# Patient Record
Sex: FEMALE | Race: WHITE | NOT HISPANIC OR LATINO | Employment: OTHER | URBAN - METROPOLITAN AREA
[De-identification: names, ages, dates, MRNs, and addresses within clinical notes are randomized per-mention and may not be internally consistent; named-entity substitution may affect disease eponyms.]

---

## 2017-03-06 RX ORDER — NITROFURANTOIN MACROCRYSTALS 50 MG/1
CAPSULE ORAL
Qty: 30 CAPSULE | Refills: 11 | OUTPATIENT
Start: 2017-03-06

## 2017-03-06 RX ORDER — OSPEMIFENE 60 MG/1
TABLET, FILM COATED ORAL
Qty: 30 TABLET | Refills: 11 | OUTPATIENT
Start: 2017-03-06

## 2017-03-06 RX ORDER — ACYCLOVIR 400 MG/1
TABLET ORAL
Qty: 30 TABLET | Refills: 11 | OUTPATIENT
Start: 2017-03-06

## 2017-03-08 RX ORDER — OSPEMIFENE 60 MG/1
TABLET, FILM COATED ORAL
Qty: 30 TABLET | Refills: 11 | OUTPATIENT
Start: 2017-03-08

## 2017-03-08 RX ORDER — ACYCLOVIR 400 MG/1
TABLET ORAL
Qty: 30 TABLET | Refills: 11 | OUTPATIENT
Start: 2017-03-08

## 2017-03-08 RX ORDER — NITROFURANTOIN MACROCRYSTALS 50 MG/1
CAPSULE ORAL
Qty: 30 CAPSULE | Refills: 11 | OUTPATIENT
Start: 2017-03-08

## 2017-03-10 RX ORDER — OSPEMIFENE 60 MG/1
TABLET, FILM COATED ORAL
Qty: 30 TABLET | Refills: 11 | OUTPATIENT
Start: 2017-03-10

## 2017-03-10 RX ORDER — ACYCLOVIR 400 MG/1
TABLET ORAL
Qty: 30 TABLET | Refills: 11 | OUTPATIENT
Start: 2017-03-10

## 2017-03-10 RX ORDER — NITROFURANTOIN MACROCRYSTALS 50 MG/1
CAPSULE ORAL
Qty: 30 CAPSULE | Refills: 11 | OUTPATIENT
Start: 2017-03-10

## 2017-03-29 ENCOUNTER — OFFICE VISIT (OUTPATIENT)
Dept: OBSTETRICS AND GYNECOLOGY | Facility: CLINIC | Age: 62
End: 2017-03-29

## 2017-03-29 VITALS
SYSTOLIC BLOOD PRESSURE: 132 MMHG | DIASTOLIC BLOOD PRESSURE: 86 MMHG | WEIGHT: 190.6 LBS | HEIGHT: 61 IN | BODY MASS INDEX: 35.98 KG/M2

## 2017-03-29 DIAGNOSIS — N94.11 SUPERFICIAL DYSPAREUNIA: Primary | ICD-10-CM

## 2017-03-29 DIAGNOSIS — Z78.0 MENOPAUSE: ICD-10-CM

## 2017-03-29 DIAGNOSIS — A60.00 RECURRENT GENITAL HERPES: ICD-10-CM

## 2017-03-29 DIAGNOSIS — Z01.419 ENCOUNTER FOR GYNECOLOGICAL EXAMINATION WITHOUT ABNORMAL FINDING: ICD-10-CM

## 2017-03-29 PROCEDURE — 99396 PREV VISIT EST AGE 40-64: CPT | Performed by: OBSTETRICS & GYNECOLOGY

## 2017-03-29 RX ORDER — ACYCLOVIR 400 MG/1
400 TABLET ORAL DAILY
Qty: 90 TABLET | Refills: 3 | Status: SHIPPED | OUTPATIENT
Start: 2017-03-29 | End: 2018-05-29 | Stop reason: SDUPTHER

## 2017-03-29 RX ORDER — FLUTICASONE PROPIONATE 50 MCG
2 SPRAY, SUSPENSION (ML) NASAL DAILY
COMMUNITY

## 2017-03-29 RX ORDER — PSEUDOEPHEDRINE HCL 30 MG
30 TABLET ORAL EVERY 4 HOURS PRN
COMMUNITY
End: 2021-01-13

## 2017-03-29 RX ORDER — PHENTERMINE HYDROCHLORIDE 37.5 MG/1
37.5 CAPSULE ORAL EVERY MORNING
COMMUNITY
End: 2019-09-24

## 2017-03-29 NOTE — PROGRESS NOTES
"   Chief Complaint   Patient presents with   • Gynecologic Exam     intermittent lower abd pain   • Med Refill       Park Ray is a 61 y.o. year old  presenting to be seen for her annual exam.  This patient has previously had an abdominal hysterectomy left salpingo-oophorectomy by Dr. Burgos.  He subsequently had a right salpingo-oophorectomy in .  Dr. 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  She has recently had laparoscopic cholecystectomy.  Takes Osphena for superficial dyspareunia.  She takes daily acyclovir for recurrent genital HSV.  She denies menopausal symptoms.  She has complaints of urinary urgency and incontinence.  She had simple uroflowmetry in 2016 in my office with a Bonne' test revealing a 10° deviation from baseline and a PVR of 15 mL.     SCREENING TESTS    Year 2012   Age                         PAP                         HPV high risk                         Mammogram     benign                    JOSÉ MANUEL score                         Breast MRI                         Lipids                         Vitamin D                         Colonoscopy                         DEXA  Frax (hip/any)   normal                      Ovarian Screen                           Enter the month test was performed.  If month not known, enter \"X'  · Black numbers = normal results  · Red numbers = abnormal results  · Black X = patient reported normal  · Red X - patient reported abnormal      Referred by:    Profession:    Other info:         History   Sexual Activity   • Sexual activity: Yes   • Partners: Male   • Birth control/ protection: Post-menopausal, Surgical    She would not like to be screened for STD's at today's exam.     She exercises regularly: no.  She wears her seat belt: yes.  She has concerns about domestic " violence: no.  She has noticed changes in height: no    GYN screening history:  · Last mammogram: was done on approximately 10/14/2016 and the result was: Birads I (Normal).  · Last DEXA: was done on approximately 2014 and the results were: normal.    No Additional Complaints Reported    The following portions of the patient's history were reviewed and updated as appropriate:vital signs and   She  does not have any pertinent problems on file.  She  has a past surgical history that includes Cholecystectomy; Bowel resection; Rectal surgery; Pelvic laparoscopy; Total abdominal hysterectomy w/ bilateral salpingoophorectomy (Left); Salpingoophorectomy (Right); Uterine Suspension; Colectomy partial / total; MMK Procedure; Enterocele repair; and  section.  Her family history includes Brain cancer in her brother; Breast cancer in her mother; Cancer in her father; Diabetes in her brother; Hypertension in her brother and sister.  She  reports that she has never smoked. She does not have any smokeless tobacco history on file. She reports that she drinks alcohol. She reports that she does not use illicit drugs.  Current Outpatient Prescriptions   Medication Sig Dispense Refill   • Cholecalciferol (VITAMIN D-400 PO) Take 200 Units by mouth Daily.     • fluticasone (FLONASE) 50 MCG/ACT nasal spray 2 sprays into each nostril Daily.     • furosemide (LASIX) 40 MG tablet Take 40 mg by mouth as needed.     • levothyroxine (SYNTHROID, LEVOTHROID) 25 MCG tablet Take 25 mcg by mouth daily.     • Multiple Vitamins-Minerals (MULTIVITAMIN ADULT PO) Take 1 tablet by mouth daily.     • phentermine 37.5 MG capsule Take 37.5 mg by mouth Every Morning.     • pravastatin (PRAVACHOL) 40 MG tablet Take 40 mg by mouth daily.     • pseudoephedrine (SUDAFED) 30 MG tablet Take 30 mg by mouth Every 4 (Four) Hours As Needed for Congestion.     • Vitamin A 53783 UNITS tablet Take 10,000 tablets by mouth daily.     • vitamin E 400 UNIT capsule  "Take 400 Units by mouth daily.     • acyclovir (ZOVIRAX) 400 MG tablet Take 1 tablet by mouth Daily. Take no more than 5 doses a day. 90 tablet 3   • Ospemifene 60 MG tablet Take 60 mg by mouth Daily. 90 tablet 3     No current facility-administered medications for this visit.      She is allergic to tetracyclines & related..    Review of Systems  A comprehensive review of systems was negative.  Constitutional: negative for fever, chills, activity change, appetite change, fatigue and unexpected weight change.  Respiratory: negative  Cardiovascular: negative  Gastrointestinal: positive for abdominal pain  Genitourinary:positive for urinary incontinence and urgency  Musculoskeletal:negative  Behavioral/Psych: negative       /86  Ht 61\" (154.9 cm)  Wt 190 lb 9.6 oz (86.5 kg)  LMP  (LMP Unknown)  BMI 36.01 kg/m2    Physical Exam    General:  alert; cooperative; well developed; well nourished   Skin:  No suspicious lesions seen   Thyroid: normal to inspection and palpation   Lungs:  clear to auscultation bilaterally   Heart:  regular rate and rhythm, S1, S2 normal, no murmur, click, rub or gallop   Breasts:  Examined in supine position  Symmetric without masses or skin dimpling  Nipples normal without inversion, lesions or discharge  There are no palpable axillary nodes   Abdomen: soft, non-tender; no masses  no umbilical or inginual hernias are present  no hepato-splenomegaly  scars   Pelvis: Clinical staff was present for exam  External genitalia:  normal appearance of the external genitalia including Bartholin's and [place]'s glands.  Vaginal:  normal pink mucosa without prolapse or lesions.  Cervix:  absent.  Uterus:  absent.  Adnexa:  absent, bilateral.  Rectal:  anus visually normal appearing. recto-vaginal exam unremarkable and confirms findings;     Lab Review   No data reviewed    Imaging  Mammogram results- benign         ASSESSMENT  Problems Addressed this Visit        Nervous and Auditory    " Superficial dyspareunia - Primary    Relevant Medications    Ospemifene 60 MG tablet       Genitourinary    Menopause    Recurrent genital herpes    Relevant Medications    acyclovir (ZOVIRAX) 400 MG tablet      Other Visit Diagnoses     Encounter for gynecological examination without abnormal finding              PLAN    Medications prescribed this encounter:    New Medications Ordered This Visit   Medications   • phentermine 37.5 MG capsule     Sig: Take 37.5 mg by mouth Every Morning.   • fluticasone (FLONASE) 50 MCG/ACT nasal spray     Si sprays into each nostril Daily.   • pseudoephedrine (SUDAFED) 30 MG tablet     Sig: Take 30 mg by mouth Every 4 (Four) Hours As Needed for Congestion.   • Cholecalciferol (VITAMIN D-400 PO)     Sig: Take 200 Units by mouth Daily.   • acyclovir (ZOVIRAX) 400 MG tablet     Sig: Take 1 tablet by mouth Daily. Take no more than 5 doses a day.     Dispense:  90 tablet     Refill:  3   • Ospemifene 60 MG tablet     Sig: Take 60 mg by mouth Daily.     Dispense:  90 tablet     Refill:  3   ·   · Calcium, 600 mg/ Vit. D, 400 IU daily; regular weight-bearing exercise  · Follow up: 12 month(s)  *Please note that portions of this documentation may have been completed with a voice recognition program.  Efforts were made to edit this dictation, but occasional words may have been mistranscribed.       This note was electronically signed.    SHARON Bishop MD  2017  3:35 PM

## 2017-03-30 RX ORDER — NITROFURANTOIN MACROCRYSTALS 50 MG/1
50 CAPSULE ORAL DAILY
Qty: 90 CAPSULE | Refills: 3 | Status: SHIPPED | OUTPATIENT
Start: 2017-03-30 | End: 2018-07-25 | Stop reason: SDUPTHER

## 2017-03-30 NOTE — TELEPHONE ENCOUNTER
Pt asking for refill on Macrodantin 50 mg - takes daily.  She forgot when here yesterday. Wants  90 day supply sent to [facility] in [place]. Ok 'd per Dr Bishop.

## 2017-06-01 ENCOUNTER — APPOINTMENT (OUTPATIENT)
Dept: GENERAL RADIOLOGY | Facility: HOSPITAL | Age: 62
End: 2017-06-01

## 2017-06-01 ENCOUNTER — HOSPITAL ENCOUNTER (EMERGENCY)
Facility: HOSPITAL | Age: 62
Discharge: HOME | End: 2017-06-01
Attending: EMERGENCY MEDICINE | Admitting: EMERGENCY MEDICINE

## 2017-06-01 ENCOUNTER — APPOINTMENT (OUTPATIENT)
Dept: CT IMAGING | Facility: HOSPITAL | Age: 62
End: 2017-06-01

## 2017-06-01 VITALS
HEART RATE: 75 BPM | TEMPERATURE: 98 F | OXYGEN SATURATION: 96 % | RESPIRATION RATE: 16 BRPM | HEIGHT: 62 IN | DIASTOLIC BLOOD PRESSURE: 79 MMHG | WEIGHT: 160 LBS | SYSTOLIC BLOOD PRESSURE: 125 MMHG | BODY MASS INDEX: 29.44 KG/M2
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
## 2017-06-01 DIAGNOSIS — S52.125A CLOSED NONDISPLACED FRACTURE OF HEAD OF LEFT RADIUS, INITIAL ENCOUNTER: ICD-10-CM

## 2017-06-01 DIAGNOSIS — S90.32XA CONTUSION OF LEFT FOOT, INITIAL ENCOUNTER: ICD-10-CM

## 2017-06-01 DIAGNOSIS — S42.022A CLOSED DISPLACED FRACTURE OF SHAFT OF LEFT CLAVICLE, INITIAL ENCOUNTER: Primary | ICD-10-CM

## 2017-06-01 LAB
ALBUMIN SERPL-MCNC: 4.3 G/DL (ref 3.2–4.8)
ALBUMIN/GLOB SERPL: 1.7 G/DL (ref 1.5–2.5)
ALP SERPL-CCNC: 99 U/L (ref 25–100)
ALT SERPL W P-5'-P-CCNC: 29 U/L (ref 7–40)
ANION GAP SERPL CALCULATED.3IONS-SCNC: 2 MMOL/L (ref 3–11)
APTT PPP: 26.5 SECONDS (ref 24–31)
AST SERPL-CCNC: 30 U/L (ref 0–33)
BACTERIA UR QL AUTO: ABNORMAL /HPF
BASOPHILS # BLD AUTO: 0.02 10*3/MM3 (ref 0–0.2)
BASOPHILS NFR BLD AUTO: 0.2 % (ref 0–1)
BILIRUB SERPL-MCNC: 0.6 MG/DL (ref 0.3–1.2)
BILIRUB UR QL STRIP: NEGATIVE
BUN BLD-MCNC: 14 MG/DL (ref 9–23)
BUN/CREAT SERPL: 28 (ref 7–25)
CALCIUM SPEC-SCNC: 9.5 MG/DL (ref 8.7–10.4)
CHLORIDE SERPL-SCNC: 102 MMOL/L (ref 99–109)
CLARITY UR: ABNORMAL
CO2 SERPL-SCNC: 35 MMOL/L (ref 20–31)
COD CRY URNS QL: ABNORMAL /HPF
COLOR UR: ABNORMAL
CREAT BLD-MCNC: 0.5 MG/DL (ref 0.6–1.3)
DEPRECATED RDW RBC AUTO: 43.1 FL (ref 37–54)
EOSINOPHIL # BLD AUTO: 0.12 10*3/MM3 (ref 0.1–0.3)
EOSINOPHIL NFR BLD AUTO: 1.1 % (ref 0–3)
ERYTHROCYTE [DISTWIDTH] IN BLOOD BY AUTOMATED COUNT: 13.6 % (ref 11.3–14.5)
GFR SERPL CREATININE-BSD FRML MDRD: 125 ML/MIN/1.73
GLOBULIN UR ELPH-MCNC: 2.5 GM/DL
GLUCOSE BLD-MCNC: 88 MG/DL (ref 70–100)
GLUCOSE UR STRIP-MCNC: NEGATIVE MG/DL
HCT VFR BLD AUTO: 41.6 % (ref 34.5–44)
HGB BLD-MCNC: 13.6 G/DL (ref 11.5–15.5)
HGB UR QL STRIP.AUTO: NEGATIVE
HYALINE CASTS UR QL AUTO: ABNORMAL /LPF
IMM GRANULOCYTES # BLD: 0.04 10*3/MM3 (ref 0–0.03)
IMM GRANULOCYTES NFR BLD: 0.4 % (ref 0–0.6)
INR PPP: 0.92
KETONES UR QL STRIP: NEGATIVE
LEUKOCYTE ESTERASE UR QL STRIP.AUTO: ABNORMAL
LIPASE SERPL-CCNC: 21 U/L (ref 6–51)
LYMPHOCYTES # BLD AUTO: 1.85 10*3/MM3 (ref 0.6–4.8)
LYMPHOCYTES NFR BLD AUTO: 16.9 % (ref 24–44)
MCH RBC QN AUTO: 28.6 PG (ref 27–31)
MCHC RBC AUTO-ENTMCNC: 32.7 G/DL (ref 32–36)
MCV RBC AUTO: 87.6 FL (ref 80–99)
MONOCYTES # BLD AUTO: 0.74 10*3/MM3 (ref 0–1)
MONOCYTES NFR BLD AUTO: 6.8 % (ref 0–12)
NEUTROPHILS # BLD AUTO: 8.18 10*3/MM3 (ref 1.5–8.3)
NEUTROPHILS NFR BLD AUTO: 74.6 % (ref 41–71)
NITRITE UR QL STRIP: NEGATIVE
PH UR STRIP.AUTO: <=5 [PH] (ref 5–8)
PLATELET # BLD AUTO: 236 10*3/MM3 (ref 150–450)
PMV BLD AUTO: 10.1 FL (ref 6–12)
POTASSIUM BLD-SCNC: 3.6 MMOL/L (ref 3.5–5.5)
PROT SERPL-MCNC: 6.8 G/DL (ref 5.7–8.2)
PROT UR QL STRIP: NEGATIVE
PROTHROMBIN TIME: 10 SECONDS (ref 9.6–11.5)
RBC # BLD AUTO: 4.75 10*6/MM3 (ref 3.89–5.14)
RBC # UR: ABNORMAL /HPF
REF LAB TEST METHOD: ABNORMAL
SODIUM BLD-SCNC: 139 MMOL/L (ref 132–146)
SP GR UR STRIP: >=1.03 (ref 1–1.03)
SQUAMOUS #/AREA URNS HPF: ABNORMAL /HPF
UROBILINOGEN UR QL STRIP: ABNORMAL
WBC NRBC COR # BLD: 10.95 10*3/MM3 (ref 3.5–10.8)
WBC UR QL AUTO: ABNORMAL /HPF
YEAST URNS QL MICRO: ABNORMAL /HPF

## 2017-06-01 PROCEDURE — 83690 ASSAY OF LIPASE: CPT | Performed by: EMERGENCY MEDICINE

## 2017-06-01 PROCEDURE — 96374 THER/PROPH/DIAG INJ IV PUSH: CPT

## 2017-06-01 PROCEDURE — 85025 COMPLETE CBC W/AUTO DIFF WBC: CPT | Performed by: EMERGENCY MEDICINE

## 2017-06-01 PROCEDURE — 73030 X-RAY EXAM OF SHOULDER: CPT

## 2017-06-01 PROCEDURE — 73110 X-RAY EXAM OF WRIST: CPT

## 2017-06-01 PROCEDURE — 25010000002 ONDANSETRON PER 1 MG: Performed by: EMERGENCY MEDICINE

## 2017-06-01 PROCEDURE — 72125 CT NECK SPINE W/O DYE: CPT

## 2017-06-01 PROCEDURE — 85610 PROTHROMBIN TIME: CPT | Performed by: EMERGENCY MEDICINE

## 2017-06-01 PROCEDURE — 25010000002 HYDROMORPHONE PER 4 MG: Performed by: EMERGENCY MEDICINE

## 2017-06-01 PROCEDURE — 73090 X-RAY EXAM OF FOREARM: CPT

## 2017-06-01 PROCEDURE — 99284 EMERGENCY DEPT VISIT MOD MDM: CPT

## 2017-06-01 PROCEDURE — 96376 TX/PRO/DX INJ SAME DRUG ADON: CPT

## 2017-06-01 PROCEDURE — 70450 CT HEAD/BRAIN W/O DYE: CPT

## 2017-06-01 PROCEDURE — 73060 X-RAY EXAM OF HUMERUS: CPT

## 2017-06-01 PROCEDURE — 80053 COMPREHEN METABOLIC PANEL: CPT | Performed by: EMERGENCY MEDICINE

## 2017-06-01 PROCEDURE — 85730 THROMBOPLASTIN TIME PARTIAL: CPT | Performed by: EMERGENCY MEDICINE

## 2017-06-01 PROCEDURE — 81001 URINALYSIS AUTO W/SCOPE: CPT | Performed by: EMERGENCY MEDICINE

## 2017-06-01 PROCEDURE — 87086 URINE CULTURE/COLONY COUNT: CPT | Performed by: EMERGENCY MEDICINE

## 2017-06-01 PROCEDURE — 96375 TX/PRO/DX INJ NEW DRUG ADDON: CPT

## 2017-06-01 PROCEDURE — 71020 HC CHEST PA AND LATERAL: CPT

## 2017-06-01 PROCEDURE — 73630 X-RAY EXAM OF FOOT: CPT

## 2017-06-01 RX ORDER — HYDROMORPHONE HYDROCHLORIDE 1 MG/ML
0.5 INJECTION, SOLUTION INTRAMUSCULAR; INTRAVENOUS; SUBCUTANEOUS ONCE
Status: COMPLETED | OUTPATIENT
Start: 2017-06-01 | End: 2017-06-01

## 2017-06-01 RX ORDER — ONDANSETRON 2 MG/ML
4 INJECTION INTRAMUSCULAR; INTRAVENOUS ONCE
Status: COMPLETED | OUTPATIENT
Start: 2017-06-01 | End: 2017-06-01

## 2017-06-01 RX ORDER — ONDANSETRON HYDROCHLORIDE 8 MG/1
8 TABLET, FILM COATED ORAL EVERY 8 HOURS PRN
Qty: 15 TABLET | Refills: 0 | Status: SHIPPED | OUTPATIENT
Start: 2017-06-01 | End: 2018-05-29

## 2017-06-01 RX ORDER — CYCLOBENZAPRINE HCL 10 MG
10 TABLET ORAL ONCE
Status: COMPLETED | OUTPATIENT
Start: 2017-06-01 | End: 2017-06-01

## 2017-06-01 RX ORDER — OXYCODONE HYDROCHLORIDE AND ACETAMINOPHEN 5; 325 MG/1; MG/1
1-2 TABLET ORAL EVERY 6 HOURS PRN
Qty: 30 TABLET | Refills: 0 | Status: SHIPPED | OUTPATIENT
Start: 2017-06-01 | End: 2018-05-29

## 2017-06-01 RX ORDER — SODIUM CHLORIDE 0.9 % (FLUSH) 0.9 %
10 SYRINGE (ML) INJECTION AS NEEDED
Status: DISCONTINUED | OUTPATIENT
Start: 2017-06-01 | End: 2017-06-01 | Stop reason: HOSPADM

## 2017-06-01 RX ADMIN — HYDROMORPHONE HYDROCHLORIDE 0.5 MG: 1 INJECTION, SOLUTION INTRAMUSCULAR; INTRAVENOUS; SUBCUTANEOUS at 13:46

## 2017-06-01 RX ADMIN — HYDROMORPHONE HYDROCHLORIDE 1 MG: 1 INJECTION, SOLUTION INTRAMUSCULAR; INTRAVENOUS; SUBCUTANEOUS at 14:30

## 2017-06-01 RX ADMIN — HYDROMORPHONE HYDROCHLORIDE 0.5 MG: 1 INJECTION, SOLUTION INTRAMUSCULAR; INTRAVENOUS; SUBCUTANEOUS at 12:42

## 2017-06-01 RX ADMIN — ONDANSETRON 4 MG: 2 INJECTION INTRAMUSCULAR; INTRAVENOUS at 12:41

## 2017-06-01 RX ADMIN — CYCLOBENZAPRINE HYDROCHLORIDE 10 MG: 10 TABLET, FILM COATED ORAL at 12:42

## 2017-06-01 RX ADMIN — HYDROMORPHONE HYDROCHLORIDE 0.5 MG: 1 INJECTION, SOLUTION INTRAMUSCULAR; INTRAVENOUS; SUBCUTANEOUS at 16:09

## 2017-06-01 NOTE — DISCHARGE INSTRUCTIONS
Call Dr. Roger today to schedule an appointment, follow up within 1 week.     Leave in your sling until follow-up with Ace wrap on the arm    Wear a firm soled shoe until seen by Dr. Roger    No vigorous activity.  Off work for the next week with no traction or lifting with either hand.  Reevaluation of the right hand in the office next week and all fracture and traumatic areas as well as the left foot.    Immediate return if any acute symptoms or any neurologic symptoms as discussed.  Use over-the-counter ibuprofen as needed in addition to the narcotics.  Start MiraLAX if you become constipated.    Information regarding Risks and Benefits When using Opioids and Other Controlled Substances to include Storage and Disposal of Medications    When considering the use of opioids and other controlled substances for the control of pain, anxiety, or for other medical purposes, you need to know of not only the benefits of these drugs but also of potential risks in using these drugs. These drugs, as well as more drugs, have beneficial uses; which is why their use is being considered in your   care, but they have risks involved in their use, too.    Opioids:  Opioids such as hydrocodone, oxycodone, hydromorphone, and codeine are pain relieving drugs, some more potent than others. They are most useful for moderate to more severe painful conditions. Risks include sedation, loss of coordination, decreased concentration, and decreased breathing with possibility of loss of consciousness or even death, especially if used in doses higher than prescribed. Improper usage can lead to addiction, tolerance, or overdose. In addition, many of these drugs are combined with acetaminophen (Tylenol) which can damage or destroy our liver when used excessively.  Alternatives to opioids are useful for mild to moderate pain and include ibuprofen (Motrin), naproxen (Aleve), aspirin, and acetaminophen (Tylenol). As with other drugs, these medications  should be used according to directions on the label or from your doctor, as overuse can cause harm.    Benzodiazepines:  This group of drugs include: alprazolam (Xanax), diazepam (Valium), lorazepam (Ativan), and clonazepam (Klonopin). These drugs are used to control anxiety symptoms including anxiety and panic attacks. Risks using these drugs include: sedation, loss of coordination, decreased ability to concentrate, effects on memory, and decreased breathing with possibility of loss of consciousness or even death. Improper and prolonged usage can lead to addiction. An alternative without addiction potential is hydroxyzine (Vistrail).    Other Controlled Substance:  This group includes Soma, Tramadol, stimulant drugs such as Ritalin, and others. Stimulant drugs are not medications that are prescribed by ER doctors. Soma and Tramadol have sedative and addictive affects similar to opioids with the same dangers mentioned with them.    Overdose:  If you or someone else are concerned that overdose has occurred, call 911 for transportation to the nearest hospital.    Storage and Disposal:  All medications need to be kept out of the reach of children or adults who cannot manage their own medicines. In addition, controlled substances can be targeted by criminals so extra precautions need to be taken to keep them in a safe, secure place. Any unused medications should be disposed of by flushing them down the toilet in the home setting or contact your local pharmacy.

## 2017-06-01 NOTE — ED PROVIDER NOTES
Subjective   HPI Comments: Park Ray is a 62 y.o.female who presents to the ED with c/o s/p fall today at 0400. She reports that at 0400 today she got up to turn on the light on the way to the bathroom when she fell over her dog and down a flight of stairs. She states that she hit her head and left shoulder on the wall but denies any LOC. She reports it took her an hour to get down the stairs secondary to pain. Additionally she complains of blurry vision, a headache across her temples which extends down the left side of her neck, pain in the toes on her left foot, mild numbness in her left hand, and sharp, intermittent abdominal pain in the right ribcage region. She is unable to lift her left arm in the ED secondary to pain. She denies nausea, vomiting, diarrhea, changes in thought, vision, or speech, or any other acute complaints at this time.          Patient is a 62 y.o. female presenting with fall.   History provided by:  Patient  Fall   Mechanism of injury: fall    Injury location:  Head/neck, shoulder/arm and foot  Shoulder/arm injury location:  L forearm and R hand  Foot injury location:  L foot  Incident location:  Home  Arrived directly from scene: no    Fall:     Fall occurred:  Down stairs    Impact surface:  Hard floor    Point of impact:  Head    Entrapped after fall: no    Suspicion of alcohol use: no    Suspicion of drug use: no    Prior to arrival data:     Blood loss:  None    Responsiveness at scene:  Alert    Orientation at scene:  Person, place, situation and time    Loss of consciousness: no      Amnesic to event: no      Airway interventions:  None    Breathing interventions:  None    IV access status:  None    Fluids administered:  None    Cardiac interventions:  None    Immobilization:  None  Associated symptoms: abdominal pain (intermittent)    Associated symptoms: no back pain, no chest pain, no nausea, no neck pain and no vomiting        Review of Systems   Constitutional: Negative  for chills and fever.   HENT: Negative for rhinorrhea and sore throat.    Respiratory: Positive for shortness of breath.    Cardiovascular: Negative for chest pain.   Gastrointestinal: Positive for abdominal pain (intermittent). Negative for nausea and vomiting.   Musculoskeletal: Negative for back pain and neck pain.        Pain in left forearm, left toes, and right wrist   Neurological:        Blurry vision   All other systems reviewed and are negative.      Past Medical History:   Diagnosis Date   • Colon cancer    • Dyspareunia    • Family history of breast cancer in mother    • Fatty liver    • Herpes    • Hypothyroidism    • Menopause    • Mixed incontinence urge and stress    • OAB (overactive bladder)        Allergies   Allergen Reactions   • Tetracyclines & Related Hives       Past Surgical History:   Procedure Laterality Date   •  SECTION     • CHOLECYSTECTOMY     • COLECTOMY PARTIAL / TOTAL     • COLON RESECTION     • ENTEROCELE REPAIR     • DAWSON MORA ([facility]) PROCEDURE     • PELVIC LAPAROSCOPY     • RECTAL SURGERY     • SALPINGO OOPHORECTOMY Right    • TOTAL ABDOMINAL HYSTERECTOMY WITH SALPINGO OOPHORECTOMY Left    • UTERINE SUSPENSION LAPAROSCOPIC         Family History   Problem Relation Age of Onset   • Cancer Father    • Breast cancer Mother    • Brain cancer Brother    • Hypertension Brother    • Diabetes Brother    • Hypertension Sister        Social History     Social History   • Marital status:      Spouse name: N/A   • Number of children: N/A   • Years of education: N/A     Social History Main Topics   • Smoking status: Never Smoker   • Smokeless tobacco: None   • Alcohol use Yes      Comment: OCCASIONALLY   • Drug use: No   • Sexual activity: Yes     Partners: Male     Birth control/ protection: Post-menopausal, Surgical     Other Topics Concern   • None     Social History Narrative    Lives with her          Objective   Physical Exam   Constitutional: She is  oriented to person, place, and time. She appears well-developed and well-nourished.   HENT:   Head: Normocephalic.   Mouth/Throat: Oropharynx is clear and moist.   No bruising visible on head. Head and scalp non-tender.    Eyes: Conjunctivae are normal. Pupils are equal, round, and reactive to light.   Neck: Normal range of motion. Neck supple.   Cardiovascular: Normal rate, regular rhythm, normal heart sounds and intact distal pulses.  Exam reveals no gallop and no friction rub.    No murmur heard.  Pulmonary/Chest: Effort normal and breath sounds normal. No respiratory distress. She has no wheezes. She has no rales.   Clear bilaterally.    Abdominal: Soft. She exhibits no distension. There is no tenderness.   Musculoskeletal: She exhibits tenderness. She exhibits no edema.   Left shoulder ttp over the entire clavical. Bilateral trapezius tautness, left greater than right. TTP left 5th digit.    Neurological: She is alert and oriented to person, place, and time.   Visual fileds including finger counting intact. Sensation intact in the left upper and lower extremities.    Skin: Skin is warm and dry.   Psychiatric: She has a normal mood and affect. Her behavior is normal.   Nursing note and vitals reviewed.      Procedures         ED Course  ED Course   Comment By Time   Dr. Collins updated patient on imaging results and course of care. All are agreeable with the plan. She requested Dr. Roger for ortho follow up. Piyush BOBBY Rand 06/01 3076   I discussed the findings with Dr. Morgan on for Dr. Roger.  He is just an Ace wrap for the radial head fracture sling for the clavicle fracture and will have her followed up in the office for orthopedic careI discussed findings at length with patient.  I discussed primary care follow-up in view of her closed head injury though she doesn't meet clear concussive criteria she certainly had some symptoms after closed head injury and will have her doctor in [place] follow her up next  week.  I discussed immediate return to the ED with any neurologic symptoms.  I discussed orthopedic care with her and pain medication suggested she take MiraLAX if she becomes constipated.I discussed precautions at length with patient and family and indications for immediate return to the EDVery pleasant and responsible patient and family verbalized understanding with the plan of care. Andrei Collins MD 06/01 5625   Discussed findings at length with patient and spouse.  Discussed needed follow-up.  I discussed her possible left fifth digit fracture as well, and is only mildly tender on last reevaluation now.  She has no clear fracture of her right wrist and proximal hand or distal forearm, however I discussed early follow-up if she has continued discomfort, and needed follow-up within the next week with the [facility] anyway.  I discussed off work for the next week with no lifting or traction with either hand.  Firm soled shoe for the next week.  I discussed immediate return again with any neurologic symptomsVery pleasant and responsible patient and spouse verbalized understanding and agreement with the plan of care. Andrei Collins MD 06/01 8347     Recent Results (from the past 24 hour(s))   Comprehensive Metabolic Panel    Collection Time: 06/01/17 12:35 PM   Result Value Ref Range    Glucose 88 70 - 100 mg/dL    BUN 14 9 - 23 mg/dL    Creatinine 0.50 (L) 0.60 - 1.30 mg/dL    Sodium 139 132 - 146 mmol/L    Potassium 3.6 3.5 - 5.5 mmol/L    Chloride 102 99 - 109 mmol/L    CO2 35.0 (H) 20.0 - 31.0 mmol/L    Calcium 9.5 8.7 - 10.4 mg/dL    Total Protein 6.8 5.7 - 8.2 g/dL    Albumin 4.30 3.20 - 4.80 g/dL    ALT (SGPT) 29 7 - 40 U/L    AST (SGOT) 30 0 - 33 U/L    Alkaline Phosphatase 99 25 - 100 U/L    Total Bilirubin 0.6 0.3 - 1.2 mg/dL    eGFR Non African Amer 125 >60 mL/min/1.73    Globulin 2.5 gm/dL    A/G Ratio 1.7 1.5 - 2.5 g/dL    BUN/Creatinine Ratio 28.0 (H) 7.0 - 25.0    Anion Gap 2.0 (L) 3.0 - 11.0 mmol/L    Protime-INR    Collection Time: 06/01/17 12:35 PM   Result Value Ref Range    Protime 10.0 9.6 - 11.5 Seconds    INR 0.92    aPTT    Collection Time: 06/01/17 12:35 PM   Result Value Ref Range    PTT 26.5 24.0 - 31.0 seconds   Lipase    Collection Time: 06/01/17 12:35 PM   Result Value Ref Range    Lipase 21 6 - 51 U/L   CBC Auto Differential    Collection Time: 06/01/17 12:35 PM   Result Value Ref Range    WBC 10.95 (H) 3.50 - 10.80 10*3/mm3    RBC 4.75 3.89 - 5.14 10*6/mm3    Hemoglobin 13.6 11.5 - 15.5 g/dL    Hematocrit 41.6 34.5 - 44.0 %    MCV 87.6 80.0 - 99.0 fL    MCH 28.6 27.0 - 31.0 pg    MCHC 32.7 32.0 - 36.0 g/dL    RDW 13.6 11.3 - 14.5 %    RDW-SD 43.1 37.0 - 54.0 fl    MPV 10.1 6.0 - 12.0 fL    Platelets 236 150 - 450 10*3/mm3    Neutrophil % 74.6 (H) 41.0 - 71.0 %    Lymphocyte % 16.9 (L) 24.0 - 44.0 %    Monocyte % 6.8 0.0 - 12.0 %    Eosinophil % 1.1 0.0 - 3.0 %    Basophil % 0.2 0.0 - 1.0 %    Immature Grans % 0.4 0.0 - 0.6 %    Neutrophils, Absolute 8.18 1.50 - 8.30 10*3/mm3    Lymphocytes, Absolute 1.85 0.60 - 4.80 10*3/mm3    Monocytes, Absolute 0.74 0.00 - 1.00 10*3/mm3    Eosinophils, Absolute 0.12 0.10 - 0.30 10*3/mm3    Basophils, Absolute 0.02 0.00 - 0.20 10*3/mm3    Immature Grans, Absolute 0.04 (H) 0.00 - 0.03 10*3/mm3   Urinalysis With / Culture If Indicated    Collection Time: 06/01/17  1:30 PM   Result Value Ref Range    Color, UA Dark Yellow (A) Yellow, Straw    Appearance, UA Cloudy (A) Clear    pH, UA <=5.0 5.0 - 8.0    Specific Gravity, UA >=1.030 1.001 - 1.030    Glucose, UA Negative Negative    Ketones, UA Negative Negative    Bilirubin, UA Negative Negative    Blood, UA Negative Negative    Protein, UA Negative Negative    Leuk Esterase, UA Small (1+) (A) Negative    Nitrite, UA Negative Negative    Urobilinogen, UA 0.2 E.U./dL 0.2 - 1.0 E.U./dL   Urinalysis, Microscopic Only    Collection Time: 06/01/17  1:30 PM   Result Value Ref Range    RBC, UA None Seen None Seen, 0-2  /HPF    WBC, UA 3-5 (A) None Seen /HPF    Bacteria, UA None Seen None Seen, Trace /HPF    Squamous Epithelial Cells, UA 3-6 (A) None Seen, 0-2 /HPF    Yeast, UA Small/1+ Budding Yeast None Seen /HPF    Hyaline Casts, UA None Seen 0 - 6 /LPF    Calcium Oxalate Crystals, UA Small/1+ None Seen /HPF    Methodology Manual Light Microscopy      Note: In addition to lab results from this visit, the labs listed above may include labs taken at another facility or during a different encounter within the last 24 hours. Please correlate lab times with ED admission and discharge times for further clarification of the services performed during this visit.    XR Foot 3+ View Left   Final Result   1.  Negative left foot series for significant acute or reproducible   evidence of a macrofracture as described above. See above comment.   2.  Midfoot and hindfoot structures are unremarkable.       D:  06/01/2017   E:  06/01/2017       This report was finalized on 6/1/2017 4:08 PM by Dr. Luigi Orozco MD.          CT Head Without Contrast   Final Result   Negative CT data set of the brain and skull.       D:  06/01/2017   E:  06/01/2017               This report was finalized on 6/1/2017 1:17 PM by Dr. Luigi Orozco MD.          CT Cervical Spine Without Contrast   Final Result   1.  Diffuse arthropathy noted throughout the cervical vertebral bodies   and the facets. Degenerative disc disease with disc space narrowing   C5-C7.   2.  Superimposed acute cervical injury, fracture or subluxation is   otherwise not identified.       D:  06/01/2017   E:  06/01/2017               This report was finalized on 6/1/2017 1:17 PM by Dr. Luigi Orozco MD.          XR Chest 2 View   Final Result   1. Slightly displaced midshaft left clavicular fracture.   2. No active disease in the chest.       D:  06/01/2017   E:  06/01/2017       This report was finalized on 6/1/2017 1:17 PM by Dr. Luigi Orozco MD.          XR Forearm 2 View Left   Final  Result   1. Compression fracture radial head without dislocation.   2. The remainder of the radius and the ulna are intact.       D:  06/01/2017   E:  06/01/2017       This report was finalized on 6/1/2017 1:17 PM by Dr. Luigi Orozco MD.          XR Shoulder 2+ View Left   Final Result   1. Negative left shoulder series.   2. Acute fracture mid shaft left clavicle.       D:  06/01/2017   E:  06/01/2017       This report was finalized on 6/1/2017 1:17 PM by Dr. Luigi Orozco MD.          XR Humerus Left   Final Result   1. Left humerus intact, no humeral or shoulder fracture or dislocation.   2. Compression fracture radial head without dislocation.       D:  06/01/2017   E:  06/01/2017       This report was finalized on 6/1/2017 1:17 PM by Dr. Luigi Orozco MD.          XR Wrist 3+ View Left   Final Result   Negative left wrist series.       D:  06/01/2017   E:  06/01/2017       This report was finalized on 6/1/2017 1:17 PM by Dr. Luigi Orozco MD.          XR Wrist 3+ View Right   Final Result   1. Poorly profiled scaphoid navicular because of projection.   2. Negative right wrist and right distal forearm otherwise.       D:  06/01/2017   E:  06/01/2017       This report was finalized on 6/1/2017 1:16 PM by Dr. Luigi Orozco MD.            Vitals:    06/01/17 1500 06/01/17 1508 06/01/17 1611 06/01/17 1646   BP: 127/70  125/79 125/79   BP Location:   Right arm Right arm   Patient Position:   Lying Sitting   Pulse: 88 94 72 75   Resp:   16 16   Temp:    98 °F (36.7 °C)   TempSrc:    Oral   SpO2: 97% 95% 96% 96%   Weight:       Height:         Medications   HYDROmorphone (DILAUDID) injection 0.5 mg (0.5 mg Intravenous Given 6/1/17 1242)   ondansetron (ZOFRAN) injection 4 mg (4 mg Intravenous Given 6/1/17 1241)   cyclobenzaprine (FLEXERIL) tablet 10 mg (10 mg Oral Given 6/1/17 1242)   HYDROmorphone (DILAUDID) injection 0.5 mg (0.5 mg Intravenous Given 6/1/17 1346)   HYDROmorphone (DILAUDID) injection 1 mg (1 mg  Intravenous Given 6/1/17 1430)   HYDROmorphone (DILAUDID) injection 0.5 mg (0.5 mg Intravenous Given 6/1/17 1609)     ECG/EMG Results (last 24 hours)     ** No results found for the last 24 hours. **                       [facility]    Final diagnoses:   Closed displaced fracture of shaft of left clavicle, initial encounter   Closed nondisplaced fracture of head of left radius, initial encounter   Fall, initial encounter   Contusion of left foot, initial encounter possible fracture       Documentation assistance provided by francesco Gordillo.  Information recorded by the francesco was done at my direction and has been verified and validated by me.     Piyush BOBBY Gordillo  06/01/17 1205       [facility]  06/01/17 1332       [facility]  06/01/17 1338       [facility]  06/01/17 1358       [facility]  06/01/17 1515       [facility]  06/01/17 1606       Andrei Collins MD  06/01/17 194

## 2017-06-03 LAB — BACTERIA SPEC AEROBE CULT: ABNORMAL

## 2017-06-04 ENCOUNTER — TELEPHONE (OUTPATIENT)
Dept: EMERGENCY DEPT | Facility: HOSPITAL | Age: 62
End: 2017-06-04

## 2017-06-05 ENCOUNTER — TRANSCRIBE ORDERS (OUTPATIENT)
Dept: ADMINISTRATIVE | Facility: HOSPITAL | Age: 62
End: 2017-06-05

## 2017-06-05 DIAGNOSIS — M25.531 ACUTE PAIN OF RIGHT WRIST: Primary | ICD-10-CM

## 2017-06-08 ENCOUNTER — APPOINTMENT (OUTPATIENT)
Dept: CT IMAGING | Facility: HOSPITAL | Age: 62
End: 2017-06-08
Attending: ORTHOPAEDIC SURGERY

## 2017-06-15 ENCOUNTER — HOSPITAL ENCOUNTER (OUTPATIENT)
Dept: CT IMAGING | Facility: HOSPITAL | Age: 62
Discharge: HOME | End: 2017-06-15
Attending: ORTHOPAEDIC SURGERY | Admitting: ORTHOPAEDIC SURGERY

## 2017-06-15 DIAGNOSIS — M25.531 ACUTE PAIN OF RIGHT WRIST: ICD-10-CM

## 2017-06-15 PROCEDURE — 73200 CT UPPER EXTREMITY W/O DYE: CPT

## 2017-10-09 ENCOUNTER — OFFICE VISIT (OUTPATIENT)
Dept: ORTHOPEDIC SURGERY | Facility: CLINIC | Age: 62
End: 2017-10-09

## 2017-10-09 ENCOUNTER — TELEPHONE (OUTPATIENT)
Dept: ORTHOPEDIC SURGERY | Facility: CLINIC | Age: 62
End: 2017-10-09

## 2017-10-09 VITALS
WEIGHT: 183 LBS | SYSTOLIC BLOOD PRESSURE: 136 MMHG | DIASTOLIC BLOOD PRESSURE: 72 MMHG | BODY MASS INDEX: 31.24 KG/M2 | HEIGHT: 64 IN | HEART RATE: 64 BPM

## 2017-10-09 DIAGNOSIS — S42.022G CLOSED DISPLACED FRACTURE OF SHAFT OF LEFT CLAVICLE WITH DELAYED HEALING, SUBSEQUENT ENCOUNTER: Primary | ICD-10-CM

## 2017-10-09 DIAGNOSIS — S52.135D CLOSED NONDISPLACED FRACTURE OF NECK OF LEFT RADIUS WITH ROUTINE HEALING, SUBSEQUENT ENCOUNTER: ICD-10-CM

## 2017-10-09 PROCEDURE — 99214 OFFICE O/P EST MOD 30 MIN: CPT | Performed by: ORTHOPAEDIC SURGERY

## 2017-10-09 RX ORDER — BIOTIN 1000 MCG
TABLET,CHEWABLE ORAL
COMMUNITY
End: 2020-12-02

## 2017-10-09 RX ORDER — OMEPRAZOLE 40 MG/1
20 CAPSULE, DELAYED RELEASE ORAL DAILY
COMMUNITY

## 2017-10-09 RX ORDER — OLOPATADINE HYDROCHLORIDE 665 UG/1
SPRAY NASAL 2 TIMES DAILY
COMMUNITY
End: 2018-05-29

## 2017-10-09 NOTE — TELEPHONE ENCOUNTER
It might be best for her primary care physician to refer her.  Otherwise, we can refer her to the ENT doctors here at [facility].

## 2017-10-09 NOTE — PROGRESS NOTES
[facility] [place] [facility] Note    Subjective     Chief Complaint   Patient presents with   • Left Clavicle - Follow-up     2 months     • Left Forearm - Follow-up     2 months          HPI    Park Ray is a 62 y.o. female. She follows up today for her left midshaft clavicle fracture, and left radial neck fracture, which she sustained on 2017.  Other injuries included a left foot proximal phalanx fracture of the small toe, which has healed.  She also injured her right wrist, but the CT scans showed no evidence of scaphoid fracture.    With regards to her left shoulder, she has minimal symptoms, with mild pain occasionally.  She is able to move her shoulder without difficulty.  She is right-hand dominant.    With regards to her left elbow, that is also improved overall since the time of her injury.  Pain does radiate from the elbow down to the forearm.  Range of motion is good today.    Patient Active Problem List   Diagnosis   • OAB (overactive bladder)   • Mixed incontinence urge and stress   • Menopause   • Hypothyroidism   • Fatty liver   • Family history of breast cancer in mother   • Superficial dyspareunia   • Colon cancer   • Recurrent genital herpes     Past Medical History:   Diagnosis Date   • Closed displaced fracture of shaft of left clavicle    • Closed nondisplaced fracture of neck of left radius    • Colon cancer    • Dyspareunia    • Family history of breast cancer in mother    • Fatty liver    • Fracture of proximal phalanx of toe    • Herpes    • Hypothyroidism    • IBS (irritable bowel syndrome)    • Left wrist pain    • Menopause    • Mixed incontinence urge and stress    • OAB (overactive bladder)    • Right wrist pain       Past Surgical History:   Procedure Laterality Date   •  SECTION     • CHOLECYSTECTOMY     • COLECTOMY PARTIAL / TOTAL     • COLON RESECTION     • ENTEROCELE REPAIR     • DAWSON MORA (MADDISON) PROCEDURE     • PELVIC LAPAROSCOPY     •  RECTAL SURGERY     • SALPINGO OOPHORECTOMY Right    • TOTAL ABDOMINAL HYSTERECTOMY WITH SALPINGO OOPHORECTOMY Left    • UTERINE SUSPENSION LAPAROSCOPIC        Family History   Problem Relation Age of Onset   • Cancer Father    • Heart disease Father    • Hypertension Father    • Osteoarthritis Father    • Breast cancer Mother    • Cancer Mother    • Brain cancer Brother    • Hypertension Brother    • Diabetes Brother    • Hypertension Sister    • Cancer Other    • Heart disease Other      Social History     Social History   • Marital status:      Spouse name: N/A   • Number of children: N/A   • Years of education: N/A     Occupational History   • Not on file.     Social History Main Topics   • Smoking status: Never Smoker   • Smokeless tobacco: Never Used   • Alcohol use Yes      Comment: OCCASIONALLY   • Drug use: No   • Sexual activity: Yes     Partners: Male     Birth control/ protection: Post-menopausal, Surgical     Other Topics Concern   • Not on file     Social History Narrative    Lives with her       Current Outpatient Prescriptions on File Prior to Visit   Medication Sig Dispense Refill   • acyclovir (ZOVIRAX) 400 MG tablet Take 1 tablet by mouth Daily. Take no more than 5 doses a day. 90 tablet 3   • Cholecalciferol (VITAMIN D-400 PO) Take 200 Units by mouth Daily.     • fluticasone (FLONASE) 50 MCG/ACT nasal spray 2 sprays into each nostril Daily.     • furosemide (LASIX) 40 MG tablet Take 40 mg by mouth as needed.     • levothyroxine (SYNTHROID, LEVOTHROID) 25 MCG tablet Take 25 mcg by mouth daily.     • Multiple Vitamins-Minerals (MULTIVITAMIN ADULT PO) Take 1 tablet by mouth daily.     • nitrofurantoin (MACRODANTIN) 50 MG capsule Take 1 capsule by mouth Daily. 90 capsule 3   • Ospemifene 60 MG tablet Take 60 mg by mouth Daily. 90 tablet 3   • phentermine 37.5 MG capsule Take 37.5 mg by mouth Every Morning.     • pravastatin (PRAVACHOL) 40 MG tablet Take 40 mg by mouth daily.     •  pseudoephedrine (SUDAFED) 30 MG tablet Take 30 mg by mouth Every 4 (Four) Hours As Needed for Congestion.     • Vitamin A 78501 UNITS tablet Take 10,000 tablets by mouth daily.     • vitamin E 400 UNIT capsule Take 400 Units by mouth daily.     • amoxicillin (AMOXIL) 875 MG tablet Take  by mouth Take As Directed.     • ondansetron (ZOFRAN) 8 MG tablet Take 1 tablet by mouth Every 8 (Eight) Hours As Needed for Nausea. 15 tablet 0   • oxyCODONE-acetaminophen (PERCOCET) 5-325 MG per tablet Take 1-2 tablets by mouth Every 6 (Six) Hours As Needed for Moderate Pain (4-6). 30 tablet 0     No current facility-administered medications on file prior to visit.       Allergies   Allergen Reactions   • Tetracyclines & Related Hives        Review of Systems   Constitutional: Negative for activity change, appetite change, chills, diaphoresis, fatigue, fever and unexpected weight change.   HENT: Positive for congestion, ear pain, hearing loss and sinus pressure. Negative for dental problem, drooling, ear discharge, facial swelling, mouth sores, nosebleeds, postnasal drip, rhinorrhea, sneezing, sore throat, tinnitus, trouble swallowing and voice change.    Eyes: Positive for visual disturbance. Negative for photophobia, pain, discharge, redness and itching.   Respiratory: Positive for wheezing. Negative for apnea, cough, choking, chest tightness, shortness of breath and stridor.    Cardiovascular: Positive for leg swelling. Negative for chest pain and palpitations.   Gastrointestinal: Positive for abdominal distention, constipation and diarrhea. Negative for abdominal pain, anal bleeding, blood in stool, nausea, rectal pain and vomiting.   Endocrine: Negative for cold intolerance, heat intolerance, polydipsia, polyphagia and polyuria.   Genitourinary: Negative for decreased urine volume, difficulty urinating, dysuria, enuresis, flank pain, frequency, genital sores, hematuria and urgency.   Musculoskeletal: Positive for arthralgias,  "gait problem, neck pain and neck stiffness. Negative for back pain, joint swelling and myalgias.   Skin: Negative for color change, pallor, rash and wound.   Allergic/Immunologic: Negative for environmental allergies, food allergies and immunocompromised state.   Neurological: Negative for dizziness, tremors, seizures, syncope, facial asymmetry, speech difficulty, weakness, light-headedness, numbness and headaches.   Hematological: Negative for adenopathy. Does not bruise/bleed easily.   Psychiatric/Behavioral: Positive for confusion and decreased concentration. Negative for agitation, behavioral problems, dysphoric mood, hallucinations, self-injury, sleep disturbance and suicidal ideas. The patient is not nervous/anxious and is not hyperactive.         Objective      Physical Exam  /72  Pulse 64  Ht 64.17\" (163 cm)  Wt 183 lb (83 kg)  LMP  (LMP Unknown) Comment: S/P AH, LEFT S&O/RIGHT S&O  BMI 31.24 kg/m2    Body mass index is 31.24 kg/(m^2).    General:   Mental Status:  Alert   Appearance: Cooperative, in no acute distress   Build and Nutrition: Overweight female   Orientation: Alert and oriented to person, place and time   Posture: Normal   Gait: Normal    Integument:   Left shoulder: No skin lesions, no rash, no ecchymosis    Upper Extremities:   Left Shoulder:    Tenderness:  None    Swelling:  None    Range of motion:  External rotation:  80°       Forward flexion:  170°       Abduction:   170°  Deformities:  None    Integument:   Left elbow: No skin lesions, no rash, no ecchymosis    Upper Extremities:   Left Elbow:    Tenderness:  Mild tenderness    Effusion:  None    Swelling:  None    Range of motion:  Extension:  0°        Flexion:  130°       Pronation:  90°       Supination: 90°  Deformities:  None      Imaging/Studies  Imaging Results (last 24 hours)     Procedure Component Value Units Date/Time    XR Forearm 2 View Left [[id number]] Resulted:  10/09/17 1022     Updated:  10/09/17 1023    " Narrative:       Left Forearm Radiographs  Indication: pain  Views: AP and lateral views of the left forearm    Comparison: Healed left radial neck fracture compared to previous films    Findings:  Healed left radial neck fracture    XR Clavicle Left [[id number]] Resulted:  10/09/17 1023     Updated:  10/09/17 1024    Narrative:       Left Clavicle Radiographs  Indication: left clavicle pain  Views: AP and 30 degree cephalad view of the left clavicle    Comparison: Fracture site persists, with no bony union    Findings:  Midshaft clavicle fracture, with no evidence of bony union            Assessment and Plan     Park was seen today for follow-up and follow-up.    Diagnoses and all orders for this visit:    Closed displaced fracture of shaft of left clavicle with delayed healing, subsequent encounter  -     XR Clavicle Left    Closed nondisplaced fracture of neck of left radius with routine healing, subsequent encounter  -     XR Forearm 2 View Left        I reviewed my findings with patient today.  Her left elbow is improved, she does have some mild residual pain.  It does radiate down to her wrist at times.  I suspect this is from a ligamentous injury associated with her radial neck fracture.  The radial neck fracture does appear to be well-healed, and I will see her back as needed for that particular problem.    With regards to her left clavicle, she seems to have a delayed union, and it may be headed towards a fibrous union.  I will see her back in 2 months with a repeat x-ray, but sooner for any problems.  She is minimally symptomatic.  We discussed possible surgical intervention if it is bothersome in the future.  She is not keen on that idea.    Return in about 2 months (around 12/9/2017) for Recheck with X-Rays left clavicle.      Medical Decision Making  Management Options : close treatment of fracture or dislocation  Data/Risk: radiology tests and independent visualization of imaging, lab tests, or  EMG/NCV      Jose Roger MD  10/09/17  10:34 AM

## 2017-10-09 NOTE — TELEPHONE ENCOUNTER
PATIENT STATES SHE IS HAVING TROUBLE WITH BLURRY VISION AND MUFFLED HEARING IN THE RIGHT SIDE SINCE HER RECENT FALL. WOULD LIKE TO BE REFERRED TO A SPECIALIST IF POSSIBLE. PATIENTS REQUESTED ENT/EYE DOCTOR REFERRALS. PLEASE CALL TO ADVISE IF HER MD ARE OKAY WITH THESE REFERRALS. PATIENT MAY BE REACHED [phone] THANK YOU!!

## 2017-10-10 NOTE — TELEPHONE ENCOUNTER
Referral Coordinators - Can you see Dr Henriquez note below and schedule this patient with  ENT docs.  Thanks, Lizzie

## 2017-12-13 ENCOUNTER — OFFICE VISIT (OUTPATIENT)
Dept: ORTHOPEDIC SURGERY | Facility: CLINIC | Age: 62
End: 2017-12-13

## 2017-12-13 VITALS
BODY MASS INDEX: 30.11 KG/M2 | DIASTOLIC BLOOD PRESSURE: 100 MMHG | WEIGHT: 176.37 LBS | HEART RATE: 104 BPM | HEIGHT: 64 IN | SYSTOLIC BLOOD PRESSURE: 179 MMHG

## 2017-12-13 DIAGNOSIS — S42.022K CLOSED DISPLACED FRACTURE OF SHAFT OF LEFT CLAVICLE WITH NONUNION, SUBSEQUENT ENCOUNTER: Primary | ICD-10-CM

## 2017-12-13 PROCEDURE — 99213 OFFICE O/P EST LOW 20 MIN: CPT | Performed by: ORTHOPAEDIC SURGERY

## 2017-12-13 NOTE — PROGRESS NOTES
[facility] [place] [facility] Note    Subjective     Chief Complaint   Patient presents with   • Left Clavicle - Follow-up        HPI    Park Ray is a 62 y.o. female. She follows up today for her left clavicle fracture.  She is having some pain, mild to moderate in severity, throbbing in quality, which is been fairly persistent.  She is able to move her shoulder, and has some mild stiffness.  Original injury was on 2017.      Patient Active Problem List   Diagnosis   • OAB (overactive bladder)   • Mixed incontinence urge and stress   • Menopause   • Hypothyroidism   • Fatty liver   • Family history of breast cancer in mother   • Superficial dyspareunia   • Colon cancer   • Recurrent genital herpes     Past Medical History:   Diagnosis Date   • Closed displaced fracture of shaft of left clavicle    • Closed nondisplaced fracture of neck of left radius    • Colon cancer    • Dyspareunia    • Family history of breast cancer in mother    • Fatty liver    • Fracture of proximal phalanx of toe    • Herpes    • Hypothyroidism    • IBS (irritable bowel syndrome)    • Left wrist pain    • Menopause    • Mixed incontinence urge and stress    • OAB (overactive bladder)    • Right wrist pain       Past Surgical History:   Procedure Laterality Date   •  SECTION     • CHOLECYSTECTOMY     • COLECTOMY PARTIAL / TOTAL     • COLON RESECTION     • ENTEROCELE REPAIR     • DAWSON MORA (JOANNK) PROCEDURE     • PELVIC LAPAROSCOPY     • RECTAL SURGERY     • SALPINGO OOPHORECTOMY Right    • TOTAL ABDOMINAL HYSTERECTOMY WITH SALPINGO OOPHORECTOMY Left    • UTERINE SUSPENSION LAPAROSCOPIC        Family History   Problem Relation Age of Onset   • Cancer Father    • Heart disease Father    • Hypertension Father    • Osteoarthritis Father    • Breast cancer Mother    • Cancer Mother    • Brain cancer Brother    • Hypertension Brother    • Diabetes Brother    • Hypertension Sister    • Cancer Other    •  Heart disease Other      Social History     Social History   • Marital status:      Spouse name: N/A   • Number of children: N/A   • Years of education: N/A     Occupational History   • Not on file.     Social History Main Topics   • Smoking status: Never Smoker   • Smokeless tobacco: Never Used   • Alcohol use Yes      Comment: OCCASIONALLY   • Drug use: No   • Sexual activity: Yes     Partners: Male     Birth control/ protection: Post-menopausal, Surgical     Other Topics Concern   • Not on file     Social History Narrative    Lives with her       Current Outpatient Prescriptions on File Prior to Visit   Medication Sig Dispense Refill   • acyclovir (ZOVIRAX) 400 MG tablet Take 1 tablet by mouth Daily. Take no more than 5 doses a day. 90 tablet 3   • amoxicillin (AMOXIL) 875 MG tablet Take  by mouth Take As Directed.     • Biotin 1000 MCG chewable tablet Chew.     • Cholecalciferol (VITAMIN D-400 PO) Take 200 Units by mouth Daily.     • fluticasone (FLONASE) 50 MCG/ACT nasal spray 2 sprays into each nostril Daily.     • furosemide (LASIX) 40 MG tablet Take 40 mg by mouth as needed.     • levothyroxine (SYNTHROID, LEVOTHROID) 25 MCG tablet Take 25 mcg by mouth daily.     • Multiple Vitamins-Minerals (MULTIVITAMIN ADULT PO) Take 1 tablet by mouth daily.     • nitrofurantoin (MACRODANTIN) 50 MG capsule Take 1 capsule by mouth Daily. 90 capsule 3   • olopatadine (PATANASE) 0.6 % solution nasal solution by Each Nare route 2 (Two) Times a Day.     • omeprazole (priLOSEC) 40 MG capsule Take 40 mg by mouth Daily.     • ondansetron (ZOFRAN) 8 MG tablet Take 1 tablet by mouth Every 8 (Eight) Hours As Needed for Nausea. 15 tablet 0   • Ospemifene 60 MG tablet Take 60 mg by mouth Daily. 90 tablet 3   • oxyCODONE-acetaminophen (PERCOCET) 5-325 MG per tablet Take 1-2 tablets by mouth Every 6 (Six) Hours As Needed for Moderate Pain (4-6). 30 tablet 0   • phentermine 37.5 MG capsule Take 37.5 mg by mouth Every  "Morning.     • pravastatin (PRAVACHOL) 40 MG tablet Take 40 mg by mouth daily.     • pseudoephedrine (SUDAFED) 30 MG tablet Take 30 mg by mouth Every 4 (Four) Hours As Needed for Congestion.     • Vitamin A 05245 UNITS tablet Take 10,000 tablets by mouth daily.     • vitamin E 400 UNIT capsule Take 400 Units by mouth daily.       No current facility-administered medications on file prior to visit.       Allergies   Allergen Reactions   • Tetracyclines & Related Hives        Review of Systems   Constitutional: Negative.    HENT: Negative.    Eyes: Negative.    Respiratory: Negative.    Cardiovascular: Negative.    Gastrointestinal: Negative.    Endocrine: Negative.    Genitourinary: Negative.    Musculoskeletal: Positive for arthralgias.   Skin: Negative.    Allergic/Immunologic: Negative.    Neurological: Negative.    Hematological: Negative.    Psychiatric/Behavioral: Negative.         Objective      Physical Exam  /100  Pulse 104  Ht 163 cm (64.17\")  Wt 80 kg (176 lb 5.9 oz)  LMP  (LMP Unknown) Comment: S/P AH, LEFT S&O/RIGHT S&O  BMI 30.11 kg/m2    Body mass index is 30.11 kg/(m^2).    General:   Mental Status:  Alert   Appearance: Cooperative, in no acute distress   Build and Nutrition: Well-nourished and well developed female   Orientation: Alert and oriented to person, place and time   Posture: Normal   Gait: Normal    Integument:   Left shoulder: No skin lesions, no rash, no ecchymosis    Upper Extremities:   Left Shoulder:    Tenderness:  Tenderness over the midshaft of the clavicle    Swelling:  None    Range of motion:  External rotation:  70°       Forward flexion:  170°       Abduction:   170°  Deformities:  Mild deformity in the midshaft of the clavicle      Imaging/Studies  Imaging Results (last 24 hours)     Procedure Component Value Units Date/Time    XR Clavicle Left [[id number]] Resulted:  12/13/17 1022     Updated:  12/13/17 1023    Narrative:       Left Clavicle Radiographs  Indication: " left clavicle pain  Views: AP and 30 degree cephalad view of the left clavicle    Comparison: Minimal interval healing compared to the previous films from   10/9/2017    Findings:  Left clavicle fracture, with probable nonunion.            Assessment and Plan     Park was seen today for follow-up.    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  Symptoms are mild to moderate, and we discussed treatment options today.  She may be interested in surgical intervention, but previously she was not keen on that idea.  I will order a CT scan, and see her back after that completed for review.  I will see her back sooner for any problems.    Return for After Imaging Study.      Medical Decision Making  Management Options : close treatment of fracture or dislocation  Data/Risk: radiology tests and independent visualization of imaging, lab tests, or EMG/NCV      Jose Roger MD  12/13/17  12:47 PM

## 2017-12-18 ENCOUNTER — APPOINTMENT (OUTPATIENT)
Dept: CT IMAGING | Facility: HOSPITAL | Age: 62
End: 2017-12-18
Attending: ORTHOPAEDIC SURGERY

## 2018-04-04 ENCOUNTER — OFFICE VISIT (OUTPATIENT)
Dept: ORTHOPEDIC SURGERY | Facility: CLINIC | Age: 63
End: 2018-04-04

## 2018-04-04 VITALS
SYSTOLIC BLOOD PRESSURE: 186 MMHG | BODY MASS INDEX: 28.68 KG/M2 | HEIGHT: 64 IN | WEIGHT: 168 LBS | DIASTOLIC BLOOD PRESSURE: 79 MMHG | HEART RATE: 94 BPM

## 2018-04-04 DIAGNOSIS — S42.022K CLOSED DISPLACED FRACTURE OF SHAFT OF LEFT CLAVICLE WITH NONUNION, SUBSEQUENT ENCOUNTER: Primary | ICD-10-CM

## 2018-04-04 PROCEDURE — 99213 OFFICE O/P EST LOW 20 MIN: CPT | Performed by: ORTHOPAEDIC SURGERY

## 2018-04-04 NOTE — PROGRESS NOTES
[facility] Note    Subjective     Chief Complaint   Patient presents with   • Follow-up     4 months left shoulder        HPI    Park Ray is a 62 y.o. female. She follows up today for her left clavicle.  She fractured the clavicle in a fall on 2017, when she fell down the stairs at home.  She has mild to moderate pain in the topical, which is aching in quality, worse with certain activities.  It is tolerable for the most part.      Patient Active Problem List   Diagnosis   • OAB (overactive bladder)   • Mixed incontinence urge and stress   • Menopause   • Hypothyroidism   • Fatty liver   • Family history of breast cancer in mother   • Superficial dyspareunia   • Colon cancer   • Recurrent genital herpes     Past Medical History:   Diagnosis Date   • Closed displaced fracture of shaft of left clavicle    • Closed nondisplaced fracture of neck of left radius    • Colon cancer    • Dyspareunia    • Family history of breast cancer in mother    • Fatty liver    • Fracture of proximal phalanx of toe    • Herpes    • Hypothyroidism    • IBS (irritable bowel syndrome)    • Left wrist pain    • Menopause    • Mixed incontinence urge and stress    • OAB (overactive bladder)    • Right wrist pain       Past Surgical History:   Procedure Laterality Date   •  SECTION     • CHOLECYSTECTOMY     • COLECTOMY PARTIAL / TOTAL     • COLON RESECTION     • ENTEROCELE REPAIR     • DAWSON MORA ([facility]) PROCEDURE     • PELVIC LAPAROSCOPY     • RECTAL SURGERY     • SALPINGO OOPHORECTOMY Right    • TOTAL ABDOMINAL HYSTERECTOMY WITH SALPINGO OOPHORECTOMY Left    • UTERINE SUSPENSION LAPAROSCOPIC        Family History   Problem Relation Age of Onset   • Cancer Father    • Heart disease Father    • Hypertension Father    • Osteoarthritis Father    • Breast cancer Mother    • Cancer Mother    • Brain cancer Brother    • Hypertension Brother    • Diabetes Brother    • Hypertension Sister     • Cancer Other    • Heart disease Other      Social History     Social History   • Marital status:      Spouse name: N/A   • Number of children: N/A   • Years of education: N/A     Occupational History   • Not on file.     Social History Main Topics   • Smoking status: Never Smoker   • Smokeless tobacco: Never Used   • Alcohol use Yes      Comment: OCCASIONALLY   • Drug use: No   • Sexual activity: Yes     Partners: Male     Birth control/ protection: Post-menopausal, Surgical     Other Topics Concern   • Not on file     Social History Narrative    Lives with her       Current Outpatient Prescriptions on File Prior to Visit   Medication Sig Dispense Refill   • acyclovir (ZOVIRAX) 400 MG tablet Take 1 tablet by mouth Daily. Take no more than 5 doses a day. 90 tablet 3   • amoxicillin (AMOXIL) 875 MG tablet Take  by mouth Take As Directed.     • Biotin 1000 MCG chewable tablet Chew.     • Cholecalciferol (VITAMIN D-400 PO) Take 200 Units by mouth Daily.     • fluticasone (FLONASE) 50 MCG/ACT nasal spray 2 sprays into each nostril Daily.     • furosemide (LASIX) 40 MG tablet Take 40 mg by mouth as needed.     • levothyroxine (SYNTHROID, LEVOTHROID) 25 MCG tablet Take 25 mcg by mouth daily.     • Multiple Vitamins-Minerals (MULTIVITAMIN ADULT PO) Take 1 tablet by mouth daily.     • nitrofurantoin (MACRODANTIN) 50 MG capsule Take 1 capsule by mouth Daily. 90 capsule 3   • olopatadine (PATANASE) 0.6 % solution nasal solution by Each Nare route 2 (Two) Times a Day.     • omeprazole (priLOSEC) 40 MG capsule Take 40 mg by mouth Daily.     • ondansetron (ZOFRAN) 8 MG tablet Take 1 tablet by mouth Every 8 (Eight) Hours As Needed for Nausea. 15 tablet 0   • Ospemifene 60 MG tablet Take 60 mg by mouth Daily. 90 tablet 3   • oxyCODONE-acetaminophen (PERCOCET) 5-325 MG per tablet Take 1-2 tablets by mouth Every 6 (Six) Hours As Needed for Moderate Pain (4-6). 30 tablet 0   • phentermine 37.5 MG capsule Take 37.5 mg  by mouth Every Morning.     • pravastatin (PRAVACHOL) 40 MG tablet Take 40 mg by mouth daily.     • pseudoephedrine (SUDAFED) 30 MG tablet Take 30 mg by mouth Every 4 (Four) Hours As Needed for Congestion.     • Vitamin A 37718 UNITS tablet Take 10,000 tablets by mouth daily.     • vitamin E 400 UNIT capsule Take 400 Units by mouth daily.       No current facility-administered medications on file prior to visit.       Allergies   Allergen Reactions   • Tetracyclines & Related Hives        Review of Systems   Constitutional: Negative for activity change, appetite change, chills, diaphoresis, fatigue, fever and unexpected weight change.        Night sweats   HENT: Positive for dental problem, ear pain, mouth sores and sinus pressure. Negative for congestion, drooling, ear discharge, facial swelling, hearing loss, nosebleeds, postnasal drip, rhinorrhea, sneezing, sore throat, tinnitus, trouble swallowing and voice change.    Eyes: Negative for photophobia, pain, discharge, redness, itching and visual disturbance.   Respiratory: Negative for apnea, cough, choking, chest tightness, shortness of breath, wheezing and stridor.    Cardiovascular: Negative for chest pain, palpitations and leg swelling.   Gastrointestinal: Negative for abdominal distention, abdominal pain, anal bleeding, blood in stool, constipation, diarrhea, nausea, rectal pain and vomiting.   Endocrine: Negative for cold intolerance, heat intolerance, polydipsia, polyphagia and polyuria.   Genitourinary: Negative for decreased urine volume, difficulty urinating, dysuria, enuresis, flank pain, frequency, genital sores, hematuria and urgency.   Musculoskeletal: Positive for gait problem, neck pain and neck stiffness. Negative for arthralgias, back pain, joint swelling and myalgias.   Skin: Negative for color change, pallor, rash and wound.   Allergic/Immunologic: Negative for environmental allergies, food allergies and immunocompromised state.   Neurological:  "Negative for dizziness, tremors, seizures, syncope, facial asymmetry, speech difficulty, weakness, light-headedness, numbness and headaches.   Hematological: Negative for adenopathy. Does not bruise/bleed easily.   Psychiatric/Behavioral: Negative for agitation, behavioral problems, confusion, decreased concentration, dysphoric mood, hallucinations, self-injury, sleep disturbance and suicidal ideas. The patient is not nervous/anxious and is not hyperactive.         Objective      Physical Exam  BP (!) 186/79   Pulse 94   Ht 163 cm (64.17\")   Wt 76.2 kg (168 lb)   LMP  (LMP Unknown) Comment: S/P AH, LEFT S&O/RIGHT S&O  BMI 28.68 kg/m²     Body mass index is 28.68 kg/m².    General:   Mental Status:  Alert   Appearance: Cooperative, in no acute distress   Build and Nutrition: Well-nourished and well developed female   Orientation: Alert and oriented to person, place and time   Posture: Normal   Gait: Normal    Integument:   Left shoulder: No skin lesions, no rash, no ecchymosis    Upper Extremities:   Left Shoulder:    Tenderness:  Mild tenderness over the midshaft clavicle    Swelling:  None    Range of motion:  External rotation:  80°       Forward flexion:  180°       Abduction:   180°  Deformities:  None  Functional testing: Negative drop arm, negative lift-off, negative impingement      Imaging/Studies  Imaging Results (last 24 hours)     Procedure Component Value Units Date/Time    XR Clavicle Left [[id number]] Resulted:  04/04/18 1545     Updated:  04/04/18 1546    Narrative:       Left Clavicle Radiographs  Indication: left clavicle pain  Views: AP and 30 degree cephalad view of the left clavicle    Comparison: 12/13/2017    Findings:  Nonunion of the clavicle is seen, with no significant change compared to   previous films.            Assessment and Plan     Park was seen today for follow-up.    Diagnoses and all orders for this visit:    Closed displaced fracture of shaft of left clavicle with " nonunion, subsequent encounter  -     XR Clavicle Left        I reviewed my findings with patient today.  She has a left clavicle nonunion.  It is minimally symptomatic at current time.  She is also not a spot where she would consider operative intervention from a social standpoint.  At this point, I will see her back if there is any worsening or problems in the future.  If she does have problems/pain, operative intervention would be entertained, and I would refer her to a shoulder specialist for consideration.  She was pleased with this plan today, and will contact me if she needs any help in the future.    Return if symptoms worsen or fail to improve.      Medical Decision Making  Data/Risk: radiology tests and independent visualization of imaging, lab tests, or EMG/NCV      Jose Roger MD  04/04/18  5:26 PM

## 2018-05-29 ENCOUNTER — OFFICE VISIT (OUTPATIENT)
Dept: OBSTETRICS AND GYNECOLOGY | Facility: CLINIC | Age: 63
End: 2018-05-29

## 2018-05-29 VITALS
BODY MASS INDEX: 34.45 KG/M2 | WEIGHT: 187.2 LBS | DIASTOLIC BLOOD PRESSURE: 88 MMHG | SYSTOLIC BLOOD PRESSURE: 160 MMHG | HEIGHT: 62 IN

## 2018-05-29 DIAGNOSIS — N95.2 VAGINAL ATROPHY: ICD-10-CM

## 2018-05-29 DIAGNOSIS — A60.00 RECURRENT GENITAL HERPES: ICD-10-CM

## 2018-05-29 DIAGNOSIS — Z01.419 ENCOUNTER FOR GYNECOLOGICAL EXAMINATION WITHOUT ABNORMAL FINDING: ICD-10-CM

## 2018-05-29 DIAGNOSIS — N32.81 OAB (OVERACTIVE BLADDER): Primary | ICD-10-CM

## 2018-05-29 DIAGNOSIS — N94.11 SUPERFICIAL DYSPAREUNIA: ICD-10-CM

## 2018-05-29 DIAGNOSIS — Z78.0 MENOPAUSE: ICD-10-CM

## 2018-05-29 PROCEDURE — 99396 PREV VISIT EST AGE 40-64: CPT | Performed by: OBSTETRICS & GYNECOLOGY

## 2018-05-29 RX ORDER — ACYCLOVIR 400 MG/1
400 TABLET ORAL DAILY
Qty: 90 TABLET | Refills: 3 | Status: SHIPPED | OUTPATIENT
Start: 2018-05-29 | End: 2019-05-29

## 2018-05-29 RX ORDER — TOLTERODINE 4 MG/1
4 CAPSULE, EXTENDED RELEASE ORAL DAILY
Qty: 90 CAPSULE | Refills: 3 | Status: SHIPPED | OUTPATIENT
Start: 2018-05-29 | End: 2019-05-29

## 2018-05-29 NOTE — PROGRESS NOTES
Chief Complaint   Patient presents with   • Gynecologic Exam   • Urinary Incontinence       Park Ray is a 63 y.o. year old  presenting to be seen for her annual exam.This patient has a complex gynecologic history.  Dr. Mahendra Muñoz's did an AH/left S&O on this patient.  He subsequently did a right S&O.  She has had a cholecystectomy.  She does not take estrogen replacement therapy.  She developed carcinoma of the rectum and Dr. Hunter Donohue did a partial sigmoid colectomy along with an enterocele repair and a Cory“Ambreen-Salvatore procedure.  She has symptoms of urinary urgency with urge incontinence.  I did simple uroflowmetry on the patient in 2016 and her Bonne' test revealed a 10° deviation from baseline(normal).  She continues to have nocturia and urgency.  She desires medical treatment.  She has been treated for superficial dyspareunia and vaginal atrophy with ospemifene, 60 mg daily with good success.  She is treated for suppression of recurrent genital herpes infection with acyclovir, 400 mg daily.    SCREENING TESTS    2012 2018 2019   [id number]   Age                         PAP                         HPV high risk                         Mammogram     benign                    JOSÉ MANUEL score                         Breast MRI                         Lipids                         Vitamin D                         Colonoscopy                         DEXA  Frax (hip/any)                         Ovarian Screen                             She exercises regularly: no.  She wears her seat belt: yes.  She has concerns about domestic violence: no.  She has noticed changes in height: no    GYN screening history:  · Last mammogram: 10/14/2016 and the result was Birads I    No Additional Complaints Reported    The following portions of the patient's history were reviewed and updated as appropriate:vital signs and    She  has a past medical history of Closed displaced fracture of shaft of left clavicle; Closed nondisplaced fracture of neck of left radius; Colon cancer; Dyspareunia; Family history of breast cancer in mother; Fatty liver; Fracture of proximal phalanx of toe; Herpes; Hypothyroidism; IBS (irritable bowel syndrome); Left wrist pain; Menopause; Mixed incontinence urge and stress; OAB (overactive bladder); and Right wrist pain.  She  does not have any pertinent problems on file.  She  has a past surgical history that includes Cholecystectomy; Bowel resection; Rectal surgery; Pelvic laparoscopy; Total abdominal hysterectomy w/ bilateral salpingoophorectomy (Left); Salpingoophorectomy (Right); Uterine Suspension; Colectomy partial / total; [facility] Procedure; Enterocele repair; and  section.  Her family history includes Brain cancer in her brother; Breast cancer in her mother; Cancer in her father, mother, and other; Diabetes in her brother; Heart disease in her father and other; Hypertension in her brother, father, and sister; Osteoarthritis in her father.  She  reports that she has never smoked. She has never used smokeless tobacco. She reports that she drinks alcohol. She reports that she does not use drugs.  Current Outpatient Prescriptions   Medication Sig Dispense Refill   • acyclovir (ZOVIRAX) 400 MG tablet Take 1 tablet by mouth Daily. Take no more than 5 doses a day. 90 tablet 3   • Biotin 1000 MCG chewable tablet Chew.     • Cholecalciferol (VITAMIN D-400 PO) Take 200 Units by mouth Daily.     • fluticasone (FLONASE) 50 MCG/ACT nasal spray 2 sprays into each nostril Daily.     • furosemide (LASIX) 40 MG tablet Take 40 mg by mouth as needed.     • levothyroxine (SYNTHROID, LEVOTHROID) 25 MCG tablet Take 25 mcg by mouth daily.     • Multiple Vitamins-Minerals (MULTIVITAMIN ADULT PO) Take 1 tablet by mouth daily.     • nitrofurantoin (MACRODANTIN) 50 MG capsule Take 1 capsule by mouth Daily. 90 capsule 3   •  "omeprazole (priLOSEC) 40 MG capsule Take 40 mg by mouth Daily.     • Ospemifene 60 MG tablet Take 60 mg by mouth Daily. 90 tablet 3   • phentermine 37.5 MG capsule Take 37.5 mg by mouth Every Morning.     • pseudoephedrine (SUDAFED) 30 MG tablet Take 30 mg by mouth Every 4 (Four) Hours As Needed for Congestion.     • Vitamin A 20869 UNITS tablet Take 10,000 tablets by mouth daily.     • vitamin E 400 UNIT capsule Take 400 Units by mouth daily.     • tolterodine LA (DETROL LA) 4 MG 24 hr capsule Take 1 capsule by mouth Daily. 90 capsule 3     No current facility-administered medications for this visit.      She is allergic to tetracyclines & related..    Review of Systems  A comprehensive review of systems was taken.  Constitutional: negative for fever, chills, activity change, appetite change, fatigue and unexpected weight change.  Respiratory: negative  Cardiovascular: negative  Gastrointestinal: negative  Genitourinary:positive for urgency and nocturia  Musculoskeletal:negative  Behavioral/Psych: negative       /88   Ht 157.5 cm (62\")   Wt 84.9 kg (187 lb 3.2 oz)   LMP  (LMP Unknown) Comment: S/P AH, LEFT S&O/RIGHT S&O  BMI 34.24 kg/m²     Physical Exam    General:  alert; cooperative; well developed; well nourished   Skin:  No suspicious lesions seen   Thyroid: normal to inspection and palpation   Lungs:  clear to auscultation bilaterally   Heart:  regular rate and rhythm, S1, S2 normal, no murmur, click, rub or gallop   Breasts:  Examined in supine position  Symmetric without masses or skin dimpling  Nipples normal without inversion, lesions or discharge  There are no palpable axillary nodes  Fibrocystic changes are present both breasts without a discrete mass   Abdomen: soft, non-tender; no masses  no umbilical or inginual hernias are present  no hepato-splenomegaly   Pelvis: Clinical staff was present for exam  External genitalia:  normal appearance of the external genitalia including Bartholin's and " [place]'s glands.  Vaginal:  normal pink mucosa without prolapse or lesions.  Cervix:  absent.  Uterus:  absent.  Adnexa:  absent, bilateral.  Rectal:  anus visually normal appearing. recto-vaginal exam unremarkable and confirms findings;     Lab Review   No data reviewed    Imaging  Mammogram results         ASSESSMENT  Problems Addressed this Visit        Nervous and Auditory    Superficial dyspareunia    Relevant Medications    Ospemifene 60 MG tablet       Musculoskeletal and Integument    OAB (overactive bladder) - Primary    Relevant Medications    tolterodine LA (DETROL LA) 4 MG 24 hr capsule       Genitourinary    Menopause    Recurrent genital herpes    Relevant Medications    acyclovir (ZOVIRAX) 400 MG tablet    Vaginal atrophy    Relevant Medications    Ospemifene 60 MG tablet      Other Visit Diagnoses     Encounter for gynecological examination without abnormal finding              PLAN    Medications prescribed this encounter:    New Medications Ordered This Visit   Medications   • acyclovir (ZOVIRAX) 400 MG tablet     Sig: Take 1 tablet by mouth Daily. Take no more than 5 doses a day.     Dispense:  90 tablet     Refill:  3   • tolterodine LA (DETROL LA) 4 MG 24 hr capsule     Sig: Take 1 capsule by mouth Daily.     Dispense:  90 capsule     Refill:  3   • Ospemifene 60 MG tablet     Sig: Take 60 mg by mouth Daily.     Dispense:  90 tablet     Refill:  3   · Monthly self breast assessment and annual breast imaging  · GI follow-up  · Calcium, 600 mg/ Vit. D, 400 IU daily; regular weight-bearing exercise  · Follow up: 12 month(s)  *Please note that portions of this documentation may have been completed with a voice recognition program.  Efforts were made to edit this dictation, but occasional words may have been mistranscribed.       This note was electronically signed.    SHARON Bishop MD  May 29, 2018  3:44 PM

## 2018-07-25 RX ORDER — NITROFURANTOIN MACROCRYSTALS 50 MG/1
CAPSULE ORAL
Qty: 90 CAPSULE | Refills: 2 | Status: SHIPPED | OUTPATIENT
Start: 2018-07-25 | End: 2019-09-24 | Stop reason: SDUPTHER

## 2018-07-25 NOTE — TELEPHONE ENCOUNTER
Patient was seen in the office 05/29/18 for an annual exam.  She has been taking Macrodantin 50 mg daily, but this prescription was not renewed at that time.  A request has come in today from the pharmacy for this medication.

## 2019-05-15 ENCOUNTER — OFFICE VISIT (OUTPATIENT)
Dept: ORTHOPEDIC SURGERY | Facility: CLINIC | Age: 64
End: 2019-05-15

## 2019-05-15 VITALS — WEIGHT: 190.92 LBS | HEIGHT: 61 IN | BODY MASS INDEX: 36.05 KG/M2 | HEART RATE: 105 BPM | OXYGEN SATURATION: 99 %

## 2019-05-15 DIAGNOSIS — M17.12 PRIMARY OSTEOARTHRITIS OF LEFT KNEE: Primary | ICD-10-CM

## 2019-05-15 PROCEDURE — 20610 DRAIN/INJ JOINT/BURSA W/O US: CPT | Performed by: ORTHOPAEDIC SURGERY

## 2019-05-15 PROCEDURE — 99214 OFFICE O/P EST MOD 30 MIN: CPT | Performed by: ORTHOPAEDIC SURGERY

## 2019-05-15 RX ORDER — ROPIVACAINE HYDROCHLORIDE 5 MG/ML
4 INJECTION, SOLUTION EPIDURAL; INFILTRATION; PERINEURAL
Status: COMPLETED | OUTPATIENT
Start: 2019-05-15 | End: 2019-05-15

## 2019-05-15 RX ORDER — TRIAMCINOLONE ACETONIDE 40 MG/ML
40 INJECTION, SUSPENSION INTRA-ARTICULAR; INTRAMUSCULAR
Status: COMPLETED | OUTPATIENT
Start: 2019-05-15 | End: 2019-05-15

## 2019-05-15 RX ADMIN — ROPIVACAINE HYDROCHLORIDE 4 ML: 5 INJECTION, SOLUTION EPIDURAL; INFILTRATION; PERINEURAL at 09:33

## 2019-05-15 RX ADMIN — TRIAMCINOLONE ACETONIDE 40 MG: 40 INJECTION, SUSPENSION INTRA-ARTICULAR; INTRAMUSCULAR at 09:33

## 2019-05-15 NOTE — PROGRESS NOTES
Procedure   Large Joint Arthrocentesis  Date/Time: 5/15/2019 9:33 AM  Consent given by: patient  Site marked: site marked  Timeout: Immediately prior to procedure a time out was called to verify the correct patient, procedure, equipment, support staff and site/side marked as required   Supporting Documentation  Indications: pain   Procedure Details  Location: knee -   Preparation: Patient was prepped and draped in the usual sterile fashion  Needle size: 22 G  Approach: anterolateral  Medications administered: 40 mg triamcinolone acetonide 40 MG/ML; 4 mL ropivacaine 0.5 %  Patient tolerance: patient tolerated the procedure well with no immediate complications

## 2019-05-15 NOTE — PROGRESS NOTES
[facility] [place] [facility] Note    Subjective     Chief Complaint   Patient presents with   • Left Knee - Pain        HPI    Park Ray is a 64 y.o. female.  She presents today for evaluation of her left knee.  She has had left knee pain since 2019, when she was down working on the floor, and had pain in her knee afterwards.  The pain is aching, throbbing and shooting.  It is associate with popping and stiffness.  It worsens with walking, standing and climbing stairs.  No previous injections.      Patient Active Problem List   Diagnosis   • OAB (overactive bladder)   • Mixed incontinence urge and stress   • Menopause   • Hypothyroidism   • Fatty liver   • Family history of breast cancer in mother   • Superficial dyspareunia   • Colon cancer (CMS/HCC)   • Recurrent genital herpes   • Vaginal atrophy     Past Medical History:   Diagnosis Date   • Closed displaced fracture of shaft of left clavicle    • Closed nondisplaced fracture of neck of left radius    • Colon cancer (CMS/HCC)    • Dyspareunia    • Family history of breast cancer in mother    • Fatty liver    • Fracture of proximal phalanx of toe    • Herpes    • Hypothyroidism    • IBS (irritable bowel syndrome)    • Left wrist pain    • Menopause    • Mixed incontinence urge and stress    • OAB (overactive bladder)    • Right wrist pain       Past Surgical History:   Procedure Laterality Date   •  SECTION     • CHOLECYSTECTOMY     • COLECTOMY PARTIAL / TOTAL     • COLON RESECTION     • ENTEROCELE REPAIR     • DAWSON MORA ([facility]) PROCEDURE     • PELVIC LAPAROSCOPY     • RECTAL SURGERY     • SALPINGO OOPHORECTOMY Right    • TOTAL ABDOMINAL HYSTERECTOMY WITH SALPINGO OOPHORECTOMY Left    • UTERINE SUSPENSION LAPAROSCOPIC        Family History   Problem Relation Age of Onset   • Cancer Father    • Heart disease Father    • Hypertension Father    • Osteoarthritis Father    • Breast cancer Mother    • Cancer Mother    •  Brain cancer Brother    • Hypertension Brother    • Diabetes Brother    • Hypertension Sister    • Cancer Other    • Heart disease Other      Social History     Socioeconomic History   • Marital status:      Spouse name: Not on file   • Number of children: Not on file   • Years of education: Not on file   • Highest education level: Not on file   Tobacco Use   • Smoking status: Never Smoker   • Smokeless tobacco: Never Used   Substance and Sexual Activity   • Alcohol use: Yes     Comment: OCCASIONALLY   • Drug use: No   • Sexual activity: Yes     Partners: Male     Birth control/protection: Post-menopausal, Surgical   Social History Narrative    Lives with her       Current Outpatient Medications on File Prior to Visit   Medication Sig Dispense Refill   • acyclovir (ZOVIRAX) 400 MG tablet Take 1 tablet by mouth Daily. Take no more than 5 doses a day. 90 tablet 3   • Biotin 1000 MCG chewable tablet Chew.     • Cholecalciferol (VITAMIN D-400 PO) Take 200 Units by mouth Daily.     • fluticasone (FLONASE) 50 MCG/ACT nasal spray 2 sprays into each nostril Daily.     • furosemide (LASIX) 40 MG tablet Take 40 mg by mouth as needed.     • levothyroxine (SYNTHROID, LEVOTHROID) 25 MCG tablet Take 25 mcg by mouth daily.     • Multiple Vitamins-Minerals (MULTIVITAMIN ADULT PO) Take 1 tablet by mouth daily.     • nitrofurantoin (MACRODANTIN) 50 MG capsule TAKE ONE CAPSULE BY MOUTH DAILY 90 capsule 2   • omeprazole (priLOSEC) 40 MG capsule Take 40 mg by mouth Daily.     • Ospemifene 60 MG tablet Take 60 mg by mouth Daily. 90 tablet 3   • phentermine 37.5 MG capsule Take 37.5 mg by mouth Every Morning.     • pseudoephedrine (SUDAFED) 30 MG tablet Take 30 mg by mouth Every 4 (Four) Hours As Needed for Congestion.     • Vitamin A 60689 UNITS tablet Take 10,000 tablets by mouth daily.     • vitamin E 400 UNIT capsule Take 400 Units by mouth daily.     • tolterodine LA (DETROL LA) 4 MG 24 hr capsule Take 1 capsule by  mouth Daily. 90 capsule 3     No current facility-administered medications on file prior to visit.       Allergies   Allergen Reactions   • Tetracyclines & Related Hives        Review of Systems   Constitutional: Positive for activity change. Negative for appetite change, chills, diaphoresis, fatigue, fever and unexpected weight change.        Night sweats   HENT: Positive for hearing loss, postnasal drip, sinus pressure and sore throat. Negative for congestion, dental problem, drooling, ear discharge, ear pain, facial swelling, mouth sores, nosebleeds, rhinorrhea, sneezing, tinnitus, trouble swallowing and voice change.    Eyes: Negative.  Negative for photophobia, pain, discharge, redness, itching and visual disturbance.   Respiratory: Negative.  Negative for apnea, cough, choking, chest tightness, shortness of breath, wheezing and stridor.    Cardiovascular: Positive for leg swelling. Negative for chest pain and palpitations.   Gastrointestinal: Positive for abdominal distention and constipation. Negative for abdominal pain, anal bleeding, blood in stool, diarrhea, nausea, rectal pain and vomiting.   Endocrine: Negative.  Negative for cold intolerance, heat intolerance, polydipsia, polyphagia and polyuria.   Genitourinary: Positive for frequency. Negative for decreased urine volume, difficulty urinating, dysuria, enuresis, flank pain, genital sores, hematuria and urgency.   Musculoskeletal: Positive for arthralgias. Negative for back pain, gait problem, joint swelling, myalgias, neck pain and neck stiffness.   Skin: Negative.  Negative for color change, pallor, rash and wound.   Allergic/Immunologic: Negative.  Negative for environmental allergies, food allergies and immunocompromised state.   Neurological: Positive for numbness. Negative for dizziness, tremors, seizures, syncope, facial asymmetry, speech difficulty, weakness, light-headedness and headaches.   Hematological: Negative.  Negative for adenopathy.  "Does not bruise/bleed easily.   Psychiatric/Behavioral: Negative.  Negative for agitation, behavioral problems, confusion, decreased concentration, dysphoric mood, hallucinations, self-injury, sleep disturbance and suicidal ideas. The patient is not nervous/anxious and is not hyperactive.         Objective      Physical Exam  Pulse 105   Ht 155.5 cm (61.22\")   Wt 86.6 kg (190 lb 14.7 oz)   LMP  (LMP Unknown) Comment: S/P AH, LEFT S&O/RIGHT S&O  SpO2 99%   BMI 35.81 kg/m²     Body mass index is 35.81 kg/m².    General:   Mental Status:  Alert   Appearance: Cooperative, in no acute distress   Build and Nutrition: Overweight female   Orientation: Alert and oriented to person, place and time   Posture: Normal   Gait: Limping on the left    Integument:   Left knee: No skin lesions, no rash, no ecchymosis    Neurologic:   Sensation:    Left foot: Intact to light touch on the dorsal and plantar aspect   Motor:  Left lower extremity: 5/5 quadriceps, hamstrings, ankle dorsiflexors, and ankle plantar flexors  Vascular:   Left lower extremity: 2+ dorsalis pedis pulse, prompt capillary refill    Lower Extremities:   Left Knee:    Tenderness:  Medial and lateral joint line tenderness    Effusion:  None    Swelling:  None    Crepitus:  Positive    Atrophy:  None    Range of motion:  Extension: 0°       Flexion: 110°  Instability:  No varus laxity, no valgus laxity, negative anterior drawer  Deformities:  None      Imaging/Studies  Imaging Results (last 24 hours)     Procedure Component Value Units Date/Time    XR Knee 4+ View Left [[id number]] Resulted:  05/15/19 0919     Updated:  05/15/19 0920    Narrative:       Left Knee Radiographs  Indication: left knee pain  Views: Standing AP's and skiers of both knees, with lateral and sunrise   views of the left knee    Comparison: no prior studies available    Findings:   Bone-on-bone contact medial compartment, advanced patellofemoral   degeneration, varus alignment, with " tricompartment osteophytes.    Impression: Advanced left knee osteoarthritis.        MRI: MRI from [facility] of the left knee from 5/10/2019 was reviewed, which showed medial meniscal degeneration, osteoarthritic changes, with a small popliteal cyst.    Assessment and Plan     Park was seen today for pain.    Diagnoses and all orders for this visit:    Primary osteoarthritis of left knee  -     XR Knee 4+ View Left  -     Large Joint Arthrocentesis        1. Primary osteoarthritis of left knee        I reviewed my findings with the patient today.  X-rays and MRI confirm advanced degeneration in the left knee.  I offered her an intra-articular injection today, she wished to proceed.  Long-term she may be a candidate for knee replacement surgery depending her response to conservative treatment.  I will see her back in 2 months, but sooner for any problems.  She may also be interested in Visco supplementation injections in the future.    Of note, she had 30 to 40% relief just a few minutes following the injection.    Return in about 2 months (around 7/15/2019).      Medical Decision Making  Management Options : prescription/IM medicine  Data/Risk: radiology tests and independent visualization of imaging, lab tests, or EMG/NCV      Jose Roger MD  05/15/19  9:34 AM

## 2019-05-16 ENCOUNTER — TELEPHONE (OUTPATIENT)
Dept: ORTHOPEDIC SURGERY | Facility: CLINIC | Age: 64
End: 2019-05-16

## 2019-06-21 ENCOUNTER — TRANSCRIBE ORDERS (OUTPATIENT)
Dept: ADMINISTRATIVE | Facility: HOSPITAL | Age: 64
End: 2019-06-21

## 2019-06-21 DIAGNOSIS — E34.0 CARCINOID SYNDROME (HCC): Primary | ICD-10-CM

## 2019-06-25 ENCOUNTER — APPOINTMENT (OUTPATIENT)
Dept: CT IMAGING | Facility: HOSPITAL | Age: 64
End: 2019-06-25

## 2019-06-25 ENCOUNTER — HOSPITAL ENCOUNTER (OUTPATIENT)
Dept: CT IMAGING | Facility: HOSPITAL | Age: 64
Discharge: HOME | End: 2019-06-25
Admitting: COLON & RECTAL SURGERY

## 2019-06-25 DIAGNOSIS — E34.0 CARCINOID SYNDROME (HCC): ICD-10-CM

## 2019-06-25 PROCEDURE — 74177 CT ABD & PELVIS W/CONTRAST: CPT

## 2019-06-25 PROCEDURE — 0 DIATRIZOATE MEGLUMINE & SODIUM PER 1 ML: Performed by: COLON & RECTAL SURGERY

## 2019-06-25 PROCEDURE — 25010000002 IOPAMIDOL 61 % SOLUTION: Performed by: COLON & RECTAL SURGERY

## 2019-06-25 RX ADMIN — DIATRIZOATE MEGLUMINE AND DIATRIZOATE SODIUM 15 ML: 660; 100 LIQUID ORAL; RECTAL at 15:54

## 2019-06-25 RX ADMIN — IOPAMIDOL 90 ML: 612 INJECTION, SOLUTION INTRAVENOUS at 15:54

## 2019-06-26 ENCOUNTER — TRANSCRIBE ORDERS (OUTPATIENT)
Dept: ADMINISTRATIVE | Facility: HOSPITAL | Age: 64
End: 2019-06-26

## 2019-06-26 DIAGNOSIS — D3A.026 CARCINOID TUMOR OF RECTUM: Primary | ICD-10-CM

## 2019-06-26 DIAGNOSIS — D3A.026 RECTAL CARCINOID TUMOR: ICD-10-CM

## 2019-06-29 ENCOUNTER — APPOINTMENT (OUTPATIENT)
Dept: CT IMAGING | Facility: HOSPITAL | Age: 64
End: 2019-06-29

## 2019-07-08 ENCOUNTER — HOSPITAL ENCOUNTER (OUTPATIENT)
Dept: NUCLEAR MEDICINE | Facility: HOSPITAL | Age: 64
Discharge: HOME | End: 2019-07-08

## 2019-07-08 DIAGNOSIS — D3A.026 RECTAL CARCINOID TUMOR: ICD-10-CM

## 2019-07-08 PROCEDURE — 78804 RP LOCLZJ TUM WHBDY 2+D IMG: CPT

## 2019-07-08 PROCEDURE — 0 INDIUM PENTETREOTIDE KIT: Performed by: COLON & RECTAL SURGERY

## 2019-07-08 PROCEDURE — A9572 INDIUM IN-111 PENTETREOTIDE: HCPCS | Performed by: COLON & RECTAL SURGERY

## 2019-07-08 RX ADMIN — INDIUM IN -111 PENTETREOTIDE 1 DOSE: KIT at 10:30

## 2019-07-09 ENCOUNTER — HOSPITAL ENCOUNTER (OUTPATIENT)
Dept: NUCLEAR MEDICINE | Facility: HOSPITAL | Age: 64
Discharge: HOME | End: 2019-07-09

## 2019-07-10 ENCOUNTER — HOSPITAL ENCOUNTER (OUTPATIENT)
Dept: NUCLEAR MEDICINE | Facility: HOSPITAL | Age: 64
Discharge: HOME | End: 2019-07-10

## 2019-07-17 ENCOUNTER — OFFICE VISIT (OUTPATIENT)
Dept: ORTHOPEDIC SURGERY | Facility: CLINIC | Age: 64
End: 2019-07-17

## 2019-07-17 VITALS — BODY MASS INDEX: 35.59 KG/M2 | HEART RATE: 122 BPM | HEIGHT: 61 IN | WEIGHT: 188.49 LBS | OXYGEN SATURATION: 98 %

## 2019-07-17 DIAGNOSIS — M17.12 PRIMARY OSTEOARTHRITIS OF LEFT KNEE: Primary | ICD-10-CM

## 2019-07-17 PROCEDURE — 20610 DRAIN/INJ JOINT/BURSA W/O US: CPT | Performed by: ORTHOPAEDIC SURGERY

## 2019-07-17 PROCEDURE — 99213 OFFICE O/P EST LOW 20 MIN: CPT | Performed by: ORTHOPAEDIC SURGERY

## 2019-07-17 RX ORDER — ROPIVACAINE HYDROCHLORIDE 5 MG/ML
4 INJECTION, SOLUTION EPIDURAL; INFILTRATION; PERINEURAL
Status: COMPLETED | OUTPATIENT
Start: 2019-07-17 | End: 2019-07-17

## 2019-07-17 RX ORDER — TRIAMCINOLONE ACETONIDE 40 MG/ML
40 INJECTION, SUSPENSION INTRA-ARTICULAR; INTRAMUSCULAR
Status: COMPLETED | OUTPATIENT
Start: 2019-07-17 | End: 2019-07-17

## 2019-07-17 RX ORDER — DIAZEPAM 5 MG/1
5 TABLET ORAL 2 TIMES DAILY PRN
COMMUNITY

## 2019-07-17 RX ADMIN — TRIAMCINOLONE ACETONIDE 40 MG: 40 INJECTION, SUSPENSION INTRA-ARTICULAR; INTRAMUSCULAR at 10:29

## 2019-07-17 RX ADMIN — ROPIVACAINE HYDROCHLORIDE 4 ML: 5 INJECTION, SOLUTION EPIDURAL; INFILTRATION; PERINEURAL at 10:29

## 2019-07-17 NOTE — PROGRESS NOTES
[facility] [place] [facility] Note    Subjective     Chief Complaint   Patient presents with   • Follow-up     2 months- Primary osteoarthritis of left knee         HPI    Park Ray is a 64 y.o. female.  Follows up today for left knee.  She did have brief relief with the steroid injection, but the pain is returned, which is severe, dull, aching, burning and throbbing.  Worse with walking, standing and climbing stairs.  She would like to have another injection today.  She is not interested in surgery at this time, because her  is having upcoming surgery on his knee.      Patient Active Problem List   Diagnosis   • OAB (overactive bladder)   • Mixed incontinence urge and stress   • Menopause   • Hypothyroidism   • Fatty liver   • Family history of breast cancer in mother   • Superficial dyspareunia   • Colon cancer (CMS/HCC)   • Recurrent genital herpes   • Vaginal atrophy     Past Medical History:   Diagnosis Date   • Closed displaced fracture of shaft of left clavicle    • Closed nondisplaced fracture of neck of left radius    • Colon cancer (CMS/HCC)    • Dyspareunia    • Family history of breast cancer in mother    • Fatty liver    • Fracture of proximal phalanx of toe    • Herpes    • Hypothyroidism    • IBS (irritable bowel syndrome)    • Left wrist pain    • Menopause    • Mixed incontinence urge and stress    • OAB (overactive bladder)    • Right wrist pain       Past Surgical History:   Procedure Laterality Date   •  SECTION     • CHOLECYSTECTOMY     • COLECTOMY PARTIAL / TOTAL     • COLON RESECTION     • ENTEROCELE REPAIR     • DAWSON MORA ([facility]) PROCEDURE     • PELVIC LAPAROSCOPY     • RECTAL SURGERY     • SALPINGO OOPHORECTOMY Right    • TOTAL ABDOMINAL HYSTERECTOMY WITH SALPINGO OOPHORECTOMY Left    • UTERINE SUSPENSION LAPAROSCOPIC        Family History   Problem Relation Age of Onset   • Cancer Father    • Heart disease Father    • Hypertension Father    •  Osteoarthritis Father    • Breast cancer Mother    • Cancer Mother    • Brain cancer Brother    • Hypertension Brother    • Diabetes Brother    • Hypertension Sister    • Cancer Other    • Heart disease Other      Social History     Socioeconomic History   • Marital status:      Spouse name: Not on file   • Number of children: Not on file   • Years of education: Not on file   • Highest education level: Not on file   Tobacco Use   • Smoking status: Never Smoker   • Smokeless tobacco: Never Used   Substance and Sexual Activity   • Alcohol use: Yes     Comment: OCCASIONALLY   • Drug use: No   • Sexual activity: Yes     Partners: Male     Birth control/protection: Post-menopausal, Surgical   Social History Narrative    Lives with her       Current Outpatient Medications on File Prior to Visit   Medication Sig Dispense Refill   • Biotin 1000 MCG chewable tablet Chew.     • Cholecalciferol (VITAMIN D-400 PO) Take 200 Units by mouth Daily.     • diazePAM (VALIUM) 5 MG tablet Take 5 mg by mouth 2 (Two) Times a Day As Needed for Anxiety.     • fluticasone (FLONASE) 50 MCG/ACT nasal spray 2 sprays into each nostril Daily.     • furosemide (LASIX) 40 MG tablet Take 40 mg by mouth as needed.     • levothyroxine (SYNTHROID, LEVOTHROID) 25 MCG tablet Take 25 mcg by mouth daily.     • Multiple Vitamins-Minerals (MULTIVITAMIN ADULT PO) Take 1 tablet by mouth daily.     • nitrofurantoin (MACRODANTIN) 50 MG capsule TAKE ONE CAPSULE BY MOUTH DAILY 90 capsule 2   • omeprazole (priLOSEC) 40 MG capsule Take 40 mg by mouth Daily.     • phentermine 37.5 MG capsule Take 37.5 mg by mouth Every Morning.     • pseudoephedrine (SUDAFED) 30 MG tablet Take 30 mg by mouth Every 4 (Four) Hours As Needed for Congestion.     • Vitamin A 94794 UNITS tablet Take 10,000 tablets by mouth daily.     • vitamin E 400 UNIT capsule Take 400 Units by mouth daily.       No current facility-administered medications on file prior to visit.        Allergies   Allergen Reactions   • Tetracyclines & Related Hives        Review of Systems   Constitutional: Negative for activity change, appetite change, chills, diaphoresis, fatigue, fever and unexpected weight change.   HENT: Negative for congestion, dental problem, drooling, ear discharge, ear pain, facial swelling, hearing loss, mouth sores, nosebleeds, postnasal drip, rhinorrhea, sinus pressure, sneezing, sore throat, tinnitus, trouble swallowing and voice change.    Eyes: Negative for photophobia, pain, discharge, redness, itching and visual disturbance.   Respiratory: Negative for apnea, cough, choking, chest tightness, shortness of breath, wheezing and stridor.    Cardiovascular: Negative for chest pain, palpitations and leg swelling.   Gastrointestinal: Negative for abdominal distention, abdominal pain, anal bleeding, blood in stool, constipation, diarrhea, nausea, rectal pain and vomiting.   Endocrine: Negative for cold intolerance, heat intolerance, polydipsia, polyphagia and polyuria.   Genitourinary: Negative for decreased urine volume, difficulty urinating, dysuria, enuresis, flank pain, frequency, genital sores, hematuria and urgency.   Musculoskeletal: Positive for arthralgias. Negative for back pain, gait problem, joint swelling, myalgias, neck pain and neck stiffness.   Skin: Negative for color change, pallor, rash and wound.   Allergic/Immunologic: Negative for environmental allergies, food allergies and immunocompromised state.   Neurological: Negative for dizziness, tremors, seizures, syncope, facial asymmetry, speech difficulty, weakness, light-headedness, numbness and headaches.   Hematological: Negative for adenopathy. Does not bruise/bleed easily.   Psychiatric/Behavioral: Negative for agitation, behavioral problems, confusion, decreased concentration, dysphoric mood, hallucinations, self-injury, sleep disturbance and suicidal ideas. The patient is not nervous/anxious and is not  "hyperactive.         Objective      Physical Exam  Pulse (!) 122   Ht 155.5 cm (61.22\")   Wt 85.5 kg (188 lb 7.9 oz)   LMP  (LMP Unknown) Comment: S/P AH, LEFT S&O/RIGHT S&O  SpO2 98%   BMI 35.36 kg/m²     Body mass index is 35.36 kg/m².    General:   Mental Status:  Alert   Appearance: Cooperative, in no acute distress   Build and Nutrition: Obese female   Orientation: Alert and oriented to person, place and time   Posture: Normal   Gait: Limping    Integument:   Left knee: No skin lesions, no rash, no ecchymosis    Lower Extremities:   Left Knee:    Tenderness:  Medial joint line tenderness    Effusion:  None    Swelling:  None    Crepitus: Positive    Range of motion:  Extension: 0°       Flexion: 120°  Instability: No varus laxity, no valgus laxity, negative anterior drawer  Deformities:  Varus        Assessment and Plan     Park was seen today for follow-up.    Diagnoses and all orders for this visit:    Primary osteoarthritis of left knee  -     Large Joint Arthrocentesis: L knee  -     Large Joint Arthrocentesis        1. Primary osteoarthritis of left knee        I reviewed my findings with the patient today.  Left knee continues to bother her, and she is not yet interested in surgical intervention.  Her  has surgery upcoming, and she would like to wait until he has recovered.  She would like to try Visco supplementation injections.  We will do a steroid injection today, and submit for approval.  I will see her back once the Visco supplementation injection series is ready for administration.    She had 10% improvement just a few minutes following the injection today.    No Follow-up on file.      Medical Decision Making  Management Options : prescription/IM medicine      Jose Roger MD  07/17/19  11:10 AM            "

## 2019-07-17 NOTE — PROGRESS NOTES
Procedure   Large Joint Arthrocentesis: L knee  Date/Time: 7/17/2019 10:29 AM  Consent given by: patient  Site marked: site marked  Timeout: Immediately prior to procedure a time out was called to verify the correct patient, procedure, equipment, support staff and site/side marked as required   Supporting Documentation  Indications: pain   Procedure Details  Location: knee - L knee  Preparation: Patient was prepped and draped in the usual sterile fashion  Needle size: 22 G  Approach: anterolateral  Medications administered: 4 mL ropivacaine 0.5 %; 40 mg triamcinolone acetonide 40 MG/ML  Patient tolerance: patient tolerated the procedure well with no immediate complications

## 2019-08-07 ENCOUNTER — CLINICAL SUPPORT (OUTPATIENT)
Dept: ORTHOPEDIC SURGERY | Facility: CLINIC | Age: 64
End: 2019-08-07

## 2019-08-07 DIAGNOSIS — M17.12 PRIMARY OSTEOARTHRITIS OF LEFT KNEE: Primary | ICD-10-CM

## 2019-08-07 PROCEDURE — 20610 DRAIN/INJ JOINT/BURSA W/O US: CPT | Performed by: PHYSICIAN ASSISTANT

## 2019-08-07 RX ORDER — LIDOCAINE HYDROCHLORIDE 10 MG/ML
2 INJECTION, SOLUTION EPIDURAL; INFILTRATION; INTRACAUDAL; PERINEURAL
Status: COMPLETED | OUTPATIENT
Start: 2019-08-07 | End: 2019-08-07

## 2019-08-07 RX ADMIN — LIDOCAINE HYDROCHLORIDE 2 ML: 10 INJECTION, SOLUTION EPIDURAL; INFILTRATION; INTRACAUDAL; PERINEURAL at 08:50

## 2019-08-07 NOTE — PROGRESS NOTES
Procedure   Large Joint Arthrocentesis: L knee  Date/Time: 8/7/2019 8:50 AM  Consent given by: patient  Site marked: site marked  Timeout: Immediately prior to procedure a time out was called to verify the correct patient, procedure, equipment, support staff and site/side marked as required   Supporting Documentation  Indications: pain   Procedure Details  Location: knee - L knee  Preparation: Patient was prepped and draped in the usual sterile fashion  Needle size: 22 G  Approach: anterolateral  Medications administered: 30 mg Hyaluronan 30 MG/2ML; 2 mL lidocaine PF 1% 1 %  Patient tolerance: patient tolerated the procedure well with no immediate complications

## 2019-08-07 NOTE — PROGRESS NOTES
Subjective     Follow-up (#1 Left knee Orthovisc injection- Primary Osteoarthritis of left knee)      Park Ray is a 64 y.o. female.     History of Present Illness   Right is undergoing an Orthovisc series.  This would be her first series of Visco.  Allergies   Allergen Reactions   • Tetracyclines & Related Hives     Current Outpatient Medications on File Prior to Visit   Medication Sig Dispense Refill   • Biotin 1000 MCG chewable tablet Chew.     • Cholecalciferol (VITAMIN D-400 PO) Take 200 Units by mouth Daily.     • diazePAM (VALIUM) 5 MG tablet Take 5 mg by mouth 2 (Two) Times a Day As Needed for Anxiety.     • fluticasone (FLONASE) 50 MCG/ACT nasal spray 2 sprays into each nostril Daily.     • furosemide (LASIX) 40 MG tablet Take 40 mg by mouth as needed.     • levothyroxine (SYNTHROID, LEVOTHROID) 25 MCG tablet Take 25 mcg by mouth daily.     • Multiple Vitamins-Minerals (MULTIVITAMIN ADULT PO) Take 1 tablet by mouth daily.     • nitrofurantoin (MACRODANTIN) 50 MG capsule TAKE ONE CAPSULE BY MOUTH DAILY 90 capsule 2   • omeprazole (priLOSEC) 40 MG capsule Take 40 mg by mouth Daily.     • phentermine 37.5 MG capsule Take 37.5 mg by mouth Every Morning.     • pseudoephedrine (SUDAFED) 30 MG tablet Take 30 mg by mouth Every 4 (Four) Hours As Needed for Congestion.     • Vitamin A 30547 UNITS tablet Take 10,000 tablets by mouth daily.     • vitamin E 400 UNIT capsule Take 400 Units by mouth daily.       No current facility-administered medications on file prior to visit.      Social History     Socioeconomic History   • Marital status:      Spouse name: Not on file   • Number of children: Not on file   • Years of education: Not on file   • Highest education level: Not on file   Tobacco Use   • Smoking status: Never Smoker   • Smokeless tobacco: Never Used   Substance and Sexual Activity   • Alcohol use: Yes     Comment: OCCASIONALLY   • Drug use: No   • Sexual activity: Yes     Partners: Male      Birth control/protection: Post-menopausal, Surgical   Social History Narrative    Lives with her      Past Surgical History:   Procedure Laterality Date   •  SECTION     • CHOLECYSTECTOMY     • COLECTOMY PARTIAL / TOTAL     • COLON RESECTION     • ENTEROCELE REPAIR     • DAWSON MORA ([facility]) PROCEDURE     • PELVIC LAPAROSCOPY     • RECTAL SURGERY     • SALPINGO OOPHORECTOMY Right    • TOTAL ABDOMINAL HYSTERECTOMY WITH SALPINGO OOPHORECTOMY Left    • UTERINE SUSPENSION LAPAROSCOPIC       Family History   Problem Relation Age of Onset   • Cancer Father    • Heart disease Father    • Hypertension Father    • Osteoarthritis Father    • Breast cancer Mother    • Cancer Mother    • Brain cancer Brother    • Hypertension Brother    • Diabetes Brother    • Hypertension Sister    • Cancer Other    • Heart disease Other      Past Medical History:   Diagnosis Date   • Closed displaced fracture of shaft of left clavicle    • Closed nondisplaced fracture of neck of left radius    • Colon cancer (CMS/HCC)    • Dyspareunia    • Family history of breast cancer in mother    • Fatty liver    • Fracture of proximal phalanx of toe    • Herpes    • Hypothyroidism    • IBS (irritable bowel syndrome)    • Left wrist pain    • Menopause    • Mixed incontinence urge and stress    • OAB (overactive bladder)    • Right wrist pain          Review of Systems    The following portions of the patient's history were reviewed and updated as appropriate: allergies, current medications, past family history, past medical history, past social history, past surgical history and problem list.    Ortho Exam      Medical Decision Making    Assessment and Plan/ Diagnosis/Treatment options:   Left knee arthritis here to begin a series of Orthovisc.  Plan is to proceed with first injection in the left knee today.  She will return in 1 week for the second injection or sooner if needed.    Using sterile technique, the left knee was  sterilely prepped with Hibiclens.  Following time out, using a 22 gauge needle the left knee was aspirated and then injected with 2 ml Orthovisc. Approximately 0.5 mm of straw-colored fluid was obtained.  Patient tolerated the procedure well.  No complications.

## 2019-08-14 ENCOUNTER — CLINICAL SUPPORT (OUTPATIENT)
Dept: ORTHOPEDIC SURGERY | Facility: CLINIC | Age: 64
End: 2019-08-14

## 2019-08-14 DIAGNOSIS — M17.12 PRIMARY OSTEOARTHRITIS OF LEFT KNEE: Primary | ICD-10-CM

## 2019-08-14 PROCEDURE — 20610 DRAIN/INJ JOINT/BURSA W/O US: CPT | Performed by: PHYSICIAN ASSISTANT

## 2019-08-14 NOTE — PROGRESS NOTES
Subjective     Injections (Left Knee Orthovisc # 2 )      Park Ray is a 64 y.o. female.     History of Present Illness   Patient presents for the second injection of Orthovisc in the left knee.  She is tolerated previous injections well.  Allergies   Allergen Reactions   • Tetracyclines & Related Hives     Current Outpatient Medications on File Prior to Visit   Medication Sig Dispense Refill   • Biotin 1000 MCG chewable tablet Chew.     • Cholecalciferol (VITAMIN D-400 PO) Take 200 Units by mouth Daily.     • diazePAM (VALIUM) 5 MG tablet Take 5 mg by mouth 2 (Two) Times a Day As Needed for Anxiety.     • fluticasone (FLONASE) 50 MCG/ACT nasal spray 2 sprays into each nostril Daily.     • furosemide (LASIX) 40 MG tablet Take 40 mg by mouth as needed.     • levothyroxine (SYNTHROID, LEVOTHROID) 25 MCG tablet Take 25 mcg by mouth daily.     • Multiple Vitamins-Minerals (MULTIVITAMIN ADULT PO) Take 1 tablet by mouth daily.     • nitrofurantoin (MACRODANTIN) 50 MG capsule TAKE ONE CAPSULE BY MOUTH DAILY 90 capsule 2   • omeprazole (priLOSEC) 40 MG capsule Take 40 mg by mouth Daily.     • phentermine 37.5 MG capsule Take 37.5 mg by mouth Every Morning.     • pseudoephedrine (SUDAFED) 30 MG tablet Take 30 mg by mouth Every 4 (Four) Hours As Needed for Congestion.     • Vitamin A 73642 UNITS tablet Take 10,000 tablets by mouth daily.     • vitamin E 400 UNIT capsule Take 400 Units by mouth daily.       No current facility-administered medications on file prior to visit.      Social History     Socioeconomic History   • Marital status:      Spouse name: Not on file   • Number of children: Not on file   • Years of education: Not on file   • Highest education level: Not on file   Tobacco Use   • Smoking status: Never Smoker   • Smokeless tobacco: Never Used   Substance and Sexual Activity   • Alcohol use: Yes     Comment: OCCASIONALLY   • Drug use: No   • Sexual activity: Yes     Partners: Male     Birth  control/protection: Post-menopausal, Surgical   Social History Narrative    Lives with her      Past Surgical History:   Procedure Laterality Date   •  SECTION     • CHOLECYSTECTOMY     • COLECTOMY PARTIAL / TOTAL     • COLON RESECTION     • ENTEROCELE REPAIR     • DAWSON MORA ([facility]) PROCEDURE     • PELVIC LAPAROSCOPY     • RECTAL SURGERY     • SALPINGO OOPHORECTOMY Right    • TOTAL ABDOMINAL HYSTERECTOMY WITH SALPINGO OOPHORECTOMY Left    • UTERINE SUSPENSION LAPAROSCOPIC       Family History   Problem Relation Age of Onset   • Cancer Father    • Heart disease Father    • Hypertension Father    • Osteoarthritis Father    • Breast cancer Mother    • Cancer Mother    • Brain cancer Brother    • Hypertension Brother    • Diabetes Brother    • Hypertension Sister    • Cancer Other    • Heart disease Other      Past Medical History:   Diagnosis Date   • Closed displaced fracture of shaft of left clavicle    • Closed nondisplaced fracture of neck of left radius    • Colon cancer (CMS/HCC)    • Dyspareunia    • Family history of breast cancer in mother    • Fatty liver    • Fracture of proximal phalanx of toe    • Herpes    • Hypothyroidism    • IBS (irritable bowel syndrome)    • Left wrist pain    • Menopause    • Mixed incontinence urge and stress    • OAB (overactive bladder)    • Right wrist pain          Review of Systems   Constitutional: Negative.    HENT: Negative.    Eyes: Negative.    Respiratory: Negative.    Cardiovascular: Negative.    Gastrointestinal: Negative.    Endocrine: Negative.    Genitourinary: Negative.    Musculoskeletal: Positive for arthralgias.   Skin: Negative.    Allergic/Immunologic: Negative.    Neurological: Negative.    Hematological: Negative.    Psychiatric/Behavioral: Negative.        The following portions of the patient's history were reviewed and updated as appropriate: allergies, current medications, past family history, past medical history, past  social history, past surgical history and problem list.    Ortho Exam      Medical Decision Making    Assessment and Plan/ Diagnosis/Treatment options:   Left knee arthritis undergoing an Orthovisc series.  Plan is to proceed with second injection in the left knee today.  She will return in 1 week for the final injection or sooner if needed.    Using sterile technique, the left knee was sterilely prepped with Hibiclens.  Following time out, using a 22 gauge needle the left knee was aspirated and then injected with 2 ml Orthovisc. Approximately 0.5 mm of straw-colored fluid was obtained.  Patient tolerated the procedure well.  No complications.

## 2019-08-14 NOTE — PROGRESS NOTES
Procedure   Large Joint Arthrocentesis: L knee  Date/Time: 8/14/2019 9:25 AM  Consent given by: patient  Site marked: site marked  Timeout: Immediately prior to procedure a time out was called to verify the correct patient, procedure, equipment, support staff and site/side marked as required   Supporting Documentation  Indications: pain   Procedure Details  Location: knee - L knee  Needle size: 22 G  Approach: anterolateral  Medications administered: 30 mg Hyaluronan 30 MG/2ML  Patient tolerance: patient tolerated the procedure well with no immediate complications

## 2019-08-21 ENCOUNTER — CLINICAL SUPPORT (OUTPATIENT)
Dept: ORTHOPEDIC SURGERY | Facility: CLINIC | Age: 64
End: 2019-08-21

## 2019-08-21 DIAGNOSIS — M17.12 PRIMARY OSTEOARTHRITIS OF LEFT KNEE: Primary | ICD-10-CM

## 2019-08-21 PROCEDURE — 20610 DRAIN/INJ JOINT/BURSA W/O US: CPT | Performed by: PHYSICIAN ASSISTANT

## 2019-08-21 NOTE — PROGRESS NOTES
Subjective     Injections (Left Orthovisc injection #3)      Park Ray is a 64 y.o. female.     History of Present Illness   Patient presents for the third injection of Orthovisc in the left knee.  She is tolerated previous injections well.  Allergies   Allergen Reactions   • Tetracyclines & Related Hives     Current Outpatient Medications on File Prior to Visit   Medication Sig Dispense Refill   • Biotin 1000 MCG chewable tablet Chew.     • Cholecalciferol (VITAMIN D-400 PO) Take 200 Units by mouth Daily.     • diazePAM (VALIUM) 5 MG tablet Take 5 mg by mouth 2 (Two) Times a Day As Needed for Anxiety.     • fluticasone (FLONASE) 50 MCG/ACT nasal spray 2 sprays into each nostril Daily.     • furosemide (LASIX) 40 MG tablet Take 40 mg by mouth as needed.     • levothyroxine (SYNTHROID, LEVOTHROID) 25 MCG tablet Take 25 mcg by mouth daily.     • Multiple Vitamins-Minerals (MULTIVITAMIN ADULT PO) Take 1 tablet by mouth daily.     • nitrofurantoin (MACRODANTIN) 50 MG capsule TAKE ONE CAPSULE BY MOUTH DAILY 90 capsule 2   • omeprazole (priLOSEC) 40 MG capsule Take 40 mg by mouth Daily.     • phentermine 37.5 MG capsule Take 37.5 mg by mouth Every Morning.     • pseudoephedrine (SUDAFED) 30 MG tablet Take 30 mg by mouth Every 4 (Four) Hours As Needed for Congestion.     • Vitamin A 33707 UNITS tablet Take 10,000 tablets by mouth daily.     • vitamin E 400 UNIT capsule Take 400 Units by mouth daily.       No current facility-administered medications on file prior to visit.      Social History     Socioeconomic History   • Marital status:      Spouse name: Not on file   • Number of children: Not on file   • Years of education: Not on file   • Highest education level: Not on file   Tobacco Use   • Smoking status: Never Smoker   • Smokeless tobacco: Never Used   Substance and Sexual Activity   • Alcohol use: Yes     Comment: OCCASIONALLY   • Drug use: No   • Sexual activity: Yes     Partners: Male     Birth  control/protection: Post-menopausal, Surgical   Social History Narrative    Lives with her      Past Surgical History:   Procedure Laterality Date   •  SECTION     • CHOLECYSTECTOMY     • COLECTOMY PARTIAL / TOTAL     • COLON RESECTION     • ENTEROCELE REPAIR     • DAWSON MORA ([facility]) PROCEDURE     • PELVIC LAPAROSCOPY     • RECTAL SURGERY     • SALPINGO OOPHORECTOMY Right    • TOTAL ABDOMINAL HYSTERECTOMY WITH SALPINGO OOPHORECTOMY Left    • UTERINE SUSPENSION LAPAROSCOPIC       Family History   Problem Relation Age of Onset   • Cancer Father    • Heart disease Father    • Hypertension Father    • Osteoarthritis Father    • Breast cancer Mother    • Cancer Mother    • Brain cancer Brother    • Hypertension Brother    • Diabetes Brother    • Hypertension Sister    • Cancer Other    • Heart disease Other      Past Medical History:   Diagnosis Date   • Closed displaced fracture of shaft of left clavicle    • Closed nondisplaced fracture of neck of left radius    • Colon cancer (CMS/HCC)    • Dyspareunia    • Family history of breast cancer in mother    • Fatty liver    • Fracture of proximal phalanx of toe    • Herpes    • Hypothyroidism    • IBS (irritable bowel syndrome)    • Left wrist pain    • Menopause    • Mixed incontinence urge and stress    • OAB (overactive bladder)    • Right wrist pain          Review of Systems   Constitutional: Negative.    HENT: Negative.    Eyes: Negative.    Respiratory: Negative.    Cardiovascular: Negative.    Gastrointestinal: Negative.    Endocrine: Negative.    Genitourinary: Negative.    Musculoskeletal: Positive for arthralgias (knee pain).   Skin: Negative.    Allergic/Immunologic: Negative.    Neurological: Negative.    Hematological: Negative.    Psychiatric/Behavioral: Negative.        The following portions of the patient's history were reviewed and updated as appropriate: allergies, current medications, past family history, past medical  history, past social history, past surgical history and problem list.    Ortho Exam      Medical Decision Making    Assessment and Plan/ Diagnosis/Treatment options:   Left knee arthritis undergoing an Orthovisc series.  Plan is to proceed with final injection in the left knee today.  She will return in 2 months to see how she is progressed or sooner if needed    Using sterile technique, the left knee was sterilely prepped with Hibiclens.  Following time out, using a 22 gauge needle the left knee was aspirated and then injected with 2 ml Orthovisc. Approximately 0.5 mm of straw-colored fluid was obtained.  Patient tolerated the procedure well.  No complications.

## 2019-08-21 NOTE — PROGRESS NOTES
Procedure   Large Joint Arthrocentesis: L knee  Date/Time: 8/21/2019 8:40 AM  Consent given by: patient  Site marked: site marked  Timeout: Immediately prior to procedure a time out was called to verify the correct patient, procedure, equipment, support staff and site/side marked as required   Supporting Documentation  Indications: pain   Procedure Details  Location: knee - L knee  Preparation: Patient was prepped and draped in the usual sterile fashion  Needle size: 22 G  Approach: anterolateral  Medications administered: 30 mg Hyaluronan 30 MG/2ML  Patient tolerance: patient tolerated the procedure well with no immediate complications

## 2019-09-04 ENCOUNTER — TELEPHONE (OUTPATIENT)
Dept: OBSTETRICS AND GYNECOLOGY | Facility: CLINIC | Age: 64
End: 2019-09-04

## 2019-09-04 NOTE — TELEPHONE ENCOUNTER
Patient called back and let Daksha know that she no longer needs to speak with me.  She has an appointment next week.

## 2019-09-24 ENCOUNTER — OFFICE VISIT (OUTPATIENT)
Dept: OBSTETRICS AND GYNECOLOGY | Facility: CLINIC | Age: 64
End: 2019-09-24

## 2019-09-24 VITALS
DIASTOLIC BLOOD PRESSURE: 78 MMHG | WEIGHT: 188.8 LBS | HEIGHT: 61 IN | BODY MASS INDEX: 35.65 KG/M2 | SYSTOLIC BLOOD PRESSURE: 138 MMHG

## 2019-09-24 DIAGNOSIS — Z78.0 MENOPAUSE: Primary | ICD-10-CM

## 2019-09-24 DIAGNOSIS — N95.2 VAGINAL ATROPHY: ICD-10-CM

## 2019-09-24 DIAGNOSIS — Z01.411 ENCOUNTER FOR GYNECOLOGICAL EXAMINATION WITH ABNORMAL FINDING: ICD-10-CM

## 2019-09-24 DIAGNOSIS — Z80.3 FAMILY HISTORY OF BREAST CANCER IN MOTHER: ICD-10-CM

## 2019-09-24 DIAGNOSIS — N30.00 ACUTE RECURRENT CYSTITIS: ICD-10-CM

## 2019-09-24 DIAGNOSIS — A60.00 RECURRENT GENITAL HERPES: ICD-10-CM

## 2019-09-24 PROCEDURE — 99396 PREV VISIT EST AGE 40-64: CPT | Performed by: OBSTETRICS & GYNECOLOGY

## 2019-09-24 RX ORDER — SULFAMETHOXAZOLE AND TRIMETHOPRIM 800; 160 MG/1; MG/1
1 TABLET ORAL 2 TIMES DAILY
Qty: 10 TABLET | Refills: 0 | Status: SHIPPED | OUTPATIENT
Start: 2019-09-24 | End: 2019-09-29

## 2019-09-24 RX ORDER — NITROFURANTOIN MACROCRYSTALS 50 MG/1
50 CAPSULE ORAL DAILY
Qty: 90 CAPSULE | Refills: 3 | Status: SHIPPED | OUTPATIENT
Start: 2019-09-24 | End: 2020-09-23

## 2019-09-24 RX ORDER — PHENAZOPYRIDINE HYDROCHLORIDE 200 MG/1
200 TABLET, FILM COATED ORAL 3 TIMES DAILY PRN
COMMUNITY
End: 2021-01-13

## 2019-09-24 RX ORDER — ACYCLOVIR 400 MG/1
400 TABLET ORAL DAILY
COMMUNITY
End: 2019-09-24 | Stop reason: SDUPTHER

## 2019-09-24 RX ORDER — ACYCLOVIR 400 MG/1
400 TABLET ORAL DAILY
Qty: 90 TABLET | Refills: 3 | Status: SHIPPED | OUTPATIENT
Start: 2019-09-24 | End: 2020-09-23

## 2019-09-24 RX ORDER — ECHINACEA PURPUREA AERIAL 350 MG
1 CAPSULE ORAL DAILY
COMMUNITY

## 2019-09-24 RX ORDER — DIPHENOXYLATE HYDROCHLORIDE AND ATROPINE SULFATE 2.5; .025 MG/1; MG/1
1 TABLET ORAL 3 TIMES DAILY PRN
COMMUNITY
End: 2020-12-02

## 2019-09-24 NOTE — PROGRESS NOTES
Chief Complaint   Patient presents with   • Gynecologic Exam   • Urinary Frequency     urgency, dysuria.  patient is taking pyridium.   • Med Refill       Park Ray is a 64 y.o. year old  presenting to be seen for her annual exam.  This patient has previously had a ORTEGA/left S&O.  She has had a right salpingo-oophorectomy and an MMK.  She has previously had a cholecystectomy.  She has had a sigmoid colon resection.  She has had a ventral herniorrhaphy with mesh.  A recent CT scan of the abdomen and pelvis revealed no evidence of pathology other than a fatty liver.  She has a history of recurrent genital herpes and is suppressed with daily acyclovir.  She has a history of recurrent cystitis and is suppressed with nitrofurantoin, 50 mg daily.  She has symptoms of vaginal atrophy which have been relieved with daily Osphena, 60 mg.  She has no side effects on any of these medications.  She was recently taken off of the nitrofurantoin by her primary care physician and has now developed urinary frequency, urgency, and dysuria.  She desires treatment for cystitis.    SCREENING TESTS    Year 2012 2015 2016 2017 2018 2019 2020  [id number]   Age                         PAP                         HPV high risk                         Mammogram       benign benign                 JOSÉ MANUEL score                         Breast MRI                         Lipids                         Vitamin D                         Colonoscopy                         DEXA  Frax (hip/any)                         Ovarian Screen                             She exercises regularly: no.  She wears her seat belt: yes.  She has concerns about domestic violence: no.  She has noticed changes in height: no    GYN screening history:  · Last mammogram: was done on approximately 2019 and the result was: Birads II (Benign findings)..    No Additional Complaints Reported    The  following portions of the patient's history were reviewed and updated as appropriate:vital signs and   She  has a past medical history of Acute torn meniscus of knee, Closed displaced fracture of shaft of left clavicle, Closed nondisplaced fracture of neck of left radius, Colon cancer (CMS/HCC), Dyspareunia, Family history of breast cancer in mother, Fatty liver, Fracture of proximal phalanx of toe, Herpes, Hypothyroidism, IBS (irritable bowel syndrome), Left wrist pain, Menopause, Mixed incontinence urge and stress, OAB (overactive bladder), and Right wrist pain.  She does not have any pertinent problems on file.  She  has a past surgical history that includes Cholecystectomy; Bowel resection; Rectal surgery; Pelvic laparoscopy; Total abdominal hysterectomy w/ bilateral salpingoophorectomy (Left); Salpingoophorectomy (Right); Uterine Suspension; Colectomy partial / total; MMK Procedure; Enterocele repair; and  section.  Her family history includes Brain cancer in her brother; Breast cancer in her mother; Cancer in her father, mother, and other; Diabetes in her brother; Heart disease in her father and other; Hypertension in her brother, father, and sister; Osteoarthritis in her father.  She  reports that she has never smoked. She has never used smokeless tobacco. She reports that she drinks alcohol. She reports that she does not use drugs.  Current Outpatient Medications   Medication Sig Dispense Refill   • acyclovir (ZOVIRAX) 400 MG tablet Take 1 tablet by mouth Daily. Take no more than 5 doses a day. 90 tablet 3   • Cholecalciferol (VITAMIN D-3) 5000 units tablet Take 1 tablet by mouth Daily.     • diphenoxylate-atropine (LOMOTIL) 2.5-0.025 MG per tablet Take 1 tablet by mouth 3 (Three) Times a Day As Needed for Diarrhea.     • Milk Thistle 140 MG capsule Take 1 capsule by mouth Daily.     • phenazopyridine (PYRIDIUM) 200 MG tablet Take 200 mg by mouth 3 (Three) Times a Day As Needed for bladder spasms.    "  • Biotin 1000 MCG chewable tablet Chew.     • diazePAM (VALIUM) 5 MG tablet Take 5 mg by mouth 2 (Two) Times a Day As Needed for Anxiety.     • fluticasone (FLONASE) 50 MCG/ACT nasal spray 2 sprays into each nostril Daily.     • furosemide (LASIX) 40 MG tablet Take 40 mg by mouth as needed.     • levothyroxine (SYNTHROID, LEVOTHROID) 25 MCG tablet Take 25 mcg by mouth daily.     • Multiple Vitamins-Minerals (MULTIVITAMIN ADULT PO) Take 1 tablet by mouth daily.     • nitrofurantoin (MACRODANTIN) 50 MG capsule Take 1 capsule by mouth Daily. 90 capsule 3   • omeprazole (priLOSEC) 40 MG capsule Take 20 mg by mouth Daily.     • Ospemifene 60 MG tablet Take 60 mg by mouth Daily. 90 tablet 3   • pseudoephedrine (SUDAFED) 30 MG tablet Take 30 mg by mouth Every 4 (Four) Hours As Needed for Congestion.     • sulfamethoxazole-trimethoprim (BACTRIM DS) 800-160 MG per tablet Take 1 tablet by mouth 2 (Two) Times a Day for 5 days. 10 tablet 0   • Vitamin A 06523 UNITS tablet Take 10,000 tablets by mouth daily.     • vitamin E 400 UNIT capsule Take 400 Units by mouth daily.       No current facility-administered medications for this visit.      She is allergic to tetracyclines & related..    Review of Systems  A comprehensive review of systems was taken.  Constitutional: negative for fever, chills, activity change, appetite change, fatigue and unexpected weight change.  Respiratory: negative  Cardiovascular: negative  Gastrointestinal: positive for abdominal pain  Genitourinary:positive for frequency and urgency  Musculoskeletal:positive for left knee pain  Behavioral/Psych: negative       /78   Ht 155.6 cm (61.25\")   Wt 85.6 kg (188 lb 12.8 oz)   LMP  (LMP Unknown) Comment: S/P AH, LEFT S&O/RIGHT S&O  Breastfeeding? No   BMI 35.38 kg/m²     Physical Exam    General:  alert; cooperative; well developed; well nourished   Skin:  No suspicious lesions seen   Thyroid: normal to inspection and palpation   Lungs:  clear to " auscultation bilaterally   Heart:  regular rate and rhythm, S1, S2 normal, no murmur, click, rub or gallop   Breasts:  Examined in supine position  Symmetric without masses or skin dimpling  Nipples normal without inversion, lesions or discharge  There are no palpable axillary nodes   Abdomen: soft, non-tender; no masses  no umbilical or inguinal hernias are present  no hepato-splenomegaly   Pelvis: Clinical staff was present for exam  External genitalia:  normal appearance of the external genitalia including Bartholin's and Dresser's glands.  Vaginal:  normal pink mucosa without prolapse or lesions. pH = 4.0  Cervix:  absent.  Uterus:  absent.  Adnexa:  absent, bilateral.  Rectal:  anus visually normal appearing. recto-vaginal exam unremarkable and confirms findings;     Lab Review   No data reviewed    Imaging  Mammogram results         ASSESSMENT  Problems Addressed this Visit        Genitourinary    Menopause - Primary    Recurrent genital herpes    Relevant Medications    acyclovir (ZOVIRAX) 400 MG tablet    Vaginal atrophy    Relevant Medications    Ospemifene 60 MG tablet       Other    Family history of breast cancer in mother      Other Visit Diagnoses     Encounter for gynecological examination with abnormal finding        Acute recurrent cystitis        Relevant Medications    phenazopyridine (PYRIDIUM) 200 MG tablet    nitrofurantoin (MACRODANTIN) 50 MG capsule    sulfamethoxazole-trimethoprim (BACTRIM DS) 800-160 MG per tablet              Substance History:   reports that she has never smoked. She has never used smokeless tobacco.   reports that she drinks alcohol.   reports that she does not use drugs.    Substance use counseling is not indicated based on patient history. Alcohol use is social.    PLAN    Medications prescribed this encounter:    New Medications Ordered This Visit   Medications   • acyclovir (ZOVIRAX) 400 MG tablet     Sig: Take 1 tablet by mouth Daily. Take no more than 5 doses a day.      Dispense:  90 tablet     Refill:  3   • nitrofurantoin (MACRODANTIN) 50 MG capsule     Sig: Take 1 capsule by mouth Daily.     Dispense:  90 capsule     Refill:  3   • Ospemifene 60 MG tablet     Sig: Take 60 mg by mouth Daily.     Dispense:  90 tablet     Refill:  3   • sulfamethoxazole-trimethoprim (BACTRIM DS) 800-160 MG per tablet     Sig: Take 1 tablet by mouth 2 (Two) Times a Day for 5 days.     Dispense:  10 tablet     Refill:  0   · I have counseled the patient that I will treat her for acute cystitis but then she should resume nitrofurantoin for suppression and a dose of 50 mg daily rather than using daily Pyridium.  · I have counseled her to continue using acyclovir for suppression of recurrent genital herpes.  · She has no side effects on ospemifene and I have counseled her to continue this medication to treat vaginal atrophy and dyspareunia.  · Monthly self breast assessment and annual breast imaging  · Calcium, 600 mg/ Vit. D, 400 IU daily; regular weight-bearing exercise  · Follow up: 12 month(s)  *Please note that portions of this documentation may have been completed with a voice recognition program.  Efforts were made to edit this dictation, but occasional words may have been mistranscribed.       This note was electronically signed.    SHARON Bishop MD  September 24, 2019  4:13 PM

## 2019-10-02 ENCOUNTER — OFFICE VISIT (OUTPATIENT)
Dept: ORTHOPEDIC SURGERY | Facility: CLINIC | Age: 64
End: 2019-10-02

## 2019-10-02 VITALS — BODY MASS INDEX: 35.63 KG/M2 | HEIGHT: 61 IN | WEIGHT: 188.71 LBS | HEART RATE: 87 BPM | OXYGEN SATURATION: 99 %

## 2019-10-02 DIAGNOSIS — M70.52 PES ANSERINUS BURSITIS OF LEFT KNEE: ICD-10-CM

## 2019-10-02 DIAGNOSIS — M17.12 PRIMARY OSTEOARTHRITIS OF LEFT KNEE: Primary | ICD-10-CM

## 2019-10-02 PROCEDURE — 20610 DRAIN/INJ JOINT/BURSA W/O US: CPT | Performed by: PHYSICIAN ASSISTANT

## 2019-10-02 PROCEDURE — 99213 OFFICE O/P EST LOW 20 MIN: CPT | Performed by: PHYSICIAN ASSISTANT

## 2019-10-02 RX ADMIN — LIDOCAINE HYDROCHLORIDE 1 ML: 10 INJECTION, SOLUTION EPIDURAL; INFILTRATION; INTRACAUDAL; PERINEURAL at 08:46

## 2019-10-02 RX ADMIN — METHYLPREDNISOLONE ACETATE 40 MG: 40 INJECTION, SUSPENSION INTRA-ARTICULAR; INTRALESIONAL; INTRAMUSCULAR; SOFT TISSUE at 08:46

## 2019-10-02 NOTE — PROGRESS NOTES
[facility]    Subjective     Patient: Park Ray  : 1955    Primary Care Provider: Kate Garcia MD    Requesting Provider: As above    Follow-up (left knee follow up after injections on 19)      History    Chief Complaint: Left knee pain    History of Present Illness: Patient returns today for her left knee pain.  She reports that the Orthovisc that she finished in August improved her knee joint pain but she has persisting proximal medial tibia pain.  She has pain with weightbearing and walking that is worse with stairs.  She has a little bit of radiating pain to the distal fibula.    Current Outpatient Medications on File Prior to Visit   Medication Sig Dispense Refill   • acyclovir (ZOVIRAX) 400 MG tablet Take 1 tablet by mouth Daily. Take no more than 5 doses a day. 90 tablet 3   • Biotin 1000 MCG chewable tablet Chew.     • Cholecalciferol (VITAMIN D-3) 5000 units tablet Take 1 tablet by mouth Daily.     • diazePAM (VALIUM) 5 MG tablet Take 5 mg by mouth 2 (Two) Times a Day As Needed for Anxiety.     • diphenoxylate-atropine (LOMOTIL) 2.5-0.025 MG per tablet Take 1 tablet by mouth 3 (Three) Times a Day As Needed for Diarrhea.     • fluticasone (FLONASE) 50 MCG/ACT nasal spray 2 sprays into each nostril Daily.     • furosemide (LASIX) 40 MG tablet Take 40 mg by mouth as needed.     • levothyroxine (SYNTHROID, LEVOTHROID) 25 MCG tablet Take 25 mcg by mouth daily.     • Milk Thistle 140 MG capsule Take 1 capsule by mouth Daily.     • Multiple Vitamins-Minerals (MULTIVITAMIN ADULT PO) Take 1 tablet by mouth daily.     • nitrofurantoin (MACRODANTIN) 50 MG capsule Take 1 capsule by mouth Daily. 90 capsule 3   • omeprazole (priLOSEC) 40 MG capsule Take 20 mg by mouth Daily.     • Ospemifene 60 MG tablet Take 60 mg by mouth Daily. 90 tablet 3   • phenazopyridine (PYRIDIUM) 200 MG tablet Take 200 mg by mouth 3 (Three) Times a Day As Needed for bladder spasms.      • pseudoephedrine (SUDAFED) 30 MG tablet Take 30 mg by mouth Every 4 (Four) Hours As Needed for Congestion.     • Vitamin A 38497 UNITS tablet Take 10,000 tablets by mouth daily.     • vitamin E 400 UNIT capsule Take 400 Units by mouth daily.       No current facility-administered medications on file prior to visit.       Allergies   Allergen Reactions   • Tetracyclines & Related Hives      Past Medical History:   Diagnosis Date   • Acute torn meniscus of knee     left   • Closed displaced fracture of shaft of left clavicle    • Closed nondisplaced fracture of neck of left radius    • Colon cancer (CMS/HCC)    • Dyspareunia    • Family history of breast cancer in mother    • Fatty liver    • Fracture of proximal phalanx of toe    • Herpes    • Hypothyroidism    • IBS (irritable bowel syndrome)    • Left wrist pain    • Menopause    • Mixed incontinence urge and stress    • OAB (overactive bladder)    • Right wrist pain      Past Surgical History:   Procedure Laterality Date   •  SECTION     • CHOLECYSTECTOMY     • COLECTOMY PARTIAL / TOTAL     • COLON RESECTION     • ENTEROCELE REPAIR     • DAWSON MORA ([facility]) PROCEDURE     • PELVIC LAPAROSCOPY     • RECTAL SURGERY     • SALPINGO OOPHORECTOMY Right    • TOTAL ABDOMINAL HYSTERECTOMY WITH SALPINGO OOPHORECTOMY Left    • UTERINE SUSPENSION LAPAROSCOPIC       Family History   Problem Relation Age of Onset   • Cancer Father    • Heart disease Father    • Hypertension Father    • Osteoarthritis Father    • Breast cancer Mother    • Cancer Mother    • Brain cancer Brother    • Hypertension Brother    • Diabetes Brother    • Hypertension Sister    • Cancer Other    • Heart disease Other       Social History     Socioeconomic History   • Marital status:      Spouse name: Not on file   • Number of children: Not on file   • Years of education: Not on file   • Highest education level: Not on file   Tobacco Use   • Smoking status: Never Smoker   •  "Smokeless tobacco: Never Used   Substance and Sexual Activity   • Alcohol use: Yes     Comment: OCCASIONALLY   • Drug use: No   • Sexual activity: Yes     Partners: Male     Birth control/protection: Post-menopausal, Surgical   Social History Narrative    Lives with her         Review of Systems   Constitutional: Negative.    HENT: Negative.    Eyes: Negative.    Respiratory: Negative.    Cardiovascular: Negative.    Gastrointestinal: Negative.    Endocrine: Negative.    Genitourinary: Negative.    Musculoskeletal: Positive for joint swelling.   Skin: Negative.    Allergic/Immunologic: Negative.    Neurological: Negative.    Hematological: Negative.    Psychiatric/Behavioral: Negative.        The following portions of the patient's history were reviewed and updated as appropriate: allergies, current medications, past family history, past medical history, past social history, past surgical history and problem list.      Objective      Physical Exam  Pulse 87   Ht 155.6 cm (61.26\")   Wt 85.6 kg (188 lb 11.4 oz)   LMP  (LMP Unknown) Comment: S/P AH, LEFT S&O/RIGHT S&O  SpO2 99%   BMI 35.36 kg/m²     Body mass index is 35.36 kg/m².    Patient is well developed, well nourished and in no acute distress.  Alert and oriented x 3.    Ortho Exam  Left Knee Exam  ----------  Knee Exam:  ----------  ALIGNMENT:  Left: neutral  ----------  RANGE OF MOTION:  Left: Normal (0-120 degrees) with no extensor lag or flexion contracture  LIGAMENTOUS STABILITY:   Left:stable to varus and valgus stress at terminal extension and 30 degrees without any evidence of laxity   ----------  STRENGTH:  KNEE FLEXION  Left 5/5  KNEE EXTENSION Left 5/5  ----------  PAIN WITH PALPATION: Left medial joint line and pes bursa  PAIN WITH KNEE ROM:  Left no  PATELLAR CREPITUS:  Left yes  ----------  SENSATION TO LIGHT TOUCH:  DEEP PERONEAL/SUPERFICIAL PERONEAL/SURAL/SAPHENOUS/TIBIAL:   Left intact  ----------  EFFUSION:   Left:  no  ERYTHEMA:  ; " Left:  no  WOUNDS/INCISIONS: none, no overlying skin problems.      Medical Decision Making    Data Review:   ordered and reviewed x-rays today and none    Assessment:  1. Primary osteoarthritis of left knee    2. Pes anserinus bursitis of left knee        Plan:  Left knee arthritis with left Pes bursitis.  I reviewed clinical findings, past and current treatment with the patient.  On exam she has tenderness of the medial joint line and pes bursa.  She reports Orthovisc improved her joint line pain but she continues to have pain in the medial tibia.  I think her proximal tibia pain is Pes bursitis.  I explained the Pes bursitis to the patient.  Recommendation today is Pes bursal steroid injection.  She will do a round of physical therapy as well.  She will return in January 2020 or sooner if needed.    See procedure note for details      Abbie Mas PA-C  10/03/19  2:40 PM

## 2019-10-02 NOTE — PROGRESS NOTES
Procedure   Left knee Pes Bursa   Date/Time: 10/2/2019 8:46 AM  Consent given by: patient  Site marked: site marked  Timeout: Immediately prior to procedure a time out was called to verify the correct patient, procedure, equipment, support staff and site/side marked as required   Supporting Documentation  Indications: pain   Procedure Details  Location: knee - L knee  Preparation: Patient was prepped and draped in the usual sterile fashion  Needle size: 22 G  Approach: anteromedial  Medications administered: 1 mL lidocaine PF 1% 1 %; 40 mg methylPREDNISolone acetate 40 MG/ML  Patient tolerance: patient tolerated the procedure well with no immediate complications

## 2019-10-03 RX ORDER — LIDOCAINE HYDROCHLORIDE 10 MG/ML
1 INJECTION, SOLUTION EPIDURAL; INFILTRATION; INTRACAUDAL; PERINEURAL
Status: COMPLETED | OUTPATIENT
Start: 2019-10-02 | End: 2019-10-02

## 2019-10-03 RX ORDER — METHYLPREDNISOLONE ACETATE 40 MG/ML
40 INJECTION, SUSPENSION INTRA-ARTICULAR; INTRALESIONAL; INTRAMUSCULAR; SOFT TISSUE
Status: COMPLETED | OUTPATIENT
Start: 2019-10-02 | End: 2019-10-02

## 2019-12-04 ENCOUNTER — OFFICE VISIT (OUTPATIENT)
Dept: ORTHOPEDIC SURGERY | Facility: CLINIC | Age: 64
End: 2019-12-04

## 2019-12-04 VITALS — OXYGEN SATURATION: 98 % | BODY MASS INDEX: 35.67 KG/M2 | HEIGHT: 61 IN | WEIGHT: 188.93 LBS | HEART RATE: 99 BPM

## 2019-12-04 DIAGNOSIS — M17.11 ARTHRITIS OF RIGHT KNEE: Primary | ICD-10-CM

## 2019-12-04 DIAGNOSIS — M17.12 PRIMARY OSTEOARTHRITIS OF LEFT KNEE: ICD-10-CM

## 2019-12-04 PROCEDURE — 99214 OFFICE O/P EST MOD 30 MIN: CPT | Performed by: PHYSICIAN ASSISTANT

## 2019-12-04 PROCEDURE — 20610 DRAIN/INJ JOINT/BURSA W/O US: CPT | Performed by: PHYSICIAN ASSISTANT

## 2019-12-04 RX ORDER — LIDOCAINE HYDROCHLORIDE 10 MG/ML
4 INJECTION, SOLUTION EPIDURAL; INFILTRATION; INTRACAUDAL; PERINEURAL
Status: COMPLETED | OUTPATIENT
Start: 2019-12-04 | End: 2019-12-04

## 2019-12-04 RX ORDER — METHYLPREDNISOLONE ACETATE 40 MG/ML
40 INJECTION, SUSPENSION INTRA-ARTICULAR; INTRALESIONAL; INTRAMUSCULAR; SOFT TISSUE
Status: COMPLETED | OUTPATIENT
Start: 2019-12-04 | End: 2019-12-04

## 2019-12-04 RX ADMIN — LIDOCAINE HYDROCHLORIDE 4 ML: 10 INJECTION, SOLUTION EPIDURAL; INFILTRATION; INTRACAUDAL; PERINEURAL at 10:09

## 2019-12-04 RX ADMIN — METHYLPREDNISOLONE ACETATE 40 MG: 40 INJECTION, SUSPENSION INTRA-ARTICULAR; INTRALESIONAL; INTRAMUSCULAR; SOFT TISSUE at 10:09

## 2019-12-04 RX ADMIN — METHYLPREDNISOLONE ACETATE 40 MG: 40 INJECTION, SUSPENSION INTRA-ARTICULAR; INTRALESIONAL; INTRAMUSCULAR; SOFT TISSUE at 10:10

## 2019-12-04 RX ADMIN — LIDOCAINE HYDROCHLORIDE 4 ML: 10 INJECTION, SOLUTION EPIDURAL; INFILTRATION; INTRACAUDAL; PERINEURAL at 10:10

## 2019-12-04 NOTE — PROGRESS NOTES
[facility]    Subjective     Patient: Park Ray  : 1955    Primary Care Provider: Kate Garcia MD    Requesting Provider: As above    Follow-up of the Left Knee (2 month)      History    Chief Complaint: Bilateral knee pain    History of Present Illness: Patient returns today with new right knee pain and increasing left knee pain.  She has been treated in the past for left knee arthritis with steroid injection, Visco supplementation with improved pain.  She also recently had a Pez injection with helps her pain.  She is here today with increasing left knee pain and persisting right knee pain.  She has had no prior treatment on the right knee.    Current Outpatient Medications on File Prior to Visit   Medication Sig Dispense Refill   • acyclovir (ZOVIRAX) 400 MG tablet Take 1 tablet by mouth Daily. Take no more than 5 doses a day. 90 tablet 3   • Biotin 1000 MCG chewable tablet Chew.     • Cholecalciferol (VITAMIN D-3) 5000 units tablet Take 1 tablet by mouth Daily.     • diazePAM (VALIUM) 5 MG tablet Take 5 mg by mouth 2 (Two) Times a Day As Needed for Anxiety.     • diphenoxylate-atropine (LOMOTIL) 2.5-0.025 MG per tablet Take 1 tablet by mouth 3 (Three) Times a Day As Needed for Diarrhea.     • fluticasone (FLONASE) 50 MCG/ACT nasal spray 2 sprays into each nostril Daily.     • furosemide (LASIX) 40 MG tablet Take 40 mg by mouth as needed.     • levothyroxine (SYNTHROID, LEVOTHROID) 25 MCG tablet Take 25 mcg by mouth daily.     • Milk Thistle 140 MG capsule Take 1 capsule by mouth Daily.     • Multiple Vitamins-Minerals (MULTIVITAMIN ADULT PO) Take 1 tablet by mouth daily.     • nitrofurantoin (MACRODANTIN) 50 MG capsule Take 1 capsule by mouth Daily. 90 capsule 3   • omeprazole (priLOSEC) 40 MG capsule Take 20 mg by mouth Daily.     • Ospemifene 60 MG tablet Take 60 mg by mouth Daily. 90 tablet 3   • phenazopyridine (PYRIDIUM) 200 MG tablet Take 200 mg by  mouth 3 (Three) Times a Day As Needed for bladder spasms.     • pseudoephedrine (SUDAFED) 30 MG tablet Take 30 mg by mouth Every 4 (Four) Hours As Needed for Congestion.     • Vitamin A 18669 UNITS tablet Take 10,000 tablets by mouth daily.     • vitamin E 400 UNIT capsule Take 400 Units by mouth daily.       No current facility-administered medications on file prior to visit.       Allergies   Allergen Reactions   • Tetracyclines & Related Hives      Past Medical History:   Diagnosis Date   • Acute torn meniscus of knee     left   • Closed displaced fracture of shaft of left clavicle    • Closed nondisplaced fracture of neck of left radius    • Colon cancer (CMS/HCC)    • Dyspareunia    • Family history of breast cancer in mother    • Fatty liver    • Fracture of proximal phalanx of toe    • Herpes    • Hypothyroidism    • IBS (irritable bowel syndrome)    • Left wrist pain    • Menopause    • Mixed incontinence urge and stress    • OAB (overactive bladder)    • Right wrist pain      Past Surgical History:   Procedure Laterality Date   •  SECTION     • CHOLECYSTECTOMY     • COLECTOMY PARTIAL / TOTAL     • COLON RESECTION     • ENTEROCELE REPAIR     • DAWSON MORA ([facility]) PROCEDURE     • PELVIC LAPAROSCOPY     • RECTAL SURGERY     • SALPINGO OOPHORECTOMY Right    • TOTAL ABDOMINAL HYSTERECTOMY WITH SALPINGO OOPHORECTOMY Left    • UTERINE SUSPENSION LAPAROSCOPIC       Family History   Problem Relation Age of Onset   • Cancer Father    • Heart disease Father    • Hypertension Father    • Osteoarthritis Father    • Breast cancer Mother    • Cancer Mother    • Brain cancer Brother    • Hypertension Brother    • Diabetes Brother    • Hypertension Sister    • Cancer Other    • Heart disease Other       Social History     Socioeconomic History   • Marital status:      Spouse name: Not on file   • Number of children: Not on file   • Years of education: Not on file   • Highest education level: Not  "on file   Tobacco Use   • Smoking status: Never Smoker   • Smokeless tobacco: Never Used   Substance and Sexual Activity   • Alcohol use: Yes     Comment: OCCASIONALLY   • Drug use: No   • Sexual activity: Yes     Partners: Male     Birth control/protection: Post-menopausal, Surgical   Social History Narrative    Lives with her         Review of Systems   Constitutional: Negative.    HENT: Negative.    Eyes: Negative.    Respiratory: Negative.    Cardiovascular: Negative.    Gastrointestinal: Negative.    Endocrine: Negative.    Genitourinary: Negative.    Musculoskeletal: Positive for arthralgias.   Skin: Negative.    Allergic/Immunologic: Negative.    Neurological: Negative.    Hematological: Negative.    Psychiatric/Behavioral: Negative.        The following portions of the patient's history were reviewed and updated as appropriate: allergies, current medications, past family history, past medical history, past social history, past surgical history and problem list.      Objective      Physical Exam  Pulse 99   Ht 155.6 cm (61.26\")   Wt 85.7 kg (188 lb 15 oz)   LMP  (LMP Unknown) Comment: S/P AH, LEFT S&O/RIGHT S&O  SpO2 98%   BMI 35.40 kg/m²     Body mass index is 35.4 kg/m².    Patient is well developed, well nourished and in no acute distress.  Alert and oriented x 3.    Ortho Exam    Right Knee Exam  ----------  ALIGNMENT: Right: neutral----------  RANGE OF MOTION:  Right: Normal (0-120 degrees) with no extensor lag or flexion contracture  LIGAMENTOUS STABILITY:   Right:stable to varus and valgus stress at terminal extension and 30 degrees without any evidence of laxity----------  STRENGTH:  KNEE FLEXION Right 5/5  KNEE EXTENSION Right 5/5 ----------  PAIN WITH PALPATION: Right medial joint line  PAIN WITH KNEE ROM: Right no  PATELLAR CREPITUS: Right yes   ----------    Left Knee Exam  ----------  Knee Exam:  ----------  ALIGNMENT:  Left: neutral  ----------  RANGE OF MOTION:  Left: Normal (0-120 " degrees) with no extensor lag or flexion contracture  LIGAMENTOUS STABILITY:   Left:stable to varus and valgus stress at terminal extension and 30 degrees without any evidence of laxity   ----------  STRENGTH:  KNEE FLEXION  Left 5/5  KNEE EXTENSION Left 5/5  ----------  PAIN WITH PALPATION: Left medial joint line  PAIN WITH KNEE ROM:  Left no  PATELLAR CREPITUS:  Left yes  ----------        Medical Decision Making    Data Review:   ordered and reviewed x-rays today    Assessment:  1. Arthritis of right knee    2. Primary osteoarthritis of left knee        Plan:  Bilateral knee arthritis.  I reviewed today's x-rays of the right knee clinical findings past and current treatment the patient.  On exam, she has medial joint line tenderness bilaterally.  X-rays of the right knee today show Near bone-on-bone contact medial compartment, varus alignment, tricompartmental osteophytes, with patellofemoral degeneration.  Plan today is bilateral intra-articular steroid injection of the knees.  She will return to see us as scheduled in January to consider repeat Visco.  I will put in the order for that today.    Using sterile technique, the left knee was sterilely prepped with Hibiclens. Following a time out,  using a 22 gauge needle, the left knee was injected with 40mg Depo Medrol, 4 cc lidocaine.  Patient tolerated the procedure well.  No complications.  Using sterile technique, the right knee was sterilely prepped with Hibiclens.  Following a time out,  using a 22 gauge needle, the right knee was injected with 40mg Depo Medrol, 4 cc lidocaine.  Patient tolerated the procedure well.  No complications.          Abbie Mas PA-C  12/04/19  10:50 AM

## 2019-12-04 NOTE — PROGRESS NOTES
Procedure   Large Joint Arthrocentesis: R knee  Date/Time: 12/4/2019 10:09 AM  Consent given by: patient  Site marked: site marked  Timeout: Immediately prior to procedure a time out was called to verify the correct patient, procedure, equipment, support staff and site/side marked as required   Supporting Documentation  Indications: pain   Procedure Details  Location: knee - R knee  Preparation: Patient was prepped and draped in the usual sterile fashion  Needle size: 22 G  Approach: anterolateral  Medications administered: 4 mL lidocaine PF 1% 1 %; 40 mg methylPREDNISolone acetate 40 MG/ML  Patient tolerance: patient tolerated the procedure well with no immediate complications    Large Joint Arthrocentesis: L knee  Date/Time: 12/4/2019 10:10 AM  Consent given by: patient  Site marked: site marked  Timeout: Immediately prior to procedure a time out was called to verify the correct patient, procedure, equipment, support staff and site/side marked as required   Supporting Documentation  Indications: pain   Procedure Details  Location: knee - L knee  Needle size: 22 G  Approach: anterolateral  Medications administered: 40 mg methylPREDNISolone acetate 40 MG/ML; 4 mL lidocaine PF 1% 1 %  Patient tolerance: patient tolerated the procedure well with no immediate complications

## 2020-01-08 ENCOUNTER — CLINICAL SUPPORT (OUTPATIENT)
Dept: ORTHOPEDIC SURGERY | Facility: CLINIC | Age: 65
End: 2020-01-08

## 2020-01-08 DIAGNOSIS — M17.0 PRIMARY OSTEOARTHRITIS OF BOTH KNEES: Primary | ICD-10-CM

## 2020-01-08 DIAGNOSIS — M70.52 PES ANSERINUS BURSITIS OF LEFT KNEE: ICD-10-CM

## 2020-01-08 PROCEDURE — 20610 DRAIN/INJ JOINT/BURSA W/O US: CPT | Performed by: PHYSICIAN ASSISTANT

## 2020-01-08 RX ORDER — METHYLPREDNISOLONE ACETATE 40 MG/ML
40 INJECTION, SUSPENSION INTRA-ARTICULAR; INTRALESIONAL; INTRAMUSCULAR; SOFT TISSUE
Status: COMPLETED | OUTPATIENT
Start: 2020-01-08 | End: 2020-01-08

## 2020-01-08 RX ORDER — LIDOCAINE HYDROCHLORIDE 10 MG/ML
4 INJECTION, SOLUTION EPIDURAL; INFILTRATION; INTRACAUDAL; PERINEURAL
Status: COMPLETED | OUTPATIENT
Start: 2020-01-08 | End: 2020-01-08

## 2020-01-08 RX ADMIN — LIDOCAINE HYDROCHLORIDE 4 ML: 10 INJECTION, SOLUTION EPIDURAL; INFILTRATION; INTRACAUDAL; PERINEURAL at 08:49

## 2020-01-08 RX ADMIN — METHYLPREDNISOLONE ACETATE 40 MG: 40 INJECTION, SUSPENSION INTRA-ARTICULAR; INTRALESIONAL; INTRAMUSCULAR; SOFT TISSUE at 08:49

## 2020-01-08 NOTE — PROGRESS NOTES
Procedure   Large Joint Arthrocentesis: R knee  Date/Time: 1/8/2020 8:19 AM  Consent given by: patient  Site marked: site marked  Timeout: Immediately prior to procedure a time out was called to verify the correct patient, procedure, equipment, support staff and site/side marked as required   Supporting Documentation  Indications: pain   Procedure Details  Location: knee - R knee  Preparation: Patient was prepped and draped in the usual sterile fashion  Needle size: 22 G  Approach: anterolateral  Medications administered: 30 mg Hyaluronan 30 MG/2ML  Patient tolerance: patient tolerated the procedure well with no immediate complications    Large Joint Arthrocentesis: L knee  Date/Time: 1/8/2020 8:20 AM  Consent given by: patient  Site marked: site marked  Timeout: Immediately prior to procedure a time out was called to verify the correct patient, procedure, equipment, support staff and site/side marked as required   Supporting Documentation  Indications: pain   Procedure Details  Location: knee - L knee  Preparation: Patient was prepped and draped in the usual sterile fashion  Needle size: 22 G  Approach: anterolateral  Medications administered: 30 mg Hyaluronan 30 MG/2ML  Patient tolerance: patient tolerated the procedure well with no immediate complications    Large Joint Arthrocentesis: L knee  Date/Time: 1/8/2020 8:49 AM  Consent given by: patient  Site marked: site marked  Timeout: Immediately prior to procedure a time out was called to verify the correct patient, procedure, equipment, support staff and site/side marked as required   Supporting Documentation  Indications: pain   Procedure Details  Location: knee - L knee  Preparation: Patient was prepped and draped in the usual sterile fashion  Needle size: 22 G  Approach: anteromedial  Medications administered: 4 mL lidocaine PF 1% 1 %; 40 mg methylPREDNISolone acetate 40 MG/ML  Patient tolerance: patient tolerated the procedure well with no immediate  complications

## 2020-01-08 NOTE — PROGRESS NOTES
Subjective     Follow-up (orthovisc #1 bilateral)      Park Ray is a 64 y.o. female.     History of Present Illness   Patient presents today for the first injection of Orthovisc in bilateral knees as well as increasing pain at the Pez bursa.  She had steroid injection in the pes bursa 3 months ago but was unable to participate in physical therapy secondary to death in the family.  She has had series of Visco in the past with good relief.  Allergies   Allergen Reactions   • Tetracyclines & Related Hives     Current Outpatient Medications on File Prior to Visit   Medication Sig Dispense Refill   • acyclovir (ZOVIRAX) 400 MG tablet Take 1 tablet by mouth Daily. Take no more than 5 doses a day. 90 tablet 3   • Biotin 1000 MCG chewable tablet Chew.     • Cholecalciferol (VITAMIN D-3) 5000 units tablet Take 1 tablet by mouth Daily.     • diazePAM (VALIUM) 5 MG tablet Take 5 mg by mouth 2 (Two) Times a Day As Needed for Anxiety.     • diphenoxylate-atropine (LOMOTIL) 2.5-0.025 MG per tablet Take 1 tablet by mouth 3 (Three) Times a Day As Needed for Diarrhea.     • fluticasone (FLONASE) 50 MCG/ACT nasal spray 2 sprays into each nostril Daily.     • furosemide (LASIX) 40 MG tablet Take 40 mg by mouth as needed.     • levothyroxine (SYNTHROID, LEVOTHROID) 25 MCG tablet Take 25 mcg by mouth daily.     • Milk Thistle 140 MG capsule Take 1 capsule by mouth Daily.     • Multiple Vitamins-Minerals (MULTIVITAMIN ADULT PO) Take 1 tablet by mouth daily.     • nitrofurantoin (MACRODANTIN) 50 MG capsule Take 1 capsule by mouth Daily. 90 capsule 3   • omeprazole (priLOSEC) 40 MG capsule Take 20 mg by mouth Daily.     • Ospemifene 60 MG tablet Take 60 mg by mouth Daily. 90 tablet 3   • phenazopyridine (PYRIDIUM) 200 MG tablet Take 200 mg by mouth 3 (Three) Times a Day As Needed for bladder spasms.     • pseudoephedrine (SUDAFED) 30 MG tablet Take 30 mg by mouth Every 4 (Four) Hours As Needed for Congestion.     • Vitamin A  19263 UNITS tablet Take 10,000 tablets by mouth daily.     • vitamin E 400 UNIT capsule Take 400 Units by mouth daily.       No current facility-administered medications on file prior to visit.      Social History     Socioeconomic History   • Marital status:      Spouse name: Not on file   • Number of children: Not on file   • Years of education: Not on file   • Highest education level: Not on file   Tobacco Use   • Smoking status: Never Smoker   • Smokeless tobacco: Never Used   Substance and Sexual Activity   • Alcohol use: Yes     Comment: OCCASIONALLY   • Drug use: No   • Sexual activity: Yes     Partners: Male     Birth control/protection: Post-menopausal, Surgical   Social History Narrative    Lives with her      Past Surgical History:   Procedure Laterality Date   •  SECTION     • CHOLECYSTECTOMY     • COLECTOMY PARTIAL / TOTAL     • COLON RESECTION     • ENTEROCELE REPAIR     • DAWSON MORA ([facility]) PROCEDURE     • PELVIC LAPAROSCOPY     • RECTAL SURGERY     • SALPINGO OOPHORECTOMY Right    • TOTAL ABDOMINAL HYSTERECTOMY WITH SALPINGO OOPHORECTOMY Left    • UTERINE SUSPENSION LAPAROSCOPIC       Family History   Problem Relation Age of Onset   • Cancer Father    • Heart disease Father    • Hypertension Father    • Osteoarthritis Father    • Breast cancer Mother    • Cancer Mother    • Brain cancer Brother    • Hypertension Brother    • Diabetes Brother    • Hypertension Sister    • Cancer Other    • Heart disease Other      Past Medical History:   Diagnosis Date   • Acute torn meniscus of knee     left   • Closed displaced fracture of shaft of left clavicle    • Closed nondisplaced fracture of neck of left radius    • Colon cancer (CMS/HCC)    • Dyspareunia    • Family history of breast cancer in mother    • Fatty liver    • Fracture of proximal phalanx of toe    • Herpes    • Hypothyroidism    • IBS (irritable bowel syndrome)    • Left wrist pain    • Menopause    • Mixed  incontinence urge and stress    • OAB (overactive bladder)    • Right wrist pain          Review of Systems    The following portions of the patient's history were reviewed and updated as appropriate: allergies, current medications, past family history, past medical history, past social history, past surgical history and problem list.    Ortho Exam      Medical Decision Making    Assessment and Plan/ Diagnosis/Treatment options:   Bilateral knee arthritis and left Pes anserine bursitis.  Plan today is to begin with the first injection of Orthovisc in bilateral knees.  We will also proceed with a left Pez bursal injection.  She still has her physical therapy prescription that had gave her in the past and she will begin PT.  I will see her back in 1 week for the second injection of Orthovisc or sooner if needed.    Using sterile technique, the left knee was sterilely prepped with Hibiclens.  Following time out, using a 22 gauge needle the left knee was aspirated and then injected with 2 ml Orthovisc. Approximately 0.5 mm of straw-colored fluid was obtained.  Patient tolerated the procedure well.  No complications.      Using sterile technique, the right knee was sterilely prepped with Hibiclens.  Following time out, using a 22 gauge needle the right knee was aspirated and then injected with 2 ml Orthovisc. Approximately 0.5 mm of straw-colored fluid was obtained.  Patient tolerated the procedure well.  No complications.      Using sterile technique, the left knee was sterilely prepped with Hibiclens.  Following timeout, using a 22-gauge needle the right Pes bursa was injected with 1 cc of lidocaine and 1 cc of Depakote, 40 mg.  Patient tolerated the procedure well.

## 2020-01-15 ENCOUNTER — CLINICAL SUPPORT (OUTPATIENT)
Dept: ORTHOPEDIC SURGERY | Facility: CLINIC | Age: 65
End: 2020-01-15

## 2020-01-15 DIAGNOSIS — M17.0 PRIMARY OSTEOARTHRITIS OF BOTH KNEES: Primary | ICD-10-CM

## 2020-01-15 PROCEDURE — 20610 DRAIN/INJ JOINT/BURSA W/O US: CPT | Performed by: PHYSICIAN ASSISTANT

## 2020-01-15 NOTE — PROGRESS NOTES
Subjective     Injections (bilateral knee orthovisc injection #2 )      Park Ray is a 64 y.o. female.     History of Present Illness   Patient presents for second injection of Orthovisc in bilateral knees.  She has tolerated previous injections well.  Allergies   Allergen Reactions   • Tetracyclines & Related Hives     Current Outpatient Medications on File Prior to Visit   Medication Sig Dispense Refill   • acyclovir (ZOVIRAX) 400 MG tablet Take 1 tablet by mouth Daily. Take no more than 5 doses a day. 90 tablet 3   • Biotin 1000 MCG chewable tablet Chew.     • Cholecalciferol (VITAMIN D-3) 5000 units tablet Take 1 tablet by mouth Daily.     • diazePAM (VALIUM) 5 MG tablet Take 5 mg by mouth 2 (Two) Times a Day As Needed for Anxiety.     • diphenoxylate-atropine (LOMOTIL) 2.5-0.025 MG per tablet Take 1 tablet by mouth 3 (Three) Times a Day As Needed for Diarrhea.     • fluticasone (FLONASE) 50 MCG/ACT nasal spray 2 sprays into each nostril Daily.     • furosemide (LASIX) 40 MG tablet Take 40 mg by mouth as needed.     • levothyroxine (SYNTHROID, LEVOTHROID) 25 MCG tablet Take 25 mcg by mouth daily.     • Milk Thistle 140 MG capsule Take 1 capsule by mouth Daily.     • Multiple Vitamins-Minerals (MULTIVITAMIN ADULT PO) Take 1 tablet by mouth daily.     • nitrofurantoin (MACRODANTIN) 50 MG capsule Take 1 capsule by mouth Daily. 90 capsule 3   • omeprazole (priLOSEC) 40 MG capsule Take 20 mg by mouth Daily.     • Ospemifene 60 MG tablet Take 60 mg by mouth Daily. 90 tablet 3   • phenazopyridine (PYRIDIUM) 200 MG tablet Take 200 mg by mouth 3 (Three) Times a Day As Needed for bladder spasms.     • pseudoephedrine (SUDAFED) 30 MG tablet Take 30 mg by mouth Every 4 (Four) Hours As Needed for Congestion.     • Vitamin A 72079 UNITS tablet Take 10,000 tablets by mouth daily.     • vitamin E 400 UNIT capsule Take 400 Units by mouth daily.       No current facility-administered medications on file prior to visit.       Social History     Socioeconomic History   • Marital status:      Spouse name: Not on file   • Number of children: Not on file   • Years of education: Not on file   • Highest education level: Not on file   Tobacco Use   • Smoking status: Never Smoker   • Smokeless tobacco: Never Used   Substance and Sexual Activity   • Alcohol use: Yes     Comment: OCCASIONALLY   • Drug use: No   • Sexual activity: Yes     Partners: Male     Birth control/protection: Post-menopausal, Surgical   Social History Narrative    Lives with her      Past Surgical History:   Procedure Laterality Date   •  SECTION     • CHOLECYSTECTOMY     • COLECTOMY PARTIAL / TOTAL     • COLON RESECTION     • ENTEROCELE REPAIR     • DAWSON MORA ([facility]) PROCEDURE     • PELVIC LAPAROSCOPY     • RECTAL SURGERY     • SALPINGO OOPHORECTOMY Right    • TOTAL ABDOMINAL HYSTERECTOMY WITH SALPINGO OOPHORECTOMY Left    • UTERINE SUSPENSION LAPAROSCOPIC       Family History   Problem Relation Age of Onset   • Cancer Father    • Heart disease Father    • Hypertension Father    • Osteoarthritis Father    • Breast cancer Mother    • Cancer Mother    • Brain cancer Brother    • Hypertension Brother    • Diabetes Brother    • Hypertension Sister    • Cancer Other    • Heart disease Other      Past Medical History:   Diagnosis Date   • Acute torn meniscus of knee     left   • Closed displaced fracture of shaft of left clavicle    • Closed nondisplaced fracture of neck of left radius    • Colon cancer (CMS/HCC)    • Dyspareunia    • Family history of breast cancer in mother    • Fatty liver    • Fracture of proximal phalanx of toe    • Herpes    • Hypothyroidism    • IBS (irritable bowel syndrome)    • Left wrist pain    • Menopause    • Mixed incontinence urge and stress    • OAB (overactive bladder)    • Right wrist pain          Review of Systems    The following portions of the patient's history were reviewed and updated as  appropriate: allergies, current medications, past family history, past medical history, past social history, past surgical history and problem list.    Ortho Exam      Medical Decision Making    Assessment and Plan/ Diagnosis/Treatment options:   Bilateral knee arthritis undergoing Orthovisc series.  Plan is to proceed with a second injection of both knees today.  She return in 1 week for the final injection or sooner if needed.    Using sterile technique, the left knee was sterilely prepped with Hibiclens.  Following time out, using a 22 gauge needle the left knee was aspirated and then injected with 2 ml Orthovisc. Approximately 0.5 mm of straw-colored fluid was obtained.  Patient tolerated the procedure well.  No complications.      Using sterile technique, the right knee was sterilely prepped with Hibiclens.  Following time out, using a 22 gauge needle the right knee was aspirated and then injected with 2 ml Orthovisc. Approximately 0.5 mm of straw-colored fluid was obtained.  Patient tolerated the procedure well.  No complications.

## 2020-01-15 NOTE — PROGRESS NOTES
Procedure   Large Joint Arthrocentesis: R knee  Date/Time: 1/15/2020 11:36 AM  Consent given by: patient  Site marked: site marked  Timeout: Immediately prior to procedure a time out was called to verify the correct patient, procedure, equipment, support staff and site/side marked as required   Supporting Documentation  Indications: pain   Procedure Details  Location: knee - R knee  Preparation: Patient was prepped and draped in the usual sterile fashion  Needle size: 22 G  Approach: anterolateral  Medications administered: 30 mg Hyaluronan 30 MG/2ML  Patient tolerance: patient tolerated the procedure well with no immediate complications    Large Joint Arthrocentesis: L knee  Date/Time: 1/15/2020 11:36 AM  Consent given by: patient  Site marked: site marked  Timeout: Immediately prior to procedure a time out was called to verify the correct patient, procedure, equipment, support staff and site/side marked as required   Supporting Documentation  Indications: pain   Procedure Details  Location: knee - L knee  Preparation: Patient was prepped and draped in the usual sterile fashion  Needle size: 22 G  Approach: anterolateral  Medications administered: 30 mg Hyaluronan 30 MG/2ML  Patient tolerance: patient tolerated the procedure well with no immediate complications

## 2020-01-22 ENCOUNTER — CLINICAL SUPPORT (OUTPATIENT)
Dept: ORTHOPEDIC SURGERY | Facility: CLINIC | Age: 65
End: 2020-01-22

## 2020-01-22 DIAGNOSIS — M17.0 PRIMARY OSTEOARTHRITIS OF BOTH KNEES: Primary | ICD-10-CM

## 2020-01-22 DIAGNOSIS — M70.52 PES ANSERINUS BURSITIS OF LEFT KNEE: ICD-10-CM

## 2020-01-22 PROCEDURE — 20610 DRAIN/INJ JOINT/BURSA W/O US: CPT | Performed by: PHYSICIAN ASSISTANT

## 2020-01-22 NOTE — PROGRESS NOTES
Procedure   Large Joint Arthrocentesis: R knee  Date/Time: 1/22/2020 12:59 PM  Consent given by: patient  Site marked: site marked  Timeout: Immediately prior to procedure a time out was called to verify the correct patient, procedure, equipment, support staff and site/side marked as required   Supporting Documentation  Indications: pain   Procedure Details  Location: knee - R knee  Preparation: Patient was prepped and draped in the usual sterile fashion  Needle size: 22 G  Approach: anterolateral  Medications administered: 30 mg Hyaluronan 30 MG/2ML  Patient tolerance: patient tolerated the procedure well with no immediate complications    Large Joint Arthrocentesis: L knee  Date/Time: 1/22/2020 12:59 PM  Consent given by: patient  Site marked: site marked  Timeout: Immediately prior to procedure a time out was called to verify the correct patient, procedure, equipment, support staff and site/side marked as required   Supporting Documentation  Indications: pain   Procedure Details  Location: knee - L knee  Preparation: Patient was prepped and draped in the usual sterile fashion  Needle size: 22 G  Approach: anterolateral  Medications administered: 30 mg Hyaluronan 30 MG/2ML  Patient tolerance: patient tolerated the procedure well with no immediate complications

## 2020-01-22 NOTE — PROGRESS NOTES
Subjective     Injections (bilateral knee orthovisc injection #3 )      Park Ray is a 64 y.o. female.     History of Present Illness   Patient presents today for the third injection of Orthovisc in bilateral knees.  She would also like repeat Pez injection.  She is getting ready to schedule physical therapy.  Allergies   Allergen Reactions   • Tetracyclines & Related Hives     Current Outpatient Medications on File Prior to Visit   Medication Sig Dispense Refill   • acyclovir (ZOVIRAX) 400 MG tablet Take 1 tablet by mouth Daily. Take no more than 5 doses a day. 90 tablet 3   • Biotin 1000 MCG chewable tablet Chew.     • Cholecalciferol (VITAMIN D-3) 5000 units tablet Take 1 tablet by mouth Daily.     • diazePAM (VALIUM) 5 MG tablet Take 5 mg by mouth 2 (Two) Times a Day As Needed for Anxiety.     • diphenoxylate-atropine (LOMOTIL) 2.5-0.025 MG per tablet Take 1 tablet by mouth 3 (Three) Times a Day As Needed for Diarrhea.     • fluticasone (FLONASE) 50 MCG/ACT nasal spray 2 sprays into each nostril Daily.     • furosemide (LASIX) 40 MG tablet Take 40 mg by mouth as needed.     • levothyroxine (SYNTHROID, LEVOTHROID) 25 MCG tablet Take 25 mcg by mouth daily.     • Milk Thistle 140 MG capsule Take 1 capsule by mouth Daily.     • Multiple Vitamins-Minerals (MULTIVITAMIN ADULT PO) Take 1 tablet by mouth daily.     • nitrofurantoin (MACRODANTIN) 50 MG capsule Take 1 capsule by mouth Daily. 90 capsule 3   • omeprazole (priLOSEC) 40 MG capsule Take 20 mg by mouth Daily.     • Ospemifene 60 MG tablet Take 60 mg by mouth Daily. 90 tablet 3   • phenazopyridine (PYRIDIUM) 200 MG tablet Take 200 mg by mouth 3 (Three) Times a Day As Needed for bladder spasms.     • pseudoephedrine (SUDAFED) 30 MG tablet Take 30 mg by mouth Every 4 (Four) Hours As Needed for Congestion.     • Vitamin A 95995 UNITS tablet Take 10,000 tablets by mouth daily.     • vitamin E 400 UNIT capsule Take 400 Units by mouth daily.       No current  facility-administered medications on file prior to visit.      Social History     Socioeconomic History   • Marital status:      Spouse name: Not on file   • Number of children: Not on file   • Years of education: Not on file   • Highest education level: Not on file   Tobacco Use   • Smoking status: Never Smoker   • Smokeless tobacco: Never Used   Substance and Sexual Activity   • Alcohol use: Yes     Comment: OCCASIONALLY   • Drug use: No   • Sexual activity: Yes     Partners: Male     Birth control/protection: Post-menopausal, Surgical   Social History Narrative    Lives with her      Past Surgical History:   Procedure Laterality Date   •  SECTION     • CHOLECYSTECTOMY     • COLECTOMY PARTIAL / TOTAL     • COLON RESECTION     • ENTEROCELE REPAIR     • DAWSON MORA ([facility]) PROCEDURE     • PELVIC LAPAROSCOPY     • RECTAL SURGERY     • SALPINGO OOPHORECTOMY Right    • TOTAL ABDOMINAL HYSTERECTOMY WITH SALPINGO OOPHORECTOMY Left    • UTERINE SUSPENSION LAPAROSCOPIC       Family History   Problem Relation Age of Onset   • Cancer Father    • Heart disease Father    • Hypertension Father    • Osteoarthritis Father    • Breast cancer Mother    • Cancer Mother    • Brain cancer Brother    • Hypertension Brother    • Diabetes Brother    • Hypertension Sister    • Cancer Other    • Heart disease Other      Past Medical History:   Diagnosis Date   • Acute torn meniscus of knee     left   • Closed displaced fracture of shaft of left clavicle    • Closed nondisplaced fracture of neck of left radius    • Colon cancer (CMS/HCC)    • Dyspareunia    • Family history of breast cancer in mother    • Fatty liver    • Fracture of proximal phalanx of toe    • Herpes    • Hypothyroidism    • IBS (irritable bowel syndrome)    • Left wrist pain    • Menopause    • Mixed incontinence urge and stress    • OAB (overactive bladder)    • Right wrist pain          Review of Systems    The following portions of  the patient's history were reviewed and updated as appropriate: allergies, current medications, past family history, past medical history, past social history, past surgical history and problem list.    Ortho Exam      Medical Decision Making    Assessment and Plan/ Diagnosis/Treatment options:   Bilateral knee arthritis and left Pez bursitis.  Plan today is to finish the series of Orthovisc in both knees.  I will also give her repeat injection in the Pez bursa.  She is getting ready to begin physical therapy.  I will see her back in 3 months or sooner if needed.    Using sterile technique, the left knee was sterilely prepped with Hibiclens.  Following time out, using a 22 gauge needle the left knee was aspirated and then injected with 2 ml Orthovisc. Approximately 0.5 mm of straw-colored fluid was obtained.  Patient tolerated the procedure well.  No complications.      Using sterile technique, the right knee was sterilely prepped with Hibiclens.  Following time out, using a 22 gauge needle the right knee was aspirated and then injected with 2 ml Orthovisc. Approximately 0.5 mm of straw-colored fluid was obtained.  Patient tolerated the procedure well.  No complications.      Using sterile technique, the left knee was sterilely prepped with Hibiclens.  Following a timeout, using a 22-gauge needle of the left Pez bursa was injected with 40 mg of Depo-Medrol and 2 cc of lidocaine.  Patient tolerated procedure well.  No complications.

## 2020-01-29 RX ORDER — LIDOCAINE HYDROCHLORIDE 10 MG/ML
2 INJECTION, SOLUTION EPIDURAL; INFILTRATION; INTRACAUDAL; PERINEURAL
Status: COMPLETED | OUTPATIENT
Start: 2020-01-29 | End: 2020-01-29

## 2020-01-29 RX ORDER — METHYLPREDNISOLONE ACETATE 40 MG/ML
40 INJECTION, SUSPENSION INTRA-ARTICULAR; INTRALESIONAL; INTRAMUSCULAR; SOFT TISSUE
Status: COMPLETED | OUTPATIENT
Start: 2020-01-29 | End: 2020-01-29

## 2020-01-29 RX ADMIN — LIDOCAINE HYDROCHLORIDE 2 ML: 10 INJECTION, SOLUTION EPIDURAL; INFILTRATION; INTRACAUDAL; PERINEURAL at 12:51

## 2020-01-29 RX ADMIN — METHYLPREDNISOLONE ACETATE 40 MG: 40 INJECTION, SUSPENSION INTRA-ARTICULAR; INTRALESIONAL; INTRAMUSCULAR; SOFT TISSUE at 12:51

## 2020-01-29 NOTE — PROGRESS NOTES
Procedure   Left Pes Bursa Injection  Date/Time: 1/29/2020 12:51 PM  Consent given by: patient  Site marked: site marked  Timeout: Immediately prior to procedure a time out was called to verify the correct patient, procedure, equipment, support staff and site/side marked as required   Supporting Documentation  Indications: pain   Procedure Details  Location: knee - Knee joint: Left Pes Bursa   Preparation: Patient was prepped and draped in the usual sterile fashion  Needle size: 22 G  Approach: anteromedial  Medications administered: 2 mL lidocaine PF 1% 1 %; 40 mg methylPREDNISolone acetate 40 MG/ML  Patient tolerance: patient tolerated the procedure well with no immediate complications

## 2020-03-16 ENCOUNTER — TELEPHONE (OUTPATIENT)
Dept: OBSTETRICS AND GYNECOLOGY | Facility: CLINIC | Age: 65
End: 2020-03-16

## 2020-03-16 NOTE — TELEPHONE ENCOUNTER
PT IS CALLING NEEDS SAMPL OF [facility] IS TAKING CARE OF HER 4 GRANDCHILDREN AND CANT [facility] THE REAL DEAL UNTIL HER INSURANCE IS TAKEN CARE OF UNTIL THEN NEEDS SAMPLES -PLEASE CALL IF CAN PROVIDE [phone]

## 2020-03-16 NOTE — TELEPHONE ENCOUNTER
Patient informed by telephone that only essential patients should be coming to the office and that we are unable to provide samples at this time.  Hopefully after COVID-19 restrictions have lifted we can provide patient with samples.  No drug reps allowed in office at this time.

## 2020-04-24 ENCOUNTER — TELEPHONE (OUTPATIENT)
Dept: OBSTETRICS AND GYNECOLOGY | Facility: CLINIC | Age: 65
End: 2020-04-24

## 2020-04-24 NOTE — TELEPHONE ENCOUNTER
Patient called in today asking for samples of Osphena 60 mg.  States that she has signed up for [facility]/, but will not receive medication for one month.  I have told the patient that we can give her 30 tablets, and she will come by the office to  samples.

## 2020-06-10 ENCOUNTER — OFFICE VISIT (OUTPATIENT)
Dept: ORTHOPEDIC SURGERY | Facility: CLINIC | Age: 65
End: 2020-06-10

## 2020-06-10 VITALS — OXYGEN SATURATION: 98 % | HEART RATE: 110 BPM | BODY MASS INDEX: 35.12 KG/M2 | HEIGHT: 61 IN | WEIGHT: 186 LBS

## 2020-06-10 DIAGNOSIS — M70.52 PES ANSERINUS BURSITIS OF LEFT KNEE: Primary | ICD-10-CM

## 2020-06-10 DIAGNOSIS — M70.52 PES ANSERINUS BURSITIS OF LEFT KNEE: ICD-10-CM

## 2020-06-10 DIAGNOSIS — M17.0 PRIMARY OSTEOARTHRITIS OF BOTH KNEES: Primary | ICD-10-CM

## 2020-06-10 DIAGNOSIS — I87.8 VENOUS STASIS OF BOTH LOWER EXTREMITIES: ICD-10-CM

## 2020-06-10 PROCEDURE — 99213 OFFICE O/P EST LOW 20 MIN: CPT | Performed by: PHYSICIAN ASSISTANT

## 2020-06-10 PROCEDURE — 20610 DRAIN/INJ JOINT/BURSA W/O US: CPT | Performed by: PHYSICIAN ASSISTANT

## 2020-06-10 RX ORDER — LIDOCAINE HYDROCHLORIDE 10 MG/ML
2 INJECTION, SOLUTION EPIDURAL; INFILTRATION; INTRACAUDAL; PERINEURAL
Status: COMPLETED | OUTPATIENT
Start: 2020-06-10 | End: 2020-06-10

## 2020-06-10 RX ORDER — METHYLPREDNISOLONE ACETATE 40 MG/ML
40 INJECTION, SUSPENSION INTRA-ARTICULAR; INTRALESIONAL; INTRAMUSCULAR; SOFT TISSUE
Status: COMPLETED | OUTPATIENT
Start: 2020-06-10 | End: 2020-06-10

## 2020-06-10 RX ADMIN — LIDOCAINE HYDROCHLORIDE 2 ML: 10 INJECTION, SOLUTION EPIDURAL; INFILTRATION; INTRACAUDAL; PERINEURAL at 10:47

## 2020-06-10 RX ADMIN — METHYLPREDNISOLONE ACETATE 40 MG: 40 INJECTION, SUSPENSION INTRA-ARTICULAR; INTRALESIONAL; INTRAMUSCULAR; SOFT TISSUE at 10:47

## 2020-06-10 NOTE — PROGRESS NOTES
Procedure   Large Joint Arthrocentesis: L knee  Date/Time: 6/10/2020 10:47 AM  Consent given by: patient  Site marked: site marked  Timeout: Immediately prior to procedure a time out was called to verify the correct patient, procedure, equipment, support staff and site/side marked as required   Supporting Documentation  Indications: pain   Procedure Details  Location: knee - L knee (pes Bursa )  Preparation: Patient was prepped and draped in the usual sterile fashion  Needle size: 22 G  Approach: medial  Medications administered: 2 mL lidocaine PF 1% 1 %; 40 mg methylPREDNISolone acetate 40 MG/ML  Patient tolerance: patient tolerated the procedure well with no immediate complications    Large Joint Arthrocentesis: R knee  Date/Time: 6/10/2020 10:48 AM  Consent given by: patient  Site marked: site marked  Timeout: Immediately prior to procedure a time out was called to verify the correct patient, procedure, equipment, support staff and site/side marked as required   Supporting Documentation  Indications: pain   Procedure Details  Location: knee - R knee  Preparation: Patient was prepped and draped in the usual sterile fashion  Needle size: 22 G  Approach: anterolateral  Medications administered: 30 mg Hyaluronan 30 MG/2ML  Patient tolerance: patient tolerated the procedure well with no immediate complications    Large Joint Arthrocentesis: L knee  Date/Time: 6/10/2020 10:48 AM  Consent given by: patient  Site marked: site marked  Timeout: Immediately prior to procedure a time out was called to verify the correct patient, procedure, equipment, support staff and site/side marked as required   Supporting Documentation  Indications: pain   Procedure Details  Location: knee - L knee  Preparation: Patient was prepped and draped in the usual sterile fashion  Needle size: 22 G  Medications administered: 30 mg Hyaluronan 30 MG/2ML  Patient tolerance: patient tolerated the procedure well with no immediate complications

## 2020-06-10 NOTE — PROGRESS NOTES
[facility] Note    Subjective     Patient: Park Ray  : 1955    Primary Care Provider: Kate Garcia MD    Requesting Provider: As above    Follow-up (4 months follow up Primary osteoarthritis of both knees )      History    Chief Complaint: Bilateral knees    History of Present Illness: Patient returns today for her bilateral knee pain.  She has known bilateral knee arthritis with left pes bursitis.  She reports viscosupplementation seems to give her the most relief in the knee joints.  The left Pez bursa injection in  helped a great deal as well but now it has returned and is more painful than before.  She has not done any physical therapy for this problem.  She would like repeat series of Visco.  Overall, the left knee is worse.    Current Outpatient Medications on File Prior to Visit   Medication Sig Dispense Refill   • acyclovir (ZOVIRAX) 400 MG tablet Take 1 tablet by mouth Daily. Take no more than 5 doses a day. 90 tablet 3   • Biotin 1000 MCG chewable tablet Chew.     • Cholecalciferol (VITAMIN D-3) 5000 units tablet Take 1 tablet by mouth Daily.     • diazePAM (VALIUM) 5 MG tablet Take 5 mg by mouth 2 (Two) Times a Day As Needed for Anxiety.     • diphenoxylate-atropine (LOMOTIL) 2.5-0.025 MG per tablet Take 1 tablet by mouth 3 (Three) Times a Day As Needed for Diarrhea.     • fluticasone (FLONASE) 50 MCG/ACT nasal spray 2 sprays into each nostril Daily.     • furosemide (LASIX) 40 MG tablet Take 40 mg by mouth as needed.     • levothyroxine (SYNTHROID, LEVOTHROID) 25 MCG tablet Take 25 mcg by mouth daily.     • Milk Thistle 140 MG capsule Take 1 capsule by mouth Daily.     • Multiple Vitamins-Minerals (MULTIVITAMIN ADULT PO) Take 1 tablet by mouth daily.     • nitrofurantoin (MACRODANTIN) 50 MG capsule Take 1 capsule by mouth Daily. 90 capsule 3   • omeprazole (priLOSEC) 40 MG capsule Take 20 mg by mouth Daily.     • Ospemifene 60 MG tablet Take  60 mg by mouth Daily. 90 tablet 3   • Ospemifene 60 MG tablet Take 60 mg by mouth Daily. 30 tablet 0   • phenazopyridine (PYRIDIUM) 200 MG tablet Take 200 mg by mouth 3 (Three) Times a Day As Needed for bladder spasms.     • pseudoephedrine (SUDAFED) 30 MG tablet Take 30 mg by mouth Every 4 (Four) Hours As Needed for Congestion.     • Vitamin A 46151 UNITS tablet Take 10,000 tablets by mouth daily.     • vitamin E 400 UNIT capsule Take 400 Units by mouth daily.       No current facility-administered medications on file prior to visit.       Allergies   Allergen Reactions   • Tetracyclines & Related Hives      Past Medical History:   Diagnosis Date   • Acute torn meniscus of knee     left   • Closed displaced fracture of shaft of left clavicle    • Closed nondisplaced fracture of neck of left radius    • Colon cancer (CMS/HCC)    • Dyspareunia    • Family history of breast cancer in mother    • Fatty liver    • Fracture of proximal phalanx of toe    • Herpes    • Hypothyroidism    • IBS (irritable bowel syndrome)    • Left wrist pain    • Menopause    • Mixed incontinence urge and stress    • OAB (overactive bladder)    • Right wrist pain      Past Surgical History:   Procedure Laterality Date   •  SECTION     • CHOLECYSTECTOMY     • COLECTOMY PARTIAL / TOTAL     • COLON RESECTION     • ENTEROCELE REPAIR     • DAWSON MORA ([facility]) PROCEDURE     • PELVIC LAPAROSCOPY     • RECTAL SURGERY     • SALPINGO OOPHORECTOMY Right    • TOTAL ABDOMINAL HYSTERECTOMY WITH SALPINGO OOPHORECTOMY Left    • UTERINE SUSPENSION LAPAROSCOPIC       Family History   Problem Relation Age of Onset   • Cancer Father    • Heart disease Father    • Hypertension Father    • Osteoarthritis Father    • Breast cancer Mother    • Cancer Mother    • Brain cancer Brother    • Hypertension Brother    • Diabetes Brother    • Hypertension Sister    • Cancer Other    • Heart disease Other       Social History     Socioeconomic History    "  • Marital status:      Spouse name: Not on file   • Number of children: Not on file   • Years of education: Not on file   • Highest education level: Not on file   Tobacco Use   • Smoking status: Never Smoker   • Smokeless tobacco: Never Used   Substance and Sexual Activity   • Alcohol use: Yes     Comment: OCCASIONALLY   • Drug use: No   • Sexual activity: Yes     Partners: Male     Birth control/protection: Post-menopausal, Surgical   Social History Narrative    Lives with her         Review of Systems   Constitutional: Negative.    HENT: Negative.    Eyes: Negative.    Respiratory: Negative.    Cardiovascular: Negative.    Gastrointestinal: Negative.    Endocrine: Negative.    Genitourinary: Negative.    Musculoskeletal: Positive for arthralgias.   Skin: Negative.    Allergic/Immunologic: Negative.    Neurological: Negative.    Hematological: Negative.    Psychiatric/Behavioral: Negative.        The following portions of the patient's history were reviewed and updated as appropriate: allergies, current medications, past family history, past medical history, past social history, past surgical history and problem list.      Objective      Physical Exam  Pulse 110   Ht 155.6 cm (61.26\")   Wt 84.4 kg (186 lb)   LMP  (LMP Unknown) Comment: S/P AH, LEFT S&O/RIGHT S&O  SpO2 98%   BMI 34.85 kg/m²     Body mass index is 34.85 kg/m².    Patient is well developed, well nourished and in no acute distress.  Alert and oriented x 3.    Ortho Exam    Right Knee Exam  ----------  ALIGNMENT: Right: neutral----------  RANGE OF MOTION:  Right: 0-120  LIGAMENTOUS STABILITY:   Right: stable to valgus and varus stress----------  STRENGTH:  KNEE FLEXION Right 5/5  KNEE EXTENSION Right 5/5 ----------  PAIN WITH PALPATION: Right medial joint line  PAIN WITH KNEE ROM: Right no  PATELLAR CREPITUS: Right yes   ----------    Left Knee Exam  ----------  ALIGNMENT: Left: neutral----------  RANGE OF MOTION:  Left: " 0-120  LIGAMENTOUS STABILITY:   Left: stable to valgus and varus stress----------  STRENGTH:  KNEE FLEXION left 5/5  KNEE EXTENSION left 5/5 ----------  PAIN WITH PALPATION: Left medial joint line and pes bursa  PAIN WITH KNEE ROM: Left no  PATELLAR CREPITUS: Left yes         Medical Decision Making    Data Review:   none    Assessment:  1. Primary osteoarthritis of both knees    2. Pes anserinus bursitis of left knee    3. Venous stasis of both lower extremities        Plan:  1. Bilateral knee arthritis with left left Pez anserine bursitis.  Patient is done well with Visco supplementation with multiple series in the past.  She would like repeat series in both knees today.  The left Pez bursitis continues to bother her and is progressively worsened.  I explained to her that most importantly, she should do a round of physical therapy for the hamstrings.  Plan today is repeat steroid injection into the left Pez bursa followed by round of PT.  We will begin a series of Visco in bilateral knees.  I will see her back in 1 week for the second series injection in the series or sooner if needed.    2. I explained edema and venous stasis to the patient, and the often hereditary nature of the problem, and the contribution of weight, trauma, etc. I explained how they cause edema (due to gravity, etc) I explained how they can lead to ulceration.  I explained how they can cause pain, stiffness, aching, cramping, etc. I explained that it is a very very common problem, so common that even [employer] markets compression stockings.  I recommend wearing support stockings (compression stockings) daily, we discussed what type and where to find them        Using sterile technique, the left knee was sterilely prepped with Hibiclens.  Following time out, using a 22 gauge needle the left knee was aspirated and then injected with 2 ml Orthovisc. Approximately 0.5 mm of straw-colored fluid was obtained.  Patient tolerated the procedure well.  No  complications.      Using sterile technique, the right knee was sterilely prepped with Hibiclens.  Following time out, using a 22 gauge needle the right knee was aspirated and then injected with 2 ml Orthovisc. Approximately 0.5 mm of straw-colored fluid was obtained.  Patient tolerated the procedure well.  No complications.      Using sterile technique, the left knee was sterilely prepped with Hibiclens following a timeout a 22-gauge needle was used and the left Pez bursa was injected with 1 cc of lidocaine and 40 mg of Depo-Medrol.  Patient tolerated the procedure well.      Abbie Mas PA-C  06/10/20  13:09

## 2020-06-16 ENCOUNTER — TELEPHONE (OUTPATIENT)
Dept: ORTHOPEDIC SURGERY | Facility: CLINIC | Age: 65
End: 2020-06-16

## 2020-06-16 NOTE — TELEPHONE ENCOUNTER
PT HAS APPOINTMENT TOMORROW WITH [facility]. PT HAS POISON IVY ON UPPER TORSO. STARTED Saturday. SHE WANTS TO KNOW IF SHE CAN STILL COME IN FOR INJECTION TOMORROW. I RECOMMENDED HER TO WAIT, BUT SHE WANTS [facility] TO DECIDE.

## 2020-06-17 ENCOUNTER — CLINICAL SUPPORT (OUTPATIENT)
Dept: ORTHOPEDIC SURGERY | Facility: CLINIC | Age: 65
End: 2020-06-17

## 2020-06-17 DIAGNOSIS — M17.0 PRIMARY OSTEOARTHRITIS OF BOTH KNEES: Primary | ICD-10-CM

## 2020-06-17 PROCEDURE — 20610 DRAIN/INJ JOINT/BURSA W/O US: CPT | Performed by: PHYSICIAN ASSISTANT

## 2020-06-17 RX ORDER — PREDNISONE 20 MG/1
20 TABLET ORAL 2 TIMES DAILY
COMMUNITY
End: 2021-01-13

## 2020-06-17 RX ORDER — AMOXICILLIN 250 MG/1
250 CAPSULE ORAL 3 TIMES DAILY
COMMUNITY
End: 2020-12-02

## 2020-06-17 NOTE — PROGRESS NOTES
Subjective     Follow-up (1 week recheck Bilateral #2 Orthovisc knee injections- Primary osteoarthritis of both knees)      Park Ray is a 65 y.o. female.     History of Present Illness   Patient presents for the second injection of Orthovisc in bilateral knees.  She is tolerated previous injections well.  Allergies   Allergen Reactions   • Tetracyclines & Related Hives     Current Outpatient Medications on File Prior to Visit   Medication Sig Dispense Refill   • acyclovir (ZOVIRAX) 400 MG tablet Take 1 tablet by mouth Daily. Take no more than 5 doses a day. 90 tablet 3   • Biotin 1000 MCG chewable tablet Chew.     • Cholecalciferol (VITAMIN D-3) 5000 units tablet Take 1 tablet by mouth Daily.     • diazePAM (VALIUM) 5 MG tablet Take 5 mg by mouth 2 (Two) Times a Day As Needed for Anxiety.     • diphenoxylate-atropine (LOMOTIL) 2.5-0.025 MG per tablet Take 1 tablet by mouth 3 (Three) Times a Day As Needed for Diarrhea.     • fluticasone (FLONASE) 50 MCG/ACT nasal spray 2 sprays into each nostril Daily.     • furosemide (LASIX) 40 MG tablet Take 40 mg by mouth as needed.     • levothyroxine (SYNTHROID, LEVOTHROID) 25 MCG tablet Take 25 mcg by mouth daily.     • Milk Thistle 140 MG capsule Take 1 capsule by mouth Daily.     • Multiple Vitamins-Minerals (MULTIVITAMIN ADULT PO) Take 1 tablet by mouth daily.     • nitrofurantoin (MACRODANTIN) 50 MG capsule Take 1 capsule by mouth Daily. 90 capsule 3   • omeprazole (priLOSEC) 40 MG capsule Take 20 mg by mouth Daily.     • Ospemifene 60 MG tablet Take 60 mg by mouth Daily. 90 tablet 3   • Ospemifene 60 MG tablet Take 60 mg by mouth Daily. 30 tablet 0   • phenazopyridine (PYRIDIUM) 200 MG tablet Take 200 mg by mouth 3 (Three) Times a Day As Needed for bladder spasms.     • pseudoephedrine (SUDAFED) 30 MG tablet Take 30 mg by mouth Every 4 (Four) Hours As Needed for Congestion.     • Vitamin A 65728 UNITS tablet Take 10,000 tablets by mouth daily.     • vitamin E  400 UNIT capsule Take 400 Units by mouth daily.       No current facility-administered medications on file prior to visit.      Social History     Socioeconomic History   • Marital status:      Spouse name: Not on file   • Number of children: Not on file   • Years of education: Not on file   • Highest education level: Not on file   Tobacco Use   • Smoking status: Never Smoker   • Smokeless tobacco: Never Used   Substance and Sexual Activity   • Alcohol use: Yes     Comment: OCCASIONALLY   • Drug use: No   • Sexual activity: Yes     Partners: Male     Birth control/protection: Post-menopausal, Surgical   Social History Narrative    Lives with her      Past Surgical History:   Procedure Laterality Date   •  SECTION     • CHOLECYSTECTOMY     • COLECTOMY PARTIAL / TOTAL     • COLON RESECTION     • ENTEROCELE REPAIR     • DAWSON MORA ([facility]) PROCEDURE     • PELVIC LAPAROSCOPY     • RECTAL SURGERY     • SALPINGO OOPHORECTOMY Right    • TOTAL ABDOMINAL HYSTERECTOMY WITH SALPINGO OOPHORECTOMY Left    • UTERINE SUSPENSION LAPAROSCOPIC       Family History   Problem Relation Age of Onset   • Cancer Father    • Heart disease Father    • Hypertension Father    • Osteoarthritis Father    • Breast cancer Mother    • Cancer Mother    • Brain cancer Brother    • Hypertension Brother    • Diabetes Brother    • Hypertension Sister    • Cancer Other    • Heart disease Other      Past Medical History:   Diagnosis Date   • Acute torn meniscus of knee     left   • Closed displaced fracture of shaft of left clavicle    • Closed nondisplaced fracture of neck of left radius    • Colon cancer (CMS/HCC)    • Dyspareunia    • Family history of breast cancer in mother    • Fatty liver    • Fracture of proximal phalanx of toe    • Herpes    • Hypothyroidism    • IBS (irritable bowel syndrome)    • Left wrist pain    • Menopause    • Mixed incontinence urge and stress    • OAB (overactive bladder)    • Right  wrist pain          Review of Systems   Constitutional: Negative.    HENT: Negative.    Eyes: Negative.    Respiratory: Negative.    Cardiovascular: Negative.    Gastrointestinal: Negative.    Endocrine: Negative.    Genitourinary: Negative.    Musculoskeletal: Positive for arthralgias.   Skin: Negative.    Allergic/Immunologic: Negative.    Neurological: Negative.    Hematological: Negative.    Psychiatric/Behavioral: Negative.        The following portions of the patient's history were reviewed and updated as appropriate: allergies, current medications, past family history, past medical history, past social history, past surgical history and problem list.    Ortho Exam      Medical Decision Making    Assessment and Plan/ Diagnosis/Treatment options:   Bilateral knee arthritis.  Plan is to proceed with second injection of Orthovisc today.  I will see her back in 1 week for the final injection or sooner if needed.    Using sterile technique, the left knee was sterilely prepped with Hibiclens.  Following time out, using a 22 gauge needle the left knee was aspirated and then injected with 2 ml Orthovisc. Approximately 0.5 mm of straw-colored fluid was obtained.  Patient tolerated the procedure well.  No complications.      Using sterile technique, the right knee was sterilely prepped with Hibiclens.  Following time out, using a 22 gauge needle the right knee was aspirated and then injected with 2 ml Orthovisc. Approximately 0.5 mm of straw-colored fluid was obtained.  Patient tolerated the procedure well.  No complications.

## 2020-06-17 NOTE — PROGRESS NOTES
Procedure   Large Joint Arthrocentesis: R knee  Date/Time: 6/17/2020 1:16 PM  Consent given by: patient  Site marked: site marked  Timeout: Immediately prior to procedure a time out was called to verify the correct patient, procedure, equipment, support staff and site/side marked as required   Supporting Documentation  Indications: pain   Procedure Details  Location: knee - R knee  Preparation: Patient was prepped and draped in the usual sterile fashion  Needle size: 22 G  Approach: anterolateral  Medications administered: 30 mg Hyaluronan 30 MG/2ML  Patient tolerance: patient tolerated the procedure well with no immediate complications    Large Joint Arthrocentesis: L knee  Date/Time: 6/17/2020 1:16 PM  Consent given by: patient  Site marked: site marked  Timeout: Immediately prior to procedure a time out was called to verify the correct patient, procedure, equipment, support staff and site/side marked as required   Supporting Documentation  Indications: pain   Procedure Details  Location: knee - L knee  Preparation: Patient was prepped and draped in the usual sterile fashion  Needle size: 22 G  Approach: anterolateral  Medications administered: 30 mg Hyaluronan 30 MG/2ML  Patient tolerance: patient tolerated the procedure well with no immediate complications

## 2020-06-24 ENCOUNTER — CLINICAL SUPPORT (OUTPATIENT)
Dept: ORTHOPEDIC SURGERY | Facility: CLINIC | Age: 65
End: 2020-06-24

## 2020-06-24 DIAGNOSIS — M70.52 PES ANSERINUS BURSITIS OF LEFT KNEE: Primary | ICD-10-CM

## 2020-06-24 DIAGNOSIS — M17.0 PRIMARY OSTEOARTHRITIS OF BOTH KNEES: ICD-10-CM

## 2020-06-24 PROCEDURE — 20610 DRAIN/INJ JOINT/BURSA W/O US: CPT | Performed by: PHYSICIAN ASSISTANT

## 2020-06-24 RX ORDER — LIDOCAINE HYDROCHLORIDE 10 MG/ML
1 INJECTION, SOLUTION EPIDURAL; INFILTRATION; INTRACAUDAL; PERINEURAL
Status: COMPLETED | OUTPATIENT
Start: 2020-06-24 | End: 2020-06-24

## 2020-06-24 RX ORDER — TRIAMCINOLONE ACETONIDE 40 MG/ML
40 INJECTION, SUSPENSION INTRA-ARTICULAR; INTRAMUSCULAR
Status: COMPLETED | OUTPATIENT
Start: 2020-06-24 | End: 2020-06-24

## 2020-06-24 RX ADMIN — LIDOCAINE HYDROCHLORIDE 1 ML: 10 INJECTION, SOLUTION EPIDURAL; INFILTRATION; INTRACAUDAL; PERINEURAL at 10:30

## 2020-06-24 RX ADMIN — TRIAMCINOLONE ACETONIDE 40 MG: 40 INJECTION, SUSPENSION INTRA-ARTICULAR; INTRAMUSCULAR at 10:30

## 2020-06-24 NOTE — PROGRESS NOTES
Procedure   Large Joint Arthrocentesis: R knee  Date/Time: 6/24/2020 10:12 AM  Consent given by: patient  Site marked: site marked  Timeout: Immediately prior to procedure a time out was called to verify the correct patient, procedure, equipment, support staff and site/side marked as required   Supporting Documentation  Indications: pain   Procedure Details  Location: knee - R knee  Preparation: Patient was prepped and draped in the usual sterile fashion  Needle size: 22 G  Approach: anterolateral  Medications administered: 30 mg Hyaluronan 30 MG/2ML  Patient tolerance: patient tolerated the procedure well with no immediate complications    Large Joint Arthrocentesis: L knee  Date/Time: 6/24/2020 10:12 AM  Consent given by: patient  Site marked: site marked  Timeout: Immediately prior to procedure a time out was called to verify the correct patient, procedure, equipment, support staff and site/side marked as required   Supporting Documentation  Indications: pain   Procedure Details  Location: knee - L knee  Preparation: Patient was prepped and draped in the usual sterile fashion  Needle size: 22 G  Approach: anterolateral  Medications administered: 30 mg Hyaluronan 30 MG/2ML  Patient tolerance: patient tolerated the procedure well with no immediate complications    Large Joint Arthrocentesis: L knee  Date/Time: 6/24/2020 10:30 AM  Consent given by: patient  Site marked: site marked  Timeout: Immediately prior to procedure a time out was called to verify the correct patient, procedure, equipment, support staff and site/side marked as required   Supporting Documentation  Indications: pain   Procedure Details  Location: knee - L knee (pes bursa )  Preparation: Patient was prepped and draped in the usual sterile fashion  Needle size: 22 G  Approach: anteromedial  Medications administered: 40 mg triamcinolone acetonide 40 MG/ML; 1 mL lidocaine PF 1% 1 %  Patient tolerance: patient tolerated the procedure well with no  immediate complications

## 2020-06-24 NOTE — PROGRESS NOTES
Using sterile technique, the left knee was sterilely prepped with Hibiclens.  Following time out, using a 22 gauge needle the left knee was aspirated and then injected with 2.5 ml Visco 3. Approximately 0.5 mm of straw-colored fluid was obtained.  Patient tolerated the procedure well.  No complications.

## 2020-06-24 NOTE — PROGRESS NOTES
Subjective     Injections (Bilateral knee orthovisc injection #3)      Park Ray is a 65 y.o. female.     History of Present Illness   Patient presents for the third injection of Orthovisc in bilateral knees.  She also continues to have returned to [facility] bursa pain.  She has not gone to physical therapy yet.    Allergies   Allergen Reactions   • Tetracyclines & Related Hives     Current Outpatient Medications on File Prior to Visit   Medication Sig Dispense Refill   • acyclovir (ZOVIRAX) 400 MG tablet Take 1 tablet by mouth Daily. Take no more than 5 doses a day. 90 tablet 3   • amoxicillin (AMOXIL) 250 MG capsule Take 250 mg by mouth 3 (Three) Times a Day.     • Biotin 1000 MCG chewable tablet Chew.     • Cholecalciferol (VITAMIN D-3) 5000 units tablet Take 1 tablet by mouth Daily.     • diazePAM (VALIUM) 5 MG tablet Take 5 mg by mouth 2 (Two) Times a Day As Needed for Anxiety.     • diphenoxylate-atropine (LOMOTIL) 2.5-0.025 MG per tablet Take 1 tablet by mouth 3 (Three) Times a Day As Needed for Diarrhea.     • fluticasone (FLONASE) 50 MCG/ACT nasal spray 2 sprays into each nostril Daily.     • furosemide (LASIX) 40 MG tablet Take 40 mg by mouth as needed.     • levothyroxine (SYNTHROID, LEVOTHROID) 25 MCG tablet Take 25 mcg by mouth daily.     • Milk Thistle 140 MG capsule Take 1 capsule by mouth Daily.     • Multiple Vitamins-Minerals (MULTIVITAMIN ADULT PO) Take 1 tablet by mouth daily.     • nitrofurantoin (MACRODANTIN) 50 MG capsule Take 1 capsule by mouth Daily. 90 capsule 3   • omeprazole (priLOSEC) 40 MG capsule Take 20 mg by mouth Daily.     • Ospemifene 60 MG tablet Take 60 mg by mouth Daily. 90 tablet 3   • Ospemifene 60 MG tablet Take 60 mg by mouth Daily. 30 tablet 0   • phenazopyridine (PYRIDIUM) 200 MG tablet Take 200 mg by mouth 3 (Three) Times a Day As Needed for bladder spasms.     • predniSONE (DELTASONE) 20 MG tablet Take 20 mg by mouth 2 (Two) Times a Day.     • pseudoephedrine  (SUDAFED) 30 MG tablet Take 30 mg by mouth Every 4 (Four) Hours As Needed for Congestion.     • Vitamin A 63913 UNITS tablet Take 10,000 tablets by mouth daily.     • vitamin E 400 UNIT capsule Take 400 Units by mouth daily.       No current facility-administered medications on file prior to visit.      Social History     Socioeconomic History   • Marital status:      Spouse name: Not on file   • Number of children: Not on file   • Years of education: Not on file   • Highest education level: Not on file   Tobacco Use   • Smoking status: Never Smoker   • Smokeless tobacco: Never Used   Substance and Sexual Activity   • Alcohol use: Yes     Comment: OCCASIONALLY   • Drug use: No   • Sexual activity: Yes     Partners: Male     Birth control/protection: Post-menopausal, Surgical   Social History Narrative    Lives with her      Past Surgical History:   Procedure Laterality Date   •  SECTION     • CHOLECYSTECTOMY     • COLECTOMY PARTIAL / TOTAL     • COLON RESECTION     • ENTEROCELE REPAIR     • DAWSON MORA ([facility]) PROCEDURE     • PELVIC LAPAROSCOPY     • RECTAL SURGERY     • SALPINGO OOPHORECTOMY Right    • TOTAL ABDOMINAL HYSTERECTOMY WITH SALPINGO OOPHORECTOMY Left    • UTERINE SUSPENSION LAPAROSCOPIC       Family History   Problem Relation Age of Onset   • Cancer Father    • Heart disease Father    • Hypertension Father    • Osteoarthritis Father    • Breast cancer Mother    • Cancer Mother    • Brain cancer Brother    • Hypertension Brother    • Diabetes Brother    • Hypertension Sister    • Cancer Other    • Heart disease Other      Past Medical History:   Diagnosis Date   • Acute torn meniscus of knee     left   • Closed displaced fracture of shaft of left clavicle    • Closed nondisplaced fracture of neck of left radius    • Colon cancer (CMS/HCC)    • Dyspareunia    • Family history of breast cancer in mother    • Fatty liver    • Fracture of proximal phalanx of toe    • Herpes     • Hypothyroidism    • IBS (irritable bowel syndrome)    • Left wrist pain    • Menopause    • Mixed incontinence urge and stress    • OAB (overactive bladder)    • Right wrist pain          Review of Systems    The following portions of the patient's history were reviewed and updated as appropriate: allergies, current medications, past family history, past medical history, past social history, past surgical history and problem list.    Ortho Exam      Medical Decision Making    Assessment and Plan/ Diagnosis/Treatment options:   Bilateral knee arthritis with left pes bursitis.  Plan today is steroid injection into the left Pez bursa.  Will finish the series of Orthovisc in both knees.  I given her a new prescription for physical therapy.  She will return to see me in 6 months or sooner if needed.    Using sterile technique, the left knee was sterilely prepped with Hibiclens.  Following time out, using a 22 gauge needle the left knee was aspirated and then injected with 2 ml Orthovisc. Approximately 0.5 mm of straw-colored fluid was obtained.  Patient tolerated the procedure well.  No complications.      Using sterile technique, the right knee was sterilely prepped with Hibiclens.  Following time out, using a 22 gauge needle the right knee was aspirated and then injected with 2 ml Orthovisc. Approximately 0.5 mm of straw-colored fluid was obtained.  Patient tolerated the procedure well.  No complications.      Using sterile technique, the left knee was sterilely prepped with Hibiclens.  Following timeout, using 22-gauge needle the right Pez bursa was injected with 40 mg of kenalog and 1 cc of lidocaine.  Patient tolerated procedure well.  No complications.

## 2020-06-26 ENCOUNTER — TELEPHONE (OUTPATIENT)
Dept: OBSTETRICS AND GYNECOLOGY | Facility: CLINIC | Age: 65
End: 2020-06-26

## 2020-06-26 NOTE — TELEPHONE ENCOUNTER
Patient calls today requesting samples of Osphena.  States that she has had some sort of insurance issue but that is being straightened out.  She will come by today to  samples.

## 2020-09-02 DIAGNOSIS — M70.52 PES ANSERINUS BURSITIS OF LEFT KNEE: Primary | ICD-10-CM

## 2020-09-02 DIAGNOSIS — M17.0 PRIMARY OSTEOARTHRITIS OF BOTH KNEES: ICD-10-CM

## 2020-09-16 ENCOUNTER — OFFICE VISIT (OUTPATIENT)
Dept: ORTHOPEDIC SURGERY | Facility: CLINIC | Age: 65
End: 2020-09-16

## 2020-09-16 VITALS — HEART RATE: 80 BPM | OXYGEN SATURATION: 98 % | WEIGHT: 186.07 LBS | BODY MASS INDEX: 35.13 KG/M2 | HEIGHT: 61 IN

## 2020-09-16 DIAGNOSIS — M70.52 PES ANSERINUS BURSITIS OF LEFT KNEE: Primary | ICD-10-CM

## 2020-09-16 DIAGNOSIS — M17.0 PRIMARY OSTEOARTHRITIS OF BOTH KNEES: ICD-10-CM

## 2020-09-16 PROCEDURE — 20610 DRAIN/INJ JOINT/BURSA W/O US: CPT | Performed by: PHYSICIAN ASSISTANT

## 2020-09-16 RX ADMIN — TRIAMCINOLONE ACETONIDE 40 MG: 40 INJECTION, SUSPENSION INTRA-ARTICULAR; INTRAMUSCULAR at 14:02

## 2020-09-16 RX ADMIN — LIDOCAINE HYDROCHLORIDE 4 ML: 10 INJECTION, SOLUTION EPIDURAL; INFILTRATION; INTRACAUDAL; PERINEURAL at 14:02

## 2020-09-16 NOTE — PROGRESS NOTES
[facility] [place] [facility] Note    Subjective     Patient: Park Ray  : 1955    Primary Care Provider: Kate Garcia MD    Requesting Provider: As above    Follow-up (3 months- Primary osteoarthritis of both knees )      History    Chief Complaint: Bilateral knee pain    History of Present Illness: Patient returns today for her bilateral knee pain as well as her left Pez bursitis.  She has had injections in the knees as well as the Pez in the past.  With good relief.  She is not yet ready to consider replacement.  She would like repeat injections today.  She is scheduled to begin physical therapy next week.    Current Outpatient Medications on File Prior to Visit   Medication Sig Dispense Refill   • acyclovir (ZOVIRAX) 400 MG tablet Take 1 tablet by mouth Daily. Take no more than 5 doses a day. 90 tablet 3   • amoxicillin (AMOXIL) 250 MG capsule Take 250 mg by mouth 3 (Three) Times a Day.     • Biotin 1000 MCG chewable tablet Chew.     • Cholecalciferol (VITAMIN D-3) 5000 units tablet Take 1 tablet by mouth Daily.     • diazePAM (VALIUM) 5 MG tablet Take 5 mg by mouth 2 (Two) Times a Day As Needed for Anxiety.     • diphenoxylate-atropine (LOMOTIL) 2.5-0.025 MG per tablet Take 1 tablet by mouth 3 (Three) Times a Day As Needed for Diarrhea.     • fluticasone (FLONASE) 50 MCG/ACT nasal spray 2 sprays into each nostril Daily.     • furosemide (LASIX) 40 MG tablet Take 40 mg by mouth as needed.     • levothyroxine (SYNTHROID, LEVOTHROID) 25 MCG tablet Take 25 mcg by mouth daily.     • Milk Thistle 140 MG capsule Take 1 capsule by mouth Daily.     • Multiple Vitamins-Minerals (MULTIVITAMIN ADULT PO) Take 1 tablet by mouth daily.     • nitrofurantoin (MACRODANTIN) 50 MG capsule Take 1 capsule by mouth Daily. 90 capsule 3   • omeprazole (priLOSEC) 40 MG capsule Take 20 mg by mouth Daily.     • Ospemifene 60 MG tablet Take 60 mg by mouth Daily. 90 tablet 3   • Ospemifene 60 MG tablet Take  60 mg by mouth Daily. 30 tablet 0   • Ospemifene 60 MG tablet Take 60 mg by mouth Daily. 30 tablet 0   • phenazopyridine (PYRIDIUM) 200 MG tablet Take 200 mg by mouth 3 (Three) Times a Day As Needed for bladder spasms.     • predniSONE (DELTASONE) 20 MG tablet Take 20 mg by mouth 2 (Two) Times a Day.     • pseudoephedrine (SUDAFED) 30 MG tablet Take 30 mg by mouth Every 4 (Four) Hours As Needed for Congestion.     • Vitamin A 03152 UNITS tablet Take 10,000 tablets by mouth daily.     • vitamin E 400 UNIT capsule Take 400 Units by mouth daily.       No current facility-administered medications on file prior to visit.       Allergies   Allergen Reactions   • Tetracyclines & Related Hives      Past Medical History:   Diagnosis Date   • Acute torn meniscus of knee     left   • Closed displaced fracture of shaft of left clavicle    • Closed nondisplaced fracture of neck of left radius    • Colon cancer (CMS/HCC)    • Dyspareunia    • Family history of breast cancer in mother    • Fatty liver    • Fracture of proximal phalanx of toe    • Herpes    • Hypothyroidism    • IBS (irritable bowel syndrome)    • Left wrist pain    • Menopause    • Mixed incontinence urge and stress    • OAB (overactive bladder)    • Right wrist pain      Past Surgical History:   Procedure Laterality Date   •  SECTION     • CHOLECYSTECTOMY     • COLECTOMY PARTIAL / TOTAL     • COLON RESECTION     • ENTEROCELE REPAIR     • DAWSON MORA ([facility]) PROCEDURE     • PELVIC LAPAROSCOPY     • RECTAL SURGERY     • SALPINGO OOPHORECTOMY Right    • TOTAL ABDOMINAL HYSTERECTOMY WITH SALPINGO OOPHORECTOMY Left    • UTERINE SUSPENSION LAPAROSCOPIC       Family History   Problem Relation Age of Onset   • Cancer Father    • Heart disease Father    • Hypertension Father    • Osteoarthritis Father    • Breast cancer Mother    • Cancer Mother    • Brain cancer Brother    • Hypertension Brother    • Diabetes Brother    • Hypertension Sister    •  "Cancer Other    • Heart disease Other       Social History     Socioeconomic History   • Marital status:      Spouse name: Not on file   • Number of children: Not on file   • Years of education: Not on file   • Highest education level: Not on file   Tobacco Use   • Smoking status: Never Smoker   • Smokeless tobacco: Never Used   Substance and Sexual Activity   • Alcohol use: Yes     Comment: OCCASIONALLY   • Drug use: No   • Sexual activity: Yes     Partners: Male     Birth control/protection: Post-menopausal, Surgical   Social History Narrative    Lives with her         Review of Systems   Constitutional: Negative.    HENT: Negative.    Eyes: Negative.    Respiratory: Negative.    Cardiovascular: Negative.    Gastrointestinal: Negative.    Endocrine: Negative.    Genitourinary: Negative.    Musculoskeletal: Positive for arthralgias.   Skin: Negative.    Allergic/Immunologic: Negative.    Neurological: Negative.    Hematological: Negative.    Psychiatric/Behavioral: Negative.        The following portions of the patient's history were reviewed and updated as appropriate: allergies, current medications, past family history, past medical history, past social history, past surgical history and problem list.      Objective      Physical Exam  Pulse 80   Ht 155.6 cm (61.26\")   Wt 84.4 kg (186 lb 1.1 oz)   LMP  (LMP Unknown) Comment: S/P AH, LEFT S&O/RIGHT S&O  SpO2 98%   BMI 34.86 kg/m²     Body mass index is 34.86 kg/m².    Patient is well developed, well nourished and in no acute distress.  Alert and oriented x 3.    Ortho Exam    Right Knee Exam  ----------  ALIGNMENT: Right: neutral----------  RANGE OF MOTION:  Right: 0-120  LIGAMENTOUS STABILITY:   Right: stable to valgus and varus stress----------  STRENGTH:  KNEE FLEXION Right 5/5  KNEE EXTENSION Right 5/5 ----------  PAIN WITH PALPATION: Right medial joint line  PAIN WITH KNEE ROM: Right no  PATELLAR CREPITUS: Right yes   ----------    Left " Knee Exam  ----------  ALIGNMENT: Left: neutral----------  RANGE OF MOTION:  Left: 0-120  LIGAMENTOUS STABILITY:   Left: stable to valgus and varus stress----------  STRENGTH:  KNEE FLEXION left 5/5  KNEE EXTENSION left 5/5 ----------  PAIN WITH PALPATION: Left medial joint line and pes bursa  PAIN WITH KNEE ROM: Left no  PATELLAR CREPITUS: Left yes         Medical Decision Making    Data Review:   none    Assessment:  1. Pes anserinus bursitis of left knee    2. Primary osteoarthritis of both knees        Plan:  Bilateral knee arthritis with left Pez bursitis.  Patient is done well with intermittent steroid injection in the past.  I explained to her that this the last Pez injection I am willing to give her.  She really needs to do physical therapy.  She is scheduled to begin next week.  I will see her back in 3 to 4 months or sooner if needed.    Using sterile technique, the left knee was sterilely prepped with Hibiclens. Following a time out,  using a 22 gauge needle, the left knee was injected with 1cc (40mg) Kenalog, 4 cc lidocaine.  Patient tolerated the procedure well.  No complications.  Using sterile technique, the right knee was sterilely prepped with Hibiclens.  Following a time out,  using a 22 gauge needle, the right knee was injected with 1cc (40mg) Kenalog, 4 cc lidocaine.  Patient tolerated the procedure well.  No complications.    Using sterile technique, the left knee was sterilely prepped with Hibiclens.  Following timeout, using 22-gauge needle the left pes bursa was injected with 40 mg of kenalog. and 1 cc of lidocaine.  Patient tolerated procedure well.  No complications.             Abbie Mas PA-C  09/17/20  15:40 EDT

## 2020-09-16 NOTE — PROGRESS NOTES
Procedure   Large Joint Arthrocentesis: R knee  Date/Time: 9/16/2020 2:02 PM  Consent given by: patient  Site marked: site marked  Timeout: Immediately prior to procedure a time out was called to verify the correct patient, procedure, equipment, support staff and site/side marked as required   Supporting Documentation  Indications: pain   Procedure Details  Location: knee - R knee  Preparation: Patient was prepped and draped in the usual sterile fashion  Needle size: 22 G  Approach: anterolateral  Medications administered: 4 mL lidocaine PF 1% 1 %; 40 mg triamcinolone acetonide 40 MG/ML  Patient tolerance: patient tolerated the procedure well with no immediate complications    Large Joint Arthrocentesis: L knee  Date/Time: 9/16/2020 2:02 PM  Consent given by: patient  Site marked: site marked  Timeout: Immediately prior to procedure a time out was called to verify the correct patient, procedure, equipment, support staff and site/side marked as required   Supporting Documentation  Indications: pain   Procedure Details  Location: knee - L knee  Preparation: Patient was prepped and draped in the usual sterile fashion  Needle size: 22 G  Approach: anterolateral  Medications administered: 4 mL lidocaine PF 1% 1 %; 40 mg triamcinolone acetonide 40 MG/ML      Large Joint Arthrocentesis: L knee  Date/Time: 9/16/2020 2:02 PM  Consent given by: patient  Site marked: site marked  Timeout: Immediately prior to procedure a time out was called to verify the correct patient, procedure, equipment, support staff and site/side marked as required   Supporting Documentation  Indications: pain   Procedure Details  Location: knee - L knee (Pes Bursa Cortisone injection)  Preparation: Patient was prepped and draped in the usual sterile fashion  Needle size: 22 G  Approach: anteromedial  Medications administered: 4 mL lidocaine PF 1% 1 %; 40 mg triamcinolone acetonide 40 MG/ML  Patient tolerance: patient tolerated the procedure well with  no immediate complications

## 2020-09-17 RX ORDER — TRIAMCINOLONE ACETONIDE 40 MG/ML
40 INJECTION, SUSPENSION INTRA-ARTICULAR; INTRAMUSCULAR
Status: COMPLETED | OUTPATIENT
Start: 2020-09-16 | End: 2020-09-16

## 2020-09-17 RX ORDER — LIDOCAINE HYDROCHLORIDE 10 MG/ML
4 INJECTION, SOLUTION EPIDURAL; INFILTRATION; INTRACAUDAL; PERINEURAL
Status: COMPLETED | OUTPATIENT
Start: 2020-09-16 | End: 2020-09-16

## 2020-10-07 DIAGNOSIS — N95.2 VAGINAL ATROPHY: Primary | ICD-10-CM

## 2020-12-02 ENCOUNTER — OFFICE VISIT (OUTPATIENT)
Dept: OBSTETRICS AND GYNECOLOGY | Facility: CLINIC | Age: 65
End: 2020-12-02

## 2020-12-02 VITALS
HEIGHT: 61 IN | SYSTOLIC BLOOD PRESSURE: 132 MMHG | WEIGHT: 189.2 LBS | BODY MASS INDEX: 35.72 KG/M2 | DIASTOLIC BLOOD PRESSURE: 76 MMHG

## 2020-12-02 DIAGNOSIS — Z80.3 FAMILY HISTORY OF BREAST CANCER IN MOTHER: ICD-10-CM

## 2020-12-02 DIAGNOSIS — N95.2 VAGINAL ATROPHY: ICD-10-CM

## 2020-12-02 DIAGNOSIS — A60.00 RECURRENT GENITAL HERPES: ICD-10-CM

## 2020-12-02 DIAGNOSIS — Z78.0 MENOPAUSE: Primary | ICD-10-CM

## 2020-12-02 DIAGNOSIS — N30.90 RECURRENT CYSTITIS: ICD-10-CM

## 2020-12-02 DIAGNOSIS — Z01.419 ENCOUNTER FOR GYNECOLOGICAL EXAMINATION WITHOUT ABNORMAL FINDING: ICD-10-CM

## 2020-12-02 PROCEDURE — G0101 CA SCREEN;PELVIC/BREAST EXAM: HCPCS | Performed by: OBSTETRICS & GYNECOLOGY

## 2020-12-02 RX ORDER — ACYCLOVIR 400 MG/1
400 TABLET ORAL DAILY
Qty: 90 TABLET | Refills: 3 | Status: SHIPPED | OUTPATIENT
Start: 2020-12-02 | End: 2021-12-02

## 2020-12-02 RX ORDER — NITROFURANTOIN MACROCRYSTALS 50 MG/1
50 CAPSULE ORAL DAILY
COMMUNITY
End: 2020-12-02 | Stop reason: SDUPTHER

## 2020-12-02 RX ORDER — PLECANATIDE 3 MG/1
1 TABLET ORAL DAILY
COMMUNITY
End: 2022-01-18

## 2020-12-02 RX ORDER — OSPEMIFENE 60 MG/1
1 TABLET, FILM COATED ORAL DAILY
Qty: 90 TABLET | Refills: 3 | Status: SHIPPED | OUTPATIENT
Start: 2020-12-02 | End: 2021-02-01

## 2020-12-02 RX ORDER — PHENTERMINE HYDROCHLORIDE 37.5 MG/1
37.5 TABLET ORAL
COMMUNITY
End: 2021-01-13

## 2020-12-02 RX ORDER — ACYCLOVIR 400 MG/1
400 TABLET ORAL DAILY
COMMUNITY
End: 2020-12-02 | Stop reason: SDUPTHER

## 2020-12-02 RX ORDER — ESCITALOPRAM OXALATE 5 MG/1
5 TABLET ORAL DAILY
Status: ON HOLD | COMMUNITY
End: 2022-02-01 | Stop reason: SDUPTHER

## 2020-12-02 RX ORDER — PRAVASTATIN SODIUM 40 MG
40 TABLET ORAL DAILY
COMMUNITY

## 2020-12-02 RX ORDER — NITROFURANTOIN MACROCRYSTALS 50 MG/1
50 CAPSULE ORAL DAILY
Qty: 90 CAPSULE | Refills: 3 | Status: SHIPPED | OUTPATIENT
Start: 2020-12-02 | End: 2021-12-02

## 2020-12-02 RX ORDER — OSPEMIFENE 60 MG/1
1 TABLET, FILM COATED ORAL DAILY
COMMUNITY
End: 2020-12-02 | Stop reason: SDUPTHER

## 2020-12-02 NOTE — PROGRESS NOTES
Chief Complaint   Patient presents with   • Gynecologic Exam     intermittent RLQ discomfort   • Med Refill       Park Ray is a 65 y.o. year old  presenting to be seen for her annual exam.  This patient has previously had a laparoscopic uterine suspension by Dr. Lobato.  She has had an abdominal hysterectomy/left salpingo-oophorectomy.  She has had a right salpingo-oophorectomy, Pzyanxmk-Zhgnupeci-Wldskz procedure.  She has had a cholecystectomy.  Dr. Donohue has done a laparotomy with a partial sigmoid colectomy and a vaginal pexy.  She has had a ventral herniorrhaphy.  She has intermittent right lower quadrant pain.  She denies bowel or urinary symptoms.  She has a history of recurrent cystitis and is suppressed with nitrofurantoin, 50 mg daily.  She has a history of recurrent genital herpes and is suppressed with acyclovir, 400 mg daily.  She has symptoms of vaginal atrophy and is treated with Osphena, 60 mg by mouth daily with good results.    SCREENING TESTS    Year 2012 2016 2017   Age                         PAP                         HPV high risk                         Mammogram        benign                 JOSÉ MANUEL score                         Breast MRI                         Lipids                         Vitamin D                         Colonoscopy        polyp                 DEXA  Frax (hip/any)                         Ovarian Screen                             She exercises regularly: no.  She wears her seat belt: yes.  She has concerns about domestic violence: no.  She has noticed changes in height: no    GYN screening history:  · Last mammogram: was done on approximately 2019 and the result was: Birads II (Benign findings).  · Last colonoscopy: was done on approximately 2019 and the result was: sessile polyp..    No Additional Complaints Reported    The following portions of the  patient's history were reviewed and updated as appropriate:vital signs and   She  has a past medical history of Acute torn meniscus of knee, Closed displaced fracture of shaft of left clavicle, Closed nondisplaced fracture of neck of left radius, Colon cancer (CMS/HCC), Dyspareunia, Family history of breast cancer in mother, Fatty liver, Fracture of proximal phalanx of toe, Herpes, Hypothyroidism, IBS (irritable bowel syndrome), Left wrist pain, Menopause, Mixed incontinence urge and stress, OAB (overactive bladder), Osteoarthritis, and Right wrist pain.  She does not have any pertinent problems on file.  She  has a past surgical history that includes Cholecystectomy; Bowel resection; Rectal surgery; Pelvic laparoscopy; Total abdominal hysterectomy w/ bilateral salpingoophorectomy (Left); Salpingoophorectomy (Right); Uterine Suspension; Colectomy partial / total; [facility] Procedure; Enterocele repair; and  section.  Her family history includes Brain cancer in her brother; Breast cancer in her mother; Cancer in her father, mother, and another family member; Diabetes in her brother; Heart disease in her father and another family member; Hypertension in her brother, father, and sister; Osteoarthritis in her father.  She  reports that she has never smoked. She has never used smokeless tobacco. She reports current alcohol use. She reports that she does not use drugs.  Current Outpatient Medications   Medication Sig Dispense Refill   • acyclovir (ZOVIRAX) 400 MG tablet Take 1 tablet by mouth Daily. Take no more than 5 doses a day. 90 tablet 3   • escitalopram (LEXAPRO) 5 MG tablet Take 5 mg by mouth Daily.     • nitrofurantoin (MACRODANTIN) 50 MG capsule Take 1 capsule by mouth Daily. 90 capsule 3   • Ospemifene (Osphena) 60 MG tablet Take 1 tablet by mouth Daily. 90 tablet 3   • phentermine (ADIPEX-P) 37.5 MG tablet Take 37.5 mg by mouth Every Morning Before Breakfast.     • Plecanatide (Trulance) 3 MG tablet Take 1  "tablet by mouth Daily.     • pravastatin (PRAVACHOL) 40 MG tablet Take 40 mg by mouth Daily.     • Cholecalciferol (VITAMIN D-3) 5000 units tablet Take 1 tablet by mouth Daily.     • diazePAM (VALIUM) 5 MG tablet Take 5 mg by mouth 2 (Two) Times a Day As Needed for Anxiety.     • fluticasone (FLONASE) 50 MCG/ACT nasal spray 2 sprays into each nostril Daily.     • furosemide (LASIX) 40 MG tablet Take 40 mg by mouth as needed.     • levothyroxine (SYNTHROID, LEVOTHROID) 25 MCG tablet Take 25 mcg by mouth daily.     • Milk Thistle 140 MG capsule Take 1 capsule by mouth Daily.     • Multiple Vitamins-Minerals (MULTIVITAMIN ADULT PO) Take 1 tablet by mouth daily.     • omeprazole (priLOSEC) 40 MG capsule Take 20 mg by mouth Daily.     • phenazopyridine (PYRIDIUM) 200 MG tablet Take 200 mg by mouth 3 (Three) Times a Day As Needed for bladder spasms.     • predniSONE (DELTASONE) 20 MG tablet Take 20 mg by mouth 2 (Two) Times a Day.     • pseudoephedrine (SUDAFED) 30 MG tablet Take 30 mg by mouth Every 4 (Four) Hours As Needed for Congestion.     • Vitamin A 18373 UNITS tablet Take 10,000 tablets by mouth daily.     • vitamin E 400 UNIT capsule Take 400 Units by mouth daily.       No current facility-administered medications for this visit.      She is allergic to tetracyclines & related..    Review of Systems  A review of systems was taken.  She denies cough, fever, shortness of breath, and loss of her sense of taste or smell  Constitutional: negative for fever, chills, activity change, appetite change, fatigue and unexpected weight change.  Respiratory: negative  Cardiovascular: negative  Gastrointestinal: positive for abdominal pain  Genitourinary:negative  Musculoskeletal:positive for back pain  Behavioral/Psych: negative     Counseling/Anticipatory Guidance Discussed: nutrition, physical activity, healthy weight, injury prevention, screenings and self-breast exam    /76   Ht 155.6 cm (61.25\")   Wt 85.8 kg (189 " lb 3.2 oz)   LMP  (LMP Unknown) Comment: S/P AH, LEFT S&O/RIGHT S&O  Breastfeeding No   BMI 35.46 kg/m²     MEDICALLY INDICATED   Physical Exam    General:  alert; cooperative; well developed; well nourished   Skin:  No suspicious lesions seen   Thyroid: normal to inspection and palpation   Lungs:  breathing is unlabored  clear to auscultation bilaterally   Heart:  regular rate and rhythm, S1, S2 normal, no murmur, click, rub or gallop  normal apical impulse   Breasts:  Examined in supine position  Symmetric without masses or skin dimpling  Nipples normal without inversion, lesions or discharge  There are no palpable axillary nodes  Fibrocystic changes are present both breasts without a discrete mass   Abdomen: no umbilical or inguinal hernias are present  no hepato-splenomegaly  Mild RLQ tenderness   Pelvis: Clinical staff was present for exam  External genitalia:  normal appearance of the external genitalia including Bartholin's and Lithonia's glands.  Vaginal:  normal pink mucosa without prolapse or lesions. pH = 4.0  Cervix:  absent.  Uterus:  absent.  Adnexa:  absent, bilateral.  Rectal:  anus visually normal appearing. recto-vaginal exam unremarkable and confirms findings;     Lab Review   CBC results and CMP results    Imaging  Mammogram results              ASSESSMENT  Problems Addressed this Visit        Genitourinary    Menopause - Primary    Recurrent genital herpes    Relevant Medications    acyclovir (ZOVIRAX) 400 MG tablet    Vaginal atrophy    Relevant Medications    Ospemifene (Osphena) 60 MG tablet       Other    Family history of breast cancer in mother      Other Visit Diagnoses     Recurrent cystitis        Relevant Medications    nitrofurantoin (MACRODANTIN) 50 MG capsule    Encounter for gynecological examination without abnormal finding          Diagnoses       Codes Comments    Menopause    -  Primary ICD-10-CM: Z78.0  ICD-9-CM: 627.2     Vaginal atrophy     ICD-10-CM: N95.2  ICD-9-CM: 627.3      Family history of breast cancer in mother     ICD-10-CM: Z80.3  ICD-9-CM: V16.3     Recurrent genital herpes     ICD-10-CM: A60.00  ICD-9-CM: 054.10     Recurrent cystitis     ICD-10-CM: N30.90  ICD-9-CM: 595.9     Encounter for gynecological examination without abnormal finding     ICD-10-CM: Z01.419  ICD-9-CM: V72.31         Advance directives- YES      Substance History:   reports that she has never smoked. She has never used smokeless tobacco.   reports current alcohol use.   reports no history of drug use.    Substance use counseling is not indicated based on patient history.  The patient indicates that her alcohol use is social, and controlled.      PLAN    Medications prescribed this encounter:    New Medications Ordered This Visit   Medications   • acyclovir (ZOVIRAX) 400 MG tablet     Sig: Take 1 tablet by mouth Daily. Take no more than 5 doses a day.     Dispense:  90 tablet     Refill:  3   • nitrofurantoin (MACRODANTIN) 50 MG capsule     Sig: Take 1 capsule by mouth Daily.     Dispense:  90 capsule     Refill:  3   • Ospemifene (Osphena) 60 MG tablet     Sig: Take 1 tablet by mouth Daily.     Dispense:  90 tablet     Refill:  3   · Monthly self breast assessment and annual breast imaging  · Calcium, 600 mg/ Vit. D, 400 IU daily; regular weight-bearing exercise  · Follow up: 12 month(s)  *Please note that portions of this documentation may have been completed with a voice recognition program.  Efforts were made to edit this dictation, but occasional words may have been mistranscribed.       This note was electronically signed.    SHAORN Bishop MD  December 2, 2020  16:23 EST

## 2021-01-04 ENCOUNTER — TELEPHONE (OUTPATIENT)
Dept: ORTHOPEDIC SURGERY | Facility: CLINIC | Age: 66
End: 2021-01-04

## 2021-01-04 NOTE — TELEPHONE ENCOUNTER
LEFT VOICEMAIL WITH PATIENT TO DISCUSS RECENT EXPOSURE TO COVID AND PATIENT'S SCHEDULED APPOINTMENTS. IF PATIENT CALLS BACK PLEASE TRANSFER TO ME.

## 2021-01-05 NOTE — TELEPHONE ENCOUNTER
SPOKE TO PATIENT. PATIENT AND  WERE EXPOSED TO A PERSON POSITIVE FOR COVID ON 12/29/20. PATIENT HAS BEEN RESCHEDULED TO SEE ARSEN MONDRAGON ON 1/13/21.

## 2021-01-13 ENCOUNTER — OFFICE VISIT (OUTPATIENT)
Dept: ORTHOPEDIC SURGERY | Facility: CLINIC | Age: 66
End: 2021-01-13

## 2021-01-13 VITALS — WEIGHT: 185.4 LBS | OXYGEN SATURATION: 99 % | HEART RATE: 98 BPM | BODY MASS INDEX: 35.01 KG/M2 | HEIGHT: 61 IN

## 2021-01-13 DIAGNOSIS — M17.0 PRIMARY OSTEOARTHRITIS OF BOTH KNEES: Primary | ICD-10-CM

## 2021-01-13 DIAGNOSIS — M70.52 PES ANSERINUS BURSITIS OF LEFT KNEE: ICD-10-CM

## 2021-01-13 PROCEDURE — 99213 OFFICE O/P EST LOW 20 MIN: CPT | Performed by: PHYSICIAN ASSISTANT

## 2021-01-13 PROCEDURE — 20610 DRAIN/INJ JOINT/BURSA W/O US: CPT | Performed by: PHYSICIAN ASSISTANT

## 2021-01-13 RX ADMIN — TRIAMCINOLONE ACETONIDE 40 MG: 40 INJECTION, SUSPENSION INTRA-ARTICULAR; INTRAMUSCULAR at 12:05

## 2021-01-13 RX ADMIN — TRIAMCINOLONE ACETONIDE 40 MG: 40 INJECTION, SUSPENSION INTRA-ARTICULAR; INTRAMUSCULAR at 12:06

## 2021-01-13 RX ADMIN — LIDOCAINE HYDROCHLORIDE 4 ML: 10 INJECTION, SOLUTION EPIDURAL; INFILTRATION; INTRACAUDAL; PERINEURAL at 12:05

## 2021-01-13 RX ADMIN — LIDOCAINE HYDROCHLORIDE 4 ML: 10 INJECTION, SOLUTION EPIDURAL; INFILTRATION; INTRACAUDAL; PERINEURAL at 12:06

## 2021-01-13 NOTE — PROGRESS NOTES
[facility] Note    Subjective     Patient: Park Ray  : 1955    Primary Care Provider: Kate Garcia MD    Requesting Provider: As above    Follow-up (3 month follow up; Primary osteoarthritis of both knees-last cortisone injection given on 20)      History    Chief Complaint: Bilateral knee pain    History of Present Illness: Patient returns today for known bilateral knee arthritis as well as left pes bursitis.  She has been getting intermittent steroid injection viscosupplementation with good relief.  She reports that she try to get into PT for the pes bursa but was unable to.  She is ready to proceed with PT and would like repeat steroid and Visco.    Current Outpatient Medications on File Prior to Visit   Medication Sig Dispense Refill   • acyclovir (ZOVIRAX) 400 MG tablet Take 1 tablet by mouth Daily. Take no more than 5 doses a day. 90 tablet 3   • Cholecalciferol (VITAMIN D-3) 5000 units tablet Take 1 tablet by mouth Daily.     • diazePAM (VALIUM) 5 MG tablet Take 5 mg by mouth 2 (Two) Times a Day As Needed for Anxiety.     • escitalopram (LEXAPRO) 5 MG tablet Take 5 mg by mouth Daily.     • furosemide (LASIX) 40 MG tablet Take 40 mg by mouth as needed.     • levothyroxine (SYNTHROID, LEVOTHROID) 25 MCG tablet Take 25 mcg by mouth daily.     • Milk Thistle 140 MG capsule Take 1 capsule by mouth Daily.     • Multiple Vitamins-Minerals (MULTIVITAMIN ADULT PO) Take 1 tablet by mouth daily.     • nitrofurantoin (MACRODANTIN) 50 MG capsule Take 1 capsule by mouth Daily. 90 capsule 3   • omeprazole (priLOSEC) 40 MG capsule Take 20 mg by mouth Daily.     • Ospemifene (Osphena) 60 MG tablet Take 1 tablet by mouth Daily. 90 tablet 3   • Plecanatide (Trulance) 3 MG tablet Take 1 tablet by mouth Daily.     • pravastatin (PRAVACHOL) 40 MG tablet Take 40 mg by mouth Daily.     • fluticasone (FLONASE) 50 MCG/ACT nasal spray 2 sprays into each nostril Daily.       No  current facility-administered medications on file prior to visit.       Allergies   Allergen Reactions   • Tetracyclines & Related Hives      Past Medical History:   Diagnosis Date   • Acute torn meniscus of knee     left   • Closed displaced fracture of shaft of left clavicle    • Closed nondisplaced fracture of neck of left radius    • Colon cancer (CMS/HCC)    • Dyspareunia    • Family history of breast cancer in mother    • Fatty liver    • Fracture of proximal phalanx of toe    • Herpes    • Hypothyroidism    • IBS (irritable bowel syndrome)    • Left wrist pain    • Menopause    • Mixed incontinence urge and stress    • OAB (overactive bladder)    • Osteoarthritis    • Right wrist pain      Past Surgical History:   Procedure Laterality Date   •  SECTION     • CHOLECYSTECTOMY     • COLECTOMY PARTIAL / TOTAL     • COLON RESECTION     • ENTEROCELE REPAIR     • DAWSON MORA ([facility]) PROCEDURE     • PELVIC LAPAROSCOPY     • RECTAL SURGERY     • SALPINGO OOPHORECTOMY Right    • TOTAL ABDOMINAL HYSTERECTOMY WITH SALPINGO OOPHORECTOMY Left    • UTERINE SUSPENSION LAPAROSCOPIC       Family History   Problem Relation Age of Onset   • Cancer Father    • Heart disease Father    • Hypertension Father    • Osteoarthritis Father    • Breast cancer Mother    • Cancer Mother    • Brain cancer Brother    • Hypertension Brother    • Diabetes Brother    • Hypertension Sister    • Cancer Other    • Heart disease Other       Social History     Socioeconomic History   • Marital status:      Spouse name: Not on file   • Number of children: Not on file   • Years of education: Not on file   • Highest education level: Not on file   Tobacco Use   • Smoking status: Never Smoker   • Smokeless tobacco: Never Used   Substance and Sexual Activity   • Alcohol use: Yes     Comment: OCCASIONALLY   • Drug use: No   • Sexual activity: Yes     Partners: Male     Birth control/protection: Post-menopausal, Surgical   Social  "History Narrative    Lives with her         Review of Systems   Constitutional: Negative.    HENT: Positive for dental problem, ear pain, postnasal drip, sinus pressure and sore throat.    Eyes: Positive for visual disturbance.   Respiratory: Negative.    Cardiovascular: Positive for leg swelling.   Gastrointestinal: Positive for constipation and diarrhea.   Endocrine: Negative.    Genitourinary: Negative.    Musculoskeletal: Positive for arthralgias, back pain, joint swelling, neck pain and neck stiffness.   Skin: Negative.    Allergic/Immunologic: Negative.    Neurological: Negative.    Hematological: Negative.    Psychiatric/Behavioral: Negative.        The following portions of the patient's history were reviewed and updated as appropriate: allergies, current medications, past family history, past medical history, past social history, past surgical history and problem list.      Objective      Physical Exam  Pulse 98   Ht 155.6 cm (61.26\")   Wt 84.1 kg (185 lb 6.4 oz)   LMP  (LMP Unknown) Comment: S/P AH, LEFT S&O/RIGHT S&O  SpO2 99%   BMI 34.73 kg/m²     Body mass index is 34.73 kg/m².    Patient is well developed, well nourished and in no acute distress.  Alert and oriented x 3.    Ortho Exam    Right Knee Exam  ----------  ALIGNMENT: Right: neutral----------  RANGE OF MOTION:  Right: 0-120  LIGAMENTOUS STABILITY:   Right: stable to valgus and varus stress----------  STRENGTH:  KNEE FLEXION Right 5/5  KNEE EXTENSION Right 5/5 ----------  PAIN WITH PALPATION: Right medial joint line  PAIN WITH KNEE ROM: Right no  PATELLAR CREPITUS: Right yes   ----------    Left Knee Exam  ----------  ALIGNMENT: Left: neutral----------  RANGE OF MOTION:  Left: 0-120  LIGAMENTOUS STABILITY:   Left: stable to valgus and varus stress----------  STRENGTH:  KNEE FLEXION left 5/5  KNEE EXTENSION left 5/5 ----------  PAIN WITH PALPATION: Left medial joint line and pes bursa  PAIN WITH KNEE ROM: Left no  PATELLAR CREPITUS: " Left yes         Medical Decision Making    Data Review:   none    Assessment:  1. Primary osteoarthritis of both knees    2. Pes anserinus bursitis of left knee        Plan:  1.  Bilateral knee arthritis.  Patient is done well with intermittent steroid injection and Visco.  She is not interested in knee replacement at this time.  Plan today is repeat steroid injection bilateral knees.  I will put in an order for Visco and she will return in 3 to 4 weeks to begin the series or sooner if needed.    2.  Left pes bursitis.  Patient has had 2 separate injections.  She would begin physical therapy and I have given her prescription.    Using sterile technique, the left knee was sterilely prepped with Hibiclens. Following a time out,  using a 22 gauge needle, the left knee was injected with 1cc (40mg) Kenalog, 4 cc lidocaine.  Patient tolerated the procedure well.  No complications.    Using sterile technique, the right knee was sterilely prepped with Hibiclens.  Following a time out,  using a 22 gauge needle, the right knee was injected with 1cc (40mg) Kenalog, 4 cc lidocaine.  Patient tolerated the procedure well.  No complications.          Abbie Mas PA-C  01/14/21  13:37 EST

## 2021-01-13 NOTE — PROGRESS NOTES
Procedure   Large Joint Arthrocentesis: R knee  Date/Time: 1/13/2021 12:05 PM  Consent given by: patient  Site marked: site marked  Timeout: Immediately prior to procedure a time out was called to verify the correct patient, procedure, equipment, support staff and site/side marked as required   Supporting Documentation  Indications: pain   Procedure Details  Location: knee - R knee  Preparation: Patient was prepped and draped in the usual sterile fashion  Needle size: 22 G  Approach: anterolateral  Medications administered: 4 mL lidocaine PF 1% 1 %; 40 mg triamcinolone acetonide 40 MG/ML  Patient tolerance: patient tolerated the procedure well with no immediate complications    Large Joint Arthrocentesis: L knee  Date/Time: 1/13/2021 12:06 PM  Consent given by: patient  Site marked: site marked  Timeout: Immediately prior to procedure a time out was called to verify the correct patient, procedure, equipment, support staff and site/side marked as required   Supporting Documentation  Indications: pain   Procedure Details  Location: knee - L knee  Preparation: Patient was prepped and draped in the usual sterile fashion  Needle size: 22 G  Approach: anterolateral  Medications administered: 4 mL lidocaine PF 1% 1 %; 40 mg triamcinolone acetonide 40 MG/ML  Patient tolerance: patient tolerated the procedure well with no immediate complications

## 2021-01-14 RX ORDER — LIDOCAINE HYDROCHLORIDE 10 MG/ML
4 INJECTION, SOLUTION EPIDURAL; INFILTRATION; INTRACAUDAL; PERINEURAL
Status: COMPLETED | OUTPATIENT
Start: 2021-01-13 | End: 2021-01-13

## 2021-01-14 RX ORDER — TRIAMCINOLONE ACETONIDE 40 MG/ML
40 INJECTION, SUSPENSION INTRA-ARTICULAR; INTRAMUSCULAR
Status: COMPLETED | OUTPATIENT
Start: 2021-01-13 | End: 2021-01-13

## 2021-01-26 ENCOUNTER — TELEPHONE (OUTPATIENT)
Dept: ORTHOPEDIC SURGERY | Facility: CLINIC | Age: 66
End: 2021-01-26

## 2021-01-26 NOTE — TELEPHONE ENCOUNTER
LEFT MESSAGE WITH PATIENT THAT APPOINTMENT ON 1/27/21 FOR INJECTION HAS BEEN CANCELED AND REQUESTED A CALL BACK FROM PATIENT TO GET IT RESCHEDULED.

## 2021-01-30 DIAGNOSIS — N95.2 VAGINAL ATROPHY: ICD-10-CM

## 2021-02-01 RX ORDER — OSPEMIFENE 60 MG/1
TABLET, FILM COATED ORAL
Qty: 30 TABLET | Refills: 0 | Status: SHIPPED | OUTPATIENT
Start: 2021-02-01 | End: 2021-08-26 | Stop reason: SDUPTHER

## 2021-02-01 NOTE — TELEPHONE ENCOUNTER
Patient has attempted to contact [facility] regarding prescription and she is waiting on a call back from them.    Please approve samples and refuse prescription request from [facility].

## 2021-02-01 NOTE — TELEPHONE ENCOUNTER
Patient's prescription for Osphena 60 mg was sent to [facility] 12/2/2020.  Today we have received a refill request from [facility] in [place].  I attempted to contact the patient to clarify.  There was no answer, but I left a voicemail message asking Rena to return my call.

## 2021-02-01 NOTE — TELEPHONE ENCOUNTER
Patient states that she never heard from [facility] regarding Osphena prescription that was sent electronically 12/2/2020.  I gave her the number to call the pharmacy and she will call back and let me know what she finds out.  She has also requested samples to use until she can get prescription.

## 2021-02-03 ENCOUNTER — CLINICAL SUPPORT (OUTPATIENT)
Dept: ORTHOPEDIC SURGERY | Facility: CLINIC | Age: 66
End: 2021-02-03

## 2021-02-03 DIAGNOSIS — M17.0 PRIMARY OSTEOARTHRITIS OF BOTH KNEES: Primary | ICD-10-CM

## 2021-02-03 PROCEDURE — 20610 DRAIN/INJ JOINT/BURSA W/O US: CPT | Performed by: PHYSICIAN ASSISTANT

## 2021-02-03 NOTE — PROGRESS NOTES
Subjective     Injections (Bilateral knee orthovisc injection #1 )      Park Ray is a 65 y.o. female.     History of Present Illness   Patient presents for the first injection of visco in bilateral knees.  She has had previous series with good relief      Allergies   Allergen Reactions   • Tetracyclines & Related Hives     Current Outpatient Medications on File Prior to Visit   Medication Sig Dispense Refill   • acyclovir (ZOVIRAX) 400 MG tablet Take 1 tablet by mouth Daily. Take no more than 5 doses a day. 90 tablet 3   • Cholecalciferol (VITAMIN D-3) 5000 units tablet Take 1 tablet by mouth Daily.     • diazePAM (VALIUM) 5 MG tablet Take 5 mg by mouth 2 (Two) Times a Day As Needed for Anxiety.     • escitalopram (LEXAPRO) 5 MG tablet Take 5 mg by mouth Daily.     • fluticasone (FLONASE) 50 MCG/ACT nasal spray 2 sprays into each nostril Daily.     • furosemide (LASIX) 40 MG tablet Take 40 mg by mouth as needed.     • levothyroxine (SYNTHROID, LEVOTHROID) 25 MCG tablet Take 25 mcg by mouth daily.     • Milk Thistle 140 MG capsule Take 1 capsule by mouth Daily.     • Multiple Vitamins-Minerals (MULTIVITAMIN ADULT PO) Take 1 tablet by mouth daily.     • nitrofurantoin (MACRODANTIN) 50 MG capsule Take 1 capsule by mouth Daily. 90 capsule 3   • omeprazole (priLOSEC) 40 MG capsule Take 20 mg by mouth Daily.     • Ospemifene 60 MG tablet Take 1 tablet by mouth Daily. 15 tablet 0   • Osphena 60 MG tablet TAKE ONE TABLET BY MOUTH DAILY 30 tablet 0   • Plecanatide (Trulance) 3 MG tablet Take 1 tablet by mouth Daily.     • pravastatin (PRAVACHOL) 40 MG tablet Take 40 mg by mouth Daily.       No current facility-administered medications on file prior to visit.      Social History     Socioeconomic History   • Marital status:      Spouse name: Not on file   • Number of children: Not on file   • Years of education: Not on file   • Highest education level: Not on file   Tobacco Use   • Smoking status: Never  Smoker   • Smokeless tobacco: Never Used   Substance and Sexual Activity   • Alcohol use: Yes     Comment: OCCASIONALLY   • Drug use: No   • Sexual activity: Yes     Partners: Male     Birth control/protection: Post-menopausal, Surgical   Social History Narrative    Lives with her      Past Surgical History:   Procedure Laterality Date   •  SECTION     • CHOLECYSTECTOMY     • COLECTOMY PARTIAL / TOTAL     • COLON RESECTION     • ENTEROCELE REPAIR     • DAWSON FRED MORGAN ([facility]) PROCEDURE     • PELVIC LAPAROSCOPY     • RECTAL SURGERY     • SALPINGO OOPHORECTOMY Right    • TOTAL ABDOMINAL HYSTERECTOMY WITH SALPINGO OOPHORECTOMY Left    • UTERINE SUSPENSION LAPAROSCOPIC       Family History   Problem Relation Age of Onset   • Cancer Father    • Heart disease Father    • Hypertension Father    • Osteoarthritis Father    • Breast cancer Mother    • Cancer Mother    • Brain cancer Brother    • Hypertension Brother    • Diabetes Brother    • Hypertension Sister    • Cancer Other    • Heart disease Other      Past Medical History:   Diagnosis Date   • Acute torn meniscus of knee     left   • Closed displaced fracture of shaft of left clavicle    • Closed nondisplaced fracture of neck of left radius    • Colon cancer (CMS/HCC)    • Dyspareunia    • Family history of breast cancer in mother    • Fatty liver    • Fracture of proximal phalanx of toe    • Herpes    • Hypothyroidism    • IBS (irritable bowel syndrome)    • Left wrist pain    • Menopause    • Mixed incontinence urge and stress    • OAB (overactive bladder)    • Osteoarthritis    • Right wrist pain          Review of Systems    The following portions of the patient's history were reviewed and updated as appropriate: allergies, current medications, past family history, past medical history, past social history, past surgical history and problem list.    Ortho Exam      Medical Decision Making    Assessment and Plan/ Diagnosis/Treatment options:    Bilateral knee arthritis.  Proceed with first injection of Orthovisc in bilateral knees.  Return to clinic in 1 week for the second injection sooner if needed.    Using sterile technique, the right knee was sterilely prepped with Hibiclens.  Following time out, using a 22 gauge needle the right knee was aspirated and then injected with 2 ml Orthovisc. Approximately 0.5 mm of straw-colored fluid was obtained.  Patient tolerated the procedure well.  No complications.      Using sterile technique, the left knee was sterilely prepped with Hibiclens.  Following time out, using a 22 gauge needle the left knee was aspirated and then injected with 2 ml Orthovisc. Approximately 0.5 mm of straw-colored fluid was obtained.  Patient tolerated the procedure well.  No complications.

## 2021-02-03 NOTE — PROGRESS NOTES
Procedure   Large Joint Arthrocentesis: R knee  Date/Time: 2/3/2021 1:11 PM  Consent given by: patient  Site marked: site marked  Timeout: Immediately prior to procedure a time out was called to verify the correct patient, procedure, equipment, support staff and site/side marked as required   Supporting Documentation  Indications: pain   Procedure Details  Location: knee - R knee  Preparation: Patient was prepped and draped in the usual sterile fashion  Needle size: 22 G  Approach: anterolateral  Medications administered: 30 mg Hyaluronan 30 MG/2ML  Patient tolerance: patient tolerated the procedure well with no immediate complications    Large Joint Arthrocentesis: L knee  Date/Time: 2/3/2021 1:12 PM  Consent given by: patient  Site marked: site marked  Timeout: Immediately prior to procedure a time out was called to verify the correct patient, procedure, equipment, support staff and site/side marked as required   Supporting Documentation  Indications: pain   Procedure Details  Location: knee - L knee  Preparation: Patient was prepped and draped in the usual sterile fashion  Needle size: 22 G  Approach: anterolateral  Medications administered: 30 mg Hyaluronan 30 MG/2ML  Patient tolerance: patient tolerated the procedure well with no immediate complications

## 2021-02-10 ENCOUNTER — CLINICAL SUPPORT (OUTPATIENT)
Dept: ORTHOPEDIC SURGERY | Facility: CLINIC | Age: 66
End: 2021-02-10

## 2021-02-10 DIAGNOSIS — M17.0 PRIMARY OSTEOARTHRITIS OF BOTH KNEES: Primary | ICD-10-CM

## 2021-02-10 PROCEDURE — 20610 DRAIN/INJ JOINT/BURSA W/O US: CPT | Performed by: PHYSICIAN ASSISTANT

## 2021-02-10 NOTE — PROGRESS NOTES
Subjective     Injections (Bilateral knee orthovisc injection #2 )      Park Ray is a 65 y.o. female.     History of Present Illness   Patient presents for the second injection of visco in the bilateral knee.  Previous injections tolerated well.      Allergies   Allergen Reactions   • Tetracyclines & Related Hives     Current Outpatient Medications on File Prior to Visit   Medication Sig Dispense Refill   • acyclovir (ZOVIRAX) 400 MG tablet Take 1 tablet by mouth Daily. Take no more than 5 doses a day. 90 tablet 3   • Cholecalciferol (VITAMIN D-3) 5000 units tablet Take 1 tablet by mouth Daily.     • diazePAM (VALIUM) 5 MG tablet Take 5 mg by mouth 2 (Two) Times a Day As Needed for Anxiety.     • escitalopram (LEXAPRO) 5 MG tablet Take 5 mg by mouth Daily.     • fluticasone (FLONASE) 50 MCG/ACT nasal spray 2 sprays into each nostril Daily.     • furosemide (LASIX) 40 MG tablet Take 40 mg by mouth as needed.     • levothyroxine (SYNTHROID, LEVOTHROID) 25 MCG tablet Take 25 mcg by mouth daily.     • Milk Thistle 140 MG capsule Take 1 capsule by mouth Daily.     • Multiple Vitamins-Minerals (MULTIVITAMIN ADULT PO) Take 1 tablet by mouth daily.     • nitrofurantoin (MACRODANTIN) 50 MG capsule Take 1 capsule by mouth Daily. 90 capsule 3   • omeprazole (priLOSEC) 40 MG capsule Take 20 mg by mouth Daily.     • Ospemifene 60 MG tablet Take 1 tablet by mouth Daily. 15 tablet 0   • Osphena 60 MG tablet TAKE ONE TABLET BY MOUTH DAILY 30 tablet 0   • Plecanatide (Trulance) 3 MG tablet Take 1 tablet by mouth Daily.     • pravastatin (PRAVACHOL) 40 MG tablet Take 40 mg by mouth Daily.       No current facility-administered medications on file prior to visit.      Social History     Socioeconomic History   • Marital status:      Spouse name: Not on file   • Number of children: Not on file   • Years of education: Not on file   • Highest education level: Not on file   Tobacco Use   • Smoking status: Never Smoker    • Smokeless tobacco: Never Used   Substance and Sexual Activity   • Alcohol use: Yes     Comment: OCCASIONALLY   • Drug use: No   • Sexual activity: Yes     Partners: Male     Birth control/protection: Post-menopausal, Surgical   Social History Narrative    Lives with her      Past Surgical History:   Procedure Laterality Date   •  SECTION     • CHOLECYSTECTOMY     • COLECTOMY PARTIAL / TOTAL     • COLON RESECTION     • COLONOSCOPY     • ENTEROCELE REPAIR     • DAWSON FRED MORGAN ([facility]) PROCEDURE     • PELVIC LAPAROSCOPY     • RECTAL SURGERY     • SALPINGO OOPHORECTOMY Right    • TOTAL ABDOMINAL HYSTERECTOMY WITH SALPINGO OOPHORECTOMY Left    • UTERINE SUSPENSION LAPAROSCOPIC       Family History   Problem Relation Age of Onset   • Cancer Father    • Heart disease Father    • Hypertension Father    • Osteoarthritis Father    • Breast cancer Mother    • Cancer Mother    • Brain cancer Brother    • Hypertension Brother    • Diabetes Brother    • Hypertension Sister    • Cancer Other    • Heart disease Other      Past Medical History:   Diagnosis Date   • Acute torn meniscus of knee     left   • Closed displaced fracture of shaft of left clavicle    • Closed nondisplaced fracture of neck of left radius    • Colon cancer (CMS/HCC)    • Dyspareunia    • Family history of breast cancer in mother    • Fatty liver    • Fracture of proximal phalanx of toe    • Herpes    • Hypothyroidism    • IBS (irritable bowel syndrome)    • Left wrist pain    • Menopause    • Mixed incontinence urge and stress    • OAB (overactive bladder)    • Osteoarthritis    • Right wrist pain          Review of Systems    The following portions of the patient's history were reviewed and updated as appropriate: allergies, current medications, past family history, past medical history, past social history, past surgical history and problem list.    Ortho Exam      Medical Decision Making    Assessment and Plan/  Diagnosis/Treatment options:   Bilateral knee arthritis.  Proceed with the second injection in both knees today.  Return to clinic in 1 week for the final injection or sooner if needed.    Using sterile technique, the left knee was sterilely prepped with Hibiclens.  Following time out, using a 22 gauge needle the left knee was aspirated and then injected with 2 ml Orthovisc. Approximately 0.5 mm of straw-colored fluid was obtained.  Patient tolerated the procedure well.  No complications.      Using sterile technique, the right knee was sterilely prepped with Hibiclens.  Following time out, using a 22 gauge needle the right knee was aspirated and then injected with 2 ml Orthovisc. Approximately 0.5 mm of straw-colored fluid was obtained.  Patient tolerated the procedure well.  No complications.

## 2021-02-10 NOTE — PROGRESS NOTES
Procedure   Large Joint Arthrocentesis: R knee  Date/Time: 2/10/2021 1:03 PM  Consent given by: patient  Site marked: site marked  Timeout: Immediately prior to procedure a time out was called to verify the correct patient, procedure, equipment, support staff and site/side marked as required   Supporting Documentation  Indications: pain   Procedure Details  Location: knee - R knee  Preparation: Patient was prepped and draped in the usual sterile fashion  Needle size: 22 G  Approach: anterolateral  Medications administered: 30 mg Hyaluronan 30 MG/2ML  Patient tolerance: patient tolerated the procedure well with no immediate complications    Large Joint Arthrocentesis: L knee  Date/Time: 2/10/2021 1:04 PM  Consent given by: patient  Site marked: site marked  Timeout: Immediately prior to procedure a time out was called to verify the correct patient, procedure, equipment, support staff and site/side marked as required   Supporting Documentation  Indications: pain   Procedure Details  Location: knee - L knee  Preparation: Patient was prepped and draped in the usual sterile fashion  Needle size: 22 G  Approach: anterolateral  Medications administered: 30 mg Hyaluronan 30 MG/2ML  Patient tolerance: patient tolerated the procedure well with no immediate complications

## 2021-02-17 ENCOUNTER — CLINICAL SUPPORT (OUTPATIENT)
Dept: ORTHOPEDIC SURGERY | Facility: CLINIC | Age: 66
End: 2021-02-17

## 2021-02-17 DIAGNOSIS — M17.0 PRIMARY OSTEOARTHRITIS OF BOTH KNEES: Primary | ICD-10-CM

## 2021-02-17 PROCEDURE — 20610 DRAIN/INJ JOINT/BURSA W/O US: CPT | Performed by: PHYSICIAN ASSISTANT

## 2021-02-17 NOTE — PROGRESS NOTES
Subjective     Injections (Bilateral knee Orthovisc injections #3)      Park Ray is a 65 y.o. female.     History of Present Illness   Patient presents today for the third injection of Orthovisc in bilateral knees.  She is tolerated previous injections well.    Allergies   Allergen Reactions   • Tetracyclines & Related Hives     Current Outpatient Medications on File Prior to Visit   Medication Sig Dispense Refill   • acyclovir (ZOVIRAX) 400 MG tablet Take 1 tablet by mouth Daily. Take no more than 5 doses a day. 90 tablet 3   • Cholecalciferol (VITAMIN D-3) 5000 units tablet Take 1 tablet by mouth Daily.     • diazePAM (VALIUM) 5 MG tablet Take 5 mg by mouth 2 (Two) Times a Day As Needed for Anxiety.     • escitalopram (LEXAPRO) 5 MG tablet Take 5 mg by mouth Daily.     • fluticasone (FLONASE) 50 MCG/ACT nasal spray 2 sprays into each nostril Daily.     • furosemide (LASIX) 40 MG tablet Take 40 mg by mouth as needed.     • levothyroxine (SYNTHROID, LEVOTHROID) 25 MCG tablet Take 25 mcg by mouth daily.     • Milk Thistle 140 MG capsule Take 1 capsule by mouth Daily.     • Multiple Vitamins-Minerals (MULTIVITAMIN ADULT PO) Take 1 tablet by mouth daily.     • nitrofurantoin (MACRODANTIN) 50 MG capsule Take 1 capsule by mouth Daily. 90 capsule 3   • omeprazole (priLOSEC) 40 MG capsule Take 20 mg by mouth Daily.     • Ospemifene 60 MG tablet Take 1 tablet by mouth Daily. 15 tablet 0   • Osphena 60 MG tablet TAKE ONE TABLET BY MOUTH DAILY 30 tablet 0   • Plecanatide (Trulance) 3 MG tablet Take 1 tablet by mouth Daily.     • pravastatin (PRAVACHOL) 40 MG tablet Take 40 mg by mouth Daily.       No current facility-administered medications on file prior to visit.      Social History     Socioeconomic History   • Marital status:      Spouse name: Not on file   • Number of children: Not on file   • Years of education: Not on file   • Highest education level: Not on file   Tobacco Use   • Smoking status:  Never Smoker   • Smokeless tobacco: Never Used   Substance and Sexual Activity   • Alcohol use: Yes     Comment: OCCASIONALLY   • Drug use: No   • Sexual activity: Yes     Partners: Male     Birth control/protection: Post-menopausal, Surgical   Social History Narrative    Lives with her      Past Surgical History:   Procedure Laterality Date   •  SECTION     • CHOLECYSTECTOMY     • COLECTOMY PARTIAL / TOTAL     • COLON RESECTION     • COLONOSCOPY     • ENTEROCELE REPAIR     • DAWSON FRED MORA ([facility]) PROCEDURE     • PELVIC LAPAROSCOPY     • RECTAL SURGERY     • SALPINGO OOPHORECTOMY Right    • TOTAL ABDOMINAL HYSTERECTOMY WITH SALPINGO OOPHORECTOMY Left    • UTERINE SUSPENSION LAPAROSCOPIC       Family History   Problem Relation Age of Onset   • Cancer Father    • Heart disease Father    • Hypertension Father    • Osteoarthritis Father    • Breast cancer Mother    • Cancer Mother    • Brain cancer Brother    • Hypertension Brother    • Diabetes Brother    • Hypertension Sister    • Cancer Other    • Heart disease Other      Past Medical History:   Diagnosis Date   • Acute torn meniscus of knee     left   • Closed displaced fracture of shaft of left clavicle    • Closed nondisplaced fracture of neck of left radius    • Colon cancer (CMS/HCC)    • Dyspareunia    • Family history of breast cancer in mother    • Fatty liver    • Fracture of proximal phalanx of toe    • Herpes    • Hypothyroidism    • IBS (irritable bowel syndrome)    • Left wrist pain    • Menopause    • Mixed incontinence urge and stress    • OAB (overactive bladder)    • Osteoarthritis    • Right wrist pain          Review of Systems   Constitutional: Negative.    HENT: Negative.    Eyes: Negative.    Respiratory: Negative.    Cardiovascular: Negative.    Gastrointestinal: Negative.    Endocrine: Negative.    Genitourinary: Negative.    Musculoskeletal: Positive for arthralgias.   Skin: Negative.    Allergic/Immunologic:  Negative.    Neurological: Negative.    Hematological: Negative.    Psychiatric/Behavioral: Negative.        The following portions of the patient's history were reviewed and updated as appropriate: allergies, current medications, past family history, past medical history, past social history, past surgical history and problem list.    Ortho Exam      Medical Decision Making    Assessment and Plan/ Diagnosis/Treatment options:   Bilateral knee arthritis.  Finish the series in both knees today.  Return in 6 months or sooner if needed.    Using sterile technique, the left knee was sterilely prepped with Hibiclens.  Following time out, using a 22 gauge needle the left knee was aspirated and then injected with 2 ml Orthovisc. Approximately 0.5 mm of straw-colored fluid was obtained.  Patient tolerated the procedure well.  No complications.      Using sterile technique, the right knee was sterilely prepped with Hibiclens.  Following time out, using a 22 gauge needle the right knee was aspirated and then injected with 2 ml Orthovisc. Approximately 0.5 mm of straw-colored fluid was obtained.  Patient tolerated the procedure well.  No complications.

## 2021-02-17 NOTE — PROGRESS NOTES
Procedure   Large Joint Arthrocentesis: R knee  Date/Time: 2/17/2021 1:05 PM  Consent given by: patient  Site marked: site marked  Timeout: Immediately prior to procedure a time out was called to verify the correct patient, procedure, equipment, support staff and site/side marked as required   Supporting Documentation  Indications: pain   Procedure Details  Location: knee - R knee  Preparation: Patient was prepped and draped in the usual sterile fashion  Needle size: 22 G  Approach: anterolateral  Medications administered: 30 mg Hyaluronan 30 MG/2ML  Patient tolerance: patient tolerated the procedure well with no immediate complications    Large Joint Arthrocentesis: L knee  Date/Time: 2/17/2021 1:06 PM  Consent given by: patient  Site marked: site marked  Timeout: Immediately prior to procedure a time out was called to verify the correct patient, procedure, equipment, support staff and site/side marked as required   Supporting Documentation  Indications: pain   Procedure Details  Location: knee - L knee  Preparation: Patient was prepped and draped in the usual sterile fashion  Needle size: 22 G  Approach: anterolateral  Medications administered: 30 mg Hyaluronan 30 MG/2ML  Patient tolerance: patient tolerated the procedure well with no immediate complications

## 2021-03-17 ENCOUNTER — TELEPHONE (OUTPATIENT)
Dept: ORTHOPEDIC SURGERY | Facility: CLINIC | Age: 66
End: 2021-03-17

## 2021-03-17 NOTE — TELEPHONE ENCOUNTER
After 4/19 ok for cortisone injection?   and will you give anything for pain?    I called patient and let her know that I would call her back once I heard from Abbie. She understood .      Dianelys

## 2021-03-17 NOTE — TELEPHONE ENCOUNTER
Caller: KAYLEN OSMAN     Relationship to patient: PATIENT     Best call back number: [phone]    Chief complaint: BILATERAL KNEE PAIN     Additional notes:PATIENT STATED HER  IS A PATIENT AT THE PRACTICE;WAS TOLD AT HIS LAST VISIT THAT MRS. OSMAN  COULD HAVE A CORTIZONE INJ AFTER 04/19- SHE WOULD LIKE TO SCHEDULE IF POSSIBLE- PT STATED HER PAIN WAS 9/10 AND WANTED TO KNOW IF ARSEN COULD PRESCRIBE PAIN MEDICATION? PLEASE CALL PATIENT BACK

## 2021-03-18 NOTE — TELEPHONE ENCOUNTER
Abbie-     Patient called back in- she would like to try a topical gel. I let her know that she can have cortisone after 4/49/21 and that she cannot have gel again until 08/18/2021.     Dianelys Payan-     Can you call her and get her set up for an appt for cortisone injections after 4/19/2021?    Thanks.     Dianelys

## 2021-05-04 ENCOUNTER — TELEPHONE (OUTPATIENT)
Dept: ORTHOPEDIC SURGERY | Facility: CLINIC | Age: 66
End: 2021-05-04

## 2021-05-04 NOTE — TELEPHONE ENCOUNTER
Provider: ARSEN ROBINSON     Caller: PATIENT    Relationship to Patient: SELF    Phone Number:  [phone]    Reason for Call: PT. STATES THAT SHE IS ON SCHEDULE TOMORROW FOR CORTISONE INJECTION.   SHE STILL WANTS TO DO THE CORTISONE, BUT WANTS TO GET GEL INJECTIONS TOO.   PT. STATES THAT HER KNEES ARE HURTING TERRIBLY, AND THAT SHE CAN'T HARDLY WALK..   PLEASE CALL TO ADVISE.

## 2021-05-04 NOTE — TELEPHONE ENCOUNTER
I spoke to the patient and advised her that we could not do a gel injection since she just had one in February, as those have to be 6 months apart. Patient understood and is keeping her original appointment.

## 2021-05-05 ENCOUNTER — OFFICE VISIT (OUTPATIENT)
Dept: ORTHOPEDIC SURGERY | Facility: CLINIC | Age: 66
End: 2021-05-05

## 2021-05-05 VITALS
BODY MASS INDEX: 34.81 KG/M2 | SYSTOLIC BLOOD PRESSURE: 163 MMHG | WEIGHT: 184.4 LBS | DIASTOLIC BLOOD PRESSURE: 97 MMHG | HEIGHT: 61 IN | HEART RATE: 103 BPM

## 2021-05-05 DIAGNOSIS — M17.0 PRIMARY OSTEOARTHRITIS OF BOTH KNEES: Primary | ICD-10-CM

## 2021-05-05 DIAGNOSIS — M70.52 PES ANSERINUS BURSITIS OF LEFT KNEE: ICD-10-CM

## 2021-05-05 PROCEDURE — 20610 DRAIN/INJ JOINT/BURSA W/O US: CPT | Performed by: PHYSICIAN ASSISTANT

## 2021-05-05 RX ADMIN — LIDOCAINE HYDROCHLORIDE 4 ML: 10 INJECTION, SOLUTION EPIDURAL; INFILTRATION; INTRACAUDAL; PERINEURAL at 09:09

## 2021-05-05 RX ADMIN — TRIAMCINOLONE ACETONIDE 40 MG: 40 INJECTION, SUSPENSION INTRA-ARTICULAR; INTRAMUSCULAR at 09:09

## 2021-05-05 RX ADMIN — LIDOCAINE HYDROCHLORIDE 1 ML: 10 INJECTION, SOLUTION INFILTRATION; PERINEURAL at 09:24

## 2021-05-05 RX ADMIN — TRIAMCINOLONE ACETONIDE 40 MG: 40 INJECTION, SUSPENSION INTRA-ARTICULAR; INTRAMUSCULAR at 09:24

## 2021-05-08 RX ORDER — LIDOCAINE HYDROCHLORIDE 10 MG/ML
4 INJECTION, SOLUTION EPIDURAL; INFILTRATION; INTRACAUDAL; PERINEURAL
Status: COMPLETED | OUTPATIENT
Start: 2021-05-05 | End: 2021-05-05

## 2021-05-08 RX ORDER — TRIAMCINOLONE ACETONIDE 40 MG/ML
40 INJECTION, SUSPENSION INTRA-ARTICULAR; INTRAMUSCULAR
Status: COMPLETED | OUTPATIENT
Start: 2021-05-05 | End: 2021-05-05

## 2021-05-08 RX ORDER — LIDOCAINE HYDROCHLORIDE 10 MG/ML
1 INJECTION, SOLUTION INFILTRATION; PERINEURAL
Status: COMPLETED | OUTPATIENT
Start: 2021-05-05 | End: 2021-05-05

## 2021-07-29 ENCOUNTER — TELEPHONE (OUTPATIENT)
Dept: ORTHOPEDIC SURGERY | Facility: CLINIC | Age: 66
End: 2021-07-29

## 2021-08-18 ENCOUNTER — OFFICE VISIT (OUTPATIENT)
Dept: ORTHOPEDIC SURGERY | Facility: CLINIC | Age: 66
End: 2021-08-18

## 2021-08-18 VITALS
WEIGHT: 186 LBS | HEART RATE: 80 BPM | SYSTOLIC BLOOD PRESSURE: 158 MMHG | DIASTOLIC BLOOD PRESSURE: 98 MMHG | BODY MASS INDEX: 35.12 KG/M2 | HEIGHT: 61 IN

## 2021-08-18 DIAGNOSIS — M17.0 PRIMARY OSTEOARTHRITIS OF BOTH KNEES: Primary | ICD-10-CM

## 2021-08-18 DIAGNOSIS — E66.09 CLASS 1 OBESITY DUE TO EXCESS CALORIES WITHOUT SERIOUS COMORBIDITY WITH BODY MASS INDEX (BMI) OF 34.0 TO 34.9 IN ADULT: ICD-10-CM

## 2021-08-18 DIAGNOSIS — M70.52 PES ANSERINUS BURSITIS OF LEFT KNEE: ICD-10-CM

## 2021-08-18 DIAGNOSIS — I87.8 VENOUS STASIS OF BOTH LOWER EXTREMITIES: ICD-10-CM

## 2021-08-18 PROCEDURE — 99214 OFFICE O/P EST MOD 30 MIN: CPT | Performed by: PHYSICIAN ASSISTANT

## 2021-08-18 PROCEDURE — 20610 DRAIN/INJ JOINT/BURSA W/O US: CPT | Performed by: PHYSICIAN ASSISTANT

## 2021-08-18 RX ORDER — TRIAMCINOLONE ACETONIDE 40 MG/ML
40 INJECTION, SUSPENSION INTRA-ARTICULAR; INTRAMUSCULAR
Status: COMPLETED | OUTPATIENT
Start: 2021-08-18 | End: 2021-08-18

## 2021-08-18 RX ORDER — LIDOCAINE HYDROCHLORIDE 10 MG/ML
4 INJECTION, SOLUTION EPIDURAL; INFILTRATION; INTRACAUDAL; PERINEURAL
Status: COMPLETED | OUTPATIENT
Start: 2021-08-18 | End: 2021-08-18

## 2021-08-18 RX ADMIN — TRIAMCINOLONE ACETONIDE 40 MG: 40 INJECTION, SUSPENSION INTRA-ARTICULAR; INTRAMUSCULAR at 13:45

## 2021-08-18 RX ADMIN — LIDOCAINE HYDROCHLORIDE 4 ML: 10 INJECTION, SOLUTION EPIDURAL; INFILTRATION; INTRACAUDAL; PERINEURAL at 13:44

## 2021-08-18 RX ADMIN — TRIAMCINOLONE ACETONIDE 40 MG: 40 INJECTION, SUSPENSION INTRA-ARTICULAR; INTRAMUSCULAR at 13:44

## 2021-08-18 RX ADMIN — LIDOCAINE HYDROCHLORIDE 4 ML: 10 INJECTION, SOLUTION EPIDURAL; INFILTRATION; INTRACAUDAL; PERINEURAL at 13:45

## 2021-08-18 NOTE — PROGRESS NOTES
Procedure   Large Joint Arthrocentesis: R knee  Date/Time: 8/18/2021 1:44 PM  Consent given by: patient  Site marked: site marked  Timeout: Immediately prior to procedure a time out was called to verify the correct patient, procedure, equipment, support staff and site/side marked as required   Supporting Documentation  Indications: pain   Procedure Details  Location: knee - R knee  Preparation: Patient was prepped and draped in the usual sterile fashion  Needle size: 23 G  Approach: anterolateral  Medications administered: 4 mL lidocaine PF 1% 1 %; 40 mg triamcinolone acetonide 40 MG/ML  Patient tolerance: patient tolerated the procedure well with no immediate complications    Large Joint Arthrocentesis: L knee  Date/Time: 8/18/2021 1:45 PM  Consent given by: patient  Site marked: site marked  Timeout: Immediately prior to procedure a time out was called to verify the correct patient, procedure, equipment, support staff and site/side marked as required   Supporting Documentation  Indications: pain   Procedure Details  Location: knee - L knee  Preparation: Patient was prepped and draped in the usual sterile fashion  Needle size: 23 G  Approach: anterolateral  Medications administered: 4 mL lidocaine PF 1% 1 %; 40 mg triamcinolone acetonide 40 MG/ML  Patient tolerance: patient tolerated the procedure well with no immediate complications

## 2021-08-18 NOTE — PROGRESS NOTES
"        [facility] Orthopa[facility] Clinic Note        Subjective     CC: Follow-up (3.5 months- Primary osteoarthritis of both knees )      HPI    Park Ray is a 66 y.o. female. Patient returns today for her bilateral knee pain.  She has had both cortisone injection as well as viscosupplementation in the past.  Cortisone seems to work better for her.  She is done anti-inflammatories both topical and oral.  She is trying to work on weight loss.  She is also had a pes injection and done physical therapy for this.    Overall, patient's symptoms are worsening    ROS:    Constiutional:Pt denies fever, chills, nausea, or vomiting.  MSK:as above        Objective      Past Medical History  Past Medical History:   Diagnosis Date   • Acute torn meniscus of knee     left   • Closed displaced fracture of shaft of left clavicle    • Closed nondisplaced fracture of neck of left radius    • Colon cancer (CMS/HCC)    • Dyspareunia    • Family history of breast cancer in mother    • Fatty liver    • Fracture of proximal phalanx of toe    • Herpes    • Hypothyroidism    • IBS (irritable bowel syndrome)    • Left wrist pain    • Menopause    • Mixed incontinence urge and stress    • OAB (overactive bladder)    • Osteoarthritis    • Right wrist pain          Physical Exam  /98   Pulse 80   Ht 155.6 cm (61.26\")   Wt 84.4 kg (186 lb)   LMP  (LMP Unknown) Comment: S/P AH, LEFT S&O/RIGHT S&O  BMI 34.85 kg/m²     Body mass index is 34.85 kg/m².    Patient is well nourished and well developed.        Ortho Exam  Bilateral knee exam: Tender to palpation over the medial joint line range of motion 0-1 20 ligaments stable valgus varus stress neurovascular intact distally.  Mildly antalgic gait.    Imaging/Labs/EMG Reviewed:  Imaging Results (Last 24 Hours)     ** No results found for the last 24 hours. **            Assessment    Assessment:  1. Primary osteoarthritis of both knees    2. Pes anserinus bursitis of left knee  "   3. Venous stasis of both lower extremities    4. Class 1 obesity due to excess calories without serious comorbidity with body mass index (BMI) of 34.0 to 34.9 in adult        Plan:  Recommend over the counter anti-inflammatories for pain and/or swelling  Bilateral knee arthritis.  I reviewed clinical findings past and current treatment with the patient.  Viscosupplementation does not seem to give her much relief any longer.  We discussed it and decided to just proceed with cortisone injection until she is able to consider knee replacement.  Plan today is steroid injection to bilateral knees.  I will see her back in 3 to 4 months or sooner if needed.  Left pes bursitis.  Patient has had 6 to injections in the pes bursa and done physical therapy.  She reports she continues to have pain.  I recommended she continue doing the strengthening exercises and stretches.  I do not want to give her another injection has only been 3 months and I am concerned about tendon pathology with repeated injections.  The patient has bilateral edema, I explained how this can increase pain, stiffness, tiredness, cramping.  It can lead to chronic skin changes, sores on the leg.  I would recommend bilateral knee high low to moderate pressure support stockings.  We discussed sources and types.    Patient has a BMI of 34.85  The patient has been instructed on weight loss avenues including diet, portion control, calorie restriction, low/no impact exercise, referral to weight loss management and/or bariatric surgery.  It was explained that weight loss can improve joint pain alone by decreasing the joint reaction forces.  For every pound of weight change, the knee and hip joints see a 4 to 5 fold change in pressure.    I discussed with the patient the potential benefits of performing a therapeutic injection of the left knee as well as potential risks including but not limited to infection, swelling, pain, bleeding, bruising, nerve/vessel damage,  skin color changes, transient elevation in blood glucose levels, and fat atrophy. After informed consent and verifying correct patient, procedure site, and type of procedure, the area was prepped with Hibiclens, ethyl chloride was used to numb the skin. Via the inferior lateral approach, 4cc of 1% lidocaine and  40mg/ml of Kenalog were injected into the left knee. The patient tolerated the procedure well. There were no complications.   I discussed with the patient the potential benefits of performing a therapeutic injection of the right knee as well as potential risks including but not limited to infection, swelling, pain, bleeding, bruising, nerve/vessel damage, skin color changes, transient elevation in blood glucose levels, and fat atrophy. After informed consent and verifying correct patient, procedure site, and type of procedure, the area was prepped with Hibiclens, ethyl chloride was used to numb the skin. Via the inferior lateral approach, 4cc of 1% lidocaine and  40mg/ml of Kenalog were injected into the right knee. The patient tolerated the procedure well. There were no complications.     Patient history, diagnosis and treatment plan discussed with Dr. Roger.        Abbie Mas PA-C  08/18/21  14:04 EDT      Dragon disclaimer:  Much of this encounter note is an electronic transcription/translation of spoken language to printed text. The electronic translation of spoken language may permit erroneous, or at times, nonsensical words or phrases to be inadvertently transcribed; Although I have reviewed the note for such errors, some may still exist.

## 2021-08-23 ENCOUNTER — TELEPHONE (OUTPATIENT)
Dept: OBSTETRICS AND GYNECOLOGY | Facility: CLINIC | Age: 66
End: 2021-08-23

## 2021-08-23 NOTE — TELEPHONE ENCOUNTER
Patient is calling to request a prescription for Osphena.  She would like to know how much this will cost before sending the prescription.  I have given her the telephone number for [facility].  She will call them, and if she desires prescription, she will call back to let me know.

## 2021-08-26 ENCOUNTER — TELEPHONE (OUTPATIENT)
Dept: OBSTETRICS AND GYNECOLOGY | Facility: CLINIC | Age: 66
End: 2021-08-26

## 2021-08-26 NOTE — TELEPHONE ENCOUNTER
[facility] has called requesting prescription for Osphena.  This was prescribed at the time of annual exam 12/2/2020 but there has been an issue with insurance coverage.  Requested Prescriptions      No prescriptions requested or ordered in this encounter      Last office visit with prescribing clinician: 12/2/2020      Next office visit with prescribing clinician: Visit date not found            Christina Her MA  08/26/21, 13:44 EDT

## 2021-10-26 ENCOUNTER — TELEPHONE (OUTPATIENT)
Dept: ORTHOPEDIC SURGERY | Facility: CLINIC | Age: 66
End: 2021-10-26

## 2021-10-26 NOTE — TELEPHONE ENCOUNTER
Pt called in to see if a RX for Prednisone could be called in to her pharmacy since she can not get a Cortisone injection till 11-19-21. Would like a call back please

## 2021-10-26 NOTE — TELEPHONE ENCOUNTER
"Abbie-please see message below and advise. Thanks!    Spoke with pt regarding her previous message; She states that she was told it is too early to repeat cortisone injections into her knees but she is having a lot of pain and is \"barely able to walk\" and is requesting prednisone to \"hold her over\". I told her that oral steroids isn't something we typically prescribe for this issue but she reports TAR has done it for her in the past and it helped tremendously; I told her I would have to ask TAR about this but I also asked if she was taking any NSAIDs right now or if she was able to; She reports taking ibuprofen but it \"didn't touch the pain\". She said she would be agreeable to taking an NSAID if TAR thought it would help. I told her I would send her this message and get back with her with a response. She understood.    Also, pt would like for us to move up her 12/1 appt to closer to 11/19 (3 months from her last injections). I told her I could take care of this for her.    Dorene    "

## 2021-10-27 RX ORDER — MELOXICAM 15 MG/1
15 TABLET ORAL DAILY
Qty: 30 TABLET | Refills: 2 | Status: SHIPPED | OUTPATIENT
Start: 2021-10-27 | End: 2022-05-11

## 2021-10-27 NOTE — TELEPHONE ENCOUNTER
Spoke with pt and let her know TAR's message; Also re-scheduled her 12/1 appt for 11/23 (no f/u slots available for that Friday 11/19 that pt requested). Pt will try the Meloxicam and follow up as scheduled. Will call back if any further questions or concerns.    Dorene

## 2021-10-27 NOTE — TELEPHONE ENCOUNTER
I will send a rx for meloxicam to her pharmacy.  I do not want to give her a medrol dose daniel.  It is fine to move up her appointment  Thanks,  TAR

## 2021-11-23 ENCOUNTER — OFFICE VISIT (OUTPATIENT)
Dept: ORTHOPEDIC SURGERY | Facility: CLINIC | Age: 66
End: 2021-11-23

## 2021-11-23 VITALS
SYSTOLIC BLOOD PRESSURE: 162 MMHG | WEIGHT: 187.6 LBS | BODY MASS INDEX: 35.42 KG/M2 | HEIGHT: 61 IN | DIASTOLIC BLOOD PRESSURE: 98 MMHG

## 2021-11-23 DIAGNOSIS — M17.0 PRIMARY OSTEOARTHRITIS OF BOTH KNEES: Primary | ICD-10-CM

## 2021-11-23 PROCEDURE — 20610 DRAIN/INJ JOINT/BURSA W/O US: CPT | Performed by: PHYSICIAN ASSISTANT

## 2021-11-23 RX ADMIN — TRIAMCINOLONE ACETONIDE 40 MG: 40 INJECTION, SUSPENSION INTRA-ARTICULAR; INTRAMUSCULAR at 15:27

## 2021-11-23 RX ADMIN — LIDOCAINE HYDROCHLORIDE 4 ML: 10 INJECTION, SOLUTION EPIDURAL; INFILTRATION; INTRACAUDAL; PERINEURAL at 15:27

## 2021-11-23 NOTE — PROGRESS NOTES
"        [facility] Clinic Note        Subjective     CC: Follow-up (3 month f/u- Primary osteoarthritis of both knees (cortisone injections bilateral knees 8/18/21) )      HPI    Park Ray is a 66 y.o. female.  Patient returns today for bilateral knee arthritis.  She is well-known to me.  She had has had both cortisone and viscosupplementation in the past.  The Visco does not seem to help her pain any longer.  She is not ready to consider knee replacement given her need to help her  with his medical problems.    Overall, patient's symptoms are the same    ROS:    Constiutional:Pt denies fever, chills, nausea, or vomiting.  MSK:as above        Objective      Past Medical History  Past Medical History:   Diagnosis Date   • Acute torn meniscus of knee     left   • Closed displaced fracture of shaft of left clavicle    • Closed nondisplaced fracture of neck of left radius    • Colon cancer (HCC)    • Dyspareunia    • Family history of breast cancer in mother    • Fatty liver    • Fracture of proximal phalanx of toe    • Herpes    • Hypothyroidism    • IBS (irritable bowel syndrome)    • Left wrist pain    • Menopause    • Mixed incontinence urge and stress    • OAB (overactive bladder)    • Osteoarthritis    • Right wrist pain          Physical Exam  /98   Ht 155.6 cm (61.26\")   Wt 85.1 kg (187 lb 9.6 oz)   LMP  (LMP Unknown) Comment: S/P AH, LEFT S&O/RIGHT S&O  BMI 35.15 kg/m²     Body mass index is 35.15 kg/m².    Patient is well nourished and well developed.        Ortho Exam  Bilateral knee exam: Tender palpation over the medial joint line.  Range of motion 0-1 20 ligament stable to valgus varus stress neurovascular intact distally.    Imaging/Labs/EMG Reviewed:  Imaging Results (Last 24 Hours)     ** No results found for the last 24 hours. **            Assessment    Assessment:  1. Primary osteoarthritis of both knees        Plan:  Recommend over the counter " anti-inflammatories for pain and/or swelling  Bilateral knee arthritis.  Plan today is repeat cortisone injection to both knees.  I will see her back in 4 months or sooner if needed.    I discussed with the patient the potential benefits of performing a therapeutic injection of the left knee as well as potential risks including but not limited to infection, swelling, pain, bleeding, bruising, nerve/vessel damage, skin color changes, transient elevation in blood glucose levels, and fat atrophy. After informed consent and verifying correct patient, procedure site, and type of procedure, the area was prepped with Hibiclens, ethyl chloride was used to numb the skin. Via the inferior lateral approach, 4cc of 1% lidocaine and  40mg/ml of Kenalog were injected into the left knee. The patient tolerated the procedure well. There were no complications.   I discussed with the patient the potential benefits of performing a therapeutic injection of the right knee as well as potential risks including but not limited to infection, swelling, pain, bleeding, bruising, nerve/vessel damage, skin color changes, transient elevation in blood glucose levels, and fat atrophy. After informed consent and verifying correct patient, procedure site, and type of procedure, the area was prepped with Hibiclens, ethyl chloride was used to numb the skin. Via the inferior lateral approach, 4cc of 1% lidocaine and  40mg/ml of Kenalog were injected into the right knee. The patient tolerated the procedure well. There were no complications.         Abbie Mas PA-C  11/30/21  09:35 EST

## 2021-11-23 NOTE — PROGRESS NOTES
Procedure   Large Joint Arthrocentesis: R knee  Date/Time: 11/23/2021 3:27 PM  Consent given by: patient  Site marked: site marked  Timeout: Immediately prior to procedure a time out was called to verify the correct patient, procedure, equipment, support staff and site/side marked as required   Supporting Documentation  Indications: pain   Procedure Details  Location: knee - R knee  Preparation: Patient was prepped and draped in the usual sterile fashion  Needle size: 22 G  Approach: anterolateral  Medications administered: 40 mg triamcinolone acetonide 40 MG/ML; 4 mL lidocaine PF 1% 1 %  Patient tolerance: patient tolerated the procedure well with no immediate complications    Large Joint Arthrocentesis: L knee  Date/Time: 11/23/2021 3:27 PM  Consent given by: patient  Site marked: site marked  Timeout: Immediately prior to procedure a time out was called to verify the correct patient, procedure, equipment, support staff and site/side marked as required   Supporting Documentation  Indications: pain   Procedure Details  Location: knee - L knee  Preparation: Patient was prepped and draped in the usual sterile fashion  Needle size: 22 G  Approach: anterolateral  Medications administered: 40 mg triamcinolone acetonide 40 MG/ML; 4 mL lidocaine PF 1% 1 %  Patient tolerance: patient tolerated the procedure well with no immediate complications

## 2021-11-30 RX ORDER — LIDOCAINE HYDROCHLORIDE 10 MG/ML
4 INJECTION, SOLUTION EPIDURAL; INFILTRATION; INTRACAUDAL; PERINEURAL
Status: COMPLETED | OUTPATIENT
Start: 2021-11-23 | End: 2021-11-23

## 2021-11-30 RX ORDER — TRIAMCINOLONE ACETONIDE 40 MG/ML
40 INJECTION, SUSPENSION INTRA-ARTICULAR; INTRAMUSCULAR
Status: COMPLETED | OUTPATIENT
Start: 2021-11-23 | End: 2021-11-23

## 2021-12-08 ENCOUNTER — OFFICE VISIT (OUTPATIENT)
Dept: OBSTETRICS AND GYNECOLOGY | Facility: CLINIC | Age: 66
End: 2021-12-08

## 2021-12-08 VITALS
WEIGHT: 186 LBS | RESPIRATION RATE: 6 BRPM | DIASTOLIC BLOOD PRESSURE: 90 MMHG | SYSTOLIC BLOOD PRESSURE: 150 MMHG | BODY MASS INDEX: 34.85 KG/M2

## 2021-12-08 DIAGNOSIS — Z85.038 PERSONAL HISTORY OF COLON CANCER: ICD-10-CM

## 2021-12-08 DIAGNOSIS — N39.0 RECURRENT UTI: ICD-10-CM

## 2021-12-08 DIAGNOSIS — B00.9 HERPES INFECTION: ICD-10-CM

## 2021-12-08 DIAGNOSIS — N95.2 VAGINAL ATROPHY: Primary | ICD-10-CM

## 2021-12-08 DIAGNOSIS — Z80.3 FAMILY HISTORY OF BREAST CANCER: ICD-10-CM

## 2021-12-08 PROCEDURE — 99214 OFFICE O/P EST MOD 30 MIN: CPT | Performed by: NURSE PRACTITIONER

## 2021-12-08 RX ORDER — ACYCLOVIR 400 MG/1
400 TABLET ORAL DAILY
Qty: 90 TABLET | Refills: 3 | Status: SHIPPED | OUTPATIENT
Start: 2021-12-08 | End: 2022-02-01 | Stop reason: HOSPADM

## 2021-12-08 RX ORDER — NITROFURANTOIN MACROCRYSTALS 50 MG/1
50 CAPSULE ORAL DAILY
Qty: 90 CAPSULE | Refills: 3 | Status: SHIPPED | OUTPATIENT
Start: 2021-12-08 | End: 2022-02-01 | Stop reason: HOSPADM

## 2021-12-08 NOTE — PROGRESS NOTES
Annual Visit     Patient Name: Park Ray  : 1955   MRN: [id number]   Care Team: Patient Care Team:  Kate Garcia MD as PCP - General (Family Medicine)  Fahad Bishop MD (Inactive) as Consulting Physician (Gynecology)    Chief Complaint:    Chief Complaint   Patient presents with   • Menopause       HPI: Park Ray is a 66 y.o. year old  presenting to be seen for annual f/u.   S/p hyst with LSO   RSO done later     Hx recurrent UTI   Doing well with Macrobid 50mg qd - needs refill     Hx recurrent genital HSV   Doing well with acyclovir 400mg qd - needs refill     Vaginal atrophy well controlled with Osphena 60mg qd - needs refill   States the dryness is very bothersome without the osphena     Mammogram  birads 2   Mother with breast cancer in her 40s   MGM and PGM also with hx of breast cancer   She has never had genetic testing done     Hx colon cancer   Sees Dr. Donohue for f/u   Colonoscopy UTD   Hx IBS     BP is slightly elevated   States it is white coat HTN   Typically WNL per pt   She takes Lasix daily as needed    Her son's fiance was diagnosed with ovarian cancer at age 38   And then breast cancer and now has another lump that has to be evaluated   Naresh Soto is her nephew       Subjective      /90   Resp (!) 6   Wt 84.4 kg (186 lb)   LMP  (LMP Unknown) Comment: S/P AH, LEFT S&O/RIGHT S&O  Breastfeeding No   BMI 34.85 kg/m²     BMI reviewed: Body mass index is 34.85 kg/m².      Objective     Physical Exam    Neuro: alert and oriented to person, place and time   General:  alert; cooperative; well developed; well nourished   Skin:  No suspicious lesions seen   Thyroid: normal to inspection and palpation   Lungs:  breathing is unlabored  clear to auscultation bilaterally   Heart:  regular rate and rhythm, S1, S2 normal, no murmur, click, rub or gallop  normal apical impulse   Breasts:  Examined in supine position  Symmetric without masses or skin  dimpling  Nipples normal without inversion, lesions or discharge  There are no palpable axillary nodes   Abdomen: soft, non-tender; no masses  no umbilical or inguinal hernias are present  no hepato-splenomegaly   Pelvis: Clinical staff was present for exam  External genitalia:  normal appearance of the external genitalia including Bartholin's and Weimar's glands.  :  urethral meatus normal;  Vaginal:  atrophic mucosal changes are present;  Cervix:  absent.  Uterus:  absent.  Adnexa:  absent, bilateral.  Rectal:  digital rectal exam not performed; anus visually normal appearing.         Assessment / Plan      Assessment  Problems Addressed This Visit    ICD-10-CM ICD-9-CM   1. Vaginal atrophy  N95.2 627.3   2. Recurrent UTI  N39.0 599.0   3. Herpes infection  B00.9 054.9   4. Family history of breast cancer  Z80.3 V16.3   5. Personal history of colon cancer  Z85.038 V10.05       Plan    Cont with osphena for atrophy - script to pharmacy     Cont with macrobid 50mg qd for UTI suppression   Reviewed prevention measures     Cont with daily Acyclovir 400mg qd     Discussed genetic counseling/testing recommendation based on family hx   She will check with her insurance first and call for order if she desires to proceed before next yr     She will call to schedule mammogram - done at [facility]   Stressed importance of annual imaging   Discussed monthly SBEs     AV 1 yr             Follow Up  Return in about 1 year (around 12/8/2022) for Annual physical.  Patient was given instructions and counseling regarding her condition or for health maintenance advice. Please see specific information pulled into the AVS if appropriate.     Crystal Hannah, APRN  December 8, 2021  10:46 EST

## 2022-01-13 ENCOUNTER — TELEPHONE (OUTPATIENT)
Dept: ORTHOPEDIC SURGERY | Facility: CLINIC | Age: 67
End: 2022-01-13

## 2022-01-13 NOTE — TELEPHONE ENCOUNTER
Called patient to let her know she will need to go through her PCP.  She says her PCP told her to go through us, I explained we will not provide this.

## 2022-01-13 NOTE — TELEPHONE ENCOUNTER
PATIENT CALLED AND WANTS TO KNOW IF SHE CAN GET A PRESCRIPTION FOR PREDNISONE FOR HER PAIN IN HER KNEES? PATIENT CAN BE REACHED @ [phone]

## 2022-01-14 ENCOUNTER — APPOINTMENT (OUTPATIENT)
Dept: GENERAL RADIOLOGY | Facility: HOSPITAL | Age: 67
End: 2022-01-14

## 2022-01-14 ENCOUNTER — HOSPITAL ENCOUNTER (EMERGENCY)
Facility: HOSPITAL | Age: 67
Discharge: HOME | End: 2022-01-14
Attending: EMERGENCY MEDICINE | Admitting: EMERGENCY MEDICINE

## 2022-01-14 ENCOUNTER — TELEPHONE (OUTPATIENT)
Dept: ORTHOPEDIC SURGERY | Facility: CLINIC | Age: 67
End: 2022-01-14

## 2022-01-14 VITALS
OXYGEN SATURATION: 95 % | RESPIRATION RATE: 18 BRPM | SYSTOLIC BLOOD PRESSURE: 160 MMHG | TEMPERATURE: 97.4 F | BODY MASS INDEX: 33.04 KG/M2 | DIASTOLIC BLOOD PRESSURE: 85 MMHG | HEIGHT: 61 IN | HEART RATE: 103 BPM | WEIGHT: 175 LBS

## 2022-01-14 DIAGNOSIS — T14.8XXA HEMATOMA AND CONTUSION: ICD-10-CM

## 2022-01-14 DIAGNOSIS — Z20.822 ENCOUNTER FOR LABORATORY TESTING FOR COVID-19 VIRUS: ICD-10-CM

## 2022-01-14 DIAGNOSIS — M25.562 CHRONIC PAIN OF BOTH KNEES: ICD-10-CM

## 2022-01-14 DIAGNOSIS — G89.29 CHRONIC PAIN OF BOTH KNEES: ICD-10-CM
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
## 2022-01-14 DIAGNOSIS — M25.561 CHRONIC PAIN OF BOTH KNEES: ICD-10-CM

## 2022-01-14 DIAGNOSIS — M79.662 PAIN OF LEFT LOWER LEG: ICD-10-CM

## 2022-01-14 LAB
FLUAV SUBTYP SPEC NAA+PROBE: NOT DETECTED
FLUBV RNA ISLT QL NAA+PROBE: NOT DETECTED
SARS-COV-2 RNA PNL SPEC NAA+PROBE: NOT DETECTED

## 2022-01-14 PROCEDURE — C9803 HOPD COVID-19 SPEC COLLECT: HCPCS | Performed by: PHYSICIAN ASSISTANT

## 2022-01-14 PROCEDURE — 87636 SARSCOV2 & INF A&B AMP PRB: CPT | Performed by: PHYSICIAN ASSISTANT

## 2022-01-14 PROCEDURE — 99283 EMERGENCY DEPT VISIT LOW MDM: CPT

## 2022-01-14 PROCEDURE — 73590 X-RAY EXAM OF LOWER LEG: CPT

## 2022-01-14 PROCEDURE — 73560 X-RAY EXAM OF KNEE 1 OR 2: CPT

## 2022-01-14 RX ORDER — HYDROCODONE BITARTRATE AND ACETAMINOPHEN 5; 325 MG/1; MG/1
1 TABLET ORAL ONCE
Status: COMPLETED | OUTPATIENT
Start: 2022-01-14 | End: 2022-01-14

## 2022-01-14 RX ADMIN — HYDROCODONE BITARTRATE AND ACETAMINOPHEN 1 TABLET: 5; 325 TABLET ORAL at 20:01

## 2022-01-14 NOTE — TELEPHONE ENCOUNTER
Caller: KAYLEN OSMAN    Relationship to patient: SELF    Best call back number:     Patient is needing: ATTEMPTED TO WARM TRANSFER DUE TO PATIENT FELL DOWN STEPS AND HAD SOME CLINICAL CONCERNS - WAS ADVISED BY PRACTICE (JACINTO) EITHER SHE OR I COULD SEND TELEPHONE ENCOUNTER (AS ROUTINE, NOT AS URGENT) TO CLINCAL.  PATIENT REQ CALL BACK FOR [facility] QUESTIONS - WAS NEEDING TO KNOW IF SHOULD, COME IN TO GET XR OR IF SHULD WAIT TIL HER APPTPT 1 19 2022

## 2022-01-14 NOTE — TELEPHONE ENCOUNTER
Patient fell last night and thinks she has fractured her tib-fib. I advised her to go to the ED or urgent care. She insisted on sending images that I have attached to this message.  After viewing the images, I called the patient and reiterated she would need to do to the ED or urgent care for evaluation. She verbalized understanding.

## 2022-01-15 NOTE — ED PROVIDER NOTES
"  [place]    EMERGENCY DEPARTMENT ENCOUNTER      Pt Name: Park Ray  MRN: [id number]  YOB: 1955  Date of evaluation: 1/14/2022  Provider: CHRYSTAL Canales    CHIEF COMPLAINT       Chief Complaint   Patient presents with   • Fall         HISTORY OF PRESENT ILLNESS  (Location/Symptom, Timing/Onset, Context/Setting, Quality, Duration, Modifying Factors, Severity.)   Park Ray is a 66 y.o. female who presents to the emergency department with complaints of pain and swelling in her left leg after a fall. Patient reports that she was at a friends house last night finally having the opportunity to celebrate Janna, when she was walking out of her friend's kitchen and experienced a fall. She shares that there are four steps coming out of the kitchen that are \"not up to code\" and caused her to fall. Patient reports that she already has bad knees and is supposed to have them replaced after her  gets through with all of his upcoming surgeries. Patient shares that she called her Orthopedic, Dr. Roger, who suggested she present to ED to make sure nothing was fractured. Patient shares that she her pain is worse with bearing weight and with ambulation and that she has not found anything helpful to alleviate her pain. She reports no additional associated symptoms. She also shares that she has had an exposure to COVID-19 and is requesting a test.     HPI   Nursing notes were reviewed.    REVIEW OF SYSTEMS    (2-9 systems for level 4, 10 or more for level 5)   Review of Systems   Constitutional: Positive for activity change. Negative for chills, fatigue and fever.   HENT: Negative.    Respiratory: Negative.    Cardiovascular: Positive for leg swelling.   Gastrointestinal: Negative.    Musculoskeletal: Positive for arthralgias, joint swelling and myalgias.   Skin: Positive for wound.   Neurological: Negative.         All systems reviewed and negative except for those discussed in HPI.   PAST " MEDICAL HISTORY     Past Medical History:   Diagnosis Date   • Acute torn meniscus of knee     left   • Closed displaced fracture of shaft of left clavicle    • Closed nondisplaced fracture of neck of left radius    • Colon cancer (HCC)    • Dyspareunia    • Family history of breast cancer in mother    • Fatty liver    • Fracture of proximal phalanx of toe    • Herpes    • Hypothyroidism    • IBS (irritable bowel syndrome)    • Left wrist pain    • Menopause    • Mixed incontinence urge and stress    • OAB (overactive bladder)    • Osteoarthritis    • Right wrist pain          SURGICAL HISTORY       Past Surgical History:   Procedure Laterality Date   •  SECTION     • CHOLECYSTECTOMY     • COLECTOMY PARTIAL / TOTAL     • COLON RESECTION     • COLONOSCOPY     • ENTEROCELE REPAIR     • DAWSON MORA ([facility]) PROCEDURE     • PELVIC LAPAROSCOPY     • RECTAL SURGERY     • SALPINGO OOPHORECTOMY Right    • TOTAL ABDOMINAL HYSTERECTOMY WITH SALPINGO OOPHORECTOMY Left    • UTERINE SUSPENSION LAPAROSCOPIC           CURRENT MEDICATIONS     No current facility-administered medications for this encounter.    Current Outpatient Medications:   •  acyclovir (ZOVIRAX) 400 MG tablet, Take 1 tablet by mouth Daily. If outbreak occurs while on suppressive therapy, call office for further instructions., Disp: 90 tablet, Rfl: 3  •  Cholecalciferol (VITAMIN D-3) 5000 units tablet, Take 1 tablet by mouth Daily., Disp: , Rfl:   •  diazePAM (VALIUM) 5 MG tablet, Take 5 mg by mouth 2 (Two) Times a Day As Needed for Anxiety., Disp: , Rfl:   •  Diclofenac Sodium (VOLTAREN) 1 % gel gel, Apply 4 g topically to the appropriate area as directed 4 (Four) Times a Day As Needed (prn for knee pain)., Disp: 350 g, Rfl: 3  •  escitalopram (LEXAPRO) 5 MG tablet, Take 5 mg by mouth Daily., Disp: , Rfl:   •  fluticasone (FLONASE) 50 MCG/ACT nasal spray, 2 sprays into each nostril Daily., Disp: , Rfl:   •  furosemide (LASIX) 40 MG tablet,  Take 40 mg by mouth as needed., Disp: , Rfl:   •  levothyroxine (SYNTHROID, LEVOTHROID) 25 MCG tablet, Take 25 mcg by mouth daily., Disp: , Rfl:   •  meloxicam (MOBIC) 15 MG tablet, Take 1 tablet by mouth Daily., Disp: 30 tablet, Rfl: 2  •  Milk Thistle 140 MG capsule, Take 1 capsule by mouth Daily., Disp: , Rfl:   •  Multiple Vitamins-Minerals (MULTIVITAMIN ADULT PO), Take 1 tablet by mouth daily., Disp: , Rfl:   •  nitrofurantoin (MACRODANTIN) 50 MG capsule, Take 1 capsule by mouth Daily., Disp: 90 capsule, Rfl: 3  •  omeprazole (priLOSEC) 40 MG capsule, Take 20 mg by mouth Daily., Disp: , Rfl:   •  Ospemifene 60 MG tablet, Take 1 tablet by mouth Daily., Disp: 90 tablet, Rfl: 3  •  Plecanatide (Trulance) 3 MG tablet, Take 1 tablet by mouth Daily., Disp: , Rfl:   •  pravastatin (PRAVACHOL) 40 MG tablet, Take 40 mg by mouth Daily., Disp: , Rfl:     ALLERGIES     Tetracyclines & related    FAMILY HISTORY       Family History   Problem Relation Age of Onset   • Cancer Father    • Heart disease Father    • Hypertension Father    • Osteoarthritis Father    • Breast cancer Mother    • Cancer Mother    • Brain cancer Brother    • Hypertension Brother    • Diabetes Brother    • Hypertension Sister    • Cancer Other    • Heart disease Other           SOCIAL HISTORY       Social History     Socioeconomic History   • Marital status:    Tobacco Use   • Smoking status: Never Smoker   • Smokeless tobacco: Never Used   Substance and Sexual Activity   • Alcohol use: Yes     Comment: OCCASIONALLY   • Drug use: No   • Sexual activity: Yes     Partners: Male     Birth control/protection: Post-menopausal, Surgical         PHYSICAL EXAM    (up to 7 for level 4, 8 or more for level 5)   Physical Exam  Vitals and nursing note reviewed.   Constitutional:       General: She is not in acute distress.     Appearance: Normal appearance. She is not ill-appearing or toxic-appearing.   HENT:      Head: Normocephalic and atraumatic.       Nose: Nose normal.      Mouth/Throat:      Mouth: Mucous membranes are moist.   Eyes:      Extraocular Movements: Extraocular movements intact.      Conjunctiva/sclera: Conjunctivae normal.   Cardiovascular:      Rate and Rhythm: Normal rate.   Pulmonary:      Effort: Pulmonary effort is normal. No respiratory distress.   Abdominal:      General: There is no distension.      Tenderness: There is no abdominal tenderness.   Musculoskeletal:      Cervical back: Normal range of motion.      Right knee: Swelling present. No bony tenderness. Tenderness present. Normal pulse.      Left knee: Swelling present. No bony tenderness. Tenderness present. Normal pulse.      Right lower leg: Swelling present. Edema present.      Left lower leg: Swelling present. Edema present.        Legs:       Comments: Patient with bruising present on bilateral lower extremities from knee down.   Skin:     General: Skin is warm and dry.   Neurological:      General: No focal deficit present.      Mental Status: She is alert.   Psychiatric:         Mood and Affect: Mood normal.         Behavior: Behavior normal.         Thought Content: Thought content normal.         Judgment: Judgment normal.          DIAGNOSTIC RESULTS     EKG: All EKGs are interpreted by the Emergency Department Physician who either signs or Co-signs this chart in the absence of a cardiologist.    No orders to display       RADIOLOGY:   Non-plain film images such as CT, Ultrasound and MRI are read by the radiologist. Plain radiographic images are visualized and preliminarily interpreted by the emergency physician with the below findings:      [x] Radiologist's Report Reviewed:  XR Tibia Fibula 2 View Left   Final Result   Cortical margins are intact without evidence of acute   fracture. The partially imaged knee and ankle joints demonstrate   advanced arthrosis change with narrowing, sclerosis and small   osteophytes. Mild diffuse superficial subcutaneous soft tissue edema  "is   present.       This report was finalized on 1/14/2022 6:10 PM by Fahad Devi.          XR Knee 1 or 2 View Left   Final Result   Cortical margins are intact without evidence of acute   fracture. Advanced tricompartmental arthrosis changes are present with   narrowing, sclerosis and small osteophytes. There is small suprapatellar   joint effusion.       This report was finalized on 1/14/2022 6:22 PM by Fahad Devi.                ED BEDSIDE ULTRASOUND:   Performed by ED Physician - none    LABS:    I have reviewed and interpreted all of the currently available lab results from this visit (if applicable):       All other labs were within normal range or not returned as of this dictation.      EMERGENCY DEPARTMENT COURSE and DIFFERENTIAL DIAGNOSIS/MDM:   Vitals:    Vitals:    01/14/22 1712   BP: 160/85   Pulse: 103   Resp: 18   Temp: 97.4 °F (36.3 °C)   TempSrc: Tympanic   SpO2: 95%   Weight: 79.4 kg (175 lb)   Height: 154.9 cm (61\")       ED Course as of 01/16/22 1250   Sun Jan 16, 2022   1247 Patient presents to ED for evaluation of left leg pain following a fall. Hematoma and bruising present on exam. Imaging demonstrates chronic changes without acute fractures or abnormalities. Requested COVID-19 test NEGATIVE. Ace wrap applied to left lower extremity at site of hematoma and swelling. Patient reassured, instructed on symptomatic care and appropriate outpatient follow up.  [JG]      ED Course User Index  [JG] uLpillo Angulo PA         MDM  Number of Diagnoses or Management Options  Chronic pain of both knees: established, worsening  Encounter for laboratory testing for COVID-19 virus: new, needed workup  Fall, initial encounter: new, needed workup  Hematoma and contusion: new, needed workup  Pain of left lower leg: new, needed workup     Amount and/or Complexity of Data Reviewed  Tests in the radiology section of CPT®: reviewed    Risk of Complications, Morbidity, and/or Mortality  Presenting " problems: low  Diagnostic procedures: low  Management options: low    Patient Progress  Patient progress: stable       I had a discussion with the patient/family regarding diagnosis, diagnostic results, treatment plan, and medications.  The patient/family indicated understanding of these instructions.  I spent adequate time at the bedside preceding discharge necessary to personally discuss the aftercare instructions, giving patient education, providing explanations of the results of our evaluations/findings, and my decision making to assure that the patient/family understand the plan of care.  Time was allotted to answer questions at that time and throughout the ED course.  Emphasis was placed on timely follow-up after discharge.  I also discussed the potential for the development of an acute emergent condition requiring further evaluation, admission, or even surgical intervention. I discussed that we found nothing during the visit today indicating the need for further workup, admission, or the presence of an unstable medical condition.  I encouraged the patient to return to the emergency department immediately for ANY concerns, worsening, new complaints, or if symptoms persist and unable to seek follow-up in a timely fashion.  The patient/family expressed understanding and agreement with this plan.  The patient will follow-up with her PCP and orthopedic surgeon for reevaluation.       MEDICATIONS ADMINISTERED IN ED:  Medications   HYDROcodone-acetaminophen (NORCO) 5-325 MG per tablet 1 tablet (1 tablet Oral Given 1/14/22 2001)       PROCEDURES:  Procedures          CRITICAL CARE TIME    Total Critical Care time was 0 minutes, excluding separately reportable procedures.   There was a high probability of clinically significant/life threatening deterioration in the patient's condition which required my urgent intervention.      FINAL IMPRESSION      1. Fall, initial encounter    2. Hematoma and contusion    3. Pain of  left lower leg    4. Chronic pain of both knees    5. Encounter for laboratory testing for COVID-19 virus          DISPOSITION/PLAN     ED Disposition     ED Disposition Condition Comment    Discharge Stable           PATIENT REFERRED TO:  Kate Garcia MD  [address] [facility] [address] [phone]    Call   As needed    Jose Roger MD  [address] [address]  [phone]    Schedule an appointment as soon as possible for a visit   Call for follow up with Orthopedic Surgery    [facility]  [address] [facility] [address] [phone]  Go to   If symptoms worsen      DISCHARGE MEDICATIONS:     Medication List      CONTINUE taking these medications    acyclovir 400 MG tablet  Commonly known as: ZOVIRAX  Take 1 tablet by mouth Daily. If outbreak occurs while on suppressive therapy, call office for further instructions.     diazePAM 5 MG tablet  Commonly known as: VALIUM     Diclofenac Sodium 1 % gel gel  Commonly known as: VOLTAREN  Apply 4 g topically to the appropriate area as directed 4 (Four) Times a Day As Needed (prn for knee pain).     escitalopram 5 MG tablet  Commonly known as: LEXAPRO     fluticasone 50 MCG/ACT nasal spray  Commonly known as: FLONASE     furosemide 40 MG tablet  Commonly known as: LASIX     levothyroxine 25 MCG tablet  Commonly known as: SYNTHROID, LEVOTHROID     meloxicam 15 MG tablet  Commonly known as: MOBIC  Take 1 tablet by mouth Daily.     Milk Thistle 140 MG capsule     multivitamin with minerals tablet tablet     nitrofurantoin 50 MG capsule  Commonly known as: MACRODANTIN  Take 1 capsule by mouth Daily.     omeprazole 40 MG capsule  Commonly known as: priLOSEC     Ospemifene 60 MG tablet  Take 1 tablet by mouth Daily.     pravastatin 40 MG tablet  Commonly known as: PRAVACHOL     Trulance 3 MG tablet  Generic drug: Plecanatide     Vitamin D-3 125 MCG (5000 UT) tablet                 Comment: Please note this report has been produced using speech recognition software.      CHRYSTAL Canales Jason C, PA  01/16/22 6704

## 2022-01-18 ENCOUNTER — OFFICE VISIT (OUTPATIENT)
Dept: ORTHOPEDIC SURGERY | Facility: CLINIC | Age: 67
End: 2022-01-18

## 2022-01-18 VITALS
HEIGHT: 61 IN | DIASTOLIC BLOOD PRESSURE: 78 MMHG | SYSTOLIC BLOOD PRESSURE: 150 MMHG | WEIGHT: 175 LBS | BODY MASS INDEX: 33.04 KG/M2

## 2022-01-18 DIAGNOSIS — S80.12XA LEG HEMATOMA, LEFT, INITIAL ENCOUNTER: Primary | ICD-10-CM

## 2022-01-18 DIAGNOSIS — M17.0 PRIMARY OSTEOARTHRITIS OF BOTH KNEES: ICD-10-CM

## 2022-01-18 PROCEDURE — 99213 OFFICE O/P EST LOW 20 MIN: CPT | Performed by: PHYSICIAN ASSISTANT

## 2022-01-18 NOTE — PROGRESS NOTES
"        [facility] Note        Subjective     CC: Follow-up (ED follow up-pt fell down stairs at friend's home 1/13/22)      KIERAN Ray is a 66 y.o. female.  Patient presents today with a new problem.  She fell down some stairs on 1/13/2022 and has a large hematoma on her left anterior tibia and bruising on her right anterior tibia.  She was seen at the [facility] ED with tib-fib x-rays on 1/14/2022.  They are negative.  She has been elevating, compression, activity modification.    She also has increased bilateral knee pain.  She has known severe arthritis and gets intermittent cortisone injections. She states that the ED doc told her to come here for aspiration of the knees.        ROS:    Constiutional:Pt denies fever, chills, nausea, or vomiting.  MSK:as above        Objective      Past Medical History  Past Medical History:   Diagnosis Date   • Acute torn meniscus of knee     left   • Closed displaced fracture of shaft of left clavicle    • Closed nondisplaced fracture of neck of left radius    • Colon cancer (HCC)    • Dyspareunia    • Family history of breast cancer in mother    • Fatty liver    • Fracture of proximal phalanx of toe    • Herpes    • Hypothyroidism    • IBS (irritable bowel syndrome)    • Left wrist pain    • Menopause    • Mixed incontinence urge and stress    • OAB (overactive bladder)    • Osteoarthritis    • Right wrist pain          Physical Exam  /78   Ht 154.9 cm (60.98\")   Wt 79.4 kg (175 lb)   LMP  (LMP Unknown) Comment: S/P AH, LEFT S&O/RIGHT S&O  BMI 33.08 kg/m²     Body mass index is 33.08 kg/m².    Patient is well nourished and well developed.        Ortho Exam  Bilateral knee exam: Tender palpation of the medial joint line.  Range of motion 0-1 15 ligament stable to valgus varus stress nervous intact distally.  Left anterior tibia has large hematoma just proximal to the ankle with fracture blistering.  Blisters are intact.  Right anterior " tibia has mild bruising.    Imaging/Labs/EMG Reviewed:  Imaging Results (Last 24 Hours)     ** No results found for the last 24 hours. **            Assessment    Assessment:  1. Leg hematoma, left, initial encounter    2. Primary osteoarthritis of both knees        Plan:  Recommend over the counter anti-inflammatories for pain and/or swelling  Left leg hematoma.  I reviewed ED x-rays of the tib-fib 1/14/2022 clinical findings with the patient.  There is nothing that needs to be done for this hematoma.  It will eventually resolve on her own.  I explained to the patient and her  that this can take weeks.  She does have a foot fracture blister that is intact.  I explained the biologic dressing that we do not want to decompress it.  It would decompress on its own.  She should continue with elevation is much as possible.  I have dressed the blisters with Telfa and an Ace from toes to knee for compression.  Bilateral knee arthritis.  Patient just had cortisone injection about 6 weeks ago.  I explained it is too early to do a repeat injection.  Regarding the need for aspiration, she does not have enough fluid on the knee as to warrant aspiration.  I explained to her that using a needle into the joint if unnecessary is not recommended it has an increased risk of infection.  I had Dr. Piña look at her knees and he agreed that they do not need aspiration.  I will see her back in 6 weeks for repeat cortisone injection or sooner if needed.    History, diagnosis and treatment plan discussed with Dr. Piña.          Abbie Mas PA-C  01/19/22  12:05 EST

## 2022-01-20 ENCOUNTER — OFFICE VISIT (OUTPATIENT)
Dept: ORTHOPEDIC SURGERY | Facility: CLINIC | Age: 67
End: 2022-01-20

## 2022-01-20 ENCOUNTER — TELEPHONE (OUTPATIENT)
Dept: ORTHOPEDIC SURGERY | Facility: CLINIC | Age: 67
End: 2022-01-20

## 2022-01-20 VITALS — BODY MASS INDEX: 33.05 KG/M2 | HEIGHT: 61 IN | WEIGHT: 175.04 LBS

## 2022-01-20 DIAGNOSIS — S80.12XD LEG HEMATOMA, LEFT, SUBSEQUENT ENCOUNTER: Primary | ICD-10-CM

## 2022-01-20 PROCEDURE — 99214 OFFICE O/P EST MOD 30 MIN: CPT | Performed by: PHYSICIAN ASSISTANT

## 2022-01-20 RX ORDER — CEPHALEXIN 500 MG/1
500 CAPSULE ORAL EVERY 6 HOURS
Qty: 40 CAPSULE | Refills: 0 | Status: SHIPPED | OUTPATIENT
Start: 2022-01-20 | End: 2022-02-01 | Stop reason: HOSPADM

## 2022-01-20 NOTE — TELEPHONE ENCOUNTER
Received pic through e-mail and shown to Abbie; She recommends pt come in to the office either this afternoon or tomorrow to be evaluated.    Pt agreeable to coming in this afternoon. Leaving from [place] shortly and should be here within the next hour.    Dorene

## 2022-01-20 NOTE — TELEPHONE ENCOUNTER
Swelling has increased a lot and it is black around the edges where hematoma is on left leg. Wants to know if Abbie could call in prescription for antibiotic to [facility] in [place] because she believes it may be infected. said where liquid is it felt warmer than the rest of the leg. Would like to be contacted to be notified of next steps.

## 2022-01-20 NOTE — TELEPHONE ENCOUNTER
Spoke with pt who is having trouble sending in a picture; I have given her my email address to try to send through that way. I will give her a call back if I have not received anything shortly.    Dorene

## 2022-01-20 NOTE — TELEPHONE ENCOUNTER
"Spoke with pt who reports she went to change her dressing today and noticed the hematoma had increased in size and she was concerned that it could be infected; She says it has not opened/started draining but when she walks, the fluid moves so she's afraid it may \"bust\".    I requested she send us a picture and once received, I will send to Abbie and see if she recommends anything different besides the \"dressing\" she's been using and elevation. Pt understood.    Dorene    "

## 2022-01-20 NOTE — PROGRESS NOTES
"        [facility] Surgery Clinic Note        Subjective     CC: Follow-up (2 days- Leg hematoma, left)      HPI    Park Ray is a 66 y.o. female. Patient returns today for her left leg hematoma.  She reports the leg has become more swollen and the blister is larger.  She is concerned that it is infected.     Overall, patient's symptoms are worsening    ROS:    Constiutional:Pt denies fever, chills, nausea, or vomiting.  MSK:as above        Objective      Past Medical History  Past Medical History:   Diagnosis Date   • Acute torn meniscus of knee     left   • Closed displaced fracture of shaft of left clavicle    • Closed nondisplaced fracture of neck of left radius    • Colon cancer (HCC)    • Dyspareunia    • Family history of breast cancer in mother    • Fatty liver    • Fracture of proximal phalanx of toe    • Herpes    • Hypothyroidism    • IBS (irritable bowel syndrome)    • Left wrist pain    • Menopause    • Mixed incontinence urge and stress    • OAB (overactive bladder)    • Osteoarthritis    • Right wrist pain          Physical Exam  Ht 154.9 cm (60.98\")   Wt 79.4 kg (175 lb 0.7 oz)   LMP  (LMP Unknown) Comment: S/P AH, LEFT S&O/RIGHT S&O  BMI 33.09 kg/m²     Body mass index is 33.09 kg/m².    Patient is well nourished and well developed.        Ortho Exam  Left lower leg exam:  Large hematoma over the anterior tibia with large fracture blister.  Ecchymosis with no evidence of infection.  NVI distally    Imaging/Labs/EMG Reviewed:  Imaging Results (Last 24 Hours)     ** No results found for the last 24 hours. **            Assessment    Assessment:  1. Leg hematoma, left, subsequent encounter        Plan:  Recommend over the counter anti-inflammatories for pain and/or swelling  Left lower leg hematoma.  I reassured the patient that I don't see any evidence of infection.  Plan today is to decompress the blister.  I have successfully done this.  The hematoma/blister was covered with " Xeroform, 4x4s and an ACE bandage.  I have given her a prescription for Keflex prophylactically.  She will change the dressing in 1 to 2 days.  We have given her some Xeroform to take home.  I will see her back in 2 weeks or sooner if needed.    History, diagnosis and treatment plan discussed with Dr. Piña.          Abbie Mas PA-C  01/21/22  09:26 EST

## 2022-01-24 ENCOUNTER — TELEPHONE (OUTPATIENT)
Dept: ORTHOPEDIC SURGERY | Facility: CLINIC | Age: 67
End: 2022-01-24

## 2022-01-24 NOTE — TELEPHONE ENCOUNTER
Received picture on Friday at 4:58pm from pt regarding her hematoma and whether she needed to see a wound specialist.    I called pt to let her know for one to make sure she calls us when she sends pics through email or only sends them when requested so we don't miss anything urgent; She verbalized understanding. Also, told her it looked okay compared to Thursday before having it drained in office so she just needed to keep it clean and dry and covered with a dressing. She was asking if she needed to put anything on the wound (like an antibiotic ointment or something like that) and I told her that the Xeroform we gave her to use was only for the first day or so after having it drained but she should only need a dry dressing until she follows up. I told her to watch for any signs of infection (fever, chills, night sweats, abnormal drainage, etc.) She understood and will call back if it gets any worse.     Dorene

## 2022-01-25 ENCOUNTER — TELEPHONE (OUTPATIENT)
Dept: ORTHOPEDIC SURGERY | Facility: CLINIC | Age: 67
End: 2022-01-25

## 2022-01-25 NOTE — TELEPHONE ENCOUNTER
This is a picture from today that I received from pt in my email; I showed Abbie the pictures and she states pt needs to be elevating more (even at night); Also needs to keep it covered with dressing and use compression; States she does not need wound care and that it does not look infected; I will call pt to relay this information.          Dorene

## 2022-01-25 NOTE — TELEPHONE ENCOUNTER
"Spoke with pt to relay to her TAR's message; I tried to re-iterate to her that she needs to be elevating more and/or higher; She states she is already doing that and is even elevating at night but she will try to elevate even higher. I told her also to use a dry dressing to keep it covered and to use an ACE wrap for compression to help eliminate some of the fluid that keeps building up; She is asking about the redness around the \"black\" part of the skin and if this is normal; I told her TAR reviewed the pictures and does not feel like it looked infected. Pt is asking if she can wash this area and I told her she could let water run over the area with antibacterial soap but to make sure to pat it dry well and cover it back up afterwards. I encouraged her that she sounds like she is doing what she should be doing and that it may just take some time to heal; I told her if she will just continue to monitor the area and if she does send more pics to let me know like she did this time. She understood.      Again, I re-iterated that she did not need wound care at this time.    Dorene    "

## 2022-01-25 NOTE — TELEPHONE ENCOUNTER
01/25/22- pt. Called back- TO LET LILIA KNOW THAT SHE IS GOING TO SEND MORE PICTURES TO HER EMAIL.   SHE IS CALLING TO TALK ABOUT IF SHE NEEDS TO GO TO WOUND CARE.

## 2022-01-28 ENCOUNTER — APPOINTMENT (OUTPATIENT)
Dept: GENERAL RADIOLOGY | Facility: HOSPITAL | Age: 67
End: 2022-01-28

## 2022-01-28 ENCOUNTER — APPOINTMENT (OUTPATIENT)
Dept: CT IMAGING | Facility: HOSPITAL | Age: 67
End: 2022-01-28

## 2022-01-28 ENCOUNTER — HOSPITAL ENCOUNTER (INPATIENT)
Facility: HOSPITAL | Age: 67
LOS: 4 days | Discharge: HOME HEALTH | End: 2022-02-01
Attending: EMERGENCY MEDICINE | Admitting: INTERNAL MEDICINE

## 2022-01-28 DIAGNOSIS — L03.116 CELLULITIS OF LEFT ANTERIOR LOWER LEG: Primary | ICD-10-CM

## 2022-01-28 LAB
ALBUMIN SERPL-MCNC: 4.1 G/DL (ref 3.5–5.2)
ALBUMIN SERPL-MCNC: 4.1 G/DL (ref 3.5–5.2)
ALBUMIN/GLOB SERPL: 1.5 G/DL
ALP SERPL-CCNC: 94 U/L (ref 39–117)
ALP SERPL-CCNC: 96 U/L (ref 39–117)
ALT SERPL W P-5'-P-CCNC: 36 U/L (ref 1–33)
ALT SERPL W P-5'-P-CCNC: 36 U/L (ref 1–33)
ANION GAP SERPL CALCULATED.3IONS-SCNC: 10 MMOL/L (ref 5–15)
AST SERPL-CCNC: 30 U/L (ref 1–32)
AST SERPL-CCNC: 32 U/L (ref 1–32)
BASOPHILS # BLD AUTO: 0.03 10*3/MM3 (ref 0–0.2)
BASOPHILS NFR BLD AUTO: 0.4 % (ref 0–1.5)
BILIRUB CONJ SERPL-MCNC: <0.2 MG/DL (ref 0–0.3)
BILIRUB INDIRECT SERPL-MCNC: ABNORMAL MG/DL
BILIRUB SERPL-MCNC: 0.5 MG/DL (ref 0–1.2)
BILIRUB SERPL-MCNC: 0.5 MG/DL (ref 0–1.2)
BUN SERPL-MCNC: 14 MG/DL (ref 8–23)
BUN/CREAT SERPL: 21.2 (ref 7–25)
CALCIUM SPEC-SCNC: 9.6 MG/DL (ref 8.6–10.5)
CHLORIDE SERPL-SCNC: 98 MMOL/L (ref 98–107)
CO2 SERPL-SCNC: 31 MMOL/L (ref 22–29)
CREAT SERPL-MCNC: 0.66 MG/DL (ref 0.57–1)
CREAT SERPL-MCNC: 0.66 MG/DL (ref 0.57–1)
D-LACTATE SERPL-SCNC: 1.5 MMOL/L (ref 0.5–2)
D-LACTATE SERPL-SCNC: 2.1 MMOL/L (ref 0.5–2)
DEPRECATED RDW RBC AUTO: 44 FL (ref 37–54)
EOSINOPHIL # BLD AUTO: 0.11 10*3/MM3 (ref 0–0.4)
EOSINOPHIL NFR BLD AUTO: 1.3 % (ref 0.3–6.2)
ERYTHROCYTE [DISTWIDTH] IN BLOOD BY AUTOMATED COUNT: 13.8 % (ref 12.3–15.4)
FLUAV RNA RESP QL NAA+PROBE: NOT DETECTED
FLUBV RNA RESP QL NAA+PROBE: NOT DETECTED
GFR SERPL CREATININE-BSD FRML MDRD: 90 ML/MIN/1.73
GFR SERPL CREATININE-BSD FRML MDRD: 90 ML/MIN/1.73
GLOBULIN UR ELPH-MCNC: 2.7 GM/DL
GLUCOSE SERPL-MCNC: 115 MG/DL (ref 65–99)
HCT VFR BLD AUTO: 40.4 % (ref 34–46.6)
HGB BLD-MCNC: 13.4 G/DL (ref 12–15.9)
IMM GRANULOCYTES # BLD AUTO: 0.02 10*3/MM3 (ref 0–0.05)
IMM GRANULOCYTES NFR BLD AUTO: 0.2 % (ref 0–0.5)
LYMPHOCYTES # BLD AUTO: 1.96 10*3/MM3 (ref 0.7–3.1)
LYMPHOCYTES NFR BLD AUTO: 23.7 % (ref 19.6–45.3)
MCH RBC QN AUTO: 28.9 PG (ref 26.6–33)
MCHC RBC AUTO-ENTMCNC: 33.2 G/DL (ref 31.5–35.7)
MCV RBC AUTO: 87.3 FL (ref 79–97)
MONOCYTES # BLD AUTO: 0.71 10*3/MM3 (ref 0.1–0.9)
MONOCYTES NFR BLD AUTO: 8.6 % (ref 5–12)
NEUTROPHILS NFR BLD AUTO: 5.44 10*3/MM3 (ref 1.7–7)
NEUTROPHILS NFR BLD AUTO: 65.8 % (ref 42.7–76)
NRBC BLD AUTO-RTO: 0 /100 WBC (ref 0–0.2)
PLATELET # BLD AUTO: 247 10*3/MM3 (ref 140–450)
PMV BLD AUTO: 9.8 FL (ref 6–12)
POTASSIUM SERPL-SCNC: 3.1 MMOL/L (ref 3.5–5.2)
PROT SERPL-MCNC: 6.4 G/DL (ref 6–8.5)
PROT SERPL-MCNC: 6.8 G/DL (ref 6–8.5)
RBC # BLD AUTO: 4.63 10*6/MM3 (ref 3.77–5.28)
SARS-COV-2 RNA RESP QL NAA+PROBE: DETECTED
SODIUM SERPL-SCNC: 139 MMOL/L (ref 136–145)
WBC NRBC COR # BLD: 8.27 10*3/MM3 (ref 3.4–10.8)

## 2022-01-28 PROCEDURE — 71045 X-RAY EXAM CHEST 1 VIEW: CPT

## 2022-01-28 PROCEDURE — 25010000002 VANCOMYCIN 10 G RECONSTITUTED SOLUTION: Performed by: NURSE PRACTITIONER

## 2022-01-28 PROCEDURE — 82565 ASSAY OF CREATININE: CPT | Performed by: INTERNAL MEDICINE

## 2022-01-28 PROCEDURE — 25010000002 REMDESIVIR 100 MG/20ML SOLUTION 1 EACH VIAL: Performed by: INTERNAL MEDICINE

## 2022-01-28 PROCEDURE — 87070 CULTURE OTHR SPECIMN AEROBIC: CPT | Performed by: NURSE PRACTITIONER

## 2022-01-28 PROCEDURE — 87636 SARSCOV2 & INF A&B AMP PRB: CPT | Performed by: INTERNAL MEDICINE

## 2022-01-28 PROCEDURE — 82248 BILIRUBIN DIRECT: CPT | Performed by: NURSE PRACTITIONER

## 2022-01-28 PROCEDURE — 87040 BLOOD CULTURE FOR BACTERIA: CPT | Performed by: NURSE PRACTITIONER

## 2022-01-28 PROCEDURE — 25010000002 ONDANSETRON PER 1 MG: Performed by: EMERGENCY MEDICINE

## 2022-01-28 PROCEDURE — 85025 COMPLETE CBC W/AUTO DIFF WBC: CPT | Performed by: NURSE PRACTITIONER

## 2022-01-28 PROCEDURE — 25010000002 ENOXAPARIN PER 10 MG: Performed by: INTERNAL MEDICINE

## 2022-01-28 PROCEDURE — 25010000002 PIPERACILLIN SOD-TAZOBACTAM PER 1 G

## 2022-01-28 PROCEDURE — 99223 1ST HOSP IP/OBS HIGH 75: CPT | Performed by: INTERNAL MEDICINE

## 2022-01-28 PROCEDURE — 73700 CT LOWER EXTREMITY W/O DYE: CPT

## 2022-01-28 PROCEDURE — 25010000002 SODIUM CHLORIDE 0.9 % WITH KCL 20 MEQ 20-0.9 MEQ/L-% SOLUTION: Performed by: INTERNAL MEDICINE

## 2022-01-28 PROCEDURE — 80053 COMPREHEN METABOLIC PANEL: CPT | Performed by: NURSE PRACTITIONER

## 2022-01-28 PROCEDURE — 73590 X-RAY EXAM OF LOWER LEG: CPT

## 2022-01-28 PROCEDURE — 83605 ASSAY OF LACTIC ACID: CPT | Performed by: NURSE PRACTITIONER

## 2022-01-28 PROCEDURE — 25010000002 MORPHINE PER 10 MG: Performed by: EMERGENCY MEDICINE

## 2022-01-28 PROCEDURE — 25010000002 PIPERACILLIN SOD-TAZOBACTAM PER 1 G: Performed by: NURSE PRACTITIONER

## 2022-01-28 PROCEDURE — XW033E5 INTRODUCTION OF REMDESIVIR ANTI-INFECTIVE INTO PERIPHERAL VEIN, PERCUTANEOUS APPROACH, NEW TECHNOLOGY GROUP 5: ICD-10-PCS | Performed by: INTERNAL MEDICINE

## 2022-01-28 PROCEDURE — 87205 SMEAR GRAM STAIN: CPT | Performed by: NURSE PRACTITIONER

## 2022-01-28 PROCEDURE — 25010000002 HYDROMORPHONE PER 4 MG: Performed by: INTERNAL MEDICINE

## 2022-01-28 PROCEDURE — 99284 EMERGENCY DEPT VISIT MOD MDM: CPT

## 2022-01-28 RX ORDER — SODIUM CHLORIDE 0.9 % (FLUSH) 0.9 %
10 SYRINGE (ML) INJECTION EVERY 12 HOURS SCHEDULED
Status: DISCONTINUED | OUTPATIENT
Start: 2022-01-28 | End: 2022-02-01 | Stop reason: HOSPADM

## 2022-01-28 RX ORDER — ONDANSETRON 2 MG/ML
4 INJECTION INTRAMUSCULAR; INTRAVENOUS EVERY 6 HOURS PRN
Status: DISCONTINUED | OUTPATIENT
Start: 2022-01-28 | End: 2022-02-01 | Stop reason: HOSPADM

## 2022-01-28 RX ORDER — POLYETHYLENE GLYCOL 3350 17 G/17G
17 POWDER, FOR SOLUTION ORAL DAILY PRN
Status: DISCONTINUED | OUTPATIENT
Start: 2022-01-28 | End: 2022-02-01 | Stop reason: HOSPADM

## 2022-01-28 RX ORDER — PANTOPRAZOLE SODIUM 40 MG/1
40 TABLET, DELAYED RELEASE ORAL EVERY MORNING
Status: DISCONTINUED | OUTPATIENT
Start: 2022-01-29 | End: 2022-02-01 | Stop reason: HOSPADM

## 2022-01-28 RX ORDER — DIAZEPAM 5 MG/1
5 TABLET ORAL 2 TIMES DAILY PRN
Status: DISCONTINUED | OUTPATIENT
Start: 2022-01-28 | End: 2022-02-01 | Stop reason: HOSPADM

## 2022-01-28 RX ORDER — ACETAMINOPHEN 325 MG/1
650 TABLET ORAL EVERY 4 HOURS PRN
Status: DISCONTINUED | OUTPATIENT
Start: 2022-01-28 | End: 2022-02-01 | Stop reason: HOSPADM

## 2022-01-28 RX ORDER — POTASSIUM CHLORIDE 7.45 MG/ML
10 INJECTION INTRAVENOUS
Status: DISCONTINUED | OUTPATIENT
Start: 2022-01-28 | End: 2022-02-01 | Stop reason: HOSPADM

## 2022-01-28 RX ORDER — TRAMADOL HYDROCHLORIDE 50 MG/1
50 TABLET ORAL EVERY 6 HOURS PRN
Status: DISCONTINUED | OUTPATIENT
Start: 2022-01-28 | End: 2022-02-01 | Stop reason: HOSPADM

## 2022-01-28 RX ORDER — SODIUM CHLORIDE AND POTASSIUM CHLORIDE 150; 900 MG/100ML; MG/100ML
100 INJECTION, SOLUTION INTRAVENOUS CONTINUOUS
Status: DISCONTINUED | OUTPATIENT
Start: 2022-01-28 | End: 2022-01-29

## 2022-01-28 RX ORDER — VANCOMYCIN HYDROCHLORIDE 1 G/200ML
1000 INJECTION, SOLUTION INTRAVENOUS EVERY 12 HOURS SCHEDULED
Status: DISCONTINUED | OUTPATIENT
Start: 2022-01-29 | End: 2022-01-29

## 2022-01-28 RX ORDER — HYDROMORPHONE HYDROCHLORIDE 1 MG/ML
0.5 INJECTION, SOLUTION INTRAMUSCULAR; INTRAVENOUS; SUBCUTANEOUS
Status: DISCONTINUED | OUTPATIENT
Start: 2022-01-28 | End: 2022-02-01 | Stop reason: HOSPADM

## 2022-01-28 RX ORDER — FLUTICASONE PROPIONATE 50 MCG
2 SPRAY, SUSPENSION (ML) NASAL DAILY
Status: DISCONTINUED | OUTPATIENT
Start: 2022-01-29 | End: 2022-02-01 | Stop reason: HOSPADM

## 2022-01-28 RX ORDER — ONDANSETRON 4 MG/1
4 TABLET, FILM COATED ORAL EVERY 6 HOURS PRN
Status: DISCONTINUED | OUTPATIENT
Start: 2022-01-28 | End: 2022-02-01 | Stop reason: HOSPADM

## 2022-01-28 RX ORDER — MORPHINE SULFATE 4 MG/ML
4 INJECTION, SOLUTION INTRAMUSCULAR; INTRAVENOUS ONCE
Status: COMPLETED | OUTPATIENT
Start: 2022-01-28 | End: 2022-01-28

## 2022-01-28 RX ORDER — ACETAMINOPHEN 160 MG/5ML
650 SOLUTION ORAL EVERY 4 HOURS PRN
Status: DISCONTINUED | OUTPATIENT
Start: 2022-01-28 | End: 2022-02-01 | Stop reason: HOSPADM

## 2022-01-28 RX ORDER — LEVOTHYROXINE SODIUM 0.03 MG/1
25 TABLET ORAL
Status: DISCONTINUED | OUTPATIENT
Start: 2022-01-29 | End: 2022-02-01 | Stop reason: HOSPADM

## 2022-01-28 RX ORDER — MULTIPLE VITAMINS W/ MINERALS TAB 9MG-400MCG
1 TAB ORAL DAILY
Status: DISCONTINUED | OUTPATIENT
Start: 2022-01-28 | End: 2022-02-01 | Stop reason: HOSPADM

## 2022-01-28 RX ORDER — NALOXONE HCL 0.4 MG/ML
0.4 VIAL (ML) INJECTION
Status: DISCONTINUED | OUTPATIENT
Start: 2022-01-28 | End: 2022-02-01 | Stop reason: HOSPADM

## 2022-01-28 RX ORDER — ACETAMINOPHEN 650 MG/1
650 SUPPOSITORY RECTAL EVERY 4 HOURS PRN
Status: DISCONTINUED | OUTPATIENT
Start: 2022-01-28 | End: 2022-02-01 | Stop reason: HOSPADM

## 2022-01-28 RX ORDER — BISACODYL 10 MG
10 SUPPOSITORY, RECTAL RECTAL DAILY PRN
Status: DISCONTINUED | OUTPATIENT
Start: 2022-01-28 | End: 2022-02-01 | Stop reason: HOSPADM

## 2022-01-28 RX ORDER — POTASSIUM CHLORIDE 750 MG/1
40 CAPSULE, EXTENDED RELEASE ORAL AS NEEDED
Status: DISCONTINUED | OUTPATIENT
Start: 2022-01-28 | End: 2022-02-01 | Stop reason: HOSPADM

## 2022-01-28 RX ORDER — AMOXICILLIN 250 MG
2 CAPSULE ORAL 2 TIMES DAILY
Status: DISCONTINUED | OUTPATIENT
Start: 2022-01-28 | End: 2022-02-01 | Stop reason: HOSPADM

## 2022-01-28 RX ORDER — POTASSIUM CHLORIDE 1.5 G/1.77G
40 POWDER, FOR SOLUTION ORAL AS NEEDED
Status: DISCONTINUED | OUTPATIENT
Start: 2022-01-28 | End: 2022-02-01 | Stop reason: HOSPADM

## 2022-01-28 RX ORDER — SODIUM CHLORIDE 0.9 % (FLUSH) 0.9 %
10 SYRINGE (ML) INJECTION AS NEEDED
Status: DISCONTINUED | OUTPATIENT
Start: 2022-01-28 | End: 2022-02-01 | Stop reason: HOSPADM

## 2022-01-28 RX ORDER — ESCITALOPRAM OXALATE 10 MG/1
5 TABLET ORAL DAILY
Status: DISCONTINUED | OUTPATIENT
Start: 2022-01-29 | End: 2022-02-01 | Stop reason: HOSPADM

## 2022-01-28 RX ORDER — ONDANSETRON 2 MG/ML
4 INJECTION INTRAMUSCULAR; INTRAVENOUS ONCE
Status: COMPLETED | OUTPATIENT
Start: 2022-01-28 | End: 2022-01-28

## 2022-01-28 RX ORDER — PRAVASTATIN SODIUM 40 MG
40 TABLET ORAL DAILY
Status: DISCONTINUED | OUTPATIENT
Start: 2022-01-29 | End: 2022-02-01 | Stop reason: HOSPADM

## 2022-01-28 RX ORDER — POTASSIUM CHLORIDE 750 MG/1
40 CAPSULE, EXTENDED RELEASE ORAL ONCE
Status: COMPLETED | OUTPATIENT
Start: 2022-01-28 | End: 2022-01-28

## 2022-01-28 RX ORDER — BISACODYL 5 MG/1
5 TABLET, DELAYED RELEASE ORAL DAILY PRN
Status: DISCONTINUED | OUTPATIENT
Start: 2022-01-28 | End: 2022-02-01 | Stop reason: HOSPADM

## 2022-01-28 RX ADMIN — VANCOMYCIN HYDROCHLORIDE 1500 MG: 10 INJECTION, POWDER, LYOPHILIZED, FOR SOLUTION INTRAVENOUS at 15:02

## 2022-01-28 RX ADMIN — POTASSIUM CHLORIDE 40 MEQ: 10 CAPSULE, COATED, EXTENDED RELEASE ORAL at 22:16

## 2022-01-28 RX ADMIN — POTASSIUM CHLORIDE 40 MEQ: 10 CAPSULE, COATED, EXTENDED RELEASE ORAL at 12:07

## 2022-01-28 RX ADMIN — MORPHINE SULFATE 4 MG: 4 INJECTION, SOLUTION INTRAMUSCULAR; INTRAVENOUS at 12:08

## 2022-01-28 RX ADMIN — SODIUM CHLORIDE 1000 ML: 9 INJECTION, SOLUTION INTRAVENOUS at 12:07

## 2022-01-28 RX ADMIN — TAZOBACTAM SODIUM AND PIPERACILLIN SODIUM 3.38 G: 375; 3 INJECTION, SOLUTION INTRAVENOUS at 12:07

## 2022-01-28 RX ADMIN — POTASSIUM CHLORIDE AND SODIUM CHLORIDE 100 ML/HR: 900; 150 INJECTION, SOLUTION INTRAVENOUS at 16:13

## 2022-01-28 RX ADMIN — HYDROMORPHONE HYDROCHLORIDE 0.5 MG: 1 INJECTION, SOLUTION INTRAMUSCULAR; INTRAVENOUS; SUBCUTANEOUS at 16:14

## 2022-01-28 RX ADMIN — Medication 1 TABLET: at 16:33

## 2022-01-28 RX ADMIN — SENNOSIDES AND DOCUSATE SODIUM 2 TABLET: 50; 8.6 TABLET ORAL at 20:53

## 2022-01-28 RX ADMIN — ONDANSETRON 4 MG: 2 INJECTION INTRAMUSCULAR; INTRAVENOUS at 12:08

## 2022-01-28 RX ADMIN — HYDROMORPHONE HYDROCHLORIDE 0.5 MG: 1 INJECTION, SOLUTION INTRAMUSCULAR; INTRAVENOUS; SUBCUTANEOUS at 22:17

## 2022-01-28 RX ADMIN — POTASSIUM CHLORIDE 40 MEQ: 10 CAPSULE, COATED, EXTENDED RELEASE ORAL at 16:33

## 2022-01-28 RX ADMIN — REMDESIVIR 200 MG: 100 INJECTION, POWDER, LYOPHILIZED, FOR SOLUTION INTRAVENOUS at 20:53

## 2022-01-28 RX ADMIN — ENOXAPARIN SODIUM 40 MG: 40 INJECTION SUBCUTANEOUS at 16:14

## 2022-01-28 RX ADMIN — TAZOBACTAM SODIUM AND PIPERACILLIN SODIUM 3.38 G: 375; 3 INJECTION, SOLUTION INTRAVENOUS at 21:06

## 2022-01-29 LAB
BILIRUB CONJ SERPL-MCNC: <0.2 MG/DL (ref 0–0.3)
CK SERPL-CCNC: 30 U/L (ref 20–180)
HBA1C MFR BLD: 5.6 % (ref 4.8–5.6)
PROCALCITONIN SERPL-MCNC: 0.05 NG/ML (ref 0–0.25)
TSH SERPL DL<=0.05 MIU/L-ACNC: 0.65 UIU/ML (ref 0.27–4.2)

## 2022-01-29 PROCEDURE — 97166 OT EVAL MOD COMPLEX 45 MIN: CPT

## 2022-01-29 PROCEDURE — 84145 PROCALCITONIN (PCT): CPT | Performed by: INTERNAL MEDICINE

## 2022-01-29 PROCEDURE — 25010000002 CEFTRIAXONE PER 250 MG: Performed by: NURSE PRACTITIONER

## 2022-01-29 PROCEDURE — 25010000002 DAPTOMYCIN PER 1 MG: Performed by: NURSE PRACTITIONER

## 2022-01-29 PROCEDURE — 82248 BILIRUBIN DIRECT: CPT | Performed by: INTERNAL MEDICINE

## 2022-01-29 PROCEDURE — 99232 SBSQ HOSP IP/OBS MODERATE 35: CPT | Performed by: INTERNAL MEDICINE

## 2022-01-29 PROCEDURE — 25010000002 ENOXAPARIN PER 10 MG: Performed by: INTERNAL MEDICINE

## 2022-01-29 PROCEDURE — 25010000002 REMDESIVIR 100 MG/20ML SOLUTION 1 EACH VIAL: Performed by: INTERNAL MEDICINE

## 2022-01-29 PROCEDURE — 25010000002 DEXAMETHASONE PER 1 MG: Performed by: INTERNAL MEDICINE

## 2022-01-29 PROCEDURE — 82550 ASSAY OF CK (CPK): CPT | Performed by: NURSE PRACTITIONER

## 2022-01-29 PROCEDURE — 25010000002 PIPERACILLIN SOD-TAZOBACTAM PER 1 G

## 2022-01-29 PROCEDURE — 25010000002 HYDROMORPHONE PER 4 MG: Performed by: INTERNAL MEDICINE

## 2022-01-29 PROCEDURE — 25010000002 SODIUM CHLORIDE 0.9 % WITH KCL 20 MEQ 20-0.9 MEQ/L-% SOLUTION: Performed by: INTERNAL MEDICINE

## 2022-01-29 PROCEDURE — 84443 ASSAY THYROID STIM HORMONE: CPT | Performed by: INTERNAL MEDICINE

## 2022-01-29 PROCEDURE — 83036 HEMOGLOBIN GLYCOSYLATED A1C: CPT | Performed by: INTERNAL MEDICINE

## 2022-01-29 RX ORDER — DEXAMETHASONE SODIUM PHOSPHATE 4 MG/ML
6 INJECTION, SOLUTION INTRA-ARTICULAR; INTRALESIONAL; INTRAMUSCULAR; INTRAVENOUS; SOFT TISSUE DAILY
Status: DISCONTINUED | OUTPATIENT
Start: 2022-01-29 | End: 2022-01-29

## 2022-01-29 RX ORDER — OXYCODONE HYDROCHLORIDE AND ACETAMINOPHEN 5; 325 MG/1; MG/1
1 TABLET ORAL EVERY 4 HOURS PRN
Status: DISCONTINUED | OUTPATIENT
Start: 2022-01-29 | End: 2022-02-01 | Stop reason: HOSPADM

## 2022-01-29 RX ADMIN — SENNOSIDES AND DOCUSATE SODIUM 2 TABLET: 50; 8.6 TABLET ORAL at 08:28

## 2022-01-29 RX ADMIN — LEVOTHYROXINE SODIUM 25 MCG: 0.03 TABLET ORAL at 06:26

## 2022-01-29 RX ADMIN — Medication 1 TABLET: at 08:28

## 2022-01-29 RX ADMIN — ESCITALOPRAM OXALATE 5 MG: 10 TABLET ORAL at 08:28

## 2022-01-29 RX ADMIN — SENNOSIDES AND DOCUSATE SODIUM 2 TABLET: 50; 8.6 TABLET ORAL at 20:49

## 2022-01-29 RX ADMIN — FLUTICASONE PROPIONATE 2 SPRAY: 50 SPRAY, METERED NASAL at 09:30

## 2022-01-29 RX ADMIN — SODIUM CHLORIDE 2 G: 900 INJECTION INTRAVENOUS at 08:29

## 2022-01-29 RX ADMIN — OXYCODONE HYDROCHLORIDE AND ACETAMINOPHEN 1 TABLET: 5; 325 TABLET ORAL at 14:22

## 2022-01-29 RX ADMIN — DAPTOMYCIN 350 MG: 500 INJECTION, POWDER, LYOPHILIZED, FOR SOLUTION INTRAVENOUS at 09:30

## 2022-01-29 RX ADMIN — POTASSIUM CHLORIDE AND SODIUM CHLORIDE 100 ML/HR: 900; 150 INJECTION, SOLUTION INTRAVENOUS at 04:19

## 2022-01-29 RX ADMIN — ENOXAPARIN SODIUM 40 MG: 40 INJECTION SUBCUTANEOUS at 08:28

## 2022-01-29 RX ADMIN — TAZOBACTAM SODIUM AND PIPERACILLIN SODIUM 3.38 G: 375; 3 INJECTION, SOLUTION INTRAVENOUS at 06:26

## 2022-01-29 RX ADMIN — PANTOPRAZOLE SODIUM 40 MG: 40 TABLET, DELAYED RELEASE ORAL at 06:26

## 2022-01-29 RX ADMIN — HYDROMORPHONE HYDROCHLORIDE 0.5 MG: 1 INJECTION, SOLUTION INTRAMUSCULAR; INTRAVENOUS; SUBCUTANEOUS at 08:30

## 2022-01-29 RX ADMIN — REMDESIVIR 100 MG: 100 INJECTION, POWDER, LYOPHILIZED, FOR SOLUTION INTRAVENOUS at 17:20

## 2022-01-29 RX ADMIN — DEXAMETHASONE SODIUM PHOSPHATE 6 MG: 4 INJECTION, SOLUTION INTRAMUSCULAR; INTRAVENOUS at 08:28

## 2022-01-29 RX ADMIN — SODIUM CHLORIDE, PRESERVATIVE FREE 10 ML: 5 INJECTION INTRAVENOUS at 20:49

## 2022-01-29 RX ADMIN — SODIUM CHLORIDE, PRESERVATIVE FREE 10 ML: 5 INJECTION INTRAVENOUS at 08:29

## 2022-01-29 RX ADMIN — OXYCODONE HYDROCHLORIDE AND ACETAMINOPHEN 1 TABLET: 5; 325 TABLET ORAL at 20:49

## 2022-01-29 RX ADMIN — HYDROMORPHONE HYDROCHLORIDE 0.5 MG: 1 INJECTION, SOLUTION INTRAMUSCULAR; INTRAVENOUS; SUBCUTANEOUS at 04:15

## 2022-01-29 RX ADMIN — PRAVASTATIN SODIUM 40 MG: 40 TABLET ORAL at 08:28

## 2022-01-30 LAB
ALBUMIN SERPL-MCNC: 3.7 G/DL (ref 3.5–5.2)
ALBUMIN/GLOB SERPL: 1.4 G/DL
ALP SERPL-CCNC: 88 U/L (ref 39–117)
ALT SERPL W P-5'-P-CCNC: 27 U/L (ref 1–33)
ANION GAP SERPL CALCULATED.3IONS-SCNC: 15 MMOL/L (ref 5–15)
AST SERPL-CCNC: 31 U/L (ref 1–32)
BASOPHILS # BLD AUTO: 0.02 10*3/MM3 (ref 0–0.2)
BASOPHILS NFR BLD AUTO: 0.2 % (ref 0–1.5)
BILIRUB CONJ SERPL-MCNC: <0.2 MG/DL (ref 0–0.3)
BILIRUB SERPL-MCNC: <0.2 MG/DL (ref 0–1.2)
BUN SERPL-MCNC: 21 MG/DL (ref 8–23)
BUN/CREAT SERPL: 21 (ref 7–25)
CALCIUM SPEC-SCNC: 9 MG/DL (ref 8.6–10.5)
CHLORIDE SERPL-SCNC: 103 MMOL/L (ref 98–107)
CO2 SERPL-SCNC: 24 MMOL/L (ref 22–29)
CREAT SERPL-MCNC: 1 MG/DL (ref 0.57–1)
CRP SERPL-MCNC: 0.42 MG/DL (ref 0–0.5)
DEPRECATED RDW RBC AUTO: 48.6 FL (ref 37–54)
EOSINOPHIL # BLD AUTO: 0 10*3/MM3 (ref 0–0.4)
EOSINOPHIL NFR BLD AUTO: 0 % (ref 0.3–6.2)
ERYTHROCYTE [DISTWIDTH] IN BLOOD BY AUTOMATED COUNT: 13.8 % (ref 12.3–15.4)
GFR SERPL CREATININE-BSD FRML MDRD: 55 ML/MIN/1.73
GLOBULIN UR ELPH-MCNC: 2.7 GM/DL
GLUCOSE SERPL-MCNC: 157 MG/DL (ref 65–99)
HCT VFR BLD AUTO: 39.6 % (ref 34–46.6)
HGB BLD-MCNC: 12 G/DL (ref 12–15.9)
IMM GRANULOCYTES # BLD AUTO: 0.05 10*3/MM3 (ref 0–0.05)
IMM GRANULOCYTES NFR BLD AUTO: 0.5 % (ref 0–0.5)
LYMPHOCYTES # BLD AUTO: 1.48 10*3/MM3 (ref 0.7–3.1)
LYMPHOCYTES NFR BLD AUTO: 14 % (ref 19.6–45.3)
MCH RBC QN AUTO: 29.1 PG (ref 26.6–33)
MCHC RBC AUTO-ENTMCNC: 30.3 G/DL (ref 31.5–35.7)
MCV RBC AUTO: 95.9 FL (ref 79–97)
MONOCYTES # BLD AUTO: 0.66 10*3/MM3 (ref 0.1–0.9)
MONOCYTES NFR BLD AUTO: 6.2 % (ref 5–12)
NEUTROPHILS NFR BLD AUTO: 79.1 % (ref 42.7–76)
NEUTROPHILS NFR BLD AUTO: 8.37 10*3/MM3 (ref 1.7–7)
NRBC BLD AUTO-RTO: 0 /100 WBC (ref 0–0.2)
PLATELET # BLD AUTO: 237 10*3/MM3 (ref 140–450)
PMV BLD AUTO: 10 FL (ref 6–12)
POTASSIUM SERPL-SCNC: 4.9 MMOL/L (ref 3.5–5.2)
PROT SERPL-MCNC: 6.4 G/DL (ref 6–8.5)
RBC # BLD AUTO: 4.13 10*6/MM3 (ref 3.77–5.28)
SODIUM SERPL-SCNC: 142 MMOL/L (ref 136–145)
WBC NRBC COR # BLD: 10.58 10*3/MM3 (ref 3.4–10.8)

## 2022-01-30 PROCEDURE — 25010000002 REMDESIVIR 100 MG/20ML SOLUTION 1 EACH VIAL: Performed by: INTERNAL MEDICINE

## 2022-01-30 PROCEDURE — 25010000002 DAPTOMYCIN PER 1 MG: Performed by: NURSE PRACTITIONER

## 2022-01-30 PROCEDURE — 97161 PT EVAL LOW COMPLEX 20 MIN: CPT

## 2022-01-30 PROCEDURE — 85025 COMPLETE CBC W/AUTO DIFF WBC: CPT | Performed by: INTERNAL MEDICINE

## 2022-01-30 PROCEDURE — 80053 COMPREHEN METABOLIC PANEL: CPT | Performed by: INTERNAL MEDICINE

## 2022-01-30 PROCEDURE — 25010000002 ENOXAPARIN PER 10 MG: Performed by: INTERNAL MEDICINE

## 2022-01-30 PROCEDURE — 63710000001 DEXAMETHASONE PER 0.25 MG: Performed by: INTERNAL MEDICINE

## 2022-01-30 PROCEDURE — 82248 BILIRUBIN DIRECT: CPT | Performed by: INTERNAL MEDICINE

## 2022-01-30 PROCEDURE — 25010000002 FUROSEMIDE PER 20 MG: Performed by: INTERNAL MEDICINE

## 2022-01-30 PROCEDURE — 25010000002 CEFTRIAXONE PER 250 MG: Performed by: NURSE PRACTITIONER

## 2022-01-30 PROCEDURE — 87205 SMEAR GRAM STAIN: CPT | Performed by: INTERNAL MEDICINE

## 2022-01-30 PROCEDURE — 87070 CULTURE OTHR SPECIMN AEROBIC: CPT | Performed by: INTERNAL MEDICINE

## 2022-01-30 PROCEDURE — 86140 C-REACTIVE PROTEIN: CPT | Performed by: INTERNAL MEDICINE

## 2022-01-30 PROCEDURE — 99232 SBSQ HOSP IP/OBS MODERATE 35: CPT | Performed by: INTERNAL MEDICINE

## 2022-01-30 PROCEDURE — 25010000002 HYDROMORPHONE PER 4 MG: Performed by: INTERNAL MEDICINE

## 2022-01-30 PROCEDURE — 97530 THERAPEUTIC ACTIVITIES: CPT

## 2022-01-30 RX ORDER — FUROSEMIDE 10 MG/ML
40 INJECTION INTRAMUSCULAR; INTRAVENOUS EVERY 4 HOURS
Status: COMPLETED | OUTPATIENT
Start: 2022-01-30 | End: 2022-01-30

## 2022-01-30 RX ADMIN — OXYCODONE HYDROCHLORIDE AND ACETAMINOPHEN 1 TABLET: 5; 325 TABLET ORAL at 17:02

## 2022-01-30 RX ADMIN — REMDESIVIR 100 MG: 100 INJECTION, POWDER, LYOPHILIZED, FOR SOLUTION INTRAVENOUS at 08:58

## 2022-01-30 RX ADMIN — SODIUM CHLORIDE 2 G: 900 INJECTION INTRAVENOUS at 08:19

## 2022-01-30 RX ADMIN — OXYCODONE HYDROCHLORIDE AND ACETAMINOPHEN 1 TABLET: 5; 325 TABLET ORAL at 12:43

## 2022-01-30 RX ADMIN — Medication 1 TABLET: at 08:18

## 2022-01-30 RX ADMIN — LEVOTHYROXINE SODIUM 25 MCG: 0.03 TABLET ORAL at 08:18

## 2022-01-30 RX ADMIN — SENNOSIDES AND DOCUSATE SODIUM 2 TABLET: 50; 8.6 TABLET ORAL at 21:09

## 2022-01-30 RX ADMIN — SODIUM CHLORIDE, PRESERVATIVE FREE 10 ML: 5 INJECTION INTRAVENOUS at 08:19

## 2022-01-30 RX ADMIN — DAPTOMYCIN 350 MG: 500 INJECTION, POWDER, LYOPHILIZED, FOR SOLUTION INTRAVENOUS at 08:19

## 2022-01-30 RX ADMIN — ESCITALOPRAM OXALATE 5 MG: 10 TABLET ORAL at 08:18

## 2022-01-30 RX ADMIN — SODIUM CHLORIDE, PRESERVATIVE FREE 10 ML: 5 INJECTION INTRAVENOUS at 21:10

## 2022-01-30 RX ADMIN — SENNOSIDES AND DOCUSATE SODIUM 2 TABLET: 50; 8.6 TABLET ORAL at 08:18

## 2022-01-30 RX ADMIN — FUROSEMIDE 40 MG: 10 INJECTION, SOLUTION INTRAMUSCULAR; INTRAVENOUS at 12:09

## 2022-01-30 RX ADMIN — PRAVASTATIN SODIUM 40 MG: 40 TABLET ORAL at 08:18

## 2022-01-30 RX ADMIN — PANTOPRAZOLE SODIUM 40 MG: 40 TABLET, DELAYED RELEASE ORAL at 08:18

## 2022-01-30 RX ADMIN — OXYCODONE HYDROCHLORIDE AND ACETAMINOPHEN 1 TABLET: 5; 325 TABLET ORAL at 21:09

## 2022-01-30 RX ADMIN — HYDROMORPHONE HYDROCHLORIDE 0.5 MG: 1 INJECTION, SOLUTION INTRAMUSCULAR; INTRAVENOUS; SUBCUTANEOUS at 13:51

## 2022-01-30 RX ADMIN — FLUTICASONE PROPIONATE 2 SPRAY: 50 SPRAY, METERED NASAL at 08:19

## 2022-01-30 RX ADMIN — FUROSEMIDE 40 MG: 10 INJECTION, SOLUTION INTRAMUSCULAR; INTRAVENOUS at 17:02

## 2022-01-30 RX ADMIN — ENOXAPARIN SODIUM 40 MG: 40 INJECTION SUBCUTANEOUS at 08:18

## 2022-01-30 RX ADMIN — OXYCODONE HYDROCHLORIDE AND ACETAMINOPHEN 1 TABLET: 5; 325 TABLET ORAL at 08:17

## 2022-01-30 RX ADMIN — DEXAMETHASONE 6 MG: 2 TABLET ORAL at 08:18

## 2022-01-31 ENCOUNTER — APPOINTMENT (OUTPATIENT)
Dept: GENERAL RADIOLOGY | Facility: HOSPITAL | Age: 67
End: 2022-01-31

## 2022-01-31 LAB
ALBUMIN SERPL-MCNC: 3.3 G/DL (ref 3.5–5.2)
ALBUMIN/GLOB SERPL: 1.3 G/DL
ALP SERPL-CCNC: 74 U/L (ref 39–117)
ALT SERPL W P-5'-P-CCNC: 22 U/L (ref 1–33)
ANION GAP SERPL CALCULATED.3IONS-SCNC: 10 MMOL/L (ref 5–15)
AST SERPL-CCNC: 17 U/L (ref 1–32)
BACTERIA SPEC AEROBE CULT: NORMAL
BASOPHILS # BLD AUTO: 0.02 10*3/MM3 (ref 0–0.2)
BASOPHILS NFR BLD AUTO: 0.2 % (ref 0–1.5)
BILIRUB CONJ SERPL-MCNC: <0.2 MG/DL (ref 0–0.3)
BILIRUB SERPL-MCNC: 0.2 MG/DL (ref 0–1.2)
BUN SERPL-MCNC: 25 MG/DL (ref 8–23)
BUN/CREAT SERPL: 28.4 (ref 7–25)
CALCIUM SPEC-SCNC: 9 MG/DL (ref 8.6–10.5)
CHLORIDE SERPL-SCNC: 102 MMOL/L (ref 98–107)
CO2 SERPL-SCNC: 30 MMOL/L (ref 22–29)
CREAT SERPL-MCNC: 0.88 MG/DL (ref 0.57–1)
CRP SERPL-MCNC: <0.3 MG/DL (ref 0–0.5)
DEPRECATED RDW RBC AUTO: 44 FL (ref 37–54)
EOSINOPHIL # BLD AUTO: 0 10*3/MM3 (ref 0–0.4)
EOSINOPHIL NFR BLD AUTO: 0 % (ref 0.3–6.2)
ERYTHROCYTE [DISTWIDTH] IN BLOOD BY AUTOMATED COUNT: 13.5 % (ref 12.3–15.4)
GFR SERPL CREATININE-BSD FRML MDRD: 64 ML/MIN/1.73
GLOBULIN UR ELPH-MCNC: 2.5 GM/DL
GLUCOSE SERPL-MCNC: 127 MG/DL (ref 65–99)
GRAM STN SPEC: NORMAL
HCT VFR BLD AUTO: 37.1 % (ref 34–46.6)
HGB BLD-MCNC: 12.1 G/DL (ref 12–15.9)
IMM GRANULOCYTES # BLD AUTO: 0.16 10*3/MM3 (ref 0–0.05)
IMM GRANULOCYTES NFR BLD AUTO: 1.2 % (ref 0–0.5)
LYMPHOCYTES # BLD AUTO: 2.01 10*3/MM3 (ref 0.7–3.1)
LYMPHOCYTES NFR BLD AUTO: 15.4 % (ref 19.6–45.3)
MCH RBC QN AUTO: 28.9 PG (ref 26.6–33)
MCHC RBC AUTO-ENTMCNC: 32.6 G/DL (ref 31.5–35.7)
MCV RBC AUTO: 88.5 FL (ref 79–97)
MONOCYTES # BLD AUTO: 1 10*3/MM3 (ref 0.1–0.9)
MONOCYTES NFR BLD AUTO: 7.7 % (ref 5–12)
NEUTROPHILS NFR BLD AUTO: 75.5 % (ref 42.7–76)
NEUTROPHILS NFR BLD AUTO: 9.84 10*3/MM3 (ref 1.7–7)
NRBC BLD AUTO-RTO: 0 /100 WBC (ref 0–0.2)
PLATELET # BLD AUTO: 292 10*3/MM3 (ref 140–450)
PMV BLD AUTO: 10.6 FL (ref 6–12)
POTASSIUM SERPL-SCNC: 3.7 MMOL/L (ref 3.5–5.2)
PROT SERPL-MCNC: 5.8 G/DL (ref 6–8.5)
RBC # BLD AUTO: 4.19 10*6/MM3 (ref 3.77–5.28)
SODIUM SERPL-SCNC: 142 MMOL/L (ref 136–145)
WBC NRBC COR # BLD: 13.03 10*3/MM3 (ref 3.4–10.8)

## 2022-01-31 PROCEDURE — 80053 COMPREHEN METABOLIC PANEL: CPT | Performed by: INTERNAL MEDICINE

## 2022-01-31 PROCEDURE — 99232 SBSQ HOSP IP/OBS MODERATE 35: CPT | Performed by: NURSE PRACTITIONER

## 2022-01-31 PROCEDURE — 71045 X-RAY EXAM CHEST 1 VIEW: CPT

## 2022-01-31 PROCEDURE — 25010000002 CEFTRIAXONE PER 250 MG: Performed by: NURSE PRACTITIONER

## 2022-01-31 PROCEDURE — 86140 C-REACTIVE PROTEIN: CPT | Performed by: INTERNAL MEDICINE

## 2022-01-31 PROCEDURE — 25010000002 DAPTOMYCIN PER 1 MG: Performed by: NURSE PRACTITIONER

## 2022-01-31 PROCEDURE — 25010000002 REMDESIVIR 100 MG/20ML SOLUTION 1 EACH VIAL: Performed by: NURSE PRACTITIONER

## 2022-01-31 PROCEDURE — 25010000002 ENOXAPARIN PER 10 MG: Performed by: INTERNAL MEDICINE

## 2022-01-31 PROCEDURE — 63710000001 DEXAMETHASONE PER 0.25 MG: Performed by: INTERNAL MEDICINE

## 2022-01-31 PROCEDURE — 82248 BILIRUBIN DIRECT: CPT | Performed by: INTERNAL MEDICINE

## 2022-01-31 PROCEDURE — 25010000002 FUROSEMIDE PER 20 MG: Performed by: NURSE PRACTITIONER

## 2022-01-31 PROCEDURE — 85025 COMPLETE CBC W/AUTO DIFF WBC: CPT | Performed by: INTERNAL MEDICINE

## 2022-01-31 PROCEDURE — 25010000002 HYDROMORPHONE PER 4 MG: Performed by: INTERNAL MEDICINE

## 2022-01-31 RX ORDER — FUROSEMIDE 10 MG/ML
40 INJECTION INTRAMUSCULAR; INTRAVENOUS ONCE
Status: COMPLETED | OUTPATIENT
Start: 2022-01-31 | End: 2022-01-31

## 2022-01-31 RX ADMIN — OXYCODONE HYDROCHLORIDE AND ACETAMINOPHEN 1 TABLET: 5; 325 TABLET ORAL at 18:23

## 2022-01-31 RX ADMIN — Medication 1 TABLET: at 08:12

## 2022-01-31 RX ADMIN — ESCITALOPRAM OXALATE 5 MG: 10 TABLET ORAL at 08:12

## 2022-01-31 RX ADMIN — SENNOSIDES AND DOCUSATE SODIUM 2 TABLET: 50; 8.6 TABLET ORAL at 08:12

## 2022-01-31 RX ADMIN — REMDESIVIR 100 MG: 100 INJECTION, POWDER, LYOPHILIZED, FOR SOLUTION INTRAVENOUS at 16:20

## 2022-01-31 RX ADMIN — LEVOTHYROXINE SODIUM 25 MCG: 0.03 TABLET ORAL at 04:04

## 2022-01-31 RX ADMIN — PRAVASTATIN SODIUM 40 MG: 40 TABLET ORAL at 08:12

## 2022-01-31 RX ADMIN — SODIUM CHLORIDE 2 G: 900 INJECTION INTRAVENOUS at 08:08

## 2022-01-31 RX ADMIN — DEXAMETHASONE 6 MG: 2 TABLET ORAL at 08:12

## 2022-01-31 RX ADMIN — ENOXAPARIN SODIUM 40 MG: 40 INJECTION SUBCUTANEOUS at 08:12

## 2022-01-31 RX ADMIN — DAPTOMYCIN 350 MG: 500 INJECTION, POWDER, LYOPHILIZED, FOR SOLUTION INTRAVENOUS at 08:08

## 2022-01-31 RX ADMIN — FLUTICASONE PROPIONATE 2 SPRAY: 50 SPRAY, METERED NASAL at 08:27

## 2022-01-31 RX ADMIN — HYDROMORPHONE HYDROCHLORIDE 0.5 MG: 1 INJECTION, SOLUTION INTRAMUSCULAR; INTRAVENOUS; SUBCUTANEOUS at 20:08

## 2022-01-31 RX ADMIN — OXYCODONE HYDROCHLORIDE AND ACETAMINOPHEN 1 TABLET: 5; 325 TABLET ORAL at 10:38

## 2022-01-31 RX ADMIN — OXYCODONE HYDROCHLORIDE AND ACETAMINOPHEN 1 TABLET: 5; 325 TABLET ORAL at 04:04

## 2022-01-31 RX ADMIN — FUROSEMIDE 40 MG: 10 INJECTION, SOLUTION INTRAMUSCULAR; INTRAVENOUS at 16:18

## 2022-01-31 RX ADMIN — SENNOSIDES AND DOCUSATE SODIUM 2 TABLET: 50; 8.6 TABLET ORAL at 20:08

## 2022-01-31 RX ADMIN — HYDROMORPHONE HYDROCHLORIDE 0.5 MG: 1 INJECTION, SOLUTION INTRAMUSCULAR; INTRAVENOUS; SUBCUTANEOUS at 13:15

## 2022-01-31 RX ADMIN — PANTOPRAZOLE SODIUM 40 MG: 40 TABLET, DELAYED RELEASE ORAL at 08:12

## 2022-01-31 RX ADMIN — SODIUM CHLORIDE, PRESERVATIVE FREE 10 ML: 5 INJECTION INTRAVENOUS at 20:08

## 2022-02-01 ENCOUNTER — HOME HEALTH ADMISSION (OUTPATIENT)
Dept: HOME HEALTH SERVICES | Facility: HOME HEALTHCARE | Age: 67
End: 2022-02-01

## 2022-02-01 ENCOUNTER — READMISSION MANAGEMENT (OUTPATIENT)
Dept: CALL CENTER | Facility: HOSPITAL | Age: 67
End: 2022-02-01

## 2022-02-01 VITALS
DIASTOLIC BLOOD PRESSURE: 64 MMHG | TEMPERATURE: 97.9 F | OXYGEN SATURATION: 95 % | SYSTOLIC BLOOD PRESSURE: 135 MMHG | BODY MASS INDEX: 32.1 KG/M2 | WEIGHT: 170 LBS | HEART RATE: 78 BPM | HEIGHT: 61 IN | RESPIRATION RATE: 18 BRPM

## 2022-02-01 LAB
ALBUMIN SERPL-MCNC: 3.3 G/DL (ref 3.5–5.2)
ALBUMIN/GLOB SERPL: 1.5 G/DL
ALP SERPL-CCNC: 69 U/L (ref 39–117)
ALT SERPL W P-5'-P-CCNC: 20 U/L (ref 1–33)
ANION GAP SERPL CALCULATED.3IONS-SCNC: 9 MMOL/L (ref 5–15)
AST SERPL-CCNC: 18 U/L (ref 1–32)
BASOPHILS # BLD AUTO: 0.04 10*3/MM3 (ref 0–0.2)
BASOPHILS NFR BLD AUTO: 0.3 % (ref 0–1.5)
BILIRUB CONJ SERPL-MCNC: <0.2 MG/DL (ref 0–0.3)
BILIRUB SERPL-MCNC: 0.2 MG/DL (ref 0–1.2)
BUN SERPL-MCNC: 26 MG/DL (ref 8–23)
BUN/CREAT SERPL: 34.2 (ref 7–25)
CALCIUM SPEC-SCNC: 8.9 MG/DL (ref 8.6–10.5)
CHLORIDE SERPL-SCNC: 101 MMOL/L (ref 98–107)
CO2 SERPL-SCNC: 31 MMOL/L (ref 22–29)
CREAT SERPL-MCNC: 0.76 MG/DL (ref 0.57–1)
DEPRECATED RDW RBC AUTO: 44.2 FL (ref 37–54)
EOSINOPHIL # BLD AUTO: 0.01 10*3/MM3 (ref 0–0.4)
EOSINOPHIL NFR BLD AUTO: 0.1 % (ref 0.3–6.2)
ERYTHROCYTE [DISTWIDTH] IN BLOOD BY AUTOMATED COUNT: 13.6 % (ref 12.3–15.4)
GFR SERPL CREATININE-BSD FRML MDRD: 76 ML/MIN/1.73
GLOBULIN UR ELPH-MCNC: 2.2 GM/DL
GLUCOSE SERPL-MCNC: 96 MG/DL (ref 65–99)
HCT VFR BLD AUTO: 37.9 % (ref 34–46.6)
HGB BLD-MCNC: 12.3 G/DL (ref 12–15.9)
IMM GRANULOCYTES # BLD AUTO: 0.17 10*3/MM3 (ref 0–0.05)
IMM GRANULOCYTES NFR BLD AUTO: 1.2 % (ref 0–0.5)
LYMPHOCYTES # BLD AUTO: 3.13 10*3/MM3 (ref 0.7–3.1)
LYMPHOCYTES NFR BLD AUTO: 21.6 % (ref 19.6–45.3)
MCH RBC QN AUTO: 29.3 PG (ref 26.6–33)
MCHC RBC AUTO-ENTMCNC: 32.5 G/DL (ref 31.5–35.7)
MCV RBC AUTO: 90.2 FL (ref 79–97)
MONOCYTES # BLD AUTO: 1.35 10*3/MM3 (ref 0.1–0.9)
MONOCYTES NFR BLD AUTO: 9.3 % (ref 5–12)
NEUTROPHILS NFR BLD AUTO: 67.5 % (ref 42.7–76)
NEUTROPHILS NFR BLD AUTO: 9.79 10*3/MM3 (ref 1.7–7)
NRBC BLD AUTO-RTO: 0 /100 WBC (ref 0–0.2)
PLATELET # BLD AUTO: 288 10*3/MM3 (ref 140–450)
PMV BLD AUTO: 10.5 FL (ref 6–12)
POTASSIUM SERPL-SCNC: 3.4 MMOL/L (ref 3.5–5.2)
PROT SERPL-MCNC: 5.5 G/DL (ref 6–8.5)
RBC # BLD AUTO: 4.2 10*6/MM3 (ref 3.77–5.28)
SODIUM SERPL-SCNC: 141 MMOL/L (ref 136–145)
WBC NRBC COR # BLD: 14.49 10*3/MM3 (ref 3.4–10.8)

## 2022-02-01 PROCEDURE — 80053 COMPREHEN METABOLIC PANEL: CPT | Performed by: INTERNAL MEDICINE

## 2022-02-01 PROCEDURE — 99239 HOSP IP/OBS DSCHRG MGMT >30: CPT | Performed by: NURSE PRACTITIONER

## 2022-02-01 PROCEDURE — 25010000002 REMDESIVIR 100 MG/20ML SOLUTION 1 EACH VIAL: Performed by: NURSE PRACTITIONER

## 2022-02-01 PROCEDURE — 85025 COMPLETE CBC W/AUTO DIFF WBC: CPT | Performed by: INTERNAL MEDICINE

## 2022-02-01 PROCEDURE — 25010000002 DAPTOMYCIN PER 1 MG: Performed by: NURSE PRACTITIONER

## 2022-02-01 PROCEDURE — 63710000001 DEXAMETHASONE PER 0.25 MG: Performed by: INTERNAL MEDICINE

## 2022-02-01 PROCEDURE — 25010000002 ENOXAPARIN PER 10 MG: Performed by: INTERNAL MEDICINE

## 2022-02-01 PROCEDURE — 25010000002 CEFTRIAXONE PER 250 MG: Performed by: NURSE PRACTITIONER

## 2022-02-01 PROCEDURE — 25010000002 HYDROMORPHONE PER 4 MG: Performed by: INTERNAL MEDICINE

## 2022-02-01 PROCEDURE — 82248 BILIRUBIN DIRECT: CPT | Performed by: INTERNAL MEDICINE

## 2022-02-01 RX ORDER — OXYCODONE HYDROCHLORIDE AND ACETAMINOPHEN 5; 325 MG/1; MG/1
1 TABLET ORAL EVERY 4 HOURS PRN
Qty: 18 TABLET | Refills: 0 | Status: SHIPPED | OUTPATIENT
Start: 2022-02-01 | End: 2022-02-04

## 2022-02-01 RX ORDER — LINEZOLID 600 MG/1
600 TABLET, FILM COATED ORAL 2 TIMES DAILY
Qty: 16 TABLET | Refills: 0 | Status: SHIPPED | OUTPATIENT
Start: 2022-02-01 | End: 2022-02-09

## 2022-02-01 RX ORDER — BENZONATATE 100 MG/1
100 CAPSULE ORAL 3 TIMES DAILY PRN
Qty: 20 CAPSULE | Refills: 0 | Status: SHIPPED | OUTPATIENT
Start: 2022-02-01 | End: 2022-12-29

## 2022-02-01 RX ORDER — ACETAMINOPHEN 325 MG/1
650 TABLET ORAL EVERY 4 HOURS PRN
Start: 2022-02-01 | End: 2022-02-01

## 2022-02-01 RX ORDER — ACETAMINOPHEN 325 MG/1
650 TABLET ORAL EVERY 4 HOURS PRN
Start: 2022-02-01

## 2022-02-01 RX ORDER — BENZONATATE 100 MG/1
100 CAPSULE ORAL 3 TIMES DAILY PRN
Qty: 20 CAPSULE | Refills: 0 | Status: SHIPPED | OUTPATIENT
Start: 2022-02-01 | End: 2022-02-01 | Stop reason: SDUPTHER

## 2022-02-01 RX ORDER — DEXAMETHASONE 6 MG/1
6 TABLET ORAL
Qty: 6 TABLET | Refills: 0 | Status: SHIPPED | OUTPATIENT
Start: 2022-02-02 | End: 2022-02-08

## 2022-02-01 RX ORDER — OXYCODONE HYDROCHLORIDE AND ACETAMINOPHEN 5; 325 MG/1; MG/1
1 TABLET ORAL EVERY 4 HOURS PRN
Qty: 20 TABLET | Refills: 0 | Status: SHIPPED | OUTPATIENT
Start: 2022-02-01 | End: 2022-02-04

## 2022-02-01 RX ORDER — DEXAMETHASONE 6 MG/1
6 TABLET ORAL
Qty: 6 TABLET | Refills: 0 | Status: SHIPPED | OUTPATIENT
Start: 2022-02-02 | End: 2022-02-01

## 2022-02-01 RX ORDER — LINEZOLID 600 MG/1
600 TABLET, FILM COATED ORAL 2 TIMES DAILY
Qty: 16 TABLET | Refills: 0 | Status: SHIPPED | OUTPATIENT
Start: 2022-02-01 | End: 2022-02-01 | Stop reason: SDUPTHER

## 2022-02-01 RX ORDER — ESCITALOPRAM OXALATE 5 MG/1
TABLET ORAL
Start: 2022-02-01 | End: 2022-11-22

## 2022-02-01 RX ADMIN — DAPTOMYCIN 350 MG: 500 INJECTION, POWDER, LYOPHILIZED, FOR SOLUTION INTRAVENOUS at 09:18

## 2022-02-01 RX ADMIN — LEVOTHYROXINE SODIUM 25 MCG: 0.03 TABLET ORAL at 05:39

## 2022-02-01 RX ADMIN — Medication 1 TABLET: at 09:09

## 2022-02-01 RX ADMIN — ENOXAPARIN SODIUM 40 MG: 40 INJECTION SUBCUTANEOUS at 09:09

## 2022-02-01 RX ADMIN — HYDROMORPHONE HYDROCHLORIDE 0.5 MG: 1 INJECTION, SOLUTION INTRAMUSCULAR; INTRAVENOUS; SUBCUTANEOUS at 09:04

## 2022-02-01 RX ADMIN — REMDESIVIR 100 MG: 100 INJECTION, POWDER, LYOPHILIZED, FOR SOLUTION INTRAVENOUS at 10:36

## 2022-02-01 RX ADMIN — FLUTICASONE PROPIONATE 2 SPRAY: 50 SPRAY, METERED NASAL at 09:09

## 2022-02-01 RX ADMIN — OXYCODONE HYDROCHLORIDE AND ACETAMINOPHEN 1 TABLET: 5; 325 TABLET ORAL at 00:13

## 2022-02-01 RX ADMIN — PRAVASTATIN SODIUM 40 MG: 40 TABLET ORAL at 09:09

## 2022-02-01 RX ADMIN — SODIUM CHLORIDE 2 G: 900 INJECTION INTRAVENOUS at 10:00

## 2022-02-01 RX ADMIN — PANTOPRAZOLE SODIUM 40 MG: 40 TABLET, DELAYED RELEASE ORAL at 05:39

## 2022-02-01 RX ADMIN — OXYCODONE HYDROCHLORIDE AND ACETAMINOPHEN 1 TABLET: 5; 325 TABLET ORAL at 05:39

## 2022-02-01 RX ADMIN — ESCITALOPRAM OXALATE 5 MG: 10 TABLET ORAL at 09:09

## 2022-02-01 RX ADMIN — OXYCODONE HYDROCHLORIDE AND ACETAMINOPHEN 1 TABLET: 5; 325 TABLET ORAL at 13:16

## 2022-02-01 RX ADMIN — DEXAMETHASONE 6 MG: 2 TABLET ORAL at 09:08

## 2022-02-01 NOTE — CASE MANAGEMENT/SOCIAL WORK
Case Management Discharge Note      Final Note: Plan is for pt. to dc to home. Have arranged HH with [facility] and they have contacted pt. Pt. has a rollator at bedside, ordered through [facility]. No other needs noted. Family to provide transport.         Selected Continued Care - Admitted Since 1/28/2022     Destination    No services have been selected for the patient.              Durable Medical Equipment Coordination complete.    Service Provider Selected Services Address Phone Fax Patient Preferred    [facility] [place]  Durable Medical Equipment [address], [facility] [address] [phone] --          Dialysis/Infusion    No services have been selected for the patient.              Home Medical Care Coordination complete.    Service Provider Selected Services Address Phone Fax Patient Preferred    [facility]  [facility] [address] [address] [phone] --          Therapy    No services have been selected for the patient.              Community Resources    No services have been selected for the patient.              Community & DME    No services have been selected for the patient.                       Final Discharge Disposition Code: 06 - home with home health care

## 2022-02-01 NOTE — DISCHARGE SUMMARY
[facility] Medicine Services  DISCHARGE SUMMARY    Patient Name: Park Ray  : 1955  MRN: [id number]    Date of Admission: 2022  9:38 AM  Date of Discharge:  2022  Primary Care Physician: Kate Garcia MD    Consults     Date and Time Order Name Status Description    2022  8:12 AM Inpatient Infectious Diseases Consult Completed     2022 12:34 AM Inpatient Infectious Diseases Consult Completed           Hospital Course     Presenting Problem:   Cellulitis of left anterior lower leg [L03.116]    Active Hospital Problems    Diagnosis  POA   • Cellulitis of left anterior lower leg [L03.116]  Yes      Resolved Hospital Problems   No resolved problems to display.          Hospital Course:  Park Ray is a 66 y.o. female with past medical history of colon cancer, fatty liver disease, hypothyroidism, irritable bowel syndrome, recurrent kidney tract infection who presented to the hospital with worsening cellulitis on top of left lower extremity hematoma.  She was also found to be mildly hypoxic with COVID-19 infection, Has been on room air.      Left lower extremity cellulitis  Elevated lactic acid, improved  CT Left LE --> Edema within the anterior soft tissues.  No discrete fluid collection or abscess    she had increased fluid collection at the site of her sloughed skin.  MD performed bedside needle aspiration of around 5 cc of serosanguineous turbid fluid.  Was sent for culture.  Cultures pending currently- no growth thus far  Continue Rocephin and daptomycin per ID recommendations inpatient.  Plan to transition to oral linezolid today at discharge per ID recommendations.   Follow CRP and procalcitonin-improving  Follow cultures and sensitivities- No growth to date     COVID-19 infection  Airborne isolation precautions   off isolation 2022  Mild hypoxia, but on room air with stable saturation  Chest x-ray with no acute infiltrates on  1/28.  repeat CXR 1/31 stable with no active disease  Started on prophylactic dose remdesivir. Completes 5th dose today  Continue Decadron (4/10)  Antitussives at dc  Incentive spirometry  Resume home lasix as needed     Hypothyroidism  Levothyroxine  TSH normal at 0.652     Elevated liver enzymes  Fatty liver disease  Likely secondary to Covid infection  Continue to monitor LFTs, improved.     History of recurrent UTI  Holding Macrobid     Anxiety  Continue home dose Valium as needed  Stable        Discharge Follow Up Recommendations for outpatient labs/diagnostics:   PCP follow up one week  Isolation until 2/7  Follow up with [facility] 2/9- continue linezolid through 2/9    Day of Discharge     HPI:   Still some leg pain with ambulation, but does better with rollator. Chest congestion much better. Some cough.    Review of Systems  Gen- No fevers, chills  CV- No chest pain, palpitations  Resp- No cough, dyspnea  GI- No N/V/D, abd pain      Vital Signs:   Temp:  [97.6 °F (36.4 °C)-98.3 °F (36.8 °C)] 97.8 °F (36.6 °C)  Heart Rate:  [74-78] 78  Resp:  [18-20] 20  BP: (149-164)/(72-90) 160/90      Physical Exam:  With patient's consent, physical exam was conducted via visual telemedicine encounter due to patient's current isolation requirements in the interest of PPE conservation.    Constitutional: No acute distress, awake, alert, nontoxic, normal body habitus  HENT: NCAT, MMM, no conjunctival injection  Respiratory: Good effort, nonlabored respirations   Cardiovascular:  tele with NSR  Musculoskeletal: No edema, normal muscle tone and mass for age  Psychiatric: Appropriate affect, good insight and judgement, cooperative  Neurologic: Oriented x 3, movements symmetric BUE and BLE, speech clear and fluent  Skin: No visible rashes, no jaundice seen on exposed skin through window        Pertinent  and/or Most Recent Results     LAB RESULTS:      Lab 02/01/22  0431 01/31/22  0520 01/30/22  1528 01/30/22  0431 01/29/22  1636  01/28/22  1450 01/28/22  1037   WBC 14.49* 13.03*  --  10.58  --   --  8.27   HEMOGLOBIN 12.3 12.1  --  12.0  --   --  13.4   HEMATOCRIT 37.9 37.1  --  39.6  --   --  40.4   PLATELETS 288 292  --  237  --   --  247   NEUTROS ABS 9.79* 9.84*  --  8.37*  --   --  5.44   IMMATURE GRANS (ABS) 0.17* 0.16*  --  0.05  --   --  0.02   LYMPHS ABS 3.13* 2.01  --  1.48  --   --  1.96   MONOS ABS 1.35* 1.00*  --  0.66  --   --  0.71   EOS ABS 0.01 0.00  --  0.00  --   --  0.11   MCV 90.2 88.5  --  95.9  --   --  87.3   CRP  --  <0.30 0.42  --   --   --   --    PROCALCITONIN  --   --   --   --  0.05  --   --    LACTATE  --   --   --   --   --  1.5 2.1*         Lab 02/01/22  0431 01/31/22  0520 01/30/22  1528 01/29/22  1643 01/29/22  1636 01/28/22  1450 01/28/22  1037   SODIUM 141 142 142  --   --   --  139   POTASSIUM 3.4* 3.7 4.9  --   --   --  3.1*   CHLORIDE 101 102 103  --   --   --  98   CO2 31.0* 30.0* 24.0  --   --   --  31.0*   ANION GAP 9.0 10.0 15.0  --   --   --  10.0   BUN 26* 25* 21  --   --   --  14   CREATININE 0.76 0.88 1.00  --   --  0.66 0.66   GLUCOSE 96 127* 157*  --   --   --  115*   CALCIUM 8.9 9.0 9.0  --   --   --  9.6   HEMOGLOBIN A1C  --   --   --  5.60  --   --   --    TSH  --   --   --   --  0.652  --   --          Lab 02/01/22  0431 01/31/22  0520 01/30/22  1528 01/29/22  1636 01/28/22  1450 01/28/22  1037   TOTAL PROTEIN 5.5* 5.8* 6.4  --  6.4 6.8   ALBUMIN 3.30* 3.30* 3.70  --  4.10 4.10   GLOBULIN 2.2 2.5 2.7  --   --  2.7   ALT (SGPT) 20 22 27  --  36* 36*   AST (SGOT) 18 17 31  --  32 30   BILIRUBIN 0.2 0.2 <0.2  --  0.5 0.5   BILIRUBIN DIRECT <0.2 <0.2 <0.2 <0.2 <0.2  --    ALK PHOS 69 74 88  --  96 94                     Brief Urine Lab Results     None        Microbiology Results (last 10 days)     Procedure Component Value - Date/Time    Wound Culture - Wound, Leg, Left [[id number]] Collected: 01/30/22 1233    Lab Status: Preliminary result Specimen: Wound from Leg, Left Updated: 02/01/22  0750     Wound Culture No growth at 2 days     Gram Stain Moderate (3+) WBCs seen      No organisms seen    COVID PRE-OP / PRE-PROCEDURE SCREENING ORDER (NO ISOLATION) - Swab, Nasopharynx [[id number]]  (Abnormal) Collected: 01/28/22 1525    Lab Status: Final result Specimen: Swab from Nasopharynx Updated: 01/28/22 1710    Narrative:      The following orders were created for panel order COVID PRE-OP / PRE-PROCEDURE SCREENING ORDER (NO ISOLATION) - Swab, Nasopharynx.  Procedure                               Abnormality         Status                     ---------                               -----------         ------                     COVID-19 and FLU A/B PCR...[[id number]]  Abnormal            Final result                 Please view results for these tests on the individual orders.    COVID-19 and FLU A/B PCR - Swab, Nasopharynx [[id number]]  (Abnormal) Collected: 01/28/22 1525    Lab Status: Final result Specimen: Swab from Nasopharynx Updated: 01/28/22 1710     COVID19 Detected     Influenza A PCR Not Detected     Influenza B PCR Not Detected    Narrative:      Fact sheet for providers: https://www.fda.gov/media/182140/download    Fact sheet for patients: https://www.fda.gov/media/117554/download    Test performed by PCR.  Influenza A and Influenza B negative results should be considered presumptive in samples that have a positive SARS-CoV-2 result.    Competitive inhibition studies showed that SARS-CoV-2 virus, when present at concentrations above 3.6E+04 copies/mL, can inhibit the detection and amplification of influenza A and influenza B virus RNA if present at or below 1.8E+02 copies/mL or 4.9E+02 copies/mL, respectively, and may lead to false negative influenza virus results. If co-infection with influenza A or influenza B virus is suspected in samples with a positive SARS-CoV-2 result, the sample should be re-tested with another FDA cleared, approved, or authorized influenza test, if influenza virus  detection would change clinical management.    Wound Culture - Wound, Leg, Left [[id number]] Collected: 01/28/22 1037    Lab Status: Final result Specimen: Wound from Leg, Left Updated: 01/31/22 0800     Wound Culture No growth at 3 days     Gram Stain No WBCs or organisms seen    Blood Culture - Blood, Arm, Left [[id number]]  (Normal) Collected: 01/28/22 1035    Lab Status: Preliminary result Specimen: Blood from Arm, Left Updated: 01/31/22 1100     Blood Culture No growth at 3 days    Blood Culture - Blood, Arm, Right [[id number]]  (Normal) Collected: 01/28/22 1030    Lab Status: Preliminary result Specimen: Blood from Arm, Right Updated: 01/31/22 1100     Blood Culture No growth at 3 days          XR Tibia Fibula 2 View Left    Result Date: 1/28/2022  DATE OF EXAM: 1/28/2022 10:19 AM  PROCEDURE: XR TIBIA FIBULA 2 VW LEFT-  INDICATIONS: wound, lt leg pain  COMPARISON: No Comparisons Available  TECHNIQUE: Two radiographic views of the left tibia and fibula were obtained.  FINDINGS: It looks like there is edema in the subcutaneous soft tissues. There is osseous demineralization. There are degenerative changes involving the knee. A fracture is not definite identified. There is no radiopaque foreign body.      1.  Subcutaneous edema suggested. 2.  Osseous demineralization. 3.  Degenerative change knee  This report was finalized on 1/28/2022 10:53 AM by Sonny Macdonald MD.      XR Chest 1 View    Result Date: 1/31/2022  DATE OF EXAM: 1/31/2022 1:43 PM  PROCEDURE: XR CHEST 1 VW-  INDICATIONS: hypoxia; L03.116-Cellulitis of left lower limb  COMPARISON: 1/28/2022  TECHNIQUE: Single radiographic view of the chest was obtained.  FINDINGS: Lungs are clear bilaterally. Cardiac and mediastinal contours within normal limits. Regional skeleton is unremarkable.      No acute cardiopulmonary abnormality.  This report was finalized on 1/31/2022 2:06 PM by William Sanchez MD.      XR Chest 1 View    Result Date: 1/28/2022  EXAMINATION: XR  CHEST 1 VW-  INDICATION: covid 19 positive; L03.116-Cellulitis of left lower limb  COMPARISON: 6/1/2017  FINDINGS: Heart mediastinum and pulmonary vasculature appear within normal limits. Lungs appear normally expanded and clear. Mild left pericardial fat pad is unchanged. Old healed left clavicle fracture is noted.       No evidence of active chest disease.    This report was finalized on 1/28/2022 5:55 PM by Dr. Aleksander Banerjee MD.      CT Lower Extremity Left Without Contrast    Result Date: 1/28/2022   DATE OF EXAM: 1/28/2022 7:29 PM  PROCEDURE: CT LOWER EXTREMITY LEFT WO CONTRAST-  INDICATIONS: soft tissue infection, recent hematoma; L03.116-Cellulitis of left lower limb  COMPARISON: No Comparisons Available  TECHNIQUE: CT of the left tibia and fibula was obtained without the administration of contrast. Coronal and sagittal reformats were obtained. Automated exposure control and alternative reconstruction methods were used.  FINDINGS: There is a small knee joint effusion..  There is soft tissue swelling anterior to the tibial tuberosity.  There is subcutaneous edema along the anterior medial aspect of the lower leg.  No discrete fluid collection or abscess identified.  No abnormal enhancement seen within the muscles.  No abnormal periosteal reaction identified.  No bony destructive lesions are noted.  There is no evidence of acute fracture. There are degenerative changes of the patellofemoral joint with osteophyte formation and joint space narrowing.       1.  Edema within the anterior soft tissues.  No discrete fluid collection or abscess. 2.  No acute fracture. 3.  Degenerative change of the knee with small joint effusion.  This report was finalized on 1/28/2022 9:51 PM by Adiel Noonan MD.                    Plan for Follow-up of Pending Labs/Results:   Pending Labs     Order Current Status    Blood Culture - Blood, Arm, Left Preliminary result    Blood Culture - Blood, Arm, Right Preliminary result    Wound  Culture - Wound, Leg, Left Preliminary result        Discharge Details        Discharge Medications      New Medications      Instructions Start Date   acetaminophen 325 MG tablet  Commonly known as: TYLENOL   650 mg, Oral, Every 4 Hours PRN      benzonatate 100 MG capsule  Commonly known as: Tessalon Perles   100 mg, Oral, 3 Times Daily PRN      dexamethasone 6 MG tablet  Commonly known as: DECADRON   6 mg, Oral, Daily With Breakfast   Start Date: February 2, 2022     linezolid 600 MG tablet  Commonly known as: ZYVOX   600 mg, Oral, 2 Times Daily      oxyCODONE-acetaminophen 5-325 MG per tablet  Commonly known as: PERCOCET   1 tablet, Oral, Every 4 Hours PRN         Changes to Medications      Instructions Start Date   escitalopram 5 MG tablet  Commonly known as: LEXAPRO  What changed:   how much to take  how to take this  when to take this  additional instructions   Hold medication while on Linezolid         Continue These Medications      Instructions Start Date   diazePAM 5 MG tablet  Commonly known as: VALIUM   5 mg, Oral, 2 Times Daily PRN      fluticasone 50 MCG/ACT nasal spray  Commonly known as: FLONASE   2 sprays, Nasal, Daily      furosemide 40 MG tablet  Commonly known as: LASIX   40 mg, Oral, As Needed      levothyroxine 25 MCG tablet  Commonly known as: SYNTHROID, LEVOTHROID   25 mcg, Oral, Daily      meloxicam 15 MG tablet  Commonly known as: MOBIC   15 mg, Oral, Daily      Milk Thistle 140 MG capsule   1 capsule, Oral, Daily      multivitamin with minerals tablet tablet   1 tablet, Oral, Daily      omeprazole 40 MG capsule  Commonly known as: priLOSEC   20 mg, Oral, Daily      pravastatin 40 MG tablet  Commonly known as: PRAVACHOL   40 mg, Oral, Daily      Vitamin D-3 125 MCG (5000 UT) tablet   1 tablet, Oral, Daily         Stop These Medications    acyclovir 400 MG tablet  Commonly known as: ZOVIRAX     cephalexin 500 MG capsule  Commonly known as: KEFLEX     nitrofurantoin 50 MG capsule  Commonly  known as: MACRODANTIN     Ospemifene 60 MG tablet            Allergies   Allergen Reactions   • Tetracyclines & Related Hives         Discharge Disposition:  Home or Self Care    Diet:  Hospital:  Diet Order   Procedures   • Diet Regular       Activity:  Activity Instructions     Activity as Tolerated            Restrictions or Other Recommendations:         CODE STATUS:    Code Status and Medical Interventions:   Ordered at: 01/28/22 1541     Level Of Support Discussed With:    Patient     Code Status (Patient has no pulse and is not breathing):    CPR (Attempt to Resuscitate)     Medical Interventions (Patient has pulse or is breathing):    Full Support       Future Appointments   Date Time Provider Department Center   3/1/2022  9:20 AM Abbie Mas PA-C [facility]       Additional Instructions for the Follow-ups that You Need to Schedule     Ambulatory Referral to Home Health   As directed      Face to Face Visit Date: 1/31/2022    Follow-up provider for Plan of Care?: I treated the patient in an acute care facility and will not continue treatment after discharge.    Follow-up provider: GERALDINE GARCIA [[id number]]    Reason/Clinical Findings: Left lower leg cellulitis, Covid 19 positive    Describe mobility limitations that make leaving home difficult: Impaired functional ability, balance, gait and endurance.    Nursing/Therapeutic Services Requested: Skilled Nursing Physical Therapy Occupational Therapy    Skilled nursing orders: Wound care dressing/changes Cardiopulmonary assessments    PT orders: Therapeutic exercise Strengthening Home safety assessment    Occupational orders: Activities of daily living Energy conservation    Frequency: 1 Week 1         Discharge Follow-up with PCP   As directed       Currently Documented PCP:    Geraldine Garcia MD    PCP Phone Number:    [phone]     Follow Up Details: 1 week         Discharge Follow-up with Specified Provider: YOLANDA braden 2/9   As directed       To: YOLANDA braden 2/9                     SAMANTHA Cortes  02/01/22      Time Spent on Discharge:  I spent  50  minutes on this discharge activity which included: face-to-face encounter with the patient, reviewing the data in the system, coordination of the care with the nursing staff as well as consultants, documentation, and entering orders.        Electronically signed by SAMANTHA Cortes, 02/01/22, 9:40 AM EST.

## 2022-02-02 ENCOUNTER — READMISSION MANAGEMENT (OUTPATIENT)
Dept: CALL CENTER | Facility: HOSPITAL | Age: 67
End: 2022-02-02

## 2022-02-02 LAB
BACTERIA SPEC AEROBE CULT: NORMAL
GRAM STN SPEC: NORMAL
GRAM STN SPEC: NORMAL

## 2022-02-02 NOTE — OUTREACH NOTE
Prep Survey      Responses   [facility] patient discharged from? [facility]   Is LACE score < 7 ? No   Emergency Room discharge w/ pulse ox? No   Eligibility Readm Mgmt   Discharge diagnosis Cellulitis of left anterior lower leg,  Covid-19    Does the patient have one of the following disease processes/diagnoses(primary or secondary)? COVID-19   Does the patient have Home health ordered? Yes   What is the Home health agency?  [facility]     Is there a DME ordered? Yes   What DME was ordered? [facility]   Comments regarding appointments See AVS   Prep survey completed? Yes          Magda Watson RN

## 2022-02-02 NOTE — OUTREACH NOTE
COVID-19 Week 1 Survey      Responses   [facility] patient discharged from? [facility]   Does the patient have one of the following disease processes/diagnoses(primary or secondary)? COVID-19   COVID-19 underlying condition? None   Call Number Call 1   Week 1 Call successful? No   Discharge diagnosis Cellulitis of left anterior lower leg,  Covid-19           Kaylyn Montalvo RN

## 2022-02-03 ENCOUNTER — READMISSION MANAGEMENT (OUTPATIENT)
Dept: CALL CENTER | Facility: HOSPITAL | Age: 67
End: 2022-02-03

## 2022-02-03 ENCOUNTER — HOME CARE VISIT (OUTPATIENT)
Dept: HOME HEALTH SERVICES | Facility: HOME HEALTHCARE | Age: 67
End: 2022-02-03

## 2022-02-03 VITALS
DIASTOLIC BLOOD PRESSURE: 86 MMHG | TEMPERATURE: 96.9 F | HEART RATE: 72 BPM | RESPIRATION RATE: 16 BRPM | OXYGEN SATURATION: 98 % | SYSTOLIC BLOOD PRESSURE: 132 MMHG

## 2022-02-03 PROCEDURE — G0299 HHS/HOSPICE OF RN EA 15 MIN: HCPCS

## 2022-02-03 NOTE — HOME HEALTH
Patient is a 66yr old  female who was dc'd 2.1.22 from [facility] after admission for cellulitis LLE. Patient had large hematoma aspirated that subsequently developed an infection.. other pmh: h/o colon cancer, fatty liver disease, hypothyroid, IBS, Recurrent kidney infection. Pt tested positive for covid in the hospital but is asymptomatic. She is in isolation until 2.7.22. sn foc wound care ( caregiver is , Antoine, who is able to perform wound care) , medication management, pain management and falls prevention. sn, pt, ot ordered. Pcp Dr Garcia, ID is Dr Horn.

## 2022-02-03 NOTE — OUTREACH NOTE
COVID-19 Week 1 Survey      Responses   [facility], [place] patient discharged from? [facility]   Does the patient have one of the following disease processes/diagnoses(primary or secondary)? COVID-19   COVID-19 underlying condition? None   Call Number Call 2   Week 1 Call successful? Yes   Call start time 0943   Call end time 1008   Discharge diagnosis Cellulitis of left anterior lower leg,  Covid-19    Meds reviewed with patient/caregiver? Yes   Is the patient having any side effects they believe may be caused by any medication additions or changes? No   Does the patient have all medications ordered at discharge? Yes   Is the patient taking all medications as directed (includes completed medication regime)? Yes   Does the patient have a primary care provider?  Yes   Does the patient have an appointment with their PCP or specialist within 7 days of discharge? Yes   Has the patient kept scheduled appointments due by today? N/A   What is the Home health agency?  [facility]    Has home health visited the patient within 72 hours of discharge? Call prior to 72 hours   Home health comments HH will visit today, 2/3/22   Psychosocial issues? No   Comments states having pain of 10 on left leg wound, pt rewraps leg daily, states wound is open, raw, with yellow exudate, is anticipating HH RN will assist with wound dsgs and provide dressing supplies   Did the patient receive a copy of their discharge instructions? Yes   Did the patient receive a copy of COVID-19 specific instructions? Yes   Nursing interventions Reviewed instructions with patient   What is the patient's perception of their health status since discharge? Improving   Does the patient have any of the following symptoms? Cough  [occasional cough]   Nursing Interventions Nurse provided patient education   Pulse Ox monitoring Intermittent   Pulse Ox device source Patient   O2 Sat comments 94-96% on RA   O2 Sat: education provided Sat levels,  Monitoring frequency,  When  to seek care   Is the patient/caregiver able to teach back steps to recovery at home? Set small, achievable goals for return to baseline health,  Rest and rebuild strength, gradually increase activity,  Eat a well-balance diet   If the patient is a current smoker, are they able to teach back resources for cessation? Not a smoker   Is the patient/caregiver able to teach back the hierarchy of who to call/visit for symptoms/problems? PCP, Specialist, Home health nurse, Urgent Care, ED, 911 Yes   COVID-19 call completed? Yes          Aura Orozco RN

## 2022-02-04 ENCOUNTER — HOME CARE VISIT (OUTPATIENT)
Dept: HOME HEALTH SERVICES | Facility: HOME HEALTHCARE | Age: 67
End: 2022-02-04

## 2022-02-04 ENCOUNTER — READMISSION MANAGEMENT (OUTPATIENT)
Dept: CALL CENTER | Facility: HOSPITAL | Age: 67
End: 2022-02-04

## 2022-02-04 NOTE — OUTREACH NOTE
COVID-19 Week 1 Survey      Responses   [facility] patient discharged from? [place]   Does the patient have one of the following disease processes/diagnoses(primary or secondary)? COVID-19   COVID-19 underlying condition? None   Call Number Call 3   Week 1 Call successful? No   Discharge diagnosis Cellulitis of left anterior lower leg,  Covid-19           Erika Parry RN

## 2022-02-07 ENCOUNTER — HOME CARE VISIT (OUTPATIENT)
Dept: HOME HEALTH SERVICES | Facility: HOME HEALTHCARE | Age: 67
End: 2022-02-07

## 2022-02-07 VITALS
SYSTOLIC BLOOD PRESSURE: 128 MMHG | HEART RATE: 8 BPM | DIASTOLIC BLOOD PRESSURE: 78 MMHG | RESPIRATION RATE: 16 BRPM | OXYGEN SATURATION: 97 % | TEMPERATURE: 97.8 F

## 2022-02-07 PROCEDURE — G0299 HHS/HOSPICE OF RN EA 15 MIN: HCPCS

## 2022-02-08 ENCOUNTER — READMISSION MANAGEMENT (OUTPATIENT)
Dept: CALL CENTER | Facility: HOSPITAL | Age: 67
End: 2022-02-08

## 2022-02-08 NOTE — OUTREACH NOTE
COVID-19 Week 2 Survey      Responses   [facility] patient discharged from? [facility]   Does the patient have one of the following disease processes/diagnoses(primary or secondary)? COVID-19   COVID-19 underlying condition? None   Call Number Call 1   COVID-19 Week 2: Call 1 attempt successful? No   Discharge diagnosis Cellulitis of left anterior lower leg,  Covid-19           Amina Amado LPN

## 2022-02-09 ENCOUNTER — TRANSCRIBE ORDERS (OUTPATIENT)
Dept: LAB | Facility: HOSPITAL | Age: 67
End: 2022-02-09

## 2022-02-09 ENCOUNTER — LAB (OUTPATIENT)
Dept: LAB | Facility: HOSPITAL | Age: 67
End: 2022-02-09

## 2022-02-09 DIAGNOSIS — L03.116 CELLULITIS OF LEFT FOOT: ICD-10-CM

## 2022-02-09 DIAGNOSIS — L03.116 CELLULITIS OF LEFT FOOT: Primary | ICD-10-CM

## 2022-02-09 LAB
BASOPHILS # BLD AUTO: 0.03 10*3/MM3 (ref 0–0.2)
BASOPHILS NFR BLD AUTO: 0.2 % (ref 0–1.5)
DEPRECATED RDW RBC AUTO: 45.2 FL (ref 37–54)
EOSINOPHIL # BLD AUTO: 0.18 10*3/MM3 (ref 0–0.4)
EOSINOPHIL NFR BLD AUTO: 1.1 % (ref 0.3–6.2)
ERYTHROCYTE [DISTWIDTH] IN BLOOD BY AUTOMATED COUNT: 14 % (ref 12.3–15.4)
HCT VFR BLD AUTO: 40.1 % (ref 34–46.6)
HGB BLD-MCNC: 13.3 G/DL (ref 12–15.9)
IMM GRANULOCYTES # BLD AUTO: 0.07 10*3/MM3 (ref 0–0.05)
IMM GRANULOCYTES NFR BLD AUTO: 0.4 % (ref 0–0.5)
LYMPHOCYTES # BLD AUTO: 3.37 10*3/MM3 (ref 0.7–3.1)
LYMPHOCYTES NFR BLD AUTO: 20 % (ref 19.6–45.3)
MCH RBC QN AUTO: 29.2 PG (ref 26.6–33)
MCHC RBC AUTO-ENTMCNC: 33.2 G/DL (ref 31.5–35.7)
MCV RBC AUTO: 87.9 FL (ref 79–97)
MONOCYTES # BLD AUTO: 0.99 10*3/MM3 (ref 0.1–0.9)
MONOCYTES NFR BLD AUTO: 5.9 % (ref 5–12)
NEUTROPHILS NFR BLD AUTO: 12.23 10*3/MM3 (ref 1.7–7)
NEUTROPHILS NFR BLD AUTO: 72.4 % (ref 42.7–76)
NRBC BLD AUTO-RTO: 0 /100 WBC (ref 0–0.2)
PLATELET # BLD AUTO: 217 10*3/MM3 (ref 140–450)
PMV BLD AUTO: 9.9 FL (ref 6–12)
RBC # BLD AUTO: 4.56 10*6/MM3 (ref 3.77–5.28)
WBC NRBC COR # BLD: 16.87 10*3/MM3 (ref 3.4–10.8)

## 2022-02-09 PROCEDURE — 36415 COLL VENOUS BLD VENIPUNCTURE: CPT

## 2022-02-09 PROCEDURE — 85025 COMPLETE CBC W/AUTO DIFF WBC: CPT

## 2022-02-11 ENCOUNTER — HOME CARE VISIT (OUTPATIENT)
Dept: HOME HEALTH SERVICES | Facility: HOME HEALTHCARE | Age: 67
End: 2022-02-11

## 2022-02-11 PROCEDURE — G0299 HHS/HOSPICE OF RN EA 15 MIN: HCPCS

## 2022-02-12 VITALS
DIASTOLIC BLOOD PRESSURE: 73 MMHG | TEMPERATURE: 97.3 F | RESPIRATION RATE: 16 BRPM | SYSTOLIC BLOOD PRESSURE: 118 MMHG | OXYGEN SATURATION: 95 % | HEART RATE: 82 BPM

## 2022-02-12 NOTE — HOME HEALTH
Patient reports she will be recieving weekly IV antibiotic infusions, and blood draws per her physician.  Patient will discuss at her appointment having these done via home health.  She was not aware that we could offer that service for her.

## 2022-02-15 ENCOUNTER — HOME CARE VISIT (OUTPATIENT)
Dept: HOME HEALTH SERVICES | Facility: HOME HEALTHCARE | Age: 67
End: 2022-02-15

## 2022-02-15 ENCOUNTER — READMISSION MANAGEMENT (OUTPATIENT)
Dept: CALL CENTER | Facility: HOSPITAL | Age: 67
End: 2022-02-15

## 2022-02-15 PROCEDURE — G0299 HHS/HOSPICE OF RN EA 15 MIN: HCPCS

## 2022-02-15 NOTE — OUTREACH NOTE
COVID-19 Week 3 Survey      Responses   [facility] patient discharged from? [facility]   Does the patient have one of the following disease processes/diagnoses(primary or secondary)? COVID-19   COVID-19 underlying condition? None   Call Number Call 1   COVID-19 Week 3: Call 1 attempt successful? No   Discharge diagnosis Cellulitis of left anterior lower leg,  Covid-19           Peyton Verma RN

## 2022-02-17 VITALS
DIASTOLIC BLOOD PRESSURE: 69 MMHG | OXYGEN SATURATION: 96 % | TEMPERATURE: 97.8 F | HEART RATE: 80 BPM | SYSTOLIC BLOOD PRESSURE: 123 MMHG

## 2022-02-18 ENCOUNTER — HOME CARE VISIT (OUTPATIENT)
Dept: HOME HEALTH SERVICES | Facility: HOME HEALTHCARE | Age: 67
End: 2022-02-18

## 2022-02-20 NOTE — CASE COMMUNICATION
Missed visit: No answer to phone calls, message left requesting time for nurse visit, no return call from patient.

## 2022-02-21 ENCOUNTER — LAB (OUTPATIENT)
Dept: LAB | Facility: HOSPITAL | Age: 67
End: 2022-02-21

## 2022-02-21 ENCOUNTER — TRANSCRIBE ORDERS (OUTPATIENT)
Dept: LAB | Facility: HOSPITAL | Age: 67
End: 2022-02-21

## 2022-02-21 DIAGNOSIS — L03.116 CELLULITIS OF LEFT FOOT: ICD-10-CM

## 2022-02-21 DIAGNOSIS — B37.2 CANDIDIASIS OF SKIN AND NAILS: Primary | ICD-10-CM

## 2022-02-21 DIAGNOSIS — D72.829 LEUKOCYTOSIS, UNSPECIFIED TYPE: ICD-10-CM

## 2022-02-21 DIAGNOSIS — S80.12XD CONTUSION OF MULTIPLE SITES OF LEFT LEG, SUBSEQUENT ENCOUNTER: ICD-10-CM

## 2022-02-21 DIAGNOSIS — S80.812D ABRASION OF LEFT LEG, SUBSEQUENT ENCOUNTER: ICD-10-CM

## 2022-02-21 PROCEDURE — 87070 CULTURE OTHR SPECIMN AEROBIC: CPT | Performed by: INTERNAL MEDICINE

## 2022-02-21 PROCEDURE — 87205 SMEAR GRAM STAIN: CPT | Performed by: INTERNAL MEDICINE

## 2022-02-22 ENCOUNTER — HOME CARE VISIT (OUTPATIENT)
Dept: HOME HEALTH SERVICES | Facility: HOME HEALTHCARE | Age: 67
End: 2022-02-22

## 2022-02-23 ENCOUNTER — HOME CARE VISIT (OUTPATIENT)
Dept: HOME HEALTH SERVICES | Facility: HOME HEALTHCARE | Age: 67
End: 2022-02-23

## 2022-02-23 ENCOUNTER — TRANSCRIBE ORDERS (OUTPATIENT)
Dept: ADMINISTRATIVE | Facility: HOSPITAL | Age: 67
End: 2022-02-23

## 2022-02-23 DIAGNOSIS — L98.499 NON-HEALING ULCER, WITH UNSPECIFIED SEVERITY: Primary | ICD-10-CM

## 2022-02-23 DIAGNOSIS — M79.605 LEFT LEG PAIN: ICD-10-CM

## 2022-02-23 DIAGNOSIS — R22.42 LOCALIZED SWELLING, MASS, OR LUMP OF LEFT LOWER EXTREMITY: ICD-10-CM

## 2022-02-24 ENCOUNTER — TRANSCRIBE ORDERS (OUTPATIENT)
Dept: ADMINISTRATIVE | Facility: HOSPITAL | Age: 67
End: 2022-02-24

## 2022-02-24 ENCOUNTER — HOSPITAL ENCOUNTER (OUTPATIENT)
Dept: PHYSICAL THERAPY | Facility: HOSPITAL | Age: 67
Setting detail: THERAPIES SERIES
Discharge: HOME | End: 2022-02-24

## 2022-02-24 ENCOUNTER — HOSPITAL ENCOUNTER (OUTPATIENT)
Dept: CARDIOLOGY | Facility: HOSPITAL | Age: 67
Discharge: HOME | End: 2022-02-24
Admitting: INTERNAL MEDICINE

## 2022-02-24 DIAGNOSIS — L97.221 NON-PRESSURE CHRONIC ULCER OF LEFT CALF, LIMITED TO BREAKDOWN OF SKIN: Primary | ICD-10-CM

## 2022-02-24 DIAGNOSIS — S81.802D OPEN WOUND OF LEFT LOWER EXTREMITY, SUBSEQUENT ENCOUNTER: ICD-10-CM

## 2022-02-24 DIAGNOSIS — L97.221 NON-PRESSURE CHRONIC ULCER OF LEFT CALF, LIMITED TO BREAKDOWN OF SKIN: ICD-10-CM

## 2022-02-24 DIAGNOSIS — M79.662 PAIN AND SWELLING OF LEFT LOWER LEG: ICD-10-CM

## 2022-02-24 DIAGNOSIS — L03.116 CELLULITIS OF LEFT ANTERIOR LOWER LEG: Primary | ICD-10-CM

## 2022-02-24 DIAGNOSIS — L03.116 CELLULITIS OF LEFT FOOT: Primary | ICD-10-CM

## 2022-02-24 DIAGNOSIS — M79.89 PAIN AND SWELLING OF LEFT LOWER LEG: ICD-10-CM

## 2022-02-24 LAB
BACTERIA SPEC AEROBE CULT: NORMAL
BH CV GRAFT BRACHIAL PRESSURE LEFT: 142 MMHG
BH CV GRAFT BRACHIAL PRESSURE RIGHT: 154 MMHG
BH CV LEA LEFT ANT TIBIAL A DISTAL EDV: 4 CM/S
BH CV LEA LEFT ANT TIBIAL A DISTAL PSV: 98 CM/S
BH CV LEA LEFT ANT TIBIAL A MID EDV: 6 CM/S
BH CV LEA LEFT ANT TIBIAL A MID PSV: 56 CM/S
BH CV LEA LEFT ANT TIBIAL A PROX PSV: 79 CM/S
BH CV LEA LEFT CFA PROX EDV: 5 CM/S
BH CV LEA LEFT CFA PROX PSV: 127 CM/S
BH CV LEA LEFT DFA PROX EDV: 7 CM/S
BH CV LEA LEFT DFA PROX PSV: 75 CM/S
BH CV LEA LEFT DPA PRESSURE: 140 MMHG
BH CV LEA LEFT PERONEAL  MID EDV: 7 CM/S
BH CV LEA LEFT PERONEAL  MID PSV: 52 CM/S
BH CV LEA LEFT POPITEAL A  DISTAL PSV: 82 CM/S
BH CV LEA LEFT POPITEAL A  PROX PSV: 85 CM/S
BH CV LEA LEFT PTA DISTAL PSV: 69 CM/S
BH CV LEA LEFT PTA MID PSV: 84 CM/S
BH CV LEA LEFT PTA PRESSURE: 142 MMHG
BH CV LEA LEFT PTA PROX EDV: 6 CM/S
BH CV LEA LEFT PTA PROX PSV: 86 CM/S
BH CV LEA LEFT SFA DISTAL PSV: 86 CM/S
BH CV LEA LEFT SFA MID EDV: 3 CM/S
BH CV LEA LEFT SFA MID PSV: 115 CM/S
BH CV LEA LEFT SFA PROX EDV: 3 CM/S
BH CV LEA LEFT SFA PROX PSV: 115 CM/S
BH CV LEA RIGHT DPA PRESSURE: 138 MMHG
BH CV LEA RIGHT PTA PRESSURE: 156 MMHG
BH CV LOWER ARTERIAL LEFT ABI RATIO: 0.92
BH CV LOWER ARTERIAL RIGHT ABI RATIO: 1.01
GRAM STN SPEC: NORMAL
MAXIMAL PREDICTED HEART RATE: 154 BPM
STRESS TARGET HR: 131 BPM

## 2022-02-24 PROCEDURE — 97597 DBRDMT OPN WND 1ST 20 CM/<: CPT | Performed by: PHYSICAL THERAPIST

## 2022-02-24 PROCEDURE — 29581 APPL MULTLAYER CMPRN SYS LEG: CPT | Performed by: PHYSICAL THERAPIST

## 2022-02-24 PROCEDURE — 93926 LOWER EXTREMITY STUDY: CPT | Performed by: INTERNAL MEDICINE

## 2022-02-24 PROCEDURE — 97162 PT EVAL MOD COMPLEX 30 MIN: CPT | Performed by: PHYSICAL THERAPIST

## 2022-02-24 PROCEDURE — 93926 LOWER EXTREMITY STUDY: CPT

## 2022-02-24 NOTE — THERAPY EVALUATION
Outpatient Rehabilitation - Wound/Debridement Initial Eval   [facility]     Patient Name: Park Ray  : 1955  MRN: [id number]  Today's Date: 2022                  Admit Date: 2022    Visit Dx:    ICD-10-CM ICD-9-CM   1. Cellulitis of left anterior lower leg  L03.116 682.6   2. Open wound of left lower extremity, subsequent encounter  S81.802D V58.89     891.0   3. Pain and swelling of left lower leg  M79.662 729.5    M79.89 729.81       Patient Active Problem List   Diagnosis   • OAB (overactive bladder)   • Mixed incontinence urge and stress   • Menopause   • Hypothyroidism   • Fatty liver   • Family history of breast cancer in mother   • Superficial dyspareunia   • Colon cancer (HCC)   • Recurrent genital herpes   • Vaginal atrophy   • Cellulitis of left anterior lower leg        Past Medical History:   Diagnosis Date   • Acute torn meniscus of knee     left   • Closed displaced fracture of shaft of left clavicle    • Closed nondisplaced fracture of neck of left radius    • Colon cancer (HCC)    • Dyspareunia    • Family history of breast cancer in mother    • Fatty liver    • Fracture of proximal phalanx of toe    • Herpes    • Hypothyroidism    • IBS (irritable bowel syndrome)    • Left wrist pain    • Menopause    • Mixed incontinence urge and stress    • OAB (overactive bladder)    • Osteoarthritis    • Right wrist pain         Past Surgical History:   Procedure Laterality Date   •  SECTION     • CHOLECYSTECTOMY     • COLECTOMY PARTIAL / TOTAL     • COLON RESECTION     • COLONOSCOPY     • ENTEROCELE REPAIR     • DAWSON MORA (MADDISON) PROCEDURE     • PELVIC LAPAROSCOPY     • RECTAL SURGERY     • SALPINGO OOPHORECTOMY Right    • TOTAL ABDOMINAL HYSTERECTOMY WITH SALPINGO OOPHORECTOMY Left    • UTERINE SUSPENSION LAPAROSCOPIC          Patient History     Row Name 22 2440             History    Chief Complaint Ulcer, wound or other skin conditions; Swelling;  Pain  -MW      Type of Pain Lower Extremity / Leg  -MW      Brief Description of Current Complaint Pt reports fell on wooden steps and hit shins. Lt shin with bruise that increased/ worsened overtime. Was seen in ED 1/14, by ortho MD 1/20. Presented to ED again 1/28 and was admitted for cellulitis LLE due to infected hematoma, and was found to be COVID positive in spite of vaccinations and booster. Pt was treated by [facility] for wound care, received IV antibx. She was discharged 2/1 with HH for wound care and therapy. She has followed up with [facility] for her cellulitis and was referred to PT wound care for LLE cellulitis/ open wound.  -MW      Previous treatment for THIS PROBLEM Medication; Other (comment)  -MW      Patient/Caregiver Goals Heal wound; Decrease swelling; Relieve pain  -MW      Patient seeing anyone else for problem(s)? [facility], ortho  -MW              Pain     Pain Location Leg  -MW      Pain Description Aching; Sharp; Shooting; Tightness  increased pain with movement  -MW      Is your sleep disturbed? Yes  -MW      Difficulties with ADL's? Bathing, dressing, wound management  -MW              Fall Risk Assessment    Any falls in the past year: Yes  -MW      Number of falls reported in the last 12 months 1  -MW      Factors that contributed to the fall: Tripped; Uneven surface  -MW              Services    Prior Rehab/Home Health Experiences Yes  -MW      When was the prior experience with Rehab/Home Health Tuesday  -MW      Where was the prior experience with Rehab/Home Health [facility]  -MW      Are you currently receiving Home Health services No  -MW      Do you plan to receive Home Health services in the near future No  -MW              Daily Activities    Primary Language English  -MW      How does patient learn best? Demonstration  -MW      Teaching needs identified Management of Condition  -MW      Patient is concerned about/has problems with Climbing Stairs; Difficulty with self care (i.e. bathing,  "dressing, toileting:; Walking  wound management  -MW      Does patient have problems with the following? --  -MW      Barriers to learning Hearing  -MW      Action taken for identified issues Reviewed with spouse  -MW      Pt Participated in POC and Goals Yes  -MW              Safety    Are you being hurt, hit, or frightened by anyone at home or in your life? No  -MW      Are you being neglected by a caregiver No  -MW      Have you had any of the following issues with --  -MW            User Key  (r) = Recorded By, (t) = Taken By, (c) = Cosigned By    Initials Name Provider Type    Ivelisse Fragoso, PT Physical Therapist                EVALUATION   PT Ortho     Row Name 02/24/22 1330       Subjective Comments    Subjective Comments Pt states she was told to come to PT wound care, that infectious dz had set up appt.  -MW       Precautions and Contraindications    Precautions/Limitations fall precautions  -MW       Subjective Pain    Able to rate subjective pain? yes  -MW    Pre-Treatment Pain Level 7  -MW    Post-Treatment Pain Level 4  -MW    Subjective Pain Comment \"Feels better\" after tx  -MW       Transfers    Sit-Stand [place] (Transfers) modified independence  -MW    Stand-Sit [place] (Transfers) modified independence  -MW    Transfers, Sit-Stand-Sit, Assist Device other (see comments)  arm rests  -MW       Gait/Stairs (Locomotion)    Comment (Gait/Stairs) long sitting for tx  -MW          User Key  (r) = Recorded By, (t) = Taken By, (c) = Cosigned By    Initials Name Provider Type    Ivelisse Fragoso, PT Physical Therapist               LDA Wound     Row Name 02/24/22 1330             Wound 02/03/22 Left lower leg    Wound - Properties Group Placement Date: 02/03/22  - Present on Hospital Admission: Y  -JM Side: Left  - Orientation: lower  - Location: leg  -      Wound Image  View All Images View Images  -MW      Dressing Appearance intact; moist drainage  -MW      Base necrotic; " black eschar; yellow; slough; red; subcutaneous; granulating  -MW      Red (%), Wound Tissue Color 30  -MW      Yellow (%), Wound Tissue Color 70  yellow/ tan  -MW      Periwound intact; redness; swelling  -MW      Periwound Temperature warm  -MW      Periwound Skin Turgor soft  -MW      Edges irregular; jagged; open  -MW      Wound Length (cm) 5 cm  -MW      Wound Width (cm) 12 cm  -MW      Wound Depth (cm) 0.2 cm  0.2 clean surface, obscured area may be deeper  -MW      Tunneling [Depth (cm)/Location] 0  -MW      Undermining [Depth (cm)/Location] 0  -MW      Drainage Characteristics/Odor serosanguineous; yellow; tan  -MW      Drainage Amount moderate  -MW      Care, Wound cleansed with; wound cleanser; debrided  -MW      Dressing Care dressing changed; silver impregnated; collagen; antimicrobial agent applied; gauze; other (see comments); cotton; multi-layer wrap  Elizabeth to clean areas, Cutimed sorbact, Optilock, cast padding, MLW  -MW      Periwound Care cleansed with pH balanced cleanser; barrier ointment applied  z guard to protect from maceration  -MW      Retired Wound - Properties Group Date first assessed: 02/03/22  - Present on Hospital Admission: Y  - Side: Left  - Location: leg  -            User Key  (r) = Recorded By, (t) = Taken By, (c) = Cosigned By    Initials Name Provider Type    MW Ivelisse Hernandez, CAROLYN Physical Therapist    Phyllis Gallardo, RN Registered Nurse               Lymphedema     Row Name 02/24/22 2741             Lymphedema Edema Assessment    Ptting Edema Category By severity  -MW      Pitting Edema Moderate  -MW              Skin Changes/Observations    Location/Assessment Lower Extremity  -MW      Lower Extremity Conditions left:; shiny; inflamed; weeping  -MW      Lower Extremity Color/Pigment left:; red; blanchable  -MW              Lymphedema Sensation    Lymphedema Sensation Comments light touch intact LLE  -MW              Lymphedema Pulses/Capillary Refill     Lymphedema Pulses/Capillary Refill lower extremity pulses; capillary refill  -MW      Dorsalis Pedis Pulse left:; +2 normal  -MW      Posterior Tibialis Pulse left:; +1 diminished  difficult to assess due to edema  -MW      Capillary Refill lower extremity capillary refill  -MW      Lower Extremity Capillary Refill left:; less than 3 seconds  -MW              Lymphedema Measurements    Measurement Type(s) Quick Girth  -MW      Quick Girth Areas Lower extremities  -MW              LLE Quick Girth (cm)    Smallest ankle 27 cm  -MW      Largest calf 44 cm  -MW              RLE Quick Girth (cm)    Smallest ankle 26 cm  -MW      Largest calf 42 cm  -MW      RLE Quick Girth Total 68  -MW              Compression/Skin Care    Compression/Skin Care skin care; wrapping location; bandaging  -MW      Skin Care washed/dried; topical anti-inflammatory applied; moisturizing lotion applied  -MW      Wrapping Location lower extremity  -MW      Wrapping Location LE left:; foot to knee  -MW      Wrapping Comments Optilock secured with cast padding. MLW size 4 MH to ankle, doubled on foot, size 5 ankle to prox shin, size 6 on prox calf  -MW      Compression/Skin Care Comments Issued extra compressogrips to change prn  -MW            User Key  (r) = Recorded By, (t) = Taken By, (c) = Cosigned By    Initials Name Provider Type    Ivelisse Fragoso, PT Physical Therapist                WOUND DEBRIDEMENT  Total area of Debridement: ~40 cm2  Debridement Site 1  Location- Site 1: LLE  Selective Debridement- Site 1: Wound Surface >20cmsq  Instruments- Site 1: #15, scapel, tweezers  Excised Tissue Description- Site 1: eschar, moderate, necrotic, slough  Bleeding- Site 1: none              Therapy Education     Row Name 02/24/22 0298             Therapy Education    Education Details Reviewed role of PT in wound care; moist bacteriostatic healing environment, moisture balance and gentle gradient compression to support wound healing. Keep  compression on daily, may remove at night.  -MW      Given Edema management; Bandaging/dressing change; Pain management  -MW      Program New  -MW      How Provided Verbal; Demonstration  -MW      Provided to Patient; Caregiver  spouse was present  -MW      Level of Understanding Verbalized  -MW            User Key  (r) = Recorded By, (t) = Taken By, (c) = Cosigned By    Initials Name Provider Type    MW Ivelisse Hernandez, PT Physical Therapist                Recommendation and Plan   PT Assessment/Plan     Row Name 02/24/22 6400          PT Assessment    Functional Limitations Performance in self-care ADL; Limitation in home management; Other (comment)  wound care  -MW     Impairments Integumentary integrity; Edema; Pain  -MW     Assessment Comments Pt presents with evolving s/s of LLE cellulitis from soft tissue trauma which resulted in hematoma and infection.  H/o recent COVID infx, fatty liver dz may negatively impact healing. Pt has some support from spouse and family. PT was able to debride large area of soft, leathery eschar with residual soft necrotic tissue beneath. Medial aspect of wound with area of red granulation tissue. Expect remaining nonviable tissue will be debrided over time; trial of Cutimed sorbact for antimicrobial interface and gentle debridement. Encouraged pt to obtain OTC lidocaine spray to aid in pain management for debridement. If wound healing stalls, may benefit from MIST to promote debridement, decrease bioburden and promote increased angiogenesis for wound healing. Cont PT wound care to promote healing of this complex wound.  -MW     Rehab Potential Fair  -MW     Patient/caregiver participated in establishment of treatment plan and goals Yes  -MW     Patient would benefit from skilled therapy intervention Yes  -MW            PT Plan    PT Frequency 2x/week; 3x/week  -MW     Predicted Duration of Therapy Intervention (PT) 3 months  -MW     Planned CPT's? PT EVAL MOD COMPLELITY: 57270;  PT SELF CARE/HOME MGMT/TRAIN EA 15: 04003; PT BHANU DEBRIDE OPEN WOUND UP TO 20 CM: 16326; PT BHANU DEBRIDE OPEN WOUND EA ADD 20 CM: 18299; PT NONSELECT DEBRIDE 15 MIN: 63424; PT NLFU MIST: 38912; PT MULTI LAYER COMP SYS LE; PT UNNA BOOT: 20336  -MW     Physical Therapy Interventions (Optional Details) wound care; patient/family education  -MW     PT Plan Comments Debridement, dressing management, MLW, education.  -MW           User Key  (r) = Recorded By, (t) = Taken By, (c) = Cosigned By    Initials Name Provider Type    Ivelisse Fragoso, PT Physical Therapist                  Goals   PT OP Goals     Row Name 22 1330          PT Short Term Goals    STG Date to Achieve 22  -MW     STG 1 Pt / caregiver able to verbalize s/s of infection and when to seek urgent care.  -MW     STG 2 LLE wound with < 25% nonviable tissue to promote clean wound bed for healing.  -MW     STG 3 LLE wound area to decrease at least 25% to demonstrate healing.  -MW            Long Term Goals    LTG 1 LLE wound area to decrease at least 75% to demonstrate healing.  -MW     LTG 2 Pt/ caregiver independent with clean technique for home dressing changes.  -MW     LTG 3 Decrease LLE edema by 2 cm to promote improved skin integrity for wound healing.  -MW            Time Calculation    PT Goal Re-Cert Due Date 22  -MW           User Key  (r) = Recorded By, (t) = Taken By, (c) = Cosigned By    Initials Name Provider Type    Ivelisse Fragoso, PT Physical Therapist                Time Calculation: Start Time: 1330  Untimed Charges  PT Eval/Re-eval Minutes: 60  Wound Care: 25596 Selective debridement, 21831 Multilayer comp below knee  40068-Ehdrpcltyt comp below knee: 12  47030-Dbpdakwix debridement: 20  Total Minutes  Untimed Charges Total Minutes: 92   Total Minutes: 92  Therapy Charges for Today     Code Description Service Date Service Provider Modifiers Qty    28269901934 HC BHANU DEBRIDE OPEN WOUND UP TO 20CM  2/24/2022 Ivelisse Hernandez, PT GP 1    21426295479 HC PT MULTI LAYER COMP SYS BELOW KNEE 2/24/2022 Ivelisse Hernandez, PT GP 1    83313155565 HC PT EVAL MOD COMPLEXITY 4 2/24/2022 Ivelisse Hernandez, PT GP 1                Ivelisse Hernandez, PT  2/24/2022

## 2022-02-24 NOTE — CASE COMMUNICATION
[facility]  Patient to continue wound care with out patient service.

## 2022-02-25 ENCOUNTER — TRANSCRIBE ORDERS (OUTPATIENT)
Dept: PHYSICAL THERAPY | Facility: HOSPITAL | Age: 67
End: 2022-02-25

## 2022-02-25 ENCOUNTER — HOSPITAL ENCOUNTER (OUTPATIENT)
Dept: INFUSION THERAPY | Facility: HOSPITAL | Age: 67
Discharge: HOME | End: 2022-02-25
Admitting: INTERNAL MEDICINE

## 2022-02-25 VITALS — HEART RATE: 90 BPM | SYSTOLIC BLOOD PRESSURE: 157 MMHG | DIASTOLIC BLOOD PRESSURE: 85 MMHG

## 2022-02-25 DIAGNOSIS — L03.116 CELLULITIS OF LEFT LOWER EXTREMITY: Primary | ICD-10-CM

## 2022-02-25 DIAGNOSIS — L03.116 CELLULITIS OF LEFT FOOT: ICD-10-CM

## 2022-02-25 PROCEDURE — C1751 CATH, INF, PER/CENT/MIDLINE: HCPCS

## 2022-02-25 PROCEDURE — C1894 INTRO/SHEATH, NON-LASER: HCPCS

## 2022-02-25 RX ORDER — FOLIC ACID 1 MG/1
1 TABLET ORAL DAILY
COMMUNITY

## 2022-02-25 RX ORDER — SODIUM CHLORIDE 0.9 % (FLUSH) 0.9 %
10 SYRINGE (ML) INJECTION EVERY 12 HOURS SCHEDULED
Status: CANCELLED | OUTPATIENT
Start: 2022-02-25

## 2022-02-25 RX ORDER — SODIUM CHLORIDE 0.9 % (FLUSH) 0.9 %
10 SYRINGE (ML) INJECTION AS NEEDED
Status: CANCELLED | OUTPATIENT
Start: 2022-02-25

## 2022-02-25 RX ORDER — SODIUM CHLORIDE 0.9 % (FLUSH) 0.9 %
10 SYRINGE (ML) INJECTION AS NEEDED
Status: DISCONTINUED | OUTPATIENT
Start: 2022-02-25 | End: 2022-02-27 | Stop reason: HOSPADM

## 2022-02-27 ENCOUNTER — HOSPITAL ENCOUNTER (OUTPATIENT)
Dept: PHYSICAL THERAPY | Facility: HOSPITAL | Age: 67
Setting detail: THERAPIES SERIES
Discharge: HOME | End: 2022-02-27

## 2022-02-27 DIAGNOSIS — S81.802D OPEN WOUND OF LEFT LOWER EXTREMITY, SUBSEQUENT ENCOUNTER: ICD-10-CM

## 2022-02-27 DIAGNOSIS — M79.89 PAIN AND SWELLING OF LEFT LOWER LEG: ICD-10-CM

## 2022-02-27 DIAGNOSIS — M79.662 PAIN AND SWELLING OF LEFT LOWER LEG: ICD-10-CM

## 2022-02-27 DIAGNOSIS — L03.116 CELLULITIS OF LEFT ANTERIOR LOWER LEG: Primary | ICD-10-CM

## 2022-02-27 PROCEDURE — 97597 DBRDMT OPN WND 1ST 20 CM/<: CPT

## 2022-02-27 PROCEDURE — 29581 APPL MULTLAYER CMPRN SYS LEG: CPT

## 2022-02-27 NOTE — THERAPY WOUND CARE TREATMENT
Outpatient Rehabilitation - Wound/Debridement Treatment Note   [place]     Patient Name: Park Ray  : 1955  MRN: [id number]  Today's Date: 2022                 Admit Date: 2022    Visit Dx:    ICD-10-CM ICD-9-CM   1. Cellulitis of left anterior lower leg  L03.116 682.6   2. Open wound of left lower extremity, subsequent encounter  S81.802D V58.89     891.0   3. Pain and swelling of left lower leg  M79.662 729.5    M79.89 729.81       Patient Active Problem List   Diagnosis   • OAB (overactive bladder)   • Mixed incontinence urge and stress   • Menopause   • Hypothyroidism   • Fatty liver   • Family history of breast cancer in mother   • Superficial dyspareunia   • Colon cancer (HCC)   • Recurrent genital herpes   • Vaginal atrophy   • Cellulitis of left anterior lower leg        Past Medical History:   Diagnosis Date   • Acute torn meniscus of knee     left   • Closed displaced fracture of shaft of left clavicle    • Closed nondisplaced fracture of neck of left radius    • Colon cancer (HCC)    • Dyspareunia    • Family history of breast cancer in mother    • Fatty liver    • Fracture of proximal phalanx of toe    • Herpes    • Hypothyroidism    • IBS (irritable bowel syndrome)    • Left wrist pain    • Menopause    • Mixed incontinence urge and stress    • OAB (overactive bladder)    • Osteoarthritis    • Right wrist pain         Past Surgical History:   Procedure Laterality Date   •  SECTION     • CHOLECYSTECTOMY     • COLECTOMY PARTIAL / TOTAL     • COLON RESECTION     • COLONOSCOPY     • ENTEROCELE REPAIR     • DAWSON MORA (MADDISON) PROCEDURE     • PELVIC LAPAROSCOPY     • RECTAL SURGERY     • SALPINGO OOPHORECTOMY Right    • TOTAL ABDOMINAL HYSTERECTOMY WITH SALPINGO OOPHORECTOMY Left    • UTERINE SUSPENSION LAPAROSCOPIC           EVALUATION   PT Ortho     Row Name 22 1000       Subjective Comments    Subjective Comments Pt stated she is still having  pain, but much improved.  -MF       Subjective Pain    Able to rate subjective pain? yes  -MF    Pre-Treatment Pain Level 4  -MF    Post-Treatment Pain Level 4  -MF       Gait/Stairs (Locomotion)    Comment (Gait/Stairs) Pt to and from [facility] via transport chair with familyassist.  -MF          User Key  (r) = Recorded By, (t) = Taken By, (c) = Cosigned By    Initials Name Provider Type    Jose Cordero, PT Physical Therapist                 LDA Wound     Row Name 02/27/22 1000             Wound 02/03/22 Left lower leg    Wound - Properties Group Placement Date: 02/03/22  - Present on Hospital Admission: Y  - Side: Left  - Orientation: lower  - Location: leg  -JM      Dressing Appearance intact; moist drainage  -MF      Base yellow; slough; red; subcutaneous; granulating  -MF      Periwound intact; redness; swelling  -MF      Periwound Temperature warm  -MF      Periwound Skin Turgor soft  -MF      Edges irregular; jagged; open  -MF      Drainage Characteristics/Odor serosanguineous  -MF      Drainage Amount moderate  -MF      Care, Wound irrigated with; sterile normal saline; debrided  -MF      Dressing Care foam; low-adherent; silver impregnated  vicky Ag with mepilex ag and ABD pad  -MF      Periwound Care cleansed with pH balanced cleanser  -MF      Retired Wound - Properties Group Date first assessed: 02/03/22  - Present on Hospital Admission: Y  - Side: Left  - Location: leg  -            User Key  (r) = Recorded By, (t) = Taken By, (c) = Cosigned By    Initials Name Provider Type    Jose Cordero, PT Physical Therapist    Phyllis Gallardo RN Registered Nurse               Lymphedema     Row Name 02/27/22 1000             Lymphedema Edema Assessment    Ptting Edema Category By severity  -MF      Pitting Edema Moderate  -MF              Compression/Skin Care    Compression/Skin Care skin care; wrapping location; bandaging  -MF      Skin Care washed/dried; topical  anti-inflammatory applied; moisturizing lotion applied  -MF      Wrapping Location lower extremity  -MF      Wrapping Location LE left:; foot to knee  -MF      Wrapping Comments vicky Ag with mepilex ag foam and ABD pad with cast padding to secure. size 4/5/6 compressogrip doubled and overlapping for gradient compression .  -MF            User Key  (r) = Recorded By, (t) = Taken By, (c) = Cosigned By    Initials Name Provider Type    Jose Cordero, PT Physical Therapist                WOUND DEBRIDEMENT  Total area of Debridement: ~10cm2  Debridement Site 1  Location- Site 1: LLE  Selective Debridement- Site 1: Wound Surface <20cmsq  Instruments- Site 1: tweezers  Excised Tissue Description- Site 1: minimum, slough  Bleeding- Site 1: none              Therapy Education     Row Name 02/27/22 1000             Therapy Education    Education Details discussed the benefit of transition to Mepilex Ag foam and continued need for compression.  -MF      Given Edema management; Bandaging/dressing change  -MF      Program Progressed  -MF      How Provided Verbal; Demonstration  -MF      Provided to Patient; Caregiver  -MF      Level of Understanding Verbalized  -MF            User Key  (r) = Recorded By, (t) = Taken By, (c) = Cosigned By    Initials Name Provider Type    Jose Cordero, PT Physical Therapist                Recommendation and Plan   PT Assessment/Plan     Row Name 02/27/22 1000          PT Assessment    Functional Limitations Performance in self-care ADL; Limitation in home management; Other (comment)  wound and edema management  -MF     Impairments Integumentary integrity; Edema; Pain  -MF     Assessment Comments Pt with good granulation formation today and good management of exudate.  PT changed to mepilex Ag foam and ABD pad as sorbact and optilock very adherent to wound base and mepilex Ag will help minimize inflammation with dressing removal.  -MF     Rehab Potential Fair  -MF      Patient/caregiver participated in establishment of treatment plan and goals Yes  -     Patient would benefit from skilled therapy intervention Yes  -MF            PT Plan    PT Frequency 2x/week; 3x/week  -     Physical Therapy Interventions (Optional Details) wound care; patient/family education  -     PT Plan Comments debridement, dressing changes, edema management  -           User Key  (r) = Recorded By, (t) = Taken By, (c) = Cosigned By    Initials Name Provider Type    Jose Cordero, PT Physical Therapist                Goals   PT OP Goals     Row Name 02/27/22 1000          Time Calculation    PT Goal Re-Cert Due Date 05/24/22  -           User Key  (r) = Recorded By, (t) = Taken By, (c) = Cosigned By    Initials Name Provider Type     Jose Lee, PT Physical Therapist                PT Goal Re-Cert Due Date: 05/24/22            Time Calculation: Start Time: 1000  Untimed Charges  96696-Tgwiowqygn comp below knee: 15  80470-Hxcbcpqig debridement: 20  Total Minutes  Untimed Charges Total Minutes: 35   Total Minutes: 35  Therapy Charges for Today     Code Description Service Date Service Provider Modifiers Qty    12443131003 HC BHANU DEBRIDE OPEN WOUND UP TO 20CM 2/27/2022 Jose Lee, PT GP 1    [id number]  PT MULTI LAYER COMP SYS BELOW KNEE 2/27/2022 Jose Lee, PT GP 1                  Jose Lee, PT  2/27/2022

## 2022-02-28 ENCOUNTER — HOSPITAL ENCOUNTER (OUTPATIENT)
Dept: PHYSICAL THERAPY | Facility: HOSPITAL | Age: 67
Setting detail: THERAPIES SERIES
Discharge: HOME | End: 2022-02-28

## 2022-02-28 DIAGNOSIS — M79.89 PAIN AND SWELLING OF LEFT LOWER LEG: ICD-10-CM

## 2022-02-28 DIAGNOSIS — M79.662 PAIN AND SWELLING OF LEFT LOWER LEG: ICD-10-CM

## 2022-02-28 DIAGNOSIS — S81.802D OPEN WOUND OF LEFT LOWER EXTREMITY, SUBSEQUENT ENCOUNTER: ICD-10-CM

## 2022-02-28 DIAGNOSIS — L03.116 CELLULITIS OF LEFT ANTERIOR LOWER LEG: Primary | ICD-10-CM

## 2022-02-28 PROCEDURE — 29581 APPL MULTLAYER CMPRN SYS LEG: CPT | Performed by: PHYSICAL THERAPIST

## 2022-02-28 PROCEDURE — 97597 DBRDMT OPN WND 1ST 20 CM/<: CPT | Performed by: PHYSICAL THERAPIST

## 2022-03-02 ENCOUNTER — HOSPITAL ENCOUNTER (OUTPATIENT)
Dept: PHYSICAL THERAPY | Facility: HOSPITAL | Age: 67
Setting detail: THERAPIES SERIES
Discharge: HOME | End: 2022-03-02

## 2022-03-03 ENCOUNTER — HOSPITAL ENCOUNTER (OUTPATIENT)
Dept: PHYSICAL THERAPY | Facility: HOSPITAL | Age: 67
Setting detail: THERAPIES SERIES
Discharge: HOME | End: 2022-03-03

## 2022-03-03 DIAGNOSIS — S81.802D OPEN WOUND OF LEFT LOWER EXTREMITY, SUBSEQUENT ENCOUNTER: ICD-10-CM

## 2022-03-03 DIAGNOSIS — M79.662 PAIN AND SWELLING OF LEFT LOWER LEG: ICD-10-CM

## 2022-03-03 DIAGNOSIS — L03.116 CELLULITIS OF LEFT ANTERIOR LOWER LEG: Primary | ICD-10-CM

## 2022-03-03 DIAGNOSIS — M79.89 PAIN AND SWELLING OF LEFT LOWER LEG: ICD-10-CM

## 2022-03-03 PROCEDURE — 29581 APPL MULTLAYER CMPRN SYS LEG: CPT

## 2022-03-03 PROCEDURE — 97597 DBRDMT OPN WND 1ST 20 CM/<: CPT

## 2022-03-03 NOTE — THERAPY WOUND CARE TREATMENT
Outpatient Rehabilitation - Wound/Debridement Treatment Note   [place]     Patient Name: Park Ray  : 1955  MRN: [id number]  Today's Date: 3/3/2022                 Admit Date: (Not on file)    Visit Dx:    ICD-10-CM ICD-9-CM   1. Cellulitis of left anterior lower leg  L03.116 682.6   2. Pain and swelling of left lower leg  M79.662 729.5    M79.89 729.81   3. Open wound of left lower extremity, subsequent encounter  S81.802D V58.89     891.0       Patient Active Problem List   Diagnosis   • OAB (overactive bladder)   • Mixed incontinence urge and stress   • Menopause   • Hypothyroidism   • Fatty liver   • Family history of breast cancer in mother   • Superficial dyspareunia   • Colon cancer (HCC)   • Recurrent genital herpes   • Vaginal atrophy   • Cellulitis of left anterior lower leg        Past Medical History:   Diagnosis Date   • Acute torn meniscus of knee     left   • Closed displaced fracture of shaft of left clavicle    • Closed nondisplaced fracture of neck of left radius    • Colon cancer (HCC)    • Dyspareunia    • Family history of breast cancer in mother    • Fatty liver    • Fracture of proximal phalanx of toe    • Herpes    • Hypothyroidism    • IBS (irritable bowel syndrome)    • Left wrist pain    • Menopause    • Mixed incontinence urge and stress    • OAB (overactive bladder)    • Osteoarthritis    • Right wrist pain         Past Surgical History:   Procedure Laterality Date   •  SECTION     • CHOLECYSTECTOMY     • COLECTOMY PARTIAL / TOTAL     • COLON RESECTION     • COLONOSCOPY     • ENTEROCELE REPAIR     • DAWSON MORA (MADDISON) PROCEDURE     • PELVIC LAPAROSCOPY     • RECTAL SURGERY     • SALPINGO OOPHORECTOMY Right    • TOTAL ABDOMINAL HYSTERECTOMY WITH SALPINGO OOPHORECTOMY Left    • UTERINE SUSPENSION LAPAROSCOPIC           EVALUATION   PT Ortho     Row Name 22 1100       Subjective Comments    Subjective Comments PT was seen at MD's office  yesterday and they removed the dressing and only were able to cover it with telfa.  Pt came to OP wound care today to have dressing changed due to increased c/o pain.  -MF       Subjective Pain    Able to rate subjective pain? yes  -MF    Pre-Treatment Pain Level 6  -MF    Post-Treatment Pain Level 2  -MF       Transfers    Comment (Transfers) remained seated in transport chair  -          User Key  (r) = Recorded By, (t) = Taken By, (c) = Cosigned By    Initials Name Provider Type    Jose Cordero, PT Physical Therapist                 LDA Wound     Row Name 03/03/22 1100             Wound 02/03/22 Left lower leg    Wound - Properties Group Placement Date: 02/03/22  - Present on Hospital Admission: Y  - Side: Left  - Orientation: lower  - Location: leg  -      Dressing Appearance intact; moist drainage  -MF      Base yellow; slough; red; subcutaneous; granulating  -MF      Periwound intact; redness; swelling  -MF      Periwound Temperature warm  -      Periwound Skin Turgor soft  -MF      Edges irregular; jagged; open  -MF      Drainage Characteristics/Odor serosanguineous  -MF      Drainage Amount moderate  -MF      Care, Wound irrigated with; wound cleanser; debrided  -MF      Dressing Care foam; low-adherent; silver impregnated  vicky Ag with mepilex Ag foam and kerilx to secure.  -MF      Periwound Care cleansed with pH balanced cleanser  -      Retired Wound - Properties Group Date first assessed: 02/03/22  - Present on Hospital Admission: Y  - Side: Left  - Location: leg  -            User Key  (r) = Recorded By, (t) = Taken By, (c) = Cosigned By    Initials Name Provider Type    Jose Cordero, PT Physical Therapist    Phyllis Gallardo, RN Registered Nurse               Lymphedema     Row Name 03/03/22 1100             Lymphedema Edema Assessment    Ptting Edema Category By severity  -MF      Pitting Edema Moderate; Mild  -MF              Compression/Skin Care     Compression/Skin Care skin care; wrapping location; bandaging  -      Skin Care washed/dried; topical anti-inflammatory applied; moisturizing lotion applied  -      Wrapping Location lower extremity  -      Wrapping Location LE left:; foot to knee  -      Wrapping Comments vicky Ag with mepilex ag foam and kerilx to secure. size 4/5/6 compressogrip doubled and overlapping for gradient compression .  -            User Key  (r) = Recorded By, (t) = Taken By, (c) = Cosigned By    Initials Name Provider Type     Jose Lee, PT Physical Therapist                WOUND DEBRIDEMENT  Total area of Debridement: ~10cm2  Debridement Site 1  Location- Site 1: LLE  Selective Debridement- Site 1: Wound Surface <20cmsq  Instruments- Site 1: tweezers  Excised Tissue Description- Site 1: minimum, slough  Bleeding- Site 1: none                 Recommendation and Plan   PT Assessment/Plan     Row Name 03/03/22 1100          PT Assessment    Functional Limitations Performance in self-care ADL; Limitation in home management; Other (comment)  -     Impairments Integumentary integrity; Edema; Pain  -     Assessment Comments Pt missed her appointment yesterday as she was not able to finish at the MD's office prior to her appointment, now presenting today with slight increase in c/o pain from temp dressing.  PT was able to lightly debride wound and good granulation formation continues.  PT also noted a moderate amount of reepithelialization to wound edges today as well.  PT will cont with debridement, dressing management, and compression wrapping to help further improve healing potential.  -            PT Plan    PT Frequency 2x/week; 3x/week  -     Physical Therapy Interventions (Optional Details) wound care  -     PT Plan Comments debridement, dressing management, education, compression wrapping.  -           User Key  (r) = Recorded By, (t) = Taken By, (c) = Cosigned By    Initials Name Provider Type    IRIS  Jose Lee, PT Physical Therapist                Goals   PT OP Goals     Row Name 03/03/22 1050          Time Calculation    PT Goal Re-Cert Due Date 05/24/22  -           User Key  (r) = Recorded By, (t) = Taken By, (c) = Cosigned By    Initials Name Provider Type     Jose Lee, PT Physical Therapist                PT Goal Re-Cert Due Date: 05/24/22            Time Calculation: Start Time: 1050  Untimed Charges  Wound Care: 18938 Selective debridement, 58712 Multilayer comp below knee  52822-Ekyfcgmixf comp below knee: 15  44229-Pvcdvkaap debridement: 25  Total Minutes  Untimed Charges Total Minutes: 40   Total Minutes: 40  Therapy Charges for Today     Code Description Service Date Service Provider Modifiers Qty    73644324625 HC BHANU DEBRIDE OPEN WOUND UP TO 20CM 3/3/2022 Jose Lee, PT GP 1    [id number]  PT MULTI LAYER COMP SYS BELOW KNEE 3/3/2022 Jose Lee, PT GP 1                  Jose Lee, PT  3/3/2022

## 2022-03-04 ENCOUNTER — APPOINTMENT (OUTPATIENT)
Dept: PHYSICAL THERAPY | Facility: HOSPITAL | Age: 67
End: 2022-03-04

## 2022-03-07 ENCOUNTER — HOSPITAL ENCOUNTER (OUTPATIENT)
Dept: CARDIOLOGY | Facility: HOSPITAL | Age: 67
Discharge: HOME | End: 2022-03-07
Admitting: INTERNAL MEDICINE

## 2022-03-07 ENCOUNTER — TRANSCRIBE ORDERS (OUTPATIENT)
Dept: ADMINISTRATIVE | Facility: HOSPITAL | Age: 67
End: 2022-03-07

## 2022-03-07 ENCOUNTER — HOSPITAL ENCOUNTER (OUTPATIENT)
Dept: PHYSICAL THERAPY | Facility: HOSPITAL | Age: 67
Setting detail: THERAPIES SERIES
Discharge: HOME | End: 2022-03-07

## 2022-03-07 DIAGNOSIS — M79.601 RIGHT UPPER LIMB PAIN: Primary | ICD-10-CM

## 2022-03-07 DIAGNOSIS — M79.662 PAIN AND SWELLING OF LEFT LOWER LEG: ICD-10-CM

## 2022-03-07 DIAGNOSIS — S81.802D OPEN WOUND OF LEFT LOWER EXTREMITY, SUBSEQUENT ENCOUNTER: ICD-10-CM

## 2022-03-07 DIAGNOSIS — M79.601 RIGHT UPPER LIMB PAIN: ICD-10-CM

## 2022-03-07 DIAGNOSIS — L03.116 CELLULITIS OF LEFT ANTERIOR LOWER LEG: Primary | ICD-10-CM

## 2022-03-07 DIAGNOSIS — M79.89 PAIN AND SWELLING OF LEFT LOWER LEG: ICD-10-CM

## 2022-03-07 LAB
BH CV UPPER VENOUS LEFT SUBCLAVIAN AUGMENT: NORMAL
BH CV UPPER VENOUS LEFT SUBCLAVIAN COMPETENT: NORMAL
BH CV UPPER VENOUS LEFT SUBCLAVIAN COMPRESS: NORMAL
BH CV UPPER VENOUS LEFT SUBCLAVIAN PHASIC: NORMAL
BH CV UPPER VENOUS LEFT SUBCLAVIAN SPONT: NORMAL
BH CV UPPER VENOUS RIGHT AXILLARY AUGMENT: NORMAL
BH CV UPPER VENOUS RIGHT AXILLARY COMPETENT: NORMAL
BH CV UPPER VENOUS RIGHT AXILLARY COMPRESS: NORMAL
BH CV UPPER VENOUS RIGHT AXILLARY PHASIC: NORMAL
BH CV UPPER VENOUS RIGHT AXILLARY SPONT: NORMAL
BH CV UPPER VENOUS RIGHT BASILIC FOREARM COMPRESS: NORMAL
BH CV UPPER VENOUS RIGHT CEPHALIC FOREARM COMPRESS: NORMAL
BH CV UPPER VENOUS RIGHT INTERNAL JUGULAR AUGMENT: NORMAL
BH CV UPPER VENOUS RIGHT INTERNAL JUGULAR COMPETENT: NORMAL
BH CV UPPER VENOUS RIGHT INTERNAL JUGULAR COMPRESS: NORMAL
BH CV UPPER VENOUS RIGHT INTERNAL JUGULAR PHASIC: NORMAL
BH CV UPPER VENOUS RIGHT INTERNAL JUGULAR SPONT: NORMAL
BH CV UPPER VENOUS RIGHT RADIAL AUGMENT: NORMAL
BH CV UPPER VENOUS RIGHT RADIAL COMPRESS: NORMAL
BH CV UPPER VENOUS RIGHT SUBCLAVIAN AUGMENT: NORMAL
BH CV UPPER VENOUS RIGHT SUBCLAVIAN COMPETENT: NORMAL
BH CV UPPER VENOUS RIGHT SUBCLAVIAN COMPRESS: NORMAL
BH CV UPPER VENOUS RIGHT SUBCLAVIAN PHASIC: NORMAL
BH CV UPPER VENOUS RIGHT SUBCLAVIAN SPONT: NORMAL
BH CV UPPER VENOUS RIGHT ULNAR AUGMENT: NORMAL
BH CV UPPER VENOUS RIGHT ULNAR COMPRESS: NORMAL
MAXIMAL PREDICTED HEART RATE: 154 BPM
STRESS TARGET HR: 131 BPM

## 2022-03-07 PROCEDURE — 93971 EXTREMITY STUDY: CPT

## 2022-03-07 PROCEDURE — 93971 EXTREMITY STUDY: CPT | Performed by: INTERNAL MEDICINE

## 2022-03-07 PROCEDURE — 29581 APPL MULTLAYER CMPRN SYS LEG: CPT

## 2022-03-07 PROCEDURE — 97597 DBRDMT OPN WND 1ST 20 CM/<: CPT

## 2022-03-07 NOTE — THERAPY WOUND CARE TREATMENT
Outpatient Rehabilitation - Wound/Debridement Treatment Note   [place]     Patient Name: Park Ray  : 1955  MRN: [id number]  Today's Date: 3/7/2022                 Admit Date: 3/7/2022    Visit Dx:    ICD-10-CM ICD-9-CM   1. Cellulitis of left anterior lower leg  L03.116 682.6   2. Pain and swelling of left lower leg  M79.662 729.5    M79.89 729.81   3. Open wound of left lower extremity, subsequent encounter  S81.802D V58.89     891.0       Patient Active Problem List   Diagnosis   • OAB (overactive bladder)   • Mixed incontinence urge and stress   • Menopause   • Hypothyroidism   • Fatty liver   • Family history of breast cancer in mother   • Superficial dyspareunia   • Colon cancer (HCC)   • Recurrent genital herpes   • Vaginal atrophy   • Cellulitis of left anterior lower leg        Past Medical History:   Diagnosis Date   • Acute torn meniscus of knee     left   • Closed displaced fracture of shaft of left clavicle    • Closed nondisplaced fracture of neck of left radius    • Colon cancer (HCC)    • Dyspareunia    • Family history of breast cancer in mother    • Fatty liver    • Fracture of proximal phalanx of toe    • Herpes    • Hypothyroidism    • IBS (irritable bowel syndrome)    • Left wrist pain    • Menopause    • Mixed incontinence urge and stress    • OAB (overactive bladder)    • Osteoarthritis    • Right wrist pain         Past Surgical History:   Procedure Laterality Date   •  SECTION     • CHOLECYSTECTOMY     • COLECTOMY PARTIAL / TOTAL     • COLON RESECTION     • COLONOSCOPY     • ENTEROCELE REPAIR     • DAWSON MORA (JOANNK) PROCEDURE     • PELVIC LAPAROSCOPY     • RECTAL SURGERY     • SALPINGO OOPHORECTOMY Right    • TOTAL ABDOMINAL HYSTERECTOMY WITH SALPINGO OOPHORECTOMY Left    • UTERINE SUSPENSION LAPAROSCOPIC           EVALUATION   PT Ortho     Row Name 22 0900       Subjective Comments    Subjective Comments Pt delayed by traffic with  rainstorms today. Spouse applied gauze and kerlix this AM. Pt had removed the compression d/t discomfort and the rest of the dressing was disrupted. Otherwise no complaints.  -       Subjective Pain    Able to rate subjective pain? yes  -    Pre-Treatment Pain Level 6  -MC    Post-Treatment Pain Level 3  -       Transfers    Comment, (Transfers) remained seated for tx  -          User Key  (r) = Recorded By, (t) = Taken By, (c) = Cosigned By    Initials Name Provider Type     Kristy Galvan PT Physical Therapist                 LDA Wound     Row Name 03/07/22 0900             Wound 02/03/22 Left lower leg    Wound - Properties Group Placement Date: 02/03/22  - Present on Hospital Admission: Y  - Side: Left  - Orientation: lower  - Location: leg  -      Wound Image View All Images View Images  -      Dressing Appearance intact;moist drainage  gauze for temp dressing  -      Base yellow;slough;red;subcutaneous;granulating;epithelialization  notable epithelialization  -      Periwound intact;redness;swelling  -      Periwound Temperature warm  -      Periwound Skin Turgor soft  -      Edges irregular;jagged;open  -      Wound Length (cm) 4.6 cm  -      Wound Width (cm) 10.7 cm  slight diagonal  -      Wound Depth (cm) 0.1 cm  -      Wound Surface Area (cm^2) 49.22 cm^2  -      Wound Volume (cm^3) 4.922 cm^3  -      Drainage Characteristics/Odor serosanguineous  -      Drainage Amount moderate  -      Care, Wound cleansed with;wound cleanser;debrided  -      Dressing Care dressing applied;silver impregnated;collagen;low-adherent;foam;gauze;multi-layer wrap  vicky, mepilex Ag, kerlix, MLW  -      Periwound Care cleansed with pH balanced cleanser;barrier ointment applied  zguard  -      Retired Wound - Properties Group Placement Date: 02/03/22  - Present on Hospital Admission: Y  - Side: Left  - Orientation: lower  - Location: leg  -      Retired Wound  - Properties Group Date first assessed: 02/03/22  - Present on Hospital Admission: Y  - Side: Left  - Location: leg  -            User Key  (r) = Recorded By, (t) = Taken By, (c) = Cosigned By    Initials Name Provider Type    Kristy Huerta, PT Physical Therapist    Phyllis Gallardo, RN Registered Nurse               Lymphedema     Row Name 03/07/22 0900             Lymphedema Edema Assessment    Ptting Edema Category By severity  -      Pitting Edema Moderate;Mild  -              Skin Changes/Observations    Lower Extremity Conditions left:;shiny;inflamed  -      Lower Extremity Color/Pigment left:;red;blanchable  -              Lymphedema Pulses/Capillary Refill    Lower Extremity Capillary Refill left:;less than 3 seconds  -              Compression/Skin Care    Compression/Skin Care skin care;wrapping location;bandaging  -      Skin Care washed/dried;lotion applied  -      Wrapping Location lower extremity  -      Wrapping Location LE left:;foot to knee  -      Wrapping Comments Size 4/5/6 compressogrip doubled and overlapping for gradient compression  -      Bandage Layers cotton elastic stocking- double layer (comment size)  -            User Key  (r) = Recorded By, (t) = Taken By, (c) = Cosigned By    Initials Name Provider Type    Kristy Huerta, PT Physical Therapist                WOUND DEBRIDEMENT  Total area of Debridement: 12 cm2  Debridement Site 1  Location- Site 1: LLE  Selective Debridement- Site 1: Wound Surface <20cmsq  Instruments- Site 1: #15, scapel, tweezers  Excised Tissue Description- Site 1: moderate, slough  Bleeding- Site 1: scant, held pressure, 1 minute              Therapy Education     Row Name 03/07/22 0946             Therapy Education    Education Details Continue current POC. May use zinc oxide ointment as desired to periwound.  -MC      Given Edema management;Bandaging/dressing change  -      Program Reinforced  -      How  Provided Verbal;Demonstration  -      Provided to Patient;Caregiver  -      Level of Understanding Verbalized  -            User Key  (r) = Recorded By, (t) = Taken By, (c) = Cosigned By    Initials Name Provider Type    Kristy Huerta PT Physical Therapist                Recommendation and Plan   PT Assessment/Plan     Row Name 03/07/22 0946          PT Assessment    Functional Limitations Performance in self-care ADL;Limitation in home management;Other (comment)  wound, edema management  -     Impairments Integumentary integrity;Edema;Pain  -     Assessment Comments Pt with improved wound dimensions since last assessment. Pt with notable new epithelialization scattered throughout the wound bed. PT able to debride additional slough as well. LLE inflammation and edema appear to be improving. Pt will continue to benefit from light to moderate compression with MLW to continue progress.  -     Rehab Potential Fair  -     Patient/caregiver participated in establishment of treatment plan and goals Yes  -     Patient would benefit from skilled therapy intervention Yes  -            PT Plan    PT Frequency 2x/week;3x/week  -     Physical Therapy Interventions (Optional Details) wound care;patient/family education  -     PT Plan Comments debridement, dressings, MLW  -           User Key  (r) = Recorded By, (t) = Taken By, (c) = Cosigned By    Initials Name Provider Type    Kristy Huerta PT Physical Therapist                Goals   PT OP Goals     Row Name 03/07/22 0948          Time Calculation    PT Goal Re-Cert Due Date 05/24/22  -           User Key  (r) = Recorded By, (t) = Taken By, (c) = Cosigned By    Initials Name Provider Type    Kristy Huerta, PT Physical Therapist                PT Goal Re-Cert Due Date: 05/24/22            Time Calculation: Start Time: 0910  Untimed Charges  14299-Metdigmssq comp below knee: 10  00876-Walssowfr debridement: 15  Total  Minutes  Untimed Charges Total Minutes: 25   Total Minutes: 25  Therapy Charges for Today     Code Description Service Date Service Provider Modifiers Qty    37846747861 HC PT MULTI LAYER COMP SYS BELOW KNEE 3/7/2022 Kristy Galvan, PT GP 1    [id number] HC BHANU DEBRIDE OPEN WOUND UP TO 20CM 3/7/2022 Kristy Galvan, PT GP 1                  Kristy Galvan, PT  3/7/2022

## 2022-03-10 ENCOUNTER — HOSPITAL ENCOUNTER (OUTPATIENT)
Dept: PHYSICAL THERAPY | Facility: HOSPITAL | Age: 67
Setting detail: THERAPIES SERIES
Discharge: HOME | End: 2022-03-10

## 2022-03-10 DIAGNOSIS — M79.89 PAIN AND SWELLING OF LEFT LOWER LEG: ICD-10-CM

## 2022-03-10 DIAGNOSIS — M79.662 PAIN AND SWELLING OF LEFT LOWER LEG: ICD-10-CM

## 2022-03-10 DIAGNOSIS — S81.802D OPEN WOUND OF LEFT LOWER EXTREMITY, SUBSEQUENT ENCOUNTER: ICD-10-CM

## 2022-03-10 DIAGNOSIS — L03.116 CELLULITIS OF LEFT ANTERIOR LOWER LEG: Primary | ICD-10-CM

## 2022-03-10 PROCEDURE — 29581 APPL MULTLAYER CMPRN SYS LEG: CPT

## 2022-03-10 PROCEDURE — 97597 DBRDMT OPN WND 1ST 20 CM/<: CPT

## 2022-03-10 NOTE — THERAPY WOUND CARE TREATMENT
Outpatient Rehabilitation - Wound/Debridement Treatment Note   Yumi     Patient Name: Park Ray  : 1955  MRN: [id number]  Today's Date: 3/10/2022                 Admit Date: 3/10/2022    Visit Dx:    ICD-10-CM ICD-9-CM   1. Cellulitis of left anterior lower leg  L03.116 682.6   2. Pain and swelling of left lower leg  M79.662 729.5    M79.89 729.81   3. Open wound of left lower extremity, subsequent encounter  S81.802D V58.89     891.0       Patient Active Problem List   Diagnosis   • OAB (overactive bladder)   • Mixed incontinence urge and stress   • Menopause   • Hypothyroidism   • Fatty liver   • Family history of breast cancer in mother   • Superficial dyspareunia   • Colon cancer (HCC)   • Recurrent genital herpes   • Vaginal atrophy   • Cellulitis of left anterior lower leg        Past Medical History:   Diagnosis Date   • Acute torn meniscus of knee     left   • Closed displaced fracture of shaft of left clavicle    • Closed nondisplaced fracture of neck of left radius    • Colon cancer (HCC)    • Dyspareunia    • Family history of breast cancer in mother    • Fatty liver    • Fracture of proximal phalanx of toe    • Herpes    • Hypothyroidism    • IBS (irritable bowel syndrome)    • Left wrist pain    • Menopause    • Mixed incontinence urge and stress    • OAB (overactive bladder)    • Osteoarthritis    • Right wrist pain         Past Surgical History:   Procedure Laterality Date   •  SECTION     • CHOLECYSTECTOMY     • COLECTOMY PARTIAL / TOTAL     • COLON RESECTION     • COLONOSCOPY     • ENTEROCELE REPAIR     • DAWSON MORA ([facility]) PROCEDURE     • PELVIC LAPAROSCOPY     • RECTAL SURGERY     • SALPINGO OOPHORECTOMY Right    • TOTAL ABDOMINAL HYSTERECTOMY WITH SALPINGO OOPHORECTOMY Left    • UTERINE SUSPENSION LAPAROSCOPIC           EVALUATION   PT Ortho     Row Name 03/10/22 7183       Subjective Comments    Subjective Comments Pt saw Dr. Horn yesterday,  states PICC line was removed and abx have been stopped.  States she is still having a lot of pain in the leg.  MD wants her to start PT for strengthening.  -       Subjective Pain    Able to rate subjective pain? yes  -JM    Pre-Treatment Pain Level 8  -JM    Post-Treatment Pain Level 8  -JM       Transfers    Comment, (Transfers) remained seated in transport chair  -          User Key  (r) = Recorded By, (t) = Taken By, (c) = Cosigned By    Initials Name Provider Type    Alessandra Mcleod PT Physical Therapist                 [facility] Wound     Row Name 03/10/22 1130             Wound 02/03/22 Left lower leg    Wound - Properties Group Placement Date: 02/03/22  - Present on Hospital Admission: Y  - Side: Left  - Orientation: lower  - Location: leg  -      Wound Image View All Images View Images  -A      Dressing Appearance intact;moist drainage  gauze, kerlix, coban placed at MD office  -JMA      Base yellow;slough;red;subcutaneous;granulating;epithelialization  approx 50% reepithelialized  -A      Periwound intact;redness;swelling  -JMA      Periwound Temperature warm  -A      Periwound Skin Turgor soft  -JMA      Edges irregular;jagged;open  -JMA      Wound Length (cm) 6 cm  -JMA      Wound Width (cm) 11 cm  -JMA      Wound Depth (cm) 0.1 cm  -JMA      Wound Surface Area (cm^2) 66 cm^2  -JMA      Wound Volume (cm^3) 6.6 cm^3  -JMA      Drainage Characteristics/Odor serosanguineous  -JMA      Drainage Amount small  -A      Care, Wound cleansed with;wound cleanser;debrided  -A      Dressing Care dressing applied;silver impregnated;foam;gauze;multi-layer wrap  mepilex ag, kerlix, MLW  -A      Periwound Care barrier ointment applied;cleansed with pH balanced cleanser;dry periwound area maintained  zguard  -A      Retired Wound - Properties Group Placement Date: 02/03/22  - Present on Hospital Admission: Y  -JM Side: Left  - Orientation: lower  - Location: leg  -      Retired Wound  - Properties Group Date first assessed: 02/03/22  -GABE Present on Hospital Admission: Y  - Side: Left  - Location: leg  -            User Key  (r) = Recorded By, (t) = Taken By, (c) = Cosigned By    Initials Name Provider Type    Alessandra Brand, PT Physical Therapist    Phyllis Gallardo, RN Registered Nurse               Lymphedema     Row Name 03/10/22 1131             Lymphedema Edema Assessment    Ptting Edema Category By severity  -      Pitting Edema Moderate;Mild  -              Skin Changes/Observations    Lower Extremity Conditions left:;shiny;inflamed  -      Lower Extremity Color/Pigment left:;red;blanchable  -              Lymphedema Pulses/Capillary Refill    Lower Extremity Capillary Refill left:;less than 3 seconds  -              Compression/Skin Care    Compression/Skin Care skin care;wrapping location;bandaging  -      Skin Care washed/dried;lotion applied  -      Wrapping Location lower extremity  -      Wrapping Location LE left:;foot to knee  -      Wrapping Comments Size 4/5/6 compressogrip doubled and overlapping for gradient compression  -      Bandage Layers cotton elastic stocking- double layer (comment size)  -            User Key  (r) = Recorded By, (t) = Taken By, (c) = Cosigned By    Initials Name Provider Type    Alessandra Mcleod, PT Physical Therapist                WOUND DEBRIDEMENT  Total area of Debridement: 10cmsq  Debridement Site 1  Location- Site 1: LLE  Selective Debridement- Site 1: Wound Surface <20cmsq  Instruments- Site 1: tweezers  Excised Tissue Description- Site 1: moderate, slough  Bleeding- Site 1: scant              Therapy Education     Row Name 03/10/22 2584             Therapy Education    Education Details Reinforced to not get wound wet when showering, keep dry/covered.  If needed, PT instructed pt/spouse in clean dressing change and compressogrip application and issued extra supplies.  Plan to continue 2x/week tx here.   Provided info for [facility] for pt to call about PT referral for strengthening/balance issues.  -GABE      Given Edema management;Bandaging/dressing change  -      Program Reinforced  -GABE      How Provided Verbal;Demonstration  -      Provided to Patient;Caregiver  -      Level of Understanding Verbalized  -            User Key  (r) = Recorded By, (t) = Taken By, (c) = Cosigned By    Initials Name Provider Type    Alsesandra Mcleod, PT Physical Therapist                Recommendation and Plan   PT Assessment/Plan     Row Name 03/10/22 1130          PT Assessment    Functional Limitations Performance in self-care ADL;Limitation in home management;Other (comment)  wound, edema management  -     Impairments Integumentary integrity;Edema;Pain  -     Assessment Comments Pt making good progress with about 50% of wound now reepithelialized, still with some residual slough that will need ongoing debridement.  Continue with POC.  -            PT Plan    PT Frequency 2x/week  -GABE     Physical Therapy Interventions (Optional Details) patient/family education;wound care  -     PT Plan Comments debridement, MLW  -           User Key  (r) = Recorded By, (t) = Taken By, (c) = Cosigned By    Initials Name Provider Type    Alessandra Mcleod, PT Physical Therapist                Goals   PT OP Goals     Row Name 03/10/22 1130          Time Calculation    PT Goal Re-Cert Due Date 05/24/22  -           User Key  (r) = Recorded By, (t) = Taken By, (c) = Cosigned By    Initials Name Provider Type    Alessandra Mcleod, PT Physical Therapist                PT Goal Re-Cert Due Date: 05/24/22            Time Calculation: Start Time: 1130  Untimed Charges  04684-Twnaluoeed comp below knee: 15  16596-Dpoutssmo debridement: 15  Total Minutes  Untimed Charges Total Minutes: 30   Total Minutes: 30  Therapy Charges for Today     Code Description Service Date Service Provider Modifiers Qty    [id number]  BHANU  DEBRIDE OPEN WOUND UP TO 20CM 3/10/2022 Alessandra Espana, PT GP 1    [id number] HC PT MULTI LAYER COMP SYS BELOW KNEE 3/10/2022 Alessandra Espana, PT GP 1                  Alessandra Espana, PT  3/10/2022

## 2022-03-17 ENCOUNTER — HOSPITAL ENCOUNTER (OUTPATIENT)
Dept: PHYSICAL THERAPY | Facility: HOSPITAL | Age: 67
Setting detail: THERAPIES SERIES
Discharge: HOME | End: 2022-03-17

## 2022-03-17 DIAGNOSIS — S81.802D OPEN WOUND OF LEFT LOWER EXTREMITY, SUBSEQUENT ENCOUNTER: ICD-10-CM

## 2022-03-17 DIAGNOSIS — L03.116 CELLULITIS OF LEFT ANTERIOR LOWER LEG: Primary | ICD-10-CM

## 2022-03-17 DIAGNOSIS — M79.662 PAIN AND SWELLING OF LEFT LOWER LEG: ICD-10-CM

## 2022-03-17 DIAGNOSIS — M79.89 PAIN AND SWELLING OF LEFT LOWER LEG: ICD-10-CM

## 2022-03-17 PROCEDURE — 29581 APPL MULTLAYER CMPRN SYS LEG: CPT | Performed by: PHYSICAL THERAPIST

## 2022-03-17 PROCEDURE — 97597 DBRDMT OPN WND 1ST 20 CM/<: CPT | Performed by: PHYSICAL THERAPIST

## 2022-03-17 NOTE — THERAPY WOUND CARE TREATMENT
Outpatient Rehabilitation - Wound/Debridement Treatment Note   [facility]     Patient Name: Park Ray  : 1955  MRN: [id number]  Today's Date: 3/17/2022                 Admit Date: 3/17/2022    Visit Dx:    ICD-10-CM ICD-9-CM   1. Cellulitis of left anterior lower leg  L03.116 682.6   2. Pain and swelling of left lower leg  M79.662 729.5    M79.89 729.81   3. Open wound of left lower extremity, subsequent encounter  S81.802D V58.89     891.0       Patient Active Problem List   Diagnosis   • OAB (overactive bladder)   • Mixed incontinence urge and stress   • Menopause   • Hypothyroidism   • Fatty liver   • Family history of breast cancer in mother   • Superficial dyspareunia   • Colon cancer (HCC)   • Recurrent genital herpes   • Vaginal atrophy   • Cellulitis of left anterior lower leg        Past Medical History:   Diagnosis Date   • Acute torn meniscus of knee     left   • Closed displaced fracture of shaft of left clavicle    • Closed nondisplaced fracture of neck of left radius    • Colon cancer (HCC)    • Dyspareunia    • Family history of breast cancer in mother    • Fatty liver    • Fracture of proximal phalanx of toe    • Herpes    • Hypothyroidism    • IBS (irritable bowel syndrome)    • Left wrist pain    • Menopause    • Mixed incontinence urge and stress    • OAB (overactive bladder)    • Osteoarthritis    • Right wrist pain         Past Surgical History:   Procedure Laterality Date   •  SECTION     • CHOLECYSTECTOMY     • COLECTOMY PARTIAL / TOTAL     • COLON RESECTION     • COLONOSCOPY     • ENTEROCELE REPAIR     • DAWSON MORA ([facility]) PROCEDURE     • PELVIC LAPAROSCOPY     • RECTAL SURGERY     • SALPINGO OOPHORECTOMY Right    • TOTAL ABDOMINAL HYSTERECTOMY WITH SALPINGO OOPHORECTOMY Left    • UTERINE SUSPENSION LAPAROSCOPIC           EVALUATION   PT Ortho     Row Name 22 1015       Subjective Comments    Subjective Comments Pt reports spouse changed  "wraps. Did he do it wrong? Going to [facility] this afternoon as pt feels wound isn't healing well, \"yellow stuff all over the dressing.\" Pt reports difficulty walking. Has a walker but does not use it. Pt reports went outside to feed the cat and take out the trash, then took 10 minutes to get up the step to get back in the house. Spouse was not home at the time. Pt plans to ask MD about starting PT for walking, leg strengthening.  -MW       Subjective Pain    Able to rate subjective pain? yes  -MW    Pre-Treatment Pain Level 5  -MW    Post-Treatment Pain Level 5  -MW       Transfers    Comment, (Transfers) remained seated in transport chair  -          User Key  (r) = Recorded By, (t) = Taken By, (c) = Cosigned By    Initials Name Provider Type    Ivelisse Fragoso, PT Physical Therapist                 LDA Wound     Row Name 03/17/22 1015             Wound 02/03/22 Left lower leg    Wound - Properties Group Placement Date: 02/03/22  - Present on Hospital Admission: Y  - Side: Left  - Orientation: lower  - Location: leg  -      Wound Image View All Images View Images  -MW      Dressing Appearance intact;moist drainage;dried drainage  -MW      Base yellow;slough;red;subcutaneous;granulating;epithelialization  approx 75% reepithelialized  -MW      Periwound intact;redness;swelling;other (see comments);blistered  purple, prominent veins in epithelialized area of wound. 2 blisters along edge of Mepilex  -MW      Periwound Temperature warm  -MW      Periwound Skin Turgor soft  -      Edges irregular;jagged;open  -MW      Drainage Characteristics/Odor serosanguineous  -MW      Drainage Amount small  -MW      Care, Wound cleansed with;wound cleanser;debrided  -MW      Dressing Care dressing applied;silver impregnated;foam;gauze;multi-layer wrap;collagen  Elizabeth, Mepilex AG, cast padding, MLW  -MW      Periwound Care cleansed with pH balanced cleanser;dry periwound area maintained  -MW      Retired Wound - " Properties Group Placement Date: 02/03/22  -GABE Present on Hospital Admission: Y  -JM Side: Left  - Orientation: lower  - Location: leg  -JM      Retired Wound - Properties Group Date first assessed: 02/03/22  -GABE Present on Hospital Admission: Y  -JM Side: Left  - Location: leg  -            User Key  (r) = Recorded By, (t) = Taken By, (c) = Cosigned By    Initials Name Provider Type    Ivelisse Fragoso, PT Physical Therapist    Phyllis Gallardo RN Registered Nurse               Lymphedema     Row Name 03/17/22 1015             Lymphedema Edema Assessment    Ptting Edema Category By severity  -MW      Pitting Edema Moderate;Mild  -MW      Edema Assessment Comment  had replaced compressogrips 4, 5, 6 all on top of each other over wound area  -MW              Skin Changes/Observations    Lower Extremity Conditions left:;fragile  -MW      Lower Extremity Color/Pigment left:;red;blanchable;purple;hyperpigmented  color change in medial area of new epithelial tissue; varicose veins?  -MW              Lymphedema Pulses/Capillary Refill    Lower Extremity Capillary Refill left:;less than 3 seconds  -MW              Compression/Skin Care    Compression/Skin Care skin care;wrapping location;bandaging  -MW      Skin Care washed/dried;lotion applied  -MW      Wrapping Location lower extremity  -MW      Wrapping Location LE left:;foot to knee  -MW      Wrapping Comments LLE compressogrips size 4 MH to mid shin, doubled on foot, size 5 ankle to fibular head, doubled distal 1/2  -MW      Bandage Layers cotton elastic stocking- double layer (comment size)  -MW      Compression/Skin Care Comments Issued additional size 4 & 5  -MW            User Key  (r) = Recorded By, (t) = Taken By, (c) = Cosigned By    Initials Name Provider Type    Ivelisse Fragoso, PT Physical Therapist                WOUND DEBRIDEMENT  Total area of Debridement: ~8 cm2  Debridement Site 1  Location- Site 1: LLE  Selective  Debridement- Site 1: Wound Surface <20cmsq  Instruments- Site 1: tweezers  Excised Tissue Description- Site 1: minimum, slough, other (comment) (crusts)  Bleeding- Site 1: none              Therapy Education     Row Name 03/17/22 1015             Therapy Education    Education Details Reviewed proper placement of MLW to provide gradient compression from foot to just below knee, not all layers over lower leg/ wound area. Do NOT need to change dressing if coming to PT 2-3 x wk. Replace if needed after MD appt or encourage MD to view wound and replace Mepilex.  -MW      Given Edema management;Bandaging/dressing change  -MW      Program Reinforced  -MW      How Provided Verbal;Demonstration  -MW      Provided to Patient;Caregiver  -MW      Level of Understanding Verbalized;Teach back education performed  -MW            User Key  (r) = Recorded By, (t) = Taken By, (c) = Cosigned By    Initials Name Provider Type    MW Ivelisse Hernandez, PT Physical Therapist                Recommendation and Plan   PT Assessment/Plan     Row Name 03/17/22 1015          PT Assessment    Functional Limitations Performance in self-care ADL;Limitation in home management;Other (comment)  wound, edema management  -MW     Impairments Integumentary integrity;Edema;Pain  -MW     Assessment Comments LLE wound continues to re-epithelialize. Scant slough debrided. Note 2 new blisters along edges of foam dressing, this may be from increased pressure as spouse had all 3 sizes of compressogrip in same location rather than as gradient compression from foot to knee. Re-eduated pt and spouse again on gradient compression. Edema has reduced which allowed transtion to only 2 sizes of compressogrip, which hopefully will also improve carry over at home. Medial aspect of wound has re-epithelialized but displays purple color changes beneath this skin; ? varicose veins? Pt seems to place herself in unsafe situations as in going outside down a step without any  assistive device and while home alone. Encouraged pt to use her walker to improve safety and endurance in home. Expect she would also benefit from PT for balance, strengthening and gait training. Cont PT wound care.  -MW     Rehab Potential Fair  -MW     Patient/caregiver participated in establishment of treatment plan and goals Yes  -MW     Patient would benefit from skilled therapy intervention Yes  -MW            PT Plan    PT Frequency 2x/week  -MW     Physical Therapy Interventions (Optional Details) wound care;patient/family education  -MW     PT Plan Comments Debridement, dressing management, MLW, education  -           User Key  (r) = Recorded By, (t) = Taken By, (c) = Cosigned By    Initials Name Provider Type    MW Ivelisse Hernandez, PT Physical Therapist                Goals                   Time Calculation: Start Time: 1015  Untimed Charges  69603-Jotilwkaou comp below knee: 15  14585-Ftohsjlth debridement: 10  Total Minutes  Untimed Charges Total Minutes: 25   Total Minutes: 25  Therapy Charges for Today     Code Description Service Date Service Provider Modifiers Qty    69550509992 HC BHANU DEBRIDE OPEN WOUND UP TO 20CM 3/17/2022 Ivelisse Hernandez, PT GP 1    [id number] HC PT MULTI LAYER COMP SYS BELOW KNEE 3/17/2022 Ivelisse Hernandez, PT GP 1                  Ivelisse Hernandez, PT  3/17/2022

## 2022-03-19 ENCOUNTER — HOSPITAL ENCOUNTER (OUTPATIENT)
Dept: PHYSICAL THERAPY | Facility: HOSPITAL | Age: 67
Setting detail: THERAPIES SERIES
Discharge: HOME | End: 2022-03-19

## 2022-03-19 DIAGNOSIS — M79.662 PAIN AND SWELLING OF LEFT LOWER LEG: ICD-10-CM

## 2022-03-19 DIAGNOSIS — L03.116 CELLULITIS OF LEFT ANTERIOR LOWER LEG: Primary | ICD-10-CM

## 2022-03-19 DIAGNOSIS — S81.802D OPEN WOUND OF LEFT LOWER EXTREMITY, SUBSEQUENT ENCOUNTER: ICD-10-CM

## 2022-03-19 DIAGNOSIS — M79.89 PAIN AND SWELLING OF LEFT LOWER LEG: ICD-10-CM

## 2022-03-19 PROCEDURE — 29581 APPL MULTLAYER CMPRN SYS LEG: CPT | Performed by: PHYSICAL THERAPIST

## 2022-03-19 PROCEDURE — 97597 DBRDMT OPN WND 1ST 20 CM/<: CPT | Performed by: PHYSICAL THERAPIST

## 2022-03-19 NOTE — THERAPY WOUND CARE TREATMENT
Outpatient Rehabilitation - Wound/Debridement Treatment Note   [facility]     Patient Name: Park Ray  : 1955  MRN: [id number]  Today's Date: 3/19/2022                 Admit Date: 3/19/2022    Visit Dx:    ICD-10-CM ICD-9-CM   1. Cellulitis of left anterior lower leg  L03.116 682.6   2. Pain and swelling of left lower leg  M79.662 729.5    M79.89 729.81   3. Open wound of left lower extremity, subsequent encounter  S81.802D V58.89     891.0       Patient Active Problem List   Diagnosis   • OAB (overactive bladder)   • Mixed incontinence urge and stress   • Menopause   • Hypothyroidism   • Fatty liver   • Family history of breast cancer in mother   • Superficial dyspareunia   • Colon cancer (HCC)   • Recurrent genital herpes   • Vaginal atrophy   • Cellulitis of left anterior lower leg        Past Medical History:   Diagnosis Date   • Acute torn meniscus of knee     left   • Closed displaced fracture of shaft of left clavicle    • Closed nondisplaced fracture of neck of left radius    • Colon cancer (HCC)    • Dyspareunia    • Family history of breast cancer in mother    • Fatty liver    • Fracture of proximal phalanx of toe    • Herpes    • Hypothyroidism    • IBS (irritable bowel syndrome)    • Left wrist pain    • Menopause    • Mixed incontinence urge and stress    • OAB (overactive bladder)    • Osteoarthritis    • Right wrist pain         Past Surgical History:   Procedure Laterality Date   •  SECTION     • CHOLECYSTECTOMY     • COLECTOMY PARTIAL / TOTAL     • COLON RESECTION     • COLONOSCOPY     • ENTEROCELE REPAIR     • DAWSON MORA (JOANNK) PROCEDURE     • PELVIC LAPAROSCOPY     • RECTAL SURGERY     • SALPINGO OOPHORECTOMY Right    • TOTAL ABDOMINAL HYSTERECTOMY WITH SALPINGO OOPHORECTOMY Left    • UTERINE SUSPENSION LAPAROSCOPIC           EVALUATION       LDA Wound     Row Name 22 1300             Wound 22 Left lower leg    Wound - Properties Group  Placement Date: 02/03/22  - Present on Hospital Admission: Y  - Side: Left  - Orientation: lower  - Location: leg  -      Wound Image View All Images View Images  -MW      Dressing Appearance intact;moist drainage;dried drainage  -MW      Base yellow;slough;red;subcutaneous;granulating;epithelialization  increased epithelial growth  -MW      Periwound intact;redness;swelling;other (see comments)  purple, prominent veins in epithelialized area of wound.  -MW      Periwound Temperature warm  -MW      Periwound Skin Turgor soft  -MW      Edges irregular;jagged;open  -MW      Drainage Characteristics/Odor serosanguineous  -MW      Drainage Amount small;scant  -MW      Care, Wound cleansed with;wound cleanser;debrided  -MW      Dressing Care dressing applied;silver impregnated;foam;gauze;multi-layer wrap;collagen  Elizabeth, Mep[employer], cast padding, MLW  -MW      Periwound Care cleansed with pH balanced cleanser;dry periwound area maintained  -MW      Retired Wound - Properties Group Placement Date: 02/03/22  - Present on Hospital Admission: Y  - Side: Left  - Orientation: lower  - Location: leg  -      Retired Wound - Properties Group Date first assessed: 02/03/22  - Present on Hospital Admission: Y  - Side: Left  - Location: leg  [facility]            User Key  (r) = Recorded By, (t) = Taken By, (c) = Cosigned By    Initials Name Provider Type    Ivelisse Fragoso, PT Physical Therapist    Phyllis Gallardo, RN Registered Nurse               Lymphedema     Row Name 03/19/22 1300             Lymphedema Edema Assessment    Ptting Edema Category By severity  -MW      Pitting Edema Moderate;Mild  -MW              Skin Changes/Observations    Lower Extremity Conditions left:;fragile;clean;dry  -MW      Lower Extremity Color/Pigment left:;red;blanchable;purple;hyperpigmented  color change in medial area of new epithelial tissue; varicose veins?  -MW              Lymphedema Pulses/Capillary Refill     "Lower Extremity Capillary Refill left:;less than 3 seconds  -MW              Compression/Skin Care    Compression/Skin Care skin care;wrapping location;bandaging  -MW      Skin Care washed/dried;lotion applied  -MW      Wrapping Location lower extremity  -MW      Wrapping Location LE left:;foot to knee  -MW      Wrapping Comments LLE compressogrips size 4 MH to Fibular head, doubled on foot and distal 1/3 of calf  -MW      Bandage Layers cotton elastic stocking- double layer (comment size)  -MW            User Key  (r) = Recorded By, (t) = Taken By, (c) = Cosigned By    Initials Name Provider Type    MW Ivelisse Hernandez, PT Physical Therapist                WOUND DEBRIDEMENT  Total area of Debridement: ~4 cm2  Debridement Site 1  Location- Site 1: LLE  Selective Debridement- Site 1: Wound Surface <20cmsq  Instruments- Site 1: tweezers  Excised Tissue Description- Site 1: minimum, slough, other (comment) (crusts)  Bleeding- Site 1: none              Therapy Education     Row Name 03/19/22 1300             Therapy Education    Education Details Assured pt wound is healing, new skin growth each visit. Swelling decreasing so able to just use size 4, doubled on foot, ankle, lower leg. Encouraged pt to walk at home to increase strength and endurance. Encouraged pt to complete AROM: ankle pumps, circles, seated marches, supine SLR, bridges. Standing at sink hip ext, flex, ABD. Reveiwed (verbally) ascend with \"good foot\" and descend with \"bad foot.\" As pt struggling with single step in home and to get into home. Keep dressing dry and in place; no need to change between PT visits as drainage decreased and wound healing.  -MW      Given Edema management;Bandaging/dressing change  -MW      Program Modified;Progressed;Reinforced  -MW      How Provided Verbal;Demonstration  -MW      Provided to Patient;Caregiver  -MW      Level of Understanding Verbalized;Teach back education performed  -MW            User Key  (r) = Recorded " By, (t) = Taken By, (c) = Cosigned By    Initials Name Provider Type    Ivelisse Fragoso, PT Physical Therapist                Recommendation and Plan   PT Assessment/Plan     Row Name 03/19/22 1300          PT Assessment    Functional Limitations Performance in self-care ADL;Limitation in home management;Other (comment)  wound, edema management  -     Impairments Integumentary integrity;Edema;Pain  -     Assessment Comments LLE continues to improve with new epithelial growth through out wound area. Blisters resolved. Edema has decreased to allow size 4 compressogrip from MH to fib head, doubled distally to provide gradient compression. Expect to be ready for d/c soon. Encouaged AROM at home.  -MW     Rehab Potential Good  -MW     Patient/caregiver participated in establishment of treatment plan and goals Yes  -MW     Patient would benefit from skilled therapy intervention Yes  -MW            PT Plan    PT Frequency 2x/week;1x/week  -     Physical Therapy Interventions (Optional Details) wound care;patient/family education  -     PT Plan Comments Debridement, dressing management, MLW, education  -           User Key  (r) = Recorded By, (t) = Taken By, (c) = Cosigned By    Initials Name Provider Type    Ivelisse Fragoso, PT Physical Therapist                Goals                   Time Calculation: Start Time: 1300  Timed Charges  75955 - PT Self Care/Mgmt Minutes: 5  Untimed Charges  Wound Care: 37967 Selective debridement, 26995 Multilayer comp below knee  79079-Cunwhrhdyd comp below knee: 12  07306-Jzdsggeoo debridement: 12  Total Minutes  Timed Charges Total Minutes: 5  Untimed Charges Total Minutes: 24   Total Minutes: 29  Therapy Charges for Today     Code Description Service Date Service Provider Modifiers Qty    37084749619 HC BHANU DEBRIDE OPEN WOUND UP TO 20CM 3/19/2022 Ivelisse Hernandez, PT GP 1    [id number]  PT MULTI LAYER COMP SYS BELOW KNEE 3/19/2022 Ivelisse Hernandez, PT GP 1                   Ivelisse Hernandez, PT  3/19/2022

## 2022-03-21 ENCOUNTER — HOSPITAL ENCOUNTER (OUTPATIENT)
Dept: PHYSICAL THERAPY | Facility: HOSPITAL | Age: 67
Setting detail: THERAPIES SERIES
Discharge: HOME | End: 2022-03-21

## 2022-03-21 DIAGNOSIS — M79.89 PAIN AND SWELLING OF LEFT LOWER LEG: ICD-10-CM

## 2022-03-21 DIAGNOSIS — S81.802D OPEN WOUND OF LEFT LOWER EXTREMITY, SUBSEQUENT ENCOUNTER: ICD-10-CM

## 2022-03-21 DIAGNOSIS — M79.662 PAIN AND SWELLING OF LEFT LOWER LEG: ICD-10-CM

## 2022-03-21 DIAGNOSIS — L03.116 CELLULITIS OF LEFT ANTERIOR LOWER LEG: Primary | ICD-10-CM

## 2022-03-21 PROCEDURE — 29581 APPL MULTLAYER CMPRN SYS LEG: CPT

## 2022-03-21 PROCEDURE — 97597 DBRDMT OPN WND 1ST 20 CM/<: CPT

## 2022-03-22 NOTE — THERAPY WOUND CARE TREATMENT
Outpatient Rehabilitation - Wound/Debridement Treatment Note   [facility]     Patient Name: Park Ray  : 1955  MRN: [id number]  Today's Date: 3/22/2022                 Admit Date: 3/21/2022    Visit Dx:    ICD-10-CM ICD-9-CM   1. Cellulitis of left anterior lower leg  L03.116 682.6   2. Pain and swelling of left lower leg  M79.662 729.5    M79.89 729.81   3. Open wound of left lower extremity, subsequent encounter  S81.802D V58.89     891.0       Patient Active Problem List   Diagnosis   • OAB (overactive bladder)   • Mixed incontinence urge and stress   • Menopause   • Hypothyroidism   • Fatty liver   • Family history of breast cancer in mother   • Superficial dyspareunia   • Colon cancer (HCC)   • Recurrent genital herpes   • Vaginal atrophy   • Cellulitis of left anterior lower leg        Past Medical History:   Diagnosis Date   • Acute torn meniscus of knee     left   • Closed displaced fracture of shaft of left clavicle    • Closed nondisplaced fracture of neck of left radius    • Colon cancer (HCC)    • Dyspareunia    • Family history of breast cancer in mother    • Fatty liver    • Fracture of proximal phalanx of toe    • Herpes    • Hypothyroidism    • IBS (irritable bowel syndrome)    • Left wrist pain    • Menopause    • Mixed incontinence urge and stress    • OAB (overactive bladder)    • Osteoarthritis    • Right wrist pain         Past Surgical History:   Procedure Laterality Date   •  SECTION     • CHOLECYSTECTOMY     • COLECTOMY PARTIAL / TOTAL     • COLON RESECTION     • COLONOSCOPY     • ENTEROCELE REPAIR     • DAWSON MORA (JOANNK) PROCEDURE     • PELVIC LAPAROSCOPY     • RECTAL SURGERY     • SALPINGO OOPHORECTOMY Right    • TOTAL ABDOMINAL HYSTERECTOMY WITH SALPINGO OOPHORECTOMY Left    • UTERINE SUSPENSION LAPAROSCOPIC           EVALUATION   PT Ortho     Row Name 22 9976       Subjective Comments    Subjective Comments Pt with no new issues or  complaints  -MF       Subjective Pain    Able to rate subjective pain? yes  -MF    Pre-Treatment Pain Level 4  -MF    Post-Treatment Pain Level 4  -MF       Transfers    Comment, (Transfers) remained seated in transport chair  -          User Key  (r) = Recorded By, (t) = Taken By, (c) = Cosigned By    Initials Name Provider Type    Jose Cordero, PT Physical Therapist                 LDA Wound     Row Name 03/21/22 1515             Wound 02/03/22 Left lower leg    Wound - Properties Group Placement Date: 02/03/22  - Present on Hospital Admission: Y  - Side: Left  - Orientation: lower  - Location: leg  -      Dressing Appearance intact;moist drainage  -MF      Base yellow;slough;red;subcutaneous;granulating;epithelialization  -MF      Periwound intact;dry;pink  -      Periwound Temperature warm  -      Periwound Skin Turgor soft  -      Edges irregular  -      Wound Length (cm) --  several small, scattered ulcerations to original wound size.  -MF      Drainage Characteristics/Odor serosanguineous  -MF      Drainage Amount scant  -MF      Care, Wound cleansed with;soap and water;debrided  -MF      Dressing Care petroleum-based  xeroform with kerlix  -      Periwound Care cleansed with pH balanced cleanser  -      Retired Wound - Properties Group Placement Date: 02/03/22  - Present on Hospital Admission: Y  - Side: Left  - Orientation: lower  - Location: leg  -      Retired Wound - Properties Group Date first assessed: 02/03/22  - Present on Hospital Admission: Y  - Side: Left  - Location: leg  -            User Key  (r) = Recorded By, (t) = Taken By, (c) = Cosigned By    Initials Name Provider Type    Jose Cordero, PT Physical Therapist    Phyllis Gallardo, RN Registered Nurse               Lymphedema     Row Name 03/21/22 1515             Lymphedema Edema Assessment    Ptting Edema Category By severity  -      Pitting Edema Mild;Moderate  -               Compression/Skin Care    Compression/Skin Care skin care;wrapping location;bandaging  -      Skin Care washed/dried;lotion applied  -      Wrapping Location lower extremity  -      Wrapping Location LE left:;foot to knee  -      Wrapping Comments xeroform with kerlix and size 5 compressogrip doubled and overlapping for gradient compression.  -            User Key  (r) = Recorded By, (t) = Taken By, (c) = Cosigned By    Initials Name Provider Type     Jose Lee, PT Physical Therapist                WOUND DEBRIDEMENT  Total area of Debridement: ~4cm2  Debridement Site 1  Location- Site 1: LLE  Selective Debridement- Site 1: Wound Surface <20cmsq  Instruments- Site 1: tweezers  Excised Tissue Description- Site 1: minimum, other (comment) (dry, flaking nonviable skin)  Bleeding- Site 1: none                 Recommendation and Plan   PT Assessment/Plan     Row Name 03/21/22 2038          PT Assessment    Functional Limitations Performance in self-care ADL;Limitation in home management;Other (comment)  wound and edema management  -     Impairments Integumentary integrity;Edema;Pain  -     Assessment Comments LLE noted to have significant reepithelialization of wound edges noted today.  PT changed dressing to xeroform with kerlix and compression wrapping to help soften remaining dry, flaking, nonviable skin to allow for easier removal next visit.  Pt will benefit from cont use of compression wrapping and dressing management until pt able to transition to compression stockings.  -     Rehab Potential Fair  -     Patient/caregiver participated in establishment of treatment plan and goals Yes  -     Patient would benefit from skilled therapy intervention Yes  -            PT Plan    PT Frequency 2x/week;1x/week  -     Physical Therapy Interventions (Optional Details) wound care;patient/family education  -     PT Plan Comments debridement, dressing management, compression wrapping.  -            User Key  (r) = Recorded By, (t) = Taken By, (c) = Cosigned By    Initials Name Provider Type    Jose Cordero, PT Physical Therapist                Goals   PT OP Goals     Row Name 03/21/22 1515          Time Calculation    PT Goal Re-Cert Due Date 05/24/22  -           User Key  (r) = Recorded By, (t) = Taken By, (c) = Cosigned By    Initials Name Provider Type    Jose Cordero, PT Physical Therapist                PT Goal Re-Cert Due Date: 05/24/22            Time Calculation: Start Time: 1515  Untimed Charges  98554-Yrhilfzovl comp below knee: 10  24262-Egauifunt debridement: 20  Total Minutes  Untimed Charges Total Minutes: 30   Total Minutes: 30  Therapy Charges for Today     Code Description Service Date Service Provider Modifiers Qty    70061216168 HC BHANU DEBRIDE OPEN WOUND UP TO 20CM 3/21/2022 Jose Lee, PT GP 1    [id number] HC PT MULTI LAYER COMP SYS BELOW KNEE 3/21/2022 Jose Lee, PT GP 1                  Jose Lee, PT  3/22/2022

## 2022-03-24 ENCOUNTER — HOSPITAL ENCOUNTER (OUTPATIENT)
Dept: PHYSICAL THERAPY | Facility: HOSPITAL | Age: 67
Setting detail: THERAPIES SERIES
Discharge: HOME | End: 2022-03-24

## 2022-03-24 DIAGNOSIS — L03.116 CELLULITIS OF LEFT ANTERIOR LOWER LEG: Primary | ICD-10-CM

## 2022-03-24 DIAGNOSIS — S81.802D OPEN WOUND OF LEFT LOWER EXTREMITY, SUBSEQUENT ENCOUNTER: ICD-10-CM

## 2022-03-24 DIAGNOSIS — M79.89 PAIN AND SWELLING OF LEFT LOWER LEG: ICD-10-CM

## 2022-03-24 DIAGNOSIS — M79.662 PAIN AND SWELLING OF LEFT LOWER LEG: ICD-10-CM

## 2022-03-24 PROCEDURE — 97597 DBRDMT OPN WND 1ST 20 CM/<: CPT | Performed by: PHYSICAL THERAPIST

## 2022-03-24 PROCEDURE — 29581 APPL MULTLAYER CMPRN SYS LEG: CPT | Performed by: PHYSICAL THERAPIST

## 2022-03-24 NOTE — THERAPY PROGRESS REPORT/RE-CERT
Outpatient Rehabilitation - Wound/Debridement Progress Note  [facility]     Patient Name: Park Ray  : 1955  MRN: [id number]  Today's Date: 3/24/2022                 Admit Date: 3/24/2022    Visit Dx:    ICD-10-CM ICD-9-CM   1. Cellulitis of left anterior lower leg  L03.116 682.6   2. Pain and swelling of left lower leg  M79.662 729.5    M79.89 729.81   3. Open wound of left lower extremity, subsequent encounter  S81.802D V58.89     891.0       Patient Active Problem List   Diagnosis   • OAB (overactive bladder)   • Mixed incontinence urge and stress   • Menopause   • Hypothyroidism   • Fatty liver   • Family history of breast cancer in mother   • Superficial dyspareunia   • Colon cancer (HCC)   • Recurrent genital herpes   • Vaginal atrophy   • Cellulitis of left anterior lower leg        Past Medical History:   Diagnosis Date   • Acute torn meniscus of knee     left   • Closed displaced fracture of shaft of left clavicle    • Closed nondisplaced fracture of neck of left radius    • Colon cancer (HCC)    • Dyspareunia    • Family history of breast cancer in mother    • Fatty liver    • Fracture of proximal phalanx of toe    • Herpes    • Hypothyroidism    • IBS (irritable bowel syndrome)    • Left wrist pain    • Menopause    • Mixed incontinence urge and stress    • OAB (overactive bladder)    • Osteoarthritis    • Right wrist pain         Past Surgical History:   Procedure Laterality Date   •  SECTION     • CHOLECYSTECTOMY     • COLECTOMY PARTIAL / TOTAL     • COLON RESECTION     • COLONOSCOPY     • ENTEROCELE REPAIR     • DAWSON MORA (MMK) PROCEDURE     • PELVIC LAPAROSCOPY     • RECTAL SURGERY     • SALPINGO OOPHORECTOMY Right    • TOTAL ABDOMINAL HYSTERECTOMY WITH SALPINGO OOPHORECTOMY Left    • UTERINE SUSPENSION LAPAROSCOPIC            LDA Wound     Row Name 22 0845             Wound 22 Left lower leg    Wound - Properties Group Placement Date:  02/03/22  - Present on Hospital Admission: Y  - Side: Left  - Orientation: lower  - Location: leg  -JM      Wound Image View All Images View Images  -MW      Dressing Appearance intact;other (see comments)  difficult to note serous drainage with xeroform residue  -MW      Base red;epithelialization;dry;scab  -MW      Periwound intact;dry;pink  -MW      Periwound Temperature warm  -MW      Periwound Skin Turgor soft  -MW      Edges irregular  -MW      Drainage Characteristics/Odor serosanguineous  -MW      Drainage Amount scant;none  serous vs xeroform  -MW      Care, Wound cleansed with;wound cleanser;debrided  -MW      Dressing Care petroleum-based;gauze, dry;multi-layer wrap  xeroform, kerlix, MLW  -MW      Periwound Care cleansed with pH balanced cleanser  -MW      Retired Wound - Properties Group Placement Date: 02/03/22  - Present on Hospital Admission: Y  - Side: Left  - Orientation: lower  - Location: leg  -      Retired Wound - Properties Group Date first assessed: 02/03/22  - Present on Hospital Admission: Y  - Side: Left  - Location: leg  -            User Key  (r) = Recorded By, (t) = Taken By, (c) = Cosigned By    Initials Name Provider Type    Ivelisse Fragoso, CAROLYN Physical Therapist    Phyllis Gallardo, RN Registered Nurse               Lymphedema     Row Name 03/24/22 0845             Lymphedema Edema Assessment    Ptting Edema Category By severity  -MW      Pitting Edema Mild  -MW              Skin Changes/Observations    Lower Extremity Conditions left:;dry;inflamed;fragile  -MW      Lower Extremity Color/Pigment left:;hyperpigmented  -MW              Lymphedema Pulses/Capillary Refill    Lower Extremity Capillary Refill left:;less than 3 seconds  -MW              Compression/Skin Care    Compression/Skin Care skin care;wrapping location;bandaging  -MW      Skin Care washed/dried;lotion applied  -MW      Wrapping Location lower extremity  -MW      Wrapping Location LE  left:;foot to knee  -MW      Wrapping Comments LLE: xeroform, kerlix. Size 4 MH to ankle, size 5 ankle to fibular head  -MW            User Key  (r) = Recorded By, (t) = Taken By, (c) = Cosigned By    Initials Name Provider Type    Ivelisse Fragoso, PT Physical Therapist                WOUND DEBRIDEMENT  Total area of Debridement: ~4 cm2  Debridement Site 1  Location- Site 1: LLE  Selective Debridement- Site 1: Wound Surface <20cmsq  Instruments- Site 1: tweezers  Excised Tissue Description- Site 1: minimum, other (comment) (crusts)  Bleeding- Site 1: none              Therapy Education     Row Name 03/24/22 8547             Therapy Education    Education Details Wound seems to be closed, but shiny skin is very fragile. Keep covered until next PT visit. Will reassess need for dressing next visit. Pt would benefit from light compression knee highs to support skin healing/ scar maturation and improve color changes (decrease). Pt encouraged to cont to increase her activity and pursue PT for LE strength and balance; she may only need to go for a couple sessions and then cont ther exer at home.  -MW      Given Bandaging/dressing change;Edema management;Symptoms/condition management  -MW      Program Reinforced;Progressed  -MW      How Provided Verbal  -MW      Provided to Patient;Caregiver  -MW      Level of Understanding Verbalized;Teach back education performed  -MW      81702 - PT Self Care/Mgmt Minutes 5  -MW            User Key  (r) = Recorded By, (t) = Taken By, (c) = Cosigned By    Initials Name Provider Type    Ivelisse Fragoso, PT Physical Therapist                Recommendation and Plan   PT Assessment/Plan     Row Name 03/24/22 4474          PT Assessment    Functional Limitations Performance in self-care ADL;Limitation in home management;Other (comment)  wound and edema management  -MW     Impairments Integumentary integrity;Edema;Pain  -MW     Assessment Comments LLE wound appears to be fully  re-epithelialized however skin is very fragile and some areas with thick crusts. Expect xeroform to aid in softening crusts and protecting fragile tissue. Pt has met 3 of 3 STGs and partially met LTGs. Expect she will met remaining goals in next several weeks. Pt to transition to light compression socks to support scar maturation. Cont PT POC.  -MW     Rehab Potential Good  -MW     Patient/caregiver participated in establishment of treatment plan and goals Yes  -MW     Patient would benefit from skilled therapy intervention Yes  -MW            PT Plan    PT Frequency 1x/week  -MW     Predicted Duration of Therapy Intervention (PT) 4 visits  -MW     Planned CPT's? PT BHANU DEBRIDE OPEN WOUND UP TO 20 CM: 32019;PT MULTI LAYER COMP SYS LE;PT SELF CARE/HOME MGMT/TRAIN EA 15: 39866;PT NONSELECT DEBRIDE 15 MIN: 08501;PT UNNA BOOT: 74205  -MW     Physical Therapy Interventions (Optional Details) wound care;patient/family education  -     PT Plan Comments debridement, dressing management, MLW vs compression socks.  -           User Key  (r) = Recorded By, (t) = Taken By, (c) = Cosigned By    Initials Name Provider Type    MW Ivelisse Hernandez, PT Physical Therapist                Goals   PT OP Goals     Row Name 22 0845          PT Short Term Goals    STG Date to Achieve 22  -MW     STG 1 Pt / caregiver able to verbalize s/s of infection and when to seek urgent care.  -MW     STG 1 Progress Met  -MW     STG 2 LLE wound with < 25% nonviable tissue to promote clean wound bed for healing.  -MW     STG 2 Progress Met  -MW     STG 3 LLE wound area to decrease at least 25% to demonstrate healing.  -MW     STG 3 Progress Met  -MW            Long Term Goals    LTG 1 LLE wound area to decrease at least 75% to demonstrate healing.  -MW     LTG 1 Progress Met  -MW     LTG 2 Pt/ caregiver independent with clean technique for home dressing changes.  -MW     LTG 2 Progress Partially Met  -MW     LTG 3 Decrease LLE  edema by 2 cm to promote improved skin integrity for wound healing.  -MW     LTG 3 Progress Partially Met  -MW            Time Calculation    PT Goal Re-Cert Due Date 05/24/22  -MW           User Key  (r) = Recorded By, (t) = Taken By, (c) = Cosigned By    Initials Name Provider Type    Ivelisse Fragoso, PT Physical Therapist                PT Goal Re-Cert Due Date: 05/24/22  PT Short Term Goals  STG Date to Achieve: 03/24/22  STG 1: Pt / caregiver able to verbalize s/s of infection and when to seek urgent care.  STG 1 Progress: Met  STG 2: LLE wound with < 25% nonviable tissue to promote clean wound bed for healing.  STG 2 Progress: Met  STG 3: LLE wound area to decrease at least 25% to demonstrate healing.  STG 3 Progress: Met  Long Term Goals  LTG 1: LLE wound area to decrease at least 75% to demonstrate healing.  LTG 1 Progress: Met  LTG 2: Pt/ caregiver independent with clean technique for home dressing changes.  LTG 2 Progress: Partially Met  LTG 3: Decrease LLE edema by 2 cm to promote improved skin integrity for wound healing.  LTG 3 Progress: Partially Met      Time Calculation: Start Time: 0845  Total Timed Code Minutes- PT: 5 minute(s)  Timed Charges  21697 - PT Self Care/Mgmt Minutes: 5  Untimed Charges  52857-Rfcnzcinno comp below knee: 10  44229-Pzfnmzprb debridement: 12  Total Minutes  Timed Charges Total Minutes: 5  Untimed Charges Total Minutes: 22   Total Minutes: 27  Therapy Charges for Today     Code Description Service Date Service Provider Modifiers Qty    [id number] HC BHANU DEBRIDE OPEN WOUND UP TO 20CM 3/24/2022 Ivelisse Hernandez, PT GP 1    [id number]  PT MULTI LAYER COMP SYS BELOW KNEE 3/24/2022 Ivelisse Hernandez, PT GP 1                  Ivelisse Hernandez PT  3/24/2022

## 2022-03-28 ENCOUNTER — HOSPITAL ENCOUNTER (OUTPATIENT)
Dept: PHYSICAL THERAPY | Facility: HOSPITAL | Age: 67
Setting detail: THERAPIES SERIES
Discharge: HOME | End: 2022-03-28

## 2022-03-28 DIAGNOSIS — L03.116 CELLULITIS OF LEFT ANTERIOR LOWER LEG: Primary | ICD-10-CM

## 2022-03-28 DIAGNOSIS — M79.89 PAIN AND SWELLING OF LEFT LOWER LEG: ICD-10-CM

## 2022-03-28 DIAGNOSIS — S81.802D OPEN WOUND OF LEFT LOWER EXTREMITY, SUBSEQUENT ENCOUNTER: ICD-10-CM

## 2022-03-28 DIAGNOSIS — M79.662 PAIN AND SWELLING OF LEFT LOWER LEG: ICD-10-CM

## 2022-03-28 PROCEDURE — 29581 APPL MULTLAYER CMPRN SYS LEG: CPT

## 2022-03-28 NOTE — THERAPY WOUND CARE TREATMENT
Outpatient Rehabilitation - Wound/Debridement Treatment Note   [facility]     Patient Name: Park Ray  : 1955  MRN: [id number]  Today's Date: 3/28/2022                 Admit Date: 3/28/2022    Visit Dx:    ICD-10-CM ICD-9-CM   1. Cellulitis of left anterior lower leg  L03.116 682.6   2. Pain and swelling of left lower leg  M79.662 729.5    M79.89 729.81   3. Open wound of left lower extremity, subsequent encounter  S81.802D V58.89     891.0       Patient Active Problem List   Diagnosis   • OAB (overactive bladder)   • Mixed incontinence urge and stress   • Menopause   • Hypothyroidism   • Fatty liver   • Family history of breast cancer in mother   • Superficial dyspareunia   • Colon cancer (HCC)   • Recurrent genital herpes   • Vaginal atrophy   • Cellulitis of left anterior lower leg        Past Medical History:   Diagnosis Date   • Acute torn meniscus of knee     left   • Closed displaced fracture of shaft of left clavicle    • Closed nondisplaced fracture of neck of left radius    • Colon cancer (HCC)    • Dyspareunia    • Family history of breast cancer in mother    • Fatty liver    • Fracture of proximal phalanx of toe    • Herpes    • Hypothyroidism    • IBS (irritable bowel syndrome)    • Left wrist pain    • Menopause    • Mixed incontinence urge and stress    • OAB (overactive bladder)    • Osteoarthritis    • Right wrist pain         Past Surgical History:   Procedure Laterality Date   •  SECTION     • CHOLECYSTECTOMY     • COLECTOMY PARTIAL / TOTAL     • COLON RESECTION     • COLONOSCOPY     • ENTEROCELE REPAIR     • DAWSON MORA (JOANNK) PROCEDURE     • PELVIC LAPAROSCOPY     • RECTAL SURGERY     • SALPINGO OOPHORECTOMY Right    • TOTAL ABDOMINAL HYSTERECTOMY WITH SALPINGO OOPHORECTOMY Left    • UTERINE SUSPENSION LAPAROSCOPIC           EVALUATION   PT Ortho     Row Name 22 9185       Subjective Comments    Subjective Comments Pt reports ongoing medial L  knee pain.  No issues with MLW, states they did not change the wrap since last tx since it was dry and intact.  -JM       Subjective Pain    Able to rate subjective pain? yes  -JM    Pre-Treatment Pain Level 9  -JM    Post-Treatment Pain Level 9  -JM    Subjective Pain Comment medial L knee pain  -JM       Transfers    Comment, (Transfers) remained seated in transport chair  -          User Key  (r) = Recorded By, (t) = Taken By, (c) = Cosigned By    Initials Name Provider Type    Alessandra Mcleod, PT Physical Therapist                 LDA Wound     Row Name 03/28/22 1515             Wound 02/03/22 Left lower leg    Wound - Properties Group Placement Date: 02/03/22  - Present on Hospital Admission: Y  -JM Side: Left  -JM Orientation: lower  -JM Location: leg  -JM      Wound Image View All Images View Images  -JMA      Dressing Appearance dry;intact;no drainage  -JMA      Base red;epithelialization;dry;scab;purple  -JMA      Periwound intact;dry;pink  -JMA      Periwound Temperature warm  -JMA      Periwound Skin Turgor soft  -JMA      Edges irregular  -JMA      Drainage Amount none  -JMA      Care, Wound cleansed with;wound cleanser;debrided  -JMA      Dressing Care dressing applied;petroleum-based;gauze;multi-layer wrap  xeroform, cast padding, MLW  -JMA      Periwound Care cleansed with pH balanced cleanser;dry periwound area maintained  -JMA      Retired Wound - Properties Group Placement Date: 02/03/22  - Present on Hospital Admission: Y  -JM Side: Left  - Orientation: lower  -JM Location: leg  -JM      Retired Wound - Properties Group Date first assessed: 02/03/22  - Present on Hospital Admission: Y  -JM Side: Left  -JM Location: leg  -JM            User Key  (r) = Recorded By, (t) = Taken By, (c) = Cosigned By    Initials Name Provider Type    Alessandra Brand, PT Physical Therapist    Phyllis Gallardo, RN Registered Nurse               Lymphedema     Row Name 03/28/22 1515              Lymphedema Edema Assessment    Ptting Edema Category By severity  -      Pitting Edema Mild  -              Skin Changes/Observations    Lower Extremity Conditions left:;dry;inflamed;fragile  -      Lower Extremity Color/Pigment left:;hyperpigmented  -              Lymphedema Pulses/Capillary Refill    Lower Extremity Capillary Refill left:;less than 3 seconds  -              Compression/Skin Care    Compression/Skin Care skin care;wrapping location;bandaging  -      Skin Care washed/dried;lotion applied  -      Wrapping Location lower extremity  -      Wrapping Location LE left:;foot to knee  -      Wrapping Comments xeroform, cast padding, size 4&5 compressogrips doubled/overlapping for gradient compression  -      Bandage Layers padding/fluff layer;cotton elastic stocking- double layer (comment size)  -            User Key  (r) = Recorded By, (t) = Taken By, (c) = Cosigned By    Initials Name Provider Type    Alessandra Mcleod, PT Physical Therapist                   Therapy Education     Row Name 03/28/22 7864             Therapy Education    Education Details Recommended compression stockings 20mmHg knee-high.  OK to shower without wrap/dressings LLE, OK to continue xeroform and compressogrips for another couple weeks to allow new skin to strengthen.  -GABE      Given Bandaging/dressing change;Edema management;Symptoms/condition management  -GABE      Program Reinforced;Progressed  -GABE      How Provided Verbal;Demonstration  -GABE      Provided to Patient;Caregiver  -GABE      Level of Understanding Verbalized;Teach back education performed  -            User Key  (r) = Recorded By, (t) = Taken By, (c) = Cosigned By    Initials Name Provider Type    Alessandra Mcleod, PT Physical Therapist                Recommendation and Plan   PT Assessment/Plan     Row Name 03/28/22 1513          PT Assessment    Functional Limitations Performance in self-care ADL;Limitation in home management;Other  (comment)  wound and edema management  -     Impairments Integumentary integrity;Edema;Pain  -     Assessment Comments LLE wound area reepithelialized but with fragile hypertrophic crusting, possibly needing debridement over next couple txs.  PT continued with xeroform and cast padding to protect fragile new skin under MLW.  Plan to follow up in one week prior to [facility] appt.  Likely d/c in next 1-2 txs.  -            PT Plan    PT Frequency 1x/week  -     Physical Therapy Interventions (Optional Details) patient/family education;wound care  -     PT Plan Comments MLW, PRN debridement, education on long-term compression use  -           User Key  (r) = Recorded By, (t) = Taken By, (c) = Cosigned By    Initials Name Provider Type    Alessandra Mcleod, PT Physical Therapist                Goals   PT OP Goals     Row Name 03/28/22 1515          Time Calculation    PT Goal Re-Cert Due Date 05/24/22  -           User Key  (r) = Recorded By, (t) = Taken By, (c) = Cosigned By    Initials Name Provider Type    Alessandra Mcleod, PT Physical Therapist                PT Goal Re-Cert Due Date: 05/24/22            Time Calculation: Start Time: 1515  Untimed Charges  31416-Aadikwfgrb comp below knee: 20  Total Minutes  Untimed Charges Total Minutes: 20   Total Minutes: 20  Therapy Charges for Today     Code Description Service Date Service Provider Modifiers Qty    48068968032 HC PT MULTI LAYER COMP SYS BELOW KNEE 3/28/2022 Alessandra Espana, PT GP 1                  Alessandra Espana PT  3/28/2022

## 2022-04-06 ENCOUNTER — APPOINTMENT (OUTPATIENT)
Dept: PHYSICAL THERAPY | Facility: HOSPITAL | Age: 67
End: 2022-04-06

## 2022-04-08 ENCOUNTER — TRANSCRIBE ORDERS (OUTPATIENT)
Dept: ADMINISTRATIVE | Facility: HOSPITAL | Age: 67
End: 2022-04-08

## 2022-04-08 ENCOUNTER — HOSPITAL ENCOUNTER (OUTPATIENT)
Dept: INFUSION THERAPY | Facility: HOSPITAL | Age: 67
Discharge: HOME | End: 2022-04-08
Admitting: INTERNAL MEDICINE

## 2022-04-08 ENCOUNTER — HOSPITAL ENCOUNTER (OUTPATIENT)
Dept: PHYSICAL THERAPY | Facility: HOSPITAL | Age: 67
Setting detail: THERAPIES SERIES
Discharge: HOME | End: 2022-04-08

## 2022-04-08 VITALS
DIASTOLIC BLOOD PRESSURE: 76 MMHG | RESPIRATION RATE: 18 BRPM | OXYGEN SATURATION: 98 % | TEMPERATURE: 96.8 F | SYSTOLIC BLOOD PRESSURE: 142 MMHG | HEART RATE: 98 BPM

## 2022-04-08 DIAGNOSIS — M79.89 PAIN AND SWELLING OF LEFT LOWER LEG: ICD-10-CM

## 2022-04-08 DIAGNOSIS — M79.662 PAIN AND SWELLING OF LEFT LOWER LEG: ICD-10-CM

## 2022-04-08 DIAGNOSIS — L03.116 CELLULITIS OF LEFT ANTERIOR LOWER LEG: Primary | ICD-10-CM

## 2022-04-08 DIAGNOSIS — L03.116 CELLULITIS OF LEFT LOWER EXTREMITY WITHOUT FOOT: Primary | ICD-10-CM

## 2022-04-08 DIAGNOSIS — L03.116 CELLULITIS OF LEFT LOWER EXTREMITY WITHOUT FOOT: ICD-10-CM

## 2022-04-08 DIAGNOSIS — S81.802D OPEN WOUND OF LEFT LOWER EXTREMITY, SUBSEQUENT ENCOUNTER: ICD-10-CM

## 2022-04-08 PROCEDURE — C1751 CATH, INF, PER/CENT/MIDLINE: HCPCS

## 2022-04-08 PROCEDURE — 97597 DBRDMT OPN WND 1ST 20 CM/<: CPT

## 2022-04-08 PROCEDURE — 29581 APPL MULTLAYER CMPRN SYS LEG: CPT

## 2022-04-08 PROCEDURE — C1894 INTRO/SHEATH, NON-LASER: HCPCS

## 2022-04-08 RX ORDER — SODIUM CHLORIDE 0.9 % (FLUSH) 0.9 %
10 SYRINGE (ML) INJECTION AS NEEDED
Status: DISCONTINUED | OUTPATIENT
Start: 2022-04-08 | End: 2022-04-10 | Stop reason: HOSPADM

## 2022-04-08 NOTE — PROGRESS NOTES
Placed by HENOK Serrano - confirmed with 3cg.  RUE single lumen PICC with 42cm total and 1cm exposed.

## 2022-04-08 NOTE — PROGRESS NOTES
Pt discharged from ir dept s/p picc line placement. Pt tolerated procedure without complications. Education provided by [facility] nurses. Pt left unit accompanied by family member via wheelchair.

## 2022-04-08 NOTE — THERAPY WOUND CARE TREATMENT
Outpatient Rehabilitation - Wound/Debridement Treatment Note   [place]     Patient Name: Park Ray  : 1955  MRN: [id number]  Today's Date: 2022                 Admit Date: 2022    Visit Dx:    ICD-10-CM ICD-9-CM   1. Cellulitis of left anterior lower leg  L03.116 682.6   2. Pain and swelling of left lower leg  M79.662 729.5    M79.89 729.81   3. Open wound of left lower extremity, subsequent encounter  S81.802D V58.89     891.0     Anterior LLE          Patient Active Problem List   Diagnosis   • OAB (overactive bladder)   • Mixed incontinence urge and stress   • Menopause   • Hypothyroidism   • Fatty liver   • Family history of breast cancer in mother   • Superficial dyspareunia   • Colon cancer (HCC)   • Recurrent genital herpes   • Vaginal atrophy   • Cellulitis of left anterior lower leg        Past Medical History:   Diagnosis Date   • Acute torn meniscus of knee     left   • Closed displaced fracture of shaft of left clavicle    • Closed nondisplaced fracture of neck of left radius    • Colon cancer (HCC)    • Dyspareunia    • Family history of breast cancer in mother    • Fatty liver    • Fracture of proximal phalanx of toe    • Herpes    • Hypothyroidism    • IBS (irritable bowel syndrome)    • Left wrist pain    • Menopause    • Mixed incontinence urge and stress    • OAB (overactive bladder)    • Osteoarthritis    • Right wrist pain         Past Surgical History:   Procedure Laterality Date   •  SECTION     • CHOLECYSTECTOMY     • COLECTOMY PARTIAL / TOTAL     • COLON RESECTION     • COLONOSCOPY     • ENTEROCELE REPAIR     • DAWSON MORA (JOANNK) PROCEDURE     • PELVIC LAPAROSCOPY     • RECTAL SURGERY     • SALPINGO OOPHORECTOMY Right    • TOTAL ABDOMINAL HYSTERECTOMY WITH SALPINGO OOPHORECTOMY Left    • UTERINE SUSPENSION LAPAROSCOPIC           EVALUATION   PT Ortho     Row Name 22 1200       Subjective Pain    Able to rate subjective pain? yes   -LH    Pre-Treatment Pain Level 9  -LH    Post-Treatment Pain Level 9  -LH       Transfers    Comment, (Transfers) remained seated in transport chair  -          User Key  (r) = Recorded By, (t) = Taken By, (c) = Cosigned By    Initials Name Provider Type     Joe Clements, PT Physical Therapist                 LDA Wound     Row Name 04/08/22 1200             Wound 02/03/22 Left lower leg    Wound - Properties Group Placement Date: 02/03/22  - Present on Hospital Admission: Y  -JM Side: Left  - Orientation: lower  -JM Location: leg  -JM      Wound Image View All Images View Images  -      Dressing Appearance dry;intact;no drainage  -      Base red;epithelialization;dry;scab;purple  -      Periwound intact;dry;pink  -      Periwound Temperature warm  -      Periwound Skin Turgor soft  -      Edges irregular  -      Drainage Amount none  -      Care, Wound cleansed with;wound cleanser;debrided  -      Dressing Care dressing applied;petroleum-based;gauze;multi-layer wrap  Xeroform, cast padding, MLW  -LH      Periwound Care cleansed with pH balanced cleanser;dry periwound area maintained;barrier ointment applied  Z guard  -      Retired Wound - Properties Group Placement Date: 02/03/22  - Present on Hospital Admission: Y  - Side: Left  - Orientation: lower  - Location: leg  -JM      Retired Wound - Properties Group Date first assessed: 02/03/22  - Present on Hospital Admission: Y  -JM Side: Left  - Location: leg  -            User Key  (r) = Recorded By, (t) = Taken By, (c) = Cosigned By    Initials Name Provider Type    Joe Jacobo, PT Physical Therapist    Phyllis Gallardo, RN Registered Nurse               Lymphedema     Row Name 04/08/22 1200             Subjective Comments    Subjective Comments Pt continues with reports of LLE pain. Pt had dressings/compression wraps removed at the doctors office yesterday. Presents with compression stockings on calves only.   -              Lymphedema Edema Assessment    Ptting Edema Category By severity  -      Pitting Edema Mild  -              Skin Changes/Observations    Lower Extremity Conditions left:;dry;inflamed;fragile  -      Lower Extremity Color/Pigment left:;hyperpigmented  -              Lymphedema Pulses/Capillary Refill    Lower Extremity Capillary Refill left:;less than 3 seconds  -              Compression/Skin Care    Compression/Skin Care skin care;wrapping location;bandaging  -      Skin Care washed/dried;lotion applied  -      Wrapping Location lower extremity  -      Wrapping Location LE left:;foot to knee  -      Wrapping Comments xeroform, cast padding, BLE size 4/5 MLW doubled and overlapped for gradient compression.  -            User Key  (r) = Recorded By, (t) = Taken By, (c) = Cosigned By    Initials Name Provider Type    Joe Jacobo, PT Physical Therapist                WOUND DEBRIDEMENT     Debridement Site 1  Location- Site 1: LLE  Selective Debridement- Site 1: Wound Surface <20cmsq  Instruments- Site 1: tweezers  Excised Tissue Description- Site 1: minimum, other (comment) (Crust)  Bleeding- Site 1: none              Therapy Education     Row Name 04/08/22 1200             Therapy Education    Education Details Recommended continuing compression stockings to improve skin integrity with xerform and cast padding to protect fragile skin.  -      Given Bandaging/dressing change;Edema management;Symptoms/condition management  -      Program Reinforced;Progressed  -      How Provided Verbal;Demonstration  -      Provided to Patient;Caregiver  -      Level of Understanding Verbalized;Teach back education performed  -            User Key  (r) = Recorded By, (t) = Taken By, (c) = Cosigned By    Initials Name Provider Type    Joe Jacobo, PT Physical Therapist                Recommendation and Plan   PT Assessment/Plan     Row Name 04/08/22 1200          PT  Assessment    Functional Limitations Performance in self-care ADL;Limitation in home management;Other (comment)  wound and edema management  -     Impairments Integumentary integrity;Edema;Pain  -     Assessment Comments Pt's LLE continuing with no open areas of ulceration, fully re-epithelialized. Pt's LLE continuing with moderate drainage per pt report.  Pt's wound is demonstrating decreased purple discoloration this session with scattered areas of induration. PT continuing with xeroform and cast padding for protection of fragile skin with MLW to help increase venous return and skin integrity. Likely DC in next 1-2 visits.  -     Rehab Potential Good  -     Patient/caregiver participated in establishment of treatment plan and goals Yes  -     Patient would benefit from skilled therapy intervention Yes  -            PT Plan    PT Frequency 1x/week  -     Physical Therapy Interventions (Optional Details) patient/family education;wound care  -     PT Plan Comments MLW, PRN debridement, education on long-term compression use  -           User Key  (r) = Recorded By, (t) = Taken By, (c) = Cosigned By    Initials Name Provider Type     Joe Clements, PT Physical Therapist                Goals   PT OP Goals     Row Name 04/08/22 1224          Time Calculation    PT Goal Re-Cert Due Date 05/24/22  -           User Key  (r) = Recorded By, (t) = Taken By, (c) = Cosigned By    Initials Name Provider Type     Joe Clements, PT Physical Therapist                PT Goal Re-Cert Due Date: 05/24/22            Time Calculation: Start Time: 1130  Untimed Charges  Wound Care: 93283 Selective debridement, 48895 Multilayer comp below knee  38475-Xbnqpqwtky comp below knee: 20  90025-Jkqdziqug debridement: 15  Total Minutes  Untimed Charges Total Minutes: 35   Total Minutes: 35  Therapy Charges for Today     Code Description Service Date Service Provider Modifiers Qty    65754701578 HC PT MULTI LAYER COMP  SYS BELOW KNEE 4/8/2022 Joe Clements, PT GP 1    17639802870 HC BHANU DEBRIDE OPEN WOUND UP TO 20CM 4/8/2022 Joe Clements, PT GP 1                  Joe Clements, PT  4/8/2022

## 2022-04-12 ENCOUNTER — HOSPITAL ENCOUNTER (OUTPATIENT)
Dept: PHYSICAL THERAPY | Facility: HOSPITAL | Age: 67
Setting detail: THERAPIES SERIES
Discharge: HOME | End: 2022-04-12

## 2022-04-12 DIAGNOSIS — S81.802D OPEN WOUND OF LEFT LOWER EXTREMITY, SUBSEQUENT ENCOUNTER: ICD-10-CM

## 2022-04-12 DIAGNOSIS — L03.116 CELLULITIS OF LEFT ANTERIOR LOWER LEG: Primary | ICD-10-CM

## 2022-04-12 DIAGNOSIS — M79.662 PAIN AND SWELLING OF LEFT LOWER LEG: ICD-10-CM

## 2022-04-12 DIAGNOSIS — M79.89 PAIN AND SWELLING OF LEFT LOWER LEG: ICD-10-CM

## 2022-04-12 PROCEDURE — 97597 DBRDMT OPN WND 1ST 20 CM/<: CPT

## 2022-04-12 PROCEDURE — 29581 APPL MULTLAYER CMPRN SYS LEG: CPT

## 2022-04-12 NOTE — THERAPY WOUND CARE TREATMENT
Outpatient Rehabilitation - Wound/Debridement Treatment Note   [facility]     Patient Name: Park Ray  : 1955  MRN: [id number]  Today's Date: 2022                 Admit Date: 2022    Visit Dx:    ICD-10-CM ICD-9-CM   1. Cellulitis of left anterior lower leg  L03.116 682.6   2. Pain and swelling of left lower leg  M79.662 729.5    M79.89 729.81   3. Open wound of left lower extremity, subsequent encounter  S81.802D V58.89     891.0       Patient Active Problem List   Diagnosis   • OAB (overactive bladder)   • Mixed incontinence urge and stress   • Menopause   • Hypothyroidism   • Fatty liver   • Family history of breast cancer in mother   • Superficial dyspareunia   • Colon cancer (HCC)   • Recurrent genital herpes   • Vaginal atrophy   • Cellulitis of left anterior lower leg        Past Medical History:   Diagnosis Date   • Acute torn meniscus of knee     left   • Closed displaced fracture of shaft of left clavicle    • Closed nondisplaced fracture of neck of left radius    • Colon cancer (HCC)    • Dyspareunia    • Family history of breast cancer in mother    • Fatty liver    • Fracture of proximal phalanx of toe    • Herpes    • Hypothyroidism    • IBS (irritable bowel syndrome)    • Left wrist pain    • Menopause    • Mixed incontinence urge and stress    • OAB (overactive bladder)    • Osteoarthritis    • Right wrist pain         Past Surgical History:   Procedure Laterality Date   •  SECTION     • CHOLECYSTECTOMY     • COLECTOMY PARTIAL / TOTAL     • COLON RESECTION     • COLONOSCOPY     • ENTEROCELE REPAIR     • DAWSON MORA (JOANNK) PROCEDURE     • PELVIC LAPAROSCOPY     • RECTAL SURGERY     • SALPINGO OOPHORECTOMY Right    • TOTAL ABDOMINAL HYSTERECTOMY WITH SALPINGO OOPHORECTOMY Left    • UTERINE SUSPENSION LAPAROSCOPIC           EVALUATION   PT Ortho     Row Name 22 1045       Subjective Comments    Subjective Comments Pt with moderate c/o increased  edema in LES  -MF       Subjective Pain    Able to rate subjective pain? yes  -MF    Pre-Treatment Pain Level 8  -MF    Post-Treatment Pain Level 8  -MF    Subjective Pain Comment medial L knee pain  -MF       Transfers    Comment, (Transfers) remained seated in transport chair  -          User Key  (r) = Recorded By, (t) = Taken By, (c) = Cosigned By    Initials Name Provider Type    Jose Cordero, PT Physical Therapist                 LDA Wound     Row Name 04/12/22 1045             Wound 02/03/22 Left lower leg    Wound - Properties Group Placement Date: 02/03/22  - Present on Hospital Admission: Y  - Side: Left  - Orientation: lower  - Location: leg  -      Dressing Appearance dry;intact  -MF      Base closed/resurfaced;epithelialization  -MF      Periwound intact;dry;pink  -MF      Periwound Temperature warm  -MF      Periwound Skin Turgor soft  -MF      Edges irregular  -MF      Drainage Amount none  -MF      Care, Wound cleansed with;soap and water;debrided  -MF      Periwound Care cleansed with pH balanced cleanser  -      Retired Wound - Properties Group Placement Date: 02/03/22  - Present on Hospital Admission: Y  - Side: Left  - Orientation: lower  - Location: leg  -JM      Retired Wound - Properties Group Date first assessed: 02/03/22  - Present on Hospital Admission: Y  - Side: Left  - Location: leg  -            User Key  (r) = Recorded By, (t) = Taken By, (c) = Cosigned By    Initials Name Provider Type    Jose Cordero, PT Physical Therapist    Phyllis Gallardo RN Registered Nurse               Lymphedema     Row Name 04/12/22 1045             Lymphedema Edema Assessment    Ptting Edema Category By severity  -      Pitting Edema Moderate  -MF              Compression/Skin Care    Compression/Skin Care skin care;wrapping location;bandaging  -MF      Skin Care washed/dried;lotion applied  -      Wrapping Location lower extremity  -      Wrapping  Location LE left:;foot to knee  -      Wrapping Comments size 4/5/6 doubled and overlapping for gradient compression.  -            User Key  (r) = Recorded By, (t) = Taken By, (c) = Cosigned By    Initials Name Provider Type    Jose Cordero, PT Physical Therapist                WOUND DEBRIDEMENT  Total area of Debridement: ~10cm2  Debridement Site 1  Location- Site 1: LLE  Selective Debridement- Site 1: Wound Surface <20cmsq  Instruments- Site 1: tweezers  Excised Tissue Description- Site 1: minimum, other (comment) (dry, flaking, nonviable skin)  Bleeding- Site 1: none                 Recommendation and Plan   PT Assessment/Plan     Row Name 04/12/22 1045          PT Assessment    Functional Limitations Performance in self-care ADL;Limitation in home management;Other (comment)  wound and edema management  -     Impairments Integumentary integrity;Edema;Pain  -     Assessment Comments LLE wound showing moderate maturation today with no new skin breakdown or issues noted. PT was able to lightly debride a moderate amount of dry flaking skin to help improve healing potential. PT held dressing to LE wound and applied hydraguard lotion to help limit skin irritation and minimize dry flaking skin.  PT also discussed with pt removing compression wrapping daily and washing LEs to help exfoliate skin.  PT also increased compressogrip to size 6 due to increased edema.  -     Rehab Potential Good  -     Patient/caregiver participated in establishment of treatment plan and goals Yes  -     Patient would benefit from skilled therapy intervention Yes  -MF            PT Plan    PT Frequency 1x/week  -     Physical Therapy Interventions (Optional Details) wound care;patient/family education  -     PT Plan Comments MLW, debridement  -           User Key  (r) = Recorded By, (t) = Taken By, (c) = Cosigned By    Initials Name Provider Type    Jose Cordero, PT Physical Therapist                 Goals   PT OP Goals     Row Name 04/12/22 1045          Time Calculation    PT Goal Re-Cert Due Date 05/24/22  -           User Key  (r) = Recorded By, (t) = Taken By, (c) = Cosigned By    Initials Name Provider Type     Jose Lee, PT Physical Therapist                PT Goal Re-Cert Due Date: 05/24/22            Time Calculation: Start Time: 1045  Untimed Charges  06299-Rgrhhshihx comp below knee: 20  02917-Keoppasmp debridement: 15  Total Minutes  Untimed Charges Total Minutes: 35   Total Minutes: 35  Therapy Charges for Today     Code Description Service Date Service Provider Modifiers Qty    63007634165 HC BHANU DEBRIDE OPEN WOUND UP TO 20CM 4/12/2022 Jose Lee, PT GP 1    [id number]  PT MULTI LAYER COMP SYS BELOW KNEE 4/12/2022 Jose Lee, PT GP 1                  Jose Lee, PT  4/12/2022

## 2022-04-14 ENCOUNTER — HOSPITAL ENCOUNTER (OUTPATIENT)
Dept: PHYSICAL THERAPY | Facility: HOSPITAL | Age: 67
Setting detail: THERAPIES SERIES
Discharge: HOME | End: 2022-04-14

## 2022-04-14 ENCOUNTER — APPOINTMENT (OUTPATIENT)
Dept: PHYSICAL THERAPY | Facility: HOSPITAL | Age: 67
End: 2022-04-14

## 2022-04-14 DIAGNOSIS — L03.116 CELLULITIS OF LEFT ANTERIOR LOWER LEG: Primary | ICD-10-CM

## 2022-04-14 DIAGNOSIS — M79.662 PAIN AND SWELLING OF LEFT LOWER LEG: ICD-10-CM

## 2022-04-14 DIAGNOSIS — M79.89 PAIN AND SWELLING OF LEFT LOWER LEG: ICD-10-CM

## 2022-04-14 PROCEDURE — 29581 APPL MULTLAYER CMPRN SYS LEG: CPT

## 2022-04-14 NOTE — THERAPY WOUND CARE TREATMENT
Outpatient Rehabilitation - Wound/Debridement Treatment Note   [facility]     Patient Name: Park Ray  : 1955  MRN: [id number]  Today's Date: 2022                 Admit Date: 2022    Visit Dx:    ICD-10-CM ICD-9-CM   1. Cellulitis of left anterior lower leg  L03.116 682.6   2. Pain and swelling of left lower leg  M79.662 729.5    M79.89 729.81       Patient Active Problem List   Diagnosis   • OAB (overactive bladder)   • Mixed incontinence urge and stress   • Menopause   • Hypothyroidism   • Fatty liver   • Family history of breast cancer in mother   • Superficial dyspareunia   • Colon cancer (HCC)   • Recurrent genital herpes   • Vaginal atrophy   • Cellulitis of left anterior lower leg        Past Medical History:   Diagnosis Date   • Acute torn meniscus of knee     left   • Closed displaced fracture of shaft of left clavicle    • Closed nondisplaced fracture of neck of left radius    • Colon cancer (HCC)    • Dyspareunia    • Family history of breast cancer in mother    • Fatty liver    • Fracture of proximal phalanx of toe    • Herpes    • Hypothyroidism    • IBS (irritable bowel syndrome)    • Left wrist pain    • Menopause    • Mixed incontinence urge and stress    • OAB (overactive bladder)    • Osteoarthritis    • Right wrist pain         Past Surgical History:   Procedure Laterality Date   •  SECTION     • CHOLECYSTECTOMY     • COLECTOMY PARTIAL / TOTAL     • COLON RESECTION     • COLONOSCOPY     • ENTEROCELE REPAIR     • DAWSON MORA ([facility]) PROCEDURE     • PELVIC LAPAROSCOPY     • RECTAL SURGERY     • SALPINGO OOPHORECTOMY Right    • TOTAL ABDOMINAL HYSTERECTOMY WITH SALPINGO OOPHORECTOMY Left    • UTERINE SUSPENSION LAPAROSCOPIC           EVALUATION   PT Ortho     Row Name 22 0900       Subjective Comments    Subjective Comments Pt cont to have c/o generalized pain  -MF       Subjective Pain    Able to rate subjective pain? yes  -MF     Pre-Treatment Pain Level 8  -MF    Post-Treatment Pain Level 8  -MF    Subjective Pain Comment L knee and generalized.  -MF       Transfers    Comment, (Transfers) remained seated in transport chair  -          User Key  (r) = Recorded By, (t) = Taken By, (c) = Cosigned By    Initials Name Provider Type    Jose Cordero, PT Physical Therapist                 LDA Wound     Row Name 04/14/22 0900             Wound 02/03/22 Left lower leg    Wound - Properties Group Placement Date: 02/03/22  - Present on Hospital Admission: Y  - Side: Left  - Orientation: lower  -JM Location: leg  -JM      Base closed/resurfaced;epithelialization  -MF      Periwound excoriated;redness  -      Periwound Temperature warm  -MF      Periwound Skin Turgor soft  -MF      Edges irregular  -MF      Drainage Amount none  -MF      Care, Wound cleansed with;soap and water  -      Periwound Care moisturizer applied;topical treatment applied  -      Retired Wound - Properties Group Placement Date: 02/03/22  - Present on Hospital Admission: Y  - Side: Left  - Orientation: lower  - Location: leg  -JM      Retired Wound - Properties Group Date first assessed: 02/03/22  - Present on Hospital Admission: Y  - Side: Left  - Location: leg  -            User Key  (r) = Recorded By, (t) = Taken By, (c) = Cosigned By    Initials Name Provider Type    Jose Cordero, PT Physical Therapist    Phyllis Gallardo RN Registered Nurse               Lymphedema     Row Name 04/14/22 0900             Lymphedema Edema Assessment    Ptting Edema Category By severity  -      Pitting Edema Moderate  -MF              Compression/Skin Care    Compression/Skin Care skin care;wrapping location;bandaging  -      Skin Care washed/dried;lotion applied  -MF      Wrapping Location lower extremity  -MF      Wrapping Location LE left:;foot to knee  -MF      Wrapping Comments size 4/5/6 doubled and overlapping for gradient  compression.  -            User Key  (r) = Recorded By, (t) = Taken By, (c) = Cosigned By    Initials Name Provider Type    Jose Cordero, PT Physical Therapist                WOUND DEBRIDEMENT                       Recommendation and Plan   PT Assessment/Plan     Row Name 04/14/22 0900          PT Assessment    Functional Limitations Performance in self-care ADL;Limitation in home management;Other (comment)  -     Impairments Integumentary integrity;Edema;Pain  -     Assessment Comments edema well controlled with compression wrapping, but pt cont to have mild erythema to superior portion of LE.  PT added antifungal lotion in case erythema / rash is potential yeast, but if erythema persists with next tx, PT may transition pt to unna boot to help improve skin integrity and help decrease irritation.  -     Rehab Potential Good  -     Patient/caregiver participated in establishment of treatment plan and goals Yes  -     Patient would benefit from skilled therapy intervention Yes  -            PT Plan    PT Frequency 1x/week  -     Physical Therapy Interventions (Optional Details) wound care;patient/family education  -     PT Plan Comments MLW with possible transition to unna boot.  -           User Key  (r) = Recorded By, (t) = Taken By, (c) = Cosigned By    Initials Name Provider Type    Jose Cordero, PT Physical Therapist                Goals   PT OP Goals     Row Name 04/14/22 0900          Time Calculation    PT Goal Re-Cert Due Date 05/24/22  -           User Key  (r) = Recorded By, (t) = Taken By, (c) = Cosigned By    Initials Name Provider Type    Jose Cordero, PT Physical Therapist                PT Goal Re-Cert Due Date: 05/24/22            Time Calculation: Start Time: 0900  Untimed Charges  60276-Kqvtzetddm comp below knee: 25  Total Minutes  Untimed Charges Total Minutes: 25   Total Minutes: 25  Therapy Charges for Today     Code Description Service Date  Service Provider Modifiers Qty    26087743524 HC PT MULTI LAYER COMP SYS BELOW KNEE 4/14/2022 Jose Lee, PT GP 1                  Jose Lee, PT  4/14/2022

## 2022-04-18 ENCOUNTER — HOSPITAL ENCOUNTER (OUTPATIENT)
Dept: PHYSICAL THERAPY | Facility: HOSPITAL | Age: 67
Setting detail: THERAPIES SERIES
Discharge: HOME | End: 2022-04-18

## 2022-04-18 DIAGNOSIS — L03.116 CELLULITIS OF LEFT ANTERIOR LOWER LEG: Primary | ICD-10-CM

## 2022-04-18 DIAGNOSIS — M79.89 PAIN AND SWELLING OF LEFT LOWER LEG: ICD-10-CM

## 2022-04-18 DIAGNOSIS — S81.802D OPEN WOUND OF LEFT LOWER EXTREMITY, SUBSEQUENT ENCOUNTER: ICD-10-CM

## 2022-04-18 DIAGNOSIS — M79.662 PAIN AND SWELLING OF LEFT LOWER LEG: ICD-10-CM

## 2022-04-18 PROCEDURE — 29581 APPL MULTLAYER CMPRN SYS LEG: CPT

## 2022-04-18 NOTE — THERAPY WOUND CARE TREATMENT
Outpatient Rehabilitation - Wound/Debridement Treatment Note   [facility]     Patient Name: Park Ray  : 1955  MRN: [id number]  Today's Date: 2022                 Admit Date: 2022    Visit Dx:    ICD-10-CM ICD-9-CM   1. Cellulitis of left anterior lower leg  L03.116 682.6   2. Pain and swelling of left lower leg  M79.662 729.5    M79.89 729.81   3. Open wound of left lower extremity, subsequent encounter  S81.802D V58.89     891.0       Patient Active Problem List   Diagnosis   • OAB (overactive bladder)   • Mixed incontinence urge and stress   • Menopause   • Hypothyroidism   • Fatty liver   • Family history of breast cancer in mother   • Superficial dyspareunia   • Colon cancer (HCC)   • Recurrent genital herpes   • Vaginal atrophy   • Cellulitis of left anterior lower leg        Past Medical History:   Diagnosis Date   • Acute torn meniscus of knee     left   • Closed displaced fracture of shaft of left clavicle    • Closed nondisplaced fracture of neck of left radius    • Colon cancer (HCC)    • Dyspareunia    • Family history of breast cancer in mother    • Fatty liver    • Fracture of proximal phalanx of toe    • Herpes    • Hypothyroidism    • IBS (irritable bowel syndrome)    • Left wrist pain    • Menopause    • Mixed incontinence urge and stress    • OAB (overactive bladder)    • Osteoarthritis    • Right wrist pain         Past Surgical History:   Procedure Laterality Date   •  SECTION     • CHOLECYSTECTOMY     • COLECTOMY PARTIAL / TOTAL     • COLON RESECTION     • COLONOSCOPY     • ENTEROCELE REPAIR     • DAWSON MORA ([facility]) PROCEDURE     • PELVIC LAPAROSCOPY     • RECTAL SURGERY     • SALPINGO OOPHORECTOMY Right    • TOTAL ABDOMINAL HYSTERECTOMY WITH SALPINGO OOPHORECTOMY Left    • UTERINE SUSPENSION LAPAROSCOPIC           EVALUATION   PT Ortho     Row Name 22 0945       Subjective Comments    Subjective Comments Pt cont to have pain in LLE   -MF       Subjective Pain    Able to rate subjective pain? yes  -    Pre-Treatment Pain Level 5  -MF    Post-Treatment Pain Level 5  -    Subjective Pain Comment 7/10 pain with pressure to LLE  -       Transfers    Comment, (Transfers) remained seated in transport chair  -          User Key  (r) = Recorded By, (t) = Taken By, (c) = Cosigned By    Initials Name Provider Type    Joes Cordero, PT Physical Therapist                   Lymphedema     Row Name 04/18/22 0945             Lymphedema Edema Assessment    Ptting Edema Category By severity  -      Pitting Edema Moderate  -              Skin Changes/Observations    Lower Extremity Conditions left:;inflamed  -      Lower Extremity Color/Pigment left:;hyperpigmented  -              LLE Quick Girth (cm)    Mid foot 24 cm  -MF      Smallest ankle 28 cm  -MF      Largest calf 46 cm  -MF              Compression/Skin Care    Compression/Skin Care skin care;wrapping location;bandaging  -      Skin Care washed/dried;lotion applied  -      Wrapping Location lower extremity  -      Wrapping Location LE left:;foot to knee  -      Wrapping Comments unna boot applied in clamshell from ankle to midcalf with cast padding to secure. size 4/5/6 compressogrip doubled and overlapping for gradient compression.  -            User Key  (r) = Recorded By, (t) = Taken By, (c) = Cosigned By    Initials Name Provider Type    Jose Cordero, PT Physical Therapist                WOUND DEBRIDEMENT                    Therapy Education     Row Name 04/18/22 0945             Therapy Education    Education Details pt to cont with compression wrapping until she is able to see MD on thursday with possible addition of triamcinolone cream if LE cont to have erythema.  -MF      Given Bandaging/dressing change;Edema management;Symptoms/condition management  -      Program Progressed  -      How Provided Verbal  -MF      Provided to Patient;Caregiver  -MF       Level of Understanding Verbalized  -            User Key  (r) = Recorded By, (t) = Taken By, (c) = Cosigned By    Initials Name Provider Type    Jose Cordero, PT Physical Therapist                Recommendation and Plan   PT Assessment/Plan     Row Name 04/18/22 0945          PT Assessment    Functional Limitations Performance in self-care ADL;Limitation in home management;Other (comment)  -     Impairments Integumentary integrity;Edema;Pain  -     Assessment Comments Pt cont to have moderate edema to BLEs and mild erythema to superior portion of now fully reepithelialized wound base.  PT added unna boot to cover ant portion of wound with continued multilayered compression wrapping to help soothe skin and limit further irritation or skin breakdown. Pt to f/u with MD on thursday and may benefit from addition of triamcinolone cream to fully resolve erythema.  -     Rehab Potential Good  -     Patient/caregiver participated in establishment of treatment plan and goals Yes  -     Patient would benefit from skilled therapy intervention Yes  -            PT Plan    PT Frequency 2x/week  -     Physical Therapy Interventions (Optional Details) wound care;patient/family education  -     PT Plan Comments cont with MLW with possible addition of triamcinolone  -           User Key  (r) = Recorded By, (t) = Taken By, (c) = Cosigned By    Initials Name Provider Type    Jose Cordero, PT Physical Therapist                Goals   PT OP Goals     Row Name 04/18/22 0945          Time Calculation    PT Goal Re-Cert Due Date 05/24/22  -           User Key  (r) = Recorded By, (t) = Taken By, (c) = Cosigned By    Initials Name Provider Type    Jose Cordero, PT Physical Therapist                PT Goal Re-Cert Due Date: 05/24/22            Time Calculation: Start Time: 0945  Untimed Charges  65119-Orhizdbyjw comp below knee: 25  Total Minutes  Untimed Charges Total Minutes: 25   Total  Minutes: 25  Therapy Charges for Today     Code Description Service Date Service Provider Modifiers Qty    86757987404 HC PT MULTI LAYER COMP SYS BELOW KNEE 4/18/2022 Jose Lee, PT GP 1                  Jose Lee, PT  4/18/2022

## 2022-04-22 ENCOUNTER — HOSPITAL ENCOUNTER (OUTPATIENT)
Dept: PHYSICAL THERAPY | Facility: HOSPITAL | Age: 67
Setting detail: THERAPIES SERIES
Discharge: HOME | End: 2022-04-22

## 2022-04-22 DIAGNOSIS — S81.802D OPEN WOUND OF LEFT LOWER EXTREMITY, SUBSEQUENT ENCOUNTER: ICD-10-CM

## 2022-04-22 DIAGNOSIS — M79.662 PAIN AND SWELLING OF LEFT LOWER LEG: ICD-10-CM

## 2022-04-22 DIAGNOSIS — M79.89 PAIN AND SWELLING OF LEFT LOWER LEG: ICD-10-CM

## 2022-04-22 DIAGNOSIS — L03.116 CELLULITIS OF LEFT ANTERIOR LOWER LEG: Primary | ICD-10-CM

## 2022-04-22 PROCEDURE — 97535 SELF CARE MNGMENT TRAINING: CPT

## 2022-04-22 NOTE — THERAPY PROGRESS REPORT/RE-CERT
Outpatient Rehabilitation - Wound/Debridement Progress Note   [facility]     Patient Name: Park Ray  : 1955  MRN: [id number]  Today's Date: 2022                 Admit Date: 2022    Visit Dx:    ICD-10-CM ICD-9-CM   1. Cellulitis of left anterior lower leg  L03.116 682.6   2. Pain and swelling of left lower leg  M79.662 729.5    M79.89 729.81   3. Open wound of left lower extremity, subsequent encounter  S81.802D V58.89     891.0       Patient Active Problem List   Diagnosis   • OAB (overactive bladder)   • Mixed incontinence urge and stress   • Menopause   • Hypothyroidism   • Fatty liver   • Family history of breast cancer in mother   • Superficial dyspareunia   • Colon cancer (HCC)   • Recurrent genital herpes   • Vaginal atrophy   • Cellulitis of left anterior lower leg        Past Medical History:   Diagnosis Date   • Acute torn meniscus of knee     left   • Closed displaced fracture of shaft of left clavicle    • Closed nondisplaced fracture of neck of left radius    • Colon cancer (HCC)    • Dyspareunia    • Family history of breast cancer in mother    • Fatty liver    • Fracture of proximal phalanx of toe    • Herpes    • Hypothyroidism    • IBS (irritable bowel syndrome)    • Left wrist pain    • Menopause    • Mixed incontinence urge and stress    • OAB (overactive bladder)    • Osteoarthritis    • Right wrist pain         Past Surgical History:   Procedure Laterality Date   •  SECTION     • CHOLECYSTECTOMY     • COLECTOMY PARTIAL / TOTAL     • COLON RESECTION     • COLONOSCOPY     • ENTEROCELE REPAIR     • DAWSON MORA ([facility]) PROCEDURE     • PELVIC LAPAROSCOPY     • RECTAL SURGERY     • SALPINGO OOPHORECTOMY Right    • TOTAL ABDOMINAL HYSTERECTOMY WITH SALPINGO OOPHORECTOMY Left    • UTERINE SUSPENSION LAPAROSCOPIC           EVALUATION   PT Ortho     Row Name 22 1030       Subjective Comments    Subjective Comments Pt cont to have moderate c/o  pain to LLE  -MF       Subjective Pain    Able to rate subjective pain? yes  -MF    Pre-Treatment Pain Level 5  -MF    Post-Treatment Pain Level 5  -MF    Subjective Pain Comment 7/10 pain with pressure to LLE  -MF       Transfers    Comment, (Transfers) remained seated in transport chair  -          User Key  (r) = Recorded By, (t) = Taken By, (c) = Cosigned By    Initials Name Provider Type    Jose Cordero, PT Physical Therapist                      WOUND DEBRIDEMENT                       Recommendation and Plan   PT Assessment/Plan     Row Name 04/22/22 1030          PT Assessment    Functional Limitations Performance in self-care ADL;Limitation in home management;Other (comment)  -     Impairments Integumentary integrity;Edema;Pain  -     Assessment Comments LLE skin remains intact, but with mild erythema to periwound area.  PT held compression wrapping today as stocking material may be causing slight irritation.  PT recommended Pt hold stocking over the weeking and apply antifungal lotion to wound and periwound area 2-3 x/ day to limit potential skin irritation from fungal infection / yeast and f/u with OP PT monday.  PT to maintain LE elevation as much as possible to limit worsening of LE edema. all STGs met to this point and 2/3 LTGs met. Pt will cont to benefit from skilled therapy to help further improve skin integrity and limit potential breakdown.  -MF     Rehab Potential Good  -MF     Patient/caregiver participated in establishment of treatment plan and goals Yes  -MF     Patient would benefit from skilled therapy intervention Yes  -MF            PT Plan    PT Frequency 2x/week  -     Physical Therapy Interventions (Optional Details) wound care  -     PT Plan Comments Pt to hold compression wrapping over the weekend and f/u on monday  -           User Key  (r) = Recorded By, (t) = Taken By, (c) = Cosigned By    Initials Name Provider Type    Jose Cordero, PT Physical  Therapist                Goals   PT OP Goals     Row Name 04/22/22 1030          PT Short Term Goals    STG Date to Achieve 03/24/22  -     STG 1 Pt / caregiver able to verbalize s/s of infection and when to seek urgent care.  -MF     STG 1 Progress Met  -     STG 2 LLE wound with < 25% nonviable tissue to promote clean wound bed for healing.  -MF     STG 2 Progress Met  -     STG 3 LLE wound area to decrease at least 25% to demonstrate healing.  -MF     STG 3 Progress Met  -            Long Term Goals    LTG 1 LLE wound area to decrease at least 75% to demonstrate healing.  -MF     LTG 1 Progress Met  -     LTG 2 Pt/ caregiver independent with clean technique for home dressing changes.  -     LTG 2 Progress Met  -     LTG 3 Decrease LLE edema by 2 cm to promote improved skin integrity for wound healing.  -     LTG 3 Progress Partially Met  -            Time Calculation    PT Goal Re-Cert Due Date 05/24/22  -           User Key  (r) = Recorded By, (t) = Taken By, (c) = Cosigned By    Initials Name Provider Type    Jose Cordero, PT Physical Therapist                PT Goal Re-Cert Due Date: 05/24/22  PT Short Term Goals  STG Date to Achieve: 03/24/22  STG 1: Pt / caregiver able to verbalize s/s of infection and when to seek urgent care.  STG 1 Progress: Met  STG 2: LLE wound with < 25% nonviable tissue to promote clean wound bed for healing.  STG 2 Progress: Met  STG 3: LLE wound area to decrease at least 25% to demonstrate healing.  STG 3 Progress: Met  Long Term Goals  LTG 1: LLE wound area to decrease at least 75% to demonstrate healing.  LTG 1 Progress: Met  LTG 2: Pt/ caregiver independent with clean technique for home dressing changes.  LTG 2 Progress: Met  LTG 3: Decrease LLE edema by 2 cm to promote improved skin integrity for wound healing.  LTG 3 Progress: Partially Met      Time Calculation: Start Time: 1030  Timed Charges  51838 - PT Self Care/Mgmt Minutes: 25  Total  Minutes  Timed Charges Total Minutes: 25   Total Minutes: 25  Therapy Charges for Today     Code Description Service Date Service Provider Modifiers Qty    15282827050 HC PT SELF CARE/MGMT/TRAIN EA 15 MIN 4/22/2022 Jose Lee, PT GP 2                  Jose Lee, PT  4/22/2022

## 2022-04-27 ENCOUNTER — APPOINTMENT (OUTPATIENT)
Dept: PHYSICAL THERAPY | Facility: HOSPITAL | Age: 67
End: 2022-04-27

## 2022-05-09 ENCOUNTER — APPOINTMENT (OUTPATIENT)
Dept: PHYSICAL THERAPY | Facility: HOSPITAL | Age: 67
End: 2022-05-09

## 2022-05-10 ENCOUNTER — HOSPITAL ENCOUNTER (OUTPATIENT)
Dept: PHYSICAL THERAPY | Facility: HOSPITAL | Age: 67
Setting detail: THERAPIES SERIES
Discharge: HOME | End: 2022-05-10

## 2022-05-10 DIAGNOSIS — M79.89 PAIN AND SWELLING OF LEFT LOWER LEG: ICD-10-CM

## 2022-05-10 DIAGNOSIS — M79.662 PAIN AND SWELLING OF LEFT LOWER LEG: ICD-10-CM

## 2022-05-10 DIAGNOSIS — L03.116 CELLULITIS OF LEFT ANTERIOR LOWER LEG: Primary | ICD-10-CM

## 2022-05-10 PROCEDURE — 29581 APPL MULTLAYER CMPRN SYS LEG: CPT

## 2022-05-10 NOTE — THERAPY WOUND CARE TREATMENT
Outpatient Rehabilitation - Wound/Debridement Treatment Note   [facility]     Patient Name: Park Ray  : 1955  MRN: [id number]  Today's Date: 5/10/2022                 Admit Date: 5/10/2022    Visit Dx:    ICD-10-CM ICD-9-CM   1. Cellulitis of left anterior lower leg  L03.116 682.6   2. Pain and swelling of left lower leg  M79.662 729.5    M79.89 729.81       Patient Active Problem List   Diagnosis   • OAB (overactive bladder)   • Mixed incontinence urge and stress   • Menopause   • Hypothyroidism   • Fatty liver   • Family history of breast cancer in mother   • Superficial dyspareunia   • Colon cancer (HCC)   • Recurrent genital herpes   • Vaginal atrophy   • Cellulitis of left anterior lower leg        Past Medical History:   Diagnosis Date   • Acute torn meniscus of knee     left   • Closed displaced fracture of shaft of left clavicle    • Closed nondisplaced fracture of neck of left radius    • Colon cancer (HCC)    • Dyspareunia    • Family history of breast cancer in mother    • Fatty liver    • Fracture of proximal phalanx of toe    • Herpes    • Hypothyroidism    • IBS (irritable bowel syndrome)    • Left wrist pain    • Menopause    • Mixed incontinence urge and stress    • OAB (overactive bladder)    • Osteoarthritis    • Right wrist pain         Past Surgical History:   Procedure Laterality Date   •  SECTION     • CHOLECYSTECTOMY     • COLECTOMY PARTIAL / TOTAL     • COLON RESECTION     • COLONOSCOPY     • ENTEROCELE REPAIR     • DAWSON MORA ([facility]) PROCEDURE     • PELVIC LAPAROSCOPY     • RECTAL SURGERY     • SALPINGO OOPHORECTOMY Right    • TOTAL ABDOMINAL HYSTERECTOMY WITH SALPINGO OOPHORECTOMY Left    • UTERINE SUSPENSION LAPAROSCOPIC           EVALUATION   PT Ortho     Row Name 05/10/22 1000       Subjective Comments    Subjective Comments Pt continuing with moderate reports of LLE pain.  -LH       Subjective Pain    Able to rate subjective pain? yes  -LH     Pre-Treatment Pain Level 5  -LH    Post-Treatment Pain Level 5  -    Subjective Pain Comment 7/10 pain with palpation/pressure to L anterior shin  -       Transfers    Comment, (Transfers) remained seated in transport chair  -       Gait/Stairs (Locomotion)    Comment, (Gait/Stairs) Pt to and from [facility] via transport chair with family assist  -          User Key  (r) = Recorded By, (t) = Taken By, (c) = Cosigned By    Initials Name Provider Type     Joe Clements, PT Physical Therapist                 LDA Wound     Row Name 05/10/22 1000             Wound 02/03/22 Left lower leg    Wound - Properties Group Placement Date: 02/03/22  - Present on Hospital Admission: Y  - Side: Left  - Orientation: lower  - Location: leg  -      Dressing Appearance dry;intact  -      Base closed/resurfaced;epithelialization  -      Periwound excoriated;redness  -      Periwound Temperature warm  -      Periwound Skin Turgor soft  -      Edges irregular  -      Drainage Amount none  -      Care, Wound cleansed with;soap and water  -      Dressing Care multi-layer wrap  compressogrip  -      Periwound Care cleansed with pH balanced cleanser;dry periwound area maintained;topical treatment applied  antifungal ointment  -      Retired Wound - Properties Group Placement Date: 02/03/22  - Present on Hospital Admission: Y  - Side: Left  - Orientation: lower  - Location: leg  -      Retired Wound - Properties Group Date first assessed: 02/03/22  - Present on Hospital Admission: Y  - Side: Left  - Location: leg  -            User Key  (r) = Recorded By, (t) = Taken By, (c) = Cosigned By    Initials Name Provider Type     Joe Clements, PT Physical Therapist    Phyllis Gallardo, RN Registered Nurse               Lymphedema     Row Name 05/10/22 1000             Lymphedema Edema Assessment    Ptting Edema Category By severity  -      Pitting Edema Moderate  -              Skin  Changes/Observations    Location/Assessment Lower Extremity  -      Lower Extremity Conditions left:;clean;dry;shiny  -      Lower Extremity Color/Pigment left:;hyperpigmented  -              Lymphedema Pulses/Capillary Refill    Lower Extremity Capillary Refill left:;less than 3 seconds  -              Compression/Skin Care    Compression/Skin Care skin care;wrapping location;bandaging  -      Skin Care washed/dried;lotion applied;topical anti-fungal applied  -      Wrapping Location lower extremity  -      Wrapping Location LE left:;foot to knee  -      Wrapping Comments Size 4/5/6 compressogrip applied doubled and overlapped for gradient compression.  -      Compression/Skin Care Comments Issued additional size 4/5/6 compressogrips to change PRN.  -            User Key  (r) = Recorded By, (t) = Taken By, (c) = Cosigned By    Initials Name Provider Type     Joe Clements, PT Physical Therapist                WOUND DEBRIDEMENT                    Therapy Education     Row Name 05/10/22 1000             Therapy Education    Education Details Pt to continue size 4/5/6 compressogrip from foot to knee and follow up with PCP for potential triamcinolone cream prescription.  -      Given Bandaging/dressing change;Edema management;Symptoms/condition management  -      Program Progressed  -      How Provided Verbal  -      Provided to Patient;Caregiver  -      Level of Understanding Verbalized  -            User Key  (r) = Recorded By, (t) = Taken By, (c) = Cosigned By    Initials Name Provider Type     Joe Clements, PT Physical Therapist                Recommendation and Plan   PT Assessment/Plan     Row Name 05/10/22 1000          PT Assessment    Functional Limitations Performance in self-care ADL;Limitation in home management;Other (comment)  -     Impairments Integumentary integrity;Edema;Pain  -     Assessment Comments Pt continues to demonstrate intact LLE with no open  areas of ulceration, although anterior shin area with hyperpigmentation and mild periwound erythema. PT recommended following up with PCP regarding potential topical steroid prescription. PT re-applied size 4/5/6 compressogrip to help increase venous return and improve skin integrity. Pt to transition to treatments 1x/week pending resolution of erythema.  -     Rehab Potential Good  -     Patient/caregiver participated in establishment of treatment plan and goals Yes  -     Patient would benefit from skilled therapy intervention Yes  -            PT Plan    PT Frequency 1x/week  -     Physical Therapy Interventions (Optional Details) patient/family education;wound care  -     PT Plan Comments Continue with MLW with potential addition of triamcinolone  -           User Key  (r) = Recorded By, (t) = Taken By, (c) = Cosigned By    Initials Name Provider Type    Joe Jacobo, PT Physical Therapist                Goals   PT OP Goals     Row Name 05/10/22 1044          Time Calculation    PT Goal Re-Cert Due Date 05/24/22  -           User Key  (r) = Recorded By, (t) = Taken By, (c) = Cosigned By    Initials Name Provider Type     Joe Clements, PT Physical Therapist                PT Goal Re-Cert Due Date: 05/24/22            Time Calculation: Start Time: 0959  Untimed Charges  Wound Care: 53340 Multilayer comp below knee  92358-Tcauvnctov comp below knee: 30  Total Minutes  Untimed Charges Total Minutes: 30   Total Minutes: 30  Therapy Charges for Today     Code Description Service Date Service Provider Modifiers Qty    78030646910 HC PT MULTI LAYER COMP SYS BELOW KNEE 5/10/2022 Joe Clements, PT GP 1                  Joe Clements PT  5/10/2022

## 2022-05-11 RX ORDER — CEPHALEXIN 500 MG/1
CAPSULE ORAL
Qty: 40 CAPSULE | Refills: 0 | OUTPATIENT
Start: 2022-05-11

## 2022-05-11 RX ORDER — MELOXICAM 15 MG/1
TABLET ORAL
Qty: 30 TABLET | Refills: 2 | Status: SHIPPED | OUTPATIENT
Start: 2022-05-11 | End: 2022-11-23

## 2022-05-17 ENCOUNTER — HOSPITAL ENCOUNTER (OUTPATIENT)
Dept: PHYSICAL THERAPY | Facility: HOSPITAL | Age: 67
Setting detail: THERAPIES SERIES
Discharge: HOME | End: 2022-05-17

## 2022-05-17 DIAGNOSIS — L03.116 CELLULITIS OF LEFT ANTERIOR LOWER LEG: Primary | ICD-10-CM

## 2022-05-17 DIAGNOSIS — M79.89 PAIN AND SWELLING OF LEFT LOWER LEG: ICD-10-CM

## 2022-05-17 DIAGNOSIS — S81.802D OPEN WOUND OF LEFT LOWER EXTREMITY, SUBSEQUENT ENCOUNTER: ICD-10-CM

## 2022-05-17 DIAGNOSIS — M79.662 PAIN AND SWELLING OF LEFT LOWER LEG: ICD-10-CM

## 2022-05-17 PROCEDURE — 29581 APPL MULTLAYER CMPRN SYS LEG: CPT

## 2022-05-17 PROCEDURE — 97597 DBRDMT OPN WND 1ST 20 CM/<: CPT

## 2022-05-17 NOTE — THERAPY WOUND CARE TREATMENT
Outpatient Rehabilitation - Wound/Debridement Treatment Note   [facility]     Patient Name: Park Ray  : 1955  MRN: [id number]  Today's Date: 2022                   Admit Date: 2022    Visit Dx:    ICD-10-CM ICD-9-CM   1. Cellulitis of left anterior lower leg  L03.116 682.6   2. Pain and swelling of left lower leg  M79.662 729.5    M79.89 729.81   3. Open wound of left lower extremity, subsequent encounter  S81.802D V58.89     891.0       Patient Active Problem List   Diagnosis   • OAB (overactive bladder)   • Mixed incontinence urge and stress   • Menopause   • Hypothyroidism   • Fatty liver   • Family history of breast cancer in mother   • Superficial dyspareunia   • Colon cancer (HCC)   • Recurrent genital herpes   • Vaginal atrophy   • Cellulitis of left anterior lower leg        Past Medical History:   Diagnosis Date   • Acute torn meniscus of knee     left   • Closed displaced fracture of shaft of left clavicle    • Closed nondisplaced fracture of neck of left radius    • Colon cancer (HCC)    • Dyspareunia    • Family history of breast cancer in mother    • Fatty liver    • Fracture of proximal phalanx of toe    • Herpes    • Hypothyroidism    • IBS (irritable bowel syndrome)    • Left wrist pain    • Menopause    • Mixed incontinence urge and stress    • OAB (overactive bladder)    • Osteoarthritis    • Right wrist pain         Past Surgical History:   Procedure Laterality Date   •  SECTION     • CHOLECYSTECTOMY     • COLECTOMY PARTIAL / TOTAL     • COLON RESECTION     • COLONOSCOPY     • ENTEROCELE REPAIR     • DAWSON MORA ([facility]) PROCEDURE     • PELVIC LAPAROSCOPY     • RECTAL SURGERY     • SALPINGO OOPHORECTOMY Right    • TOTAL ABDOMINAL HYSTERECTOMY WITH SALPINGO OOPHORECTOMY Left    • UTERINE SUSPENSION LAPAROSCOPIC           EVALUATION   PT Ortho     Row Name 22 0900       Subjective Comments    Subjective Comments Pt concerned about three new  "areas to the L anterior leg. She's been applying triamcinolone to these areas only and maintaining compression.  -       Subjective Pain    Able to rate subjective pain? yes  -    Pre-Treatment Pain Level 4  -    Post-Treatment Pain Level 4  -    Subjective Pain Comment FACES  -       Transfers    Comment, (Transfers) remained seated in transport chair  -          User Key  (r) = Recorded By, (t) = Taken By, (c) = Cosigned By    Initials Name Provider Type     Kristy Galvan PT Physical Therapist                 LDA Wound     Row Name 05/17/22 0900             Wound 02/03/22 Left lower leg    Wound - Properties Group Placement Date: 02/03/22  - Present on Hospital Admission: Y  - Side: Left  - Orientation: lower  - Location: leg  -      Wound Image View All Images View Images  -      Dressing Appearance intact;no drainage  -      Base moist;red;pink;epithelialization  medial area open, two other areas appear closed  -      Periwound redness;swelling  primary area closed but raised/red  -      Periwound Temperature warm  -      Periwound Skin Turgor soft  -      Edges irregular  -      Drainage Characteristics/Odor serous  -      Drainage Amount scant  -      Care, Wound cleansed with;wound cleanser;debrided  -      Dressing Care dressing applied;petroleum-based;gauze;low-adherent;border dressing;multi-layer wrap  xeroform, 2\" optifoam over new areas, MLW  -      Periwound Care cleansed with pH balanced cleanser;dry periwound area maintained  -      Retired Wound - Properties Group Placement Date: 02/03/22  - Present on Hospital Admission: Y  - Side: Left  - Orientation: lower  - Location: leg  -      Retired Wound - Properties Group Date first assessed: 02/03/22  - Present on Hospital Admission: Y  - Side: Left  - Location: leg  -            User Key  (r) = Recorded By, (t) = Taken By, (c) = Cosigned By    Initials Name Provider Type     " Kristy Galvan, PT Physical Therapist    Phyllis Gallardo RN Registered Nurse               Lymphedema     Row Name 05/17/22 0900             Lymphedema Edema Assessment    Ptting Edema Category By severity  -      Pitting Edema Moderate  -              Skin Changes/Observations    Lower Extremity Conditions left:;clean;dry;shiny  -      Lower Extremity Color/Pigment left:;hyperpigmented;red  -              Lymphedema Pulses/Capillary Refill    Lower Extremity Capillary Refill left:;less than 3 seconds  -              Compression/Skin Care    Compression/Skin Care skin care;wrapping location;bandaging  -      Skin Care washed/dried;lotion applied  -      Wrapping Location lower extremity  -      Wrapping Location LE left:;foot to knee  -      Wrapping Comments Size 4/5/6 compressogrip applied doubled and overlapped for gradient compression  -      Bandage Layers cotton elastic stocking- double layer (comment size)  -      Compression/Skin Care Comments Extra time in education/discussion about appropriate footwear options  -            User Key  (r) = Recorded By, (t) = Taken By, (c) = Cosigned By    Initials Name Provider Type    Kristy Huerta, PT Physical Therapist                WOUND DEBRIDEMENT  Total area of Debridement: 2 cm2  Debridement Site 1  Location- Site 1: LLE  Selective Debridement- Site 1: Wound Surface <20cmsq  Instruments- Site 1: tweezers  Excised Tissue Description- Site 1: minimum, slough, other (comment) (blistered skin over new areas)  Bleeding- Site 1: none              Therapy Education     Row Name 05/17/22 0900             Therapy Education    Education Details Continue current POC for compression. Maintain xeroform/optifoam to new small areas, which are likely mechanical in etiology rather than related to fluid overload or infection. Triamcinolone can be applied over raised red area. Compression likely to be a long-term need for edema control.  -       Given Bandaging/dressing change;Edema management;Symptoms/condition management  -      Program Progressed  -      How Provided Verbal  -      Provided to Patient;Caregiver  -      Level of Understanding Verbalized  -            User Key  (r) = Recorded By, (t) = Taken By, (c) = Cosigned By    Initials Name Provider Type    Kristy Huerta, PT Physical Therapist                Recommendation and Plan   PT Assessment/Plan     Row Name 05/17/22 0900          PT Assessment    Functional Limitations Performance in self-care ADL;Limitation in home management;Other (comment)  -     Impairments Integumentary integrity;Edema;Pain  -     Assessment Comments Pt with three new, small areas, with two already epithelialized. Based on the relative size and location of these areas, they appear to be mechanical in nature, from a scratch, skin tear, bump against the leg, etc. PT anticipates the remaining area will close quickly. Pt will continue to benefit from careful compression to maintain appropriate edema control.  -     Rehab Potential Good  -     Patient/caregiver participated in establishment of treatment plan and goals Yes  -     Patient would benefit from skilled therapy intervention Yes  -            PT Plan    PT Frequency 1x/week  -     Physical Therapy Interventions (Optional Details) wound care;patient/family education  -     PT Plan Comments dressings to L anterior leg prn, MLW  -           User Key  (r) = Recorded By, (t) = Taken By, (c) = Cosigned By    Initials Name Provider Type    Kristy Huerta PT Physical Therapist                Goals   PT OP Goals     Row Name 05/17/22 0928          Time Calculation    PT Goal Re-Cert Due Date 05/24/22  -           User Key  (r) = Recorded By, (t) = Taken By, (c) = Cosigned By    Initials Name Provider Type    Kristy Huerta, PT Physical Therapist                PT Goal Re-Cert Due Date: 05/24/22            Time  Calculation: Start Time: 0815  Untimed Charges  59826-Bsaockuxkd comp below knee: 15  14024-Uhnvkgxqx debridement: 10  Total Minutes  Untimed Charges Total Minutes: 25   Total Minutes: 25  Therapy Charges for Today     Code Description Service Date Service Provider Modifiers Qty    28547820010 HC BHANU DEBRIDE OPEN WOUND UP TO 20CM 5/17/2022 Kristy Galvan, PT GP 1    08597645802 HC PT MULTI LAYER COMP SYS BELOW KNEE 5/17/2022 Kristy Galvan, PT GP 1                  Kristy Galvan, PT  5/17/2022

## 2022-05-23 ENCOUNTER — HOSPITAL ENCOUNTER (OUTPATIENT)
Dept: PHYSICAL THERAPY | Facility: HOSPITAL | Age: 67
Setting detail: THERAPIES SERIES
Discharge: HOME | End: 2022-05-23

## 2022-05-23 DIAGNOSIS — M79.662 PAIN AND SWELLING OF LEFT LOWER LEG: ICD-10-CM

## 2022-05-23 DIAGNOSIS — M79.89 PAIN AND SWELLING OF LEFT LOWER LEG: ICD-10-CM

## 2022-05-23 DIAGNOSIS — S81.802D OPEN WOUND OF LEFT LOWER EXTREMITY, SUBSEQUENT ENCOUNTER: ICD-10-CM

## 2022-05-23 DIAGNOSIS — L03.116 CELLULITIS OF LEFT ANTERIOR LOWER LEG: Primary | ICD-10-CM

## 2022-05-23 PROCEDURE — 29581 APPL MULTLAYER CMPRN SYS LEG: CPT

## 2022-05-23 NOTE — THERAPY PROGRESS REPORT/RE-CERT
Outpatient Rehabilitation - Wound/Debridement Progress Note   [facility]     Patient Name: Park Ray  : 1955  MRN: [id number]  Today's Date: 2022                 Admit Date: 2022    Visit Dx:    ICD-10-CM ICD-9-CM   1. Cellulitis of left anterior lower leg  L03.116 682.6   2. Pain and swelling of left lower leg  M79.662 729.5    M79.89 729.81   3. Open wound of left lower extremity, subsequent encounter  S81.802D V58.89     891.0       Patient Active Problem List   Diagnosis   • OAB (overactive bladder)   • Mixed incontinence urge and stress   • Menopause   • Hypothyroidism   • Fatty liver   • Family history of breast cancer in mother   • Superficial dyspareunia   • Colon cancer (HCC)   • Recurrent genital herpes   • Vaginal atrophy   • Cellulitis of left anterior lower leg        Past Medical History:   Diagnosis Date   • Acute torn meniscus of knee     left   • Closed displaced fracture of shaft of left clavicle    • Closed nondisplaced fracture of neck of left radius    • Colon cancer (HCC)    • Dyspareunia    • Family history of breast cancer in mother    • Fatty liver    • Fracture of proximal phalanx of toe    • Herpes    • Hypothyroidism    • IBS (irritable bowel syndrome)    • Left wrist pain    • Menopause    • Mixed incontinence urge and stress    • OAB (overactive bladder)    • Osteoarthritis    • Right wrist pain         Past Surgical History:   Procedure Laterality Date   •  SECTION     • CHOLECYSTECTOMY     • COLECTOMY PARTIAL / TOTAL     • COLON RESECTION     • COLONOSCOPY     • ENTEROCELE REPAIR     • DAWSON MORA (JOANNK) PROCEDURE     • PELVIC LAPAROSCOPY     • RECTAL SURGERY     • SALPINGO OOPHORECTOMY Right    • TOTAL ABDOMINAL HYSTERECTOMY WITH SALPINGO OOPHORECTOMY Left    • UTERINE SUSPENSION LAPAROSCOPIC           EVALUATION   PT Ortho     Row Name 22 1100       Subjective Comments    Subjective Comments Pt brought steroid ointment  today. No other complaints or changes.  -       Subjective Pain    Able to rate subjective pain? yes  -    Pre-Treatment Pain Level 0  -    Post-Treatment Pain Level 0  -    Subjective Pain Comment FACES  -       Transfers    Comment, (Transfers) remained seated for tx  -          User Key  (r) = Recorded By, (t) = Taken By, (c) = Cosigned By    Initials Name Provider Type    Kristy Huerta, CAROLYN Physical Therapist                 LDA Wound     Row Name 05/23/22 1100             Wound 02/03/22 Left lower leg    Wound - Properties Group Placement Date: 02/03/22  - Present on Hospital Admission: Y  - Side: Left  - Orientation: lower  - Location: leg  -      Wound Image View All Images View Images  -      Dressing Appearance intact;no drainage  -      Base clean;closed/resurfaced;pink;red  previous skin tears closed. Reddish pink raised area present but skin intact with no evidence of breakdown  -      Periwound swelling  -      Periwound Temperature warm  -      Periwound Skin Turgor soft  -      Drainage Amount none  -      Care, Wound cleansed with;wound cleanser;other (see comments)  steroid ointment to red area  -      Dressing Care dressing applied;multi-layer wrap  MLW only  -      Periwound Care cleansed with pH balanced cleanser;dry periwound area maintained  -      Retired Wound - Properties Group Placement Date: 02/03/22  - Present on Hospital Admission: Y  - Side: Left  - Orientation: lower  - Location: leg  -      Retired Wound - Properties Group Date first assessed: 02/03/22  - Present on Hospital Admission: Y  - Side: Left  - Location: leg  -            User Key  (r) = Recorded By, (t) = Taken By, (c) = Cosigned By    Initials Name Provider Type    Kristy Huerta, PT Physical Therapist    Phyllis Gallardo RN Registered Nurse               Lymphedema     Row Name 05/23/22 1100             Lymphedema Edema Assessment    Ptting  Edema Category By severity  -      Pitting Edema Moderate  -              Skin Changes/Observations    Lower Extremity Conditions left:;clean;dry;shiny  -      Lower Extremity Color/Pigment left:;hyperpigmented;red  -              Lymphedema Pulses/Capillary Refill    Lower Extremity Capillary Refill left:;less than 3 seconds  -              Compression/Skin Care    Compression/Skin Care skin care;wrapping location;bandaging  -      Skin Care washed/dried;lotion applied  -      Wrapping Location lower extremity  -      Wrapping Location LE left:;foot to knee  -      Wrapping Comments size 4/5/6 compressogrip doubled and overlapping to create gradient compression  -      Bandage Layers cotton elastic stocking- double layer (comment size)  -MC            User Key  (r) = Recorded By, (t) = Taken By, (c) = Cosigned By    Initials Name Provider Type    Kristy Huerta, PT Physical Therapist                WOUND DEBRIDEMENT                    Therapy Education     Row Name 05/23/22 1100             Therapy Education    Education Details May apply steroid to red area if desired, but pt may be better suited for trial of OTC steroid ointments to attempt to facilitate skin maturation. Continue with MLW for trial of home management, call for follow up as needed.  -MC      Given Bandaging/dressing change;Edema management;Symptoms/condition management  -      Program Progressed  -      How Provided Verbal  -MC      Provided to Patient;Caregiver  -MC      Level of Understanding Verbalized  -            User Key  (r) = Recorded By, (t) = Taken By, (c) = Cosigned By    Initials Name Provider Type    Kristy Huerta, PT Physical Therapist                Recommendation and Plan   PT Assessment/Plan     Row Name 05/23/22 1100          PT Assessment    Functional Limitations Performance in self-care ADL;Limitation in home management;Other (comment)  -     Impairments Integumentary  integrity;Edema;Pain  -     Assessment Comments The new skin tears noted last treatment have closed and the skin integrity is appropriate. Pt continues to have a red, raised area centrally, but the skin is fully intact. Pt has a steroid ointment, but PT recommended scar treatment options to address redness and promote skin maturation. Pt still with LLE edema that improves with consistent use of MLW to improve venous return and maintain limb girth reduction. PT added a goal regarding MLW/compression options based on patient progress. Pt has met her other PT wound care goals. She is appropriate for trial of independent home care for edema management. Will see prn within the next 90 days.  -     Rehab Potential Good  -     Patient/caregiver participated in establishment of treatment plan and goals Yes  -     Patient would benefit from skilled therapy intervention Yes  -            PT Plan    PT Frequency Other (comment)  prn within 90 days  -     Predicted Duration of Therapy Intervention (PT) 4 visits  -     Planned CPT's? PT SELF CARE/MGMT/TRAIN 15 MIN: 56496;PT NONSELECT DEBRIDE 15 MIN: 02907;PT BHANU DEBRIDE OPEN WOUND UP TO 20 CM: 60620;PT UNNA BOOT: 59117;PT MULTI LAYER COMP SYS LE;PT THER SUPP EA 15 MIN  -     Physical Therapy Interventions (Optional Details) wound care;patient/family education  -     PT Plan Comments tentative d/c  -           User Key  (r) = Recorded By, (t) = Taken By, (c) = Cosigned By    Initials Name Provider Type    Kristy Huerta, PT Physical Therapist                Goals   PT OP Goals     Row Name 22 1100          PT Short Term Goals    STG 1 Pt / caregiver able to verbalize s/s of infection and when to seek urgent care.  -     STG 1 Progress Met  -     STG 2 LLE wound with < 25% nonviable tissue to promote clean wound bed for healing.  -     STG 2 Progress Met  -     STG 3 LLE wound area to decrease at least 25% to demonstrate healing.   -     STG 3 Progress Met  -            Long Term Goals    LTG 1 LLE wound area to decrease at least 75% to demonstrate healing.  -     LTG 1 Progress Met  -     LTG 2 Pt/ caregiver independent with clean technique for home dressing changes.  -     LTG 2 Progress Met  -     LTG 3 Decrease LLE edema by 2 cm to promote improved skin integrity for wound healing.  -     LTG 3 Progress Met  -     LTG 4 Pt will demonstrate independence with appropriate long-term compression option.  -     LTG 4 Progress New  -            Time Calculation    PT Goal Re-Cert Due Date 08/21/22  -           User Key  (r) = Recorded By, (t) = Taken By, (c) = Cosigned By    Initials Name Provider Type    Kristy Huerta, PT Physical Therapist                PT Goal Re-Cert Due Date: 08/21/22  PT Short Term Goals  STG 1: Pt / caregiver able to verbalize s/s of infection and when to seek urgent care.  STG 1 Progress: Met  STG 2: LLE wound with < 25% nonviable tissue to promote clean wound bed for healing.  STG 2 Progress: Met  STG 3: LLE wound area to decrease at least 25% to demonstrate healing.  STG 3 Progress: Met  Long Term Goals  LTG 1: LLE wound area to decrease at least 75% to demonstrate healing.  LTG 1 Progress: Met  LTG 2: Pt/ caregiver independent with clean technique for home dressing changes.  LTG 2 Progress: Met  LTG 3: Decrease LLE edema by 2 cm to promote improved skin integrity for wound healing.  LTG 3 Progress: Met  LTG 4: Pt will demonstrate independence with appropriate long-term compression option.  LTG 4 Progress: New      Time Calculation: Start Time: 0815  Untimed Charges  07506-Lxyywfnmud comp below knee: 20  Total Minutes  Untimed Charges Total Minutes: 20   Total Minutes: 20  Therapy Charges for Today     Code Description Service Date Service Provider Modifiers Qty    [id number] HC PT MULTI LAYER COMP SYS BELOW KNEE 5/23/2022 Kristy Galvan, PT GP 1                  Kristy Galvan,  PT  5/23/2022

## 2022-05-24 ENCOUNTER — APPOINTMENT (OUTPATIENT)
Dept: PHYSICAL THERAPY | Facility: HOSPITAL | Age: 67
End: 2022-05-24

## 2022-06-29 ENCOUNTER — TELEPHONE (OUTPATIENT)
Dept: ORTHOPEDIC SURGERY | Facility: CLINIC | Age: 67
End: 2022-06-29

## 2022-06-29 NOTE — TELEPHONE ENCOUNTER
DR REYNA PATIENT: 06/29/2022 PATIENT BRINGING RECS BY FOR DR REYNA // PATIENT CALLED SAID HAD SPOKE WITH DR REYNA YESTERDAY; WHILE ADDRESSING HER HUSBANDS COND WITH DR REYNA - SHE HAD SEEN ARSEN PREV FOR A FALL AND THEN WENT TO HER PCP GERALDINE NEGRETE AND THEY HAD ORDERED XR, ETC...PER PATIENT DR REYNA TOLD HER HE DIDN'T HAVE ACESS TO THOSE RECS & TO BRING THEM BY AND HE WOULD REVIEW EVERYTHING, BECAUSE HER PCP TOLD HER SHE NEEDS & MRI NEEDS TO SEE HER ORTHO & ORTHO ORDER THE MRI.  PCP TOLD HER RECS ARE IN [facility], I COULDN'T FIND THEM.     ONCE RECE REVIEWED BY DR REYNA - JIMMY ADEVISED SOMEONE WOULD CALL HER WITH SCHEDULING INSTRUCTIONS.

## 2022-06-29 NOTE — TELEPHONE ENCOUNTER
I spoke with Dr. Roger about his conversation yesterday.  He says he reiterated to the patient she will need to contact and see Dr. Horn first.  If Dr. Horn determines ortho intervention is needed, his office will reach out to us.  She offered to call the office for us.  I told her this is not necessary as Dr. Horn will be able to tell us exactly what is needed.  She verbalized understanding.    Kori PATTERSON(R)

## 2022-07-12 ENCOUNTER — TELEPHONE (OUTPATIENT)
Dept: ORTHOPEDIC SURGERY | Facility: CLINIC | Age: 67
End: 2022-07-12

## 2022-07-12 NOTE — TELEPHONE ENCOUNTER
Provider: AXEL  Caller: KAYLEN  Phone Number: [phone]  Reason for Call: PT IS GOING TO SEEK CARE ELSE WHERE AND IS ASKING FOR HER DENICE AND PAPER WORK TO BE MAILED TO HER HOME ADDRESS

## 2022-07-13 NOTE — TELEPHONE ENCOUNTER
Pt stated that she brought in a disc and some external records to her appointment yesterday and would like them mailed back to her at the address on file please

## 2022-08-24 ENCOUNTER — DOCUMENTATION (OUTPATIENT)
Dept: PHYSICAL THERAPY | Facility: HOSPITAL | Age: 67
End: 2022-08-24

## 2022-08-24 DIAGNOSIS — M79.89 PAIN AND SWELLING OF LEFT LOWER LEG: ICD-10-CM

## 2022-08-24 DIAGNOSIS — L03.116 CELLULITIS OF LEFT ANTERIOR LOWER LEG: Primary | ICD-10-CM

## 2022-08-24 DIAGNOSIS — S81.802D OPEN WOUND OF LEFT LOWER EXTREMITY, SUBSEQUENT ENCOUNTER: ICD-10-CM

## 2022-08-24 DIAGNOSIS — M79.662 PAIN AND SWELLING OF LEFT LOWER LEG: ICD-10-CM

## 2022-08-24 NOTE — THERAPY DISCHARGE NOTE
Outpatient Rehabilitation - Wound/Debridement Discharge Summary       Patient Name: Park Ray  : 1955  MRN: [id number]  Today's Date: 2022                  Admit Date: (Not on file)    Visit Dx:    ICD-10-CM ICD-9-CM   1. Cellulitis of left anterior lower leg  L03.116 682.6   2. Pain and swelling of left lower leg  M79.662 729.5    M79.89 729.81   3. Open wound of left lower extremity, subsequent encounter  S81.802D V58.89     891.0       Patient Active Problem List   Diagnosis   • OAB (overactive bladder)   • Mixed incontinence urge and stress   • Menopause   • Hypothyroidism   • Fatty liver   • Family history of breast cancer in mother   • Superficial dyspareunia   • Colon cancer (HCC)   • Recurrent genital herpes   • Vaginal atrophy   • Cellulitis of left anterior lower leg        Past Medical History:   Diagnosis Date   • Acute torn meniscus of knee     left   • Closed displaced fracture of shaft of left clavicle    • Closed nondisplaced fracture of neck of left radius    • Colon cancer (HCC)    • Dyspareunia    • Family history of breast cancer in mother    • Fatty liver    • Fracture of proximal phalanx of toe    • Herpes    • Hypothyroidism    • IBS (irritable bowel syndrome)    • Left wrist pain    • Menopause    • Mixed incontinence urge and stress    • OAB (overactive bladder)    • Osteoarthritis    • Right wrist pain         Past Surgical History:   Procedure Laterality Date   •  SECTION     • CHOLECYSTECTOMY     • COLECTOMY PARTIAL / TOTAL     • COLON RESECTION     • COLONOSCOPY     • ENTEROCELE REPAIR     • DAWSON MORA (MADDISON) PROCEDURE     • PELVIC LAPAROSCOPY     • RECTAL SURGERY     • SALPINGO OOPHORECTOMY Right    • TOTAL ABDOMINAL HYSTERECTOMY WITH SALPINGO OOPHORECTOMY Left    • UTERINE SUSPENSION LAPAROSCOPIC           Goals   PT OP Goals     Row Name 22 1000          PT Short Term Goals    STG Date to Achieve 22  -     STG 1 Pt /  caregiver able to verbalize s/s of infection and when to seek urgent care.  -     STG 1 Progress Met  -     STG 2 LLE wound with < 25% nonviable tissue to promote clean wound bed for healing.  -     STG 2 Progress Met  -     STG 3 LLE wound area to decrease at least 25% to demonstrate healing.  -     STG 3 Progress Met  -            Long Term Goals    LTG 1 LLE wound area to decrease at least 75% to demonstrate healing.  -     LTG 1 Progress Met  -     LTG 2 Pt/ caregiver independent with clean technique for home dressing changes.  -     LTG 2 Progress Met  -     LTG 3 Decrease LLE edema by 2 cm to promote improved skin integrity for wound healing.  -     LTG 3 Progress Met  -     LTG 4 Pt will demonstrate independence with appropriate long-term compression option.  -     LTG 4 Progress Not Met  -           User Key  (r) = Recorded By, (t) = Taken By, (c) = Cosigned By    Initials Name Provider Type    Alessandra Mcleod, PT Physical Therapist                 OP Discharge Summary     Row Name 08/24/22 1059             OP PT Discharge Summary    Date of Discharge 08/24/22  -      Reason for Discharge Independent  -      Outcomes Achieved Patient able to partially acheive established goals  -      Discharge Destination Home with home program  -      Discharge Instructions/Additional Comments Pt tentatively d/c'd last tx to home management, was to follow-up PRN w/n 90 days, has not returned for tx.  Anticipate remaining unmet goal met with continued home management.  -            User Key  (r) = Recorded By, (t) = Taken By, (c) = Cosigned By    Initials Name Provider Type    Alessandra Mcleod, PT Physical Therapist                Alessandra Espana, PT  8/24/2022

## 2022-08-31 ENCOUNTER — TRANSCRIBE ORDERS (OUTPATIENT)
Dept: ADMINISTRATIVE | Facility: HOSPITAL | Age: 67
End: 2022-08-31

## 2022-08-31 DIAGNOSIS — Z13.820 SPECIAL SCREENING FOR OSTEOPOROSIS: ICD-10-CM

## 2022-08-31 DIAGNOSIS — M85.872 OTHER SPECIFIED DISORDERS OF BONE DENSITY AND STRUCTURE, LEFT ANKLE AND FOOT: Primary | ICD-10-CM

## 2022-10-25 ENCOUNTER — APPOINTMENT (OUTPATIENT)
Dept: BONE DENSITY | Facility: HOSPITAL | Age: 67
End: 2022-10-25

## 2022-11-22 ENCOUNTER — TELEPHONE (OUTPATIENT)
Dept: OBSTETRICS AND GYNECOLOGY | Facility: CLINIC | Age: 67
End: 2022-11-22

## 2022-11-22 NOTE — TELEPHONE ENCOUNTER
PT CALLING [facility] WERE DENIED - ASKED WHY I ADVISED WLD HAVE THE NURSE CALL ERICKSON     SHE MADE HER ANNUAL EXAM WHILE ON PHONE

## 2022-11-23 RX ORDER — MELOXICAM 15 MG/1
TABLET ORAL
Qty: 30 TABLET | Refills: 2 | Status: SHIPPED | OUTPATIENT
Start: 2022-11-23

## 2022-12-29 ENCOUNTER — OFFICE VISIT (OUTPATIENT)
Dept: OBSTETRICS AND GYNECOLOGY | Facility: CLINIC | Age: 67
End: 2022-12-29

## 2022-12-29 VITALS
SYSTOLIC BLOOD PRESSURE: 140 MMHG | BODY MASS INDEX: 37.22 KG/M2 | DIASTOLIC BLOOD PRESSURE: 80 MMHG | RESPIRATION RATE: 16 BRPM | WEIGHT: 197 LBS

## 2022-12-29 DIAGNOSIS — N63.11 MASS OF UPPER OUTER QUADRANT OF RIGHT BREAST: ICD-10-CM

## 2022-12-29 DIAGNOSIS — Z85.038 PERSONAL HISTORY OF COLON CANCER: ICD-10-CM

## 2022-12-29 DIAGNOSIS — A60.00 RECURRENT GENITAL HERPES: ICD-10-CM

## 2022-12-29 DIAGNOSIS — N39.0 RECURRENT UTI: ICD-10-CM

## 2022-12-29 DIAGNOSIS — Z01.419 ENCOUNTER FOR GYNECOLOGICAL EXAMINATION WITHOUT ABNORMAL FINDING: Primary | ICD-10-CM

## 2022-12-29 DIAGNOSIS — N95.2 VAGINAL ATROPHY: ICD-10-CM

## 2022-12-29 DIAGNOSIS — Z80.3 FAMILY HISTORY OF BREAST CANCER: ICD-10-CM

## 2022-12-29 PROCEDURE — G0101 CA SCREEN;PELVIC/BREAST EXAM: HCPCS | Performed by: NURSE PRACTITIONER

## 2022-12-29 RX ORDER — ACYCLOVIR 400 MG/1
400 TABLET ORAL DAILY
Qty: 30 TABLET | Refills: 11 | Status: SHIPPED | OUTPATIENT
Start: 2022-12-29

## 2022-12-29 RX ORDER — NITROFURANTOIN MACROCRYSTALS 50 MG/1
50 CAPSULE ORAL DAILY
Qty: 30 CAPSULE | Refills: 11 | Status: SHIPPED | OUTPATIENT
Start: 2022-12-29

## 2022-12-29 NOTE — PROGRESS NOTES
Annual Visit     Patient Name: Park Ray  : 1955   MRN: [id number]   Care Team: Patient Care Team:  Hunter Horn MD as PCP - General (Infectious Diseases)  Fahad Bishop MD (Inactive) as Consulting Physician (Gynecology)    Chief Complaint:    Chief Complaint   Patient presents with   • Annual Exam       HPI: Park Ray is a 67 y.o. year old  presenting to be seen for her gynecologic exam.   S/p hyst with LSO   RSO done later      Hx recurrent UTI   Doing well with Macrobid 50mg qd - needs refill      Hx recurrent genital HSV   Doing well with acyclovir 400mg qd - needs refill      Vaginal atrophy well controlled with Osphena 60mg qd - needs refill   States the dryness is very bothersome without the osphena      Mammogram 2022 birads 1 at [facility]   Right breast u/s done 2022 birads 1 - done d/t pt reported changes in right breast - recommend f/u with annual screening mammogram in 2023   States she noticed a new palpable area in right breast about 3 months ago - tenderness as well   Has also noticed the right axillary area is more pronounced than it has been   Sister was just diagnosed with breast cancer about 6 months ago   Mother with breast cancer in her 40s   MGM and PGM also with hx of breast cancer   She has never had genetic testing done but she would like to discuss it      Hx colon cancer   Sees Dr. Donohue for f/u - just had f/u about 2 wks ago   Colonoscopy 2021   Hx IBS      Fell in 2022 and developed a hematoma on left leg that became infected   She received IV antibiotics for 7 months   Has had wound care for the last couple of months and wound is finally completely healed     DEXA done 2-3 wks ago with PCP at [facility]   Dx with osteopenia and she would like to discuss txment options     She has noticed some changing skin lesions recently   Has appt with dermatology in 2 wks      Naresh Charles is her nephew       Subjective       I have reviewed the patients family history, social history, past medical history, past surgical history and have updated it as appropriate.    /80   Resp 16   Wt 89.4 kg (197 lb)   LMP  (LMP Unknown) Comment: S/P AH, LEFT S&O/RIGHT S&O  BMI 37.22 kg/m²     BMI reviewed: Body mass index is 37.22 kg/m².      Objective     Physical Exam    Neuro: alert and oriented to person, place and time   General:  alert; cooperative; well developed; well nourished   Skin:  No suspicious lesions seen   Thyroid: normal to inspection and palpation   Lungs:  breathing is unlabored  clear to auscultation bilaterally   Heart:  regular rate and rhythm, S1, S2 normal, no murmur, click, rub or gallop  normal apical impulse   Breasts:  Examined in supine position  Symmetric without masses or skin dimpling  Nipples normal without inversion, lesions or discharge  There are no palpable axillary nodes  Fibrocystic changes are present both breasts without a discrete mass  Patient reported lump noted in upper outer quadrant of right breast   Abdomen: soft, non-tender; no masses  no umbilical or inguinal hernias are present  no hepato-splenomegaly   Pelvis: Clinical staff was present for exam  External genitalia:  normal appearance of the external genitalia including Bartholin's and Excelsior's glands.  :  urethral meatus normal;  Vaginal:  atrophic mucosal changes are present;  Cervix:  absent.  Uterus:  absent.  Adnexa:  absent, bilateral.  Rectal:  digital rectal exam not performed; anus visually normal appearing.         Assessment / Plan      Assessment  Problems Addressed This Visit    ICD-10-CM ICD-9-CM   1. Encounter for gynecological examination without abnormal finding  Z01.419 V72.31   2. Mass of upper outer quadrant of right breast  N63.11 611.72   3. Vaginal atrophy  N95.2 627.3   4. Recurrent UTI  N39.0 599.0   5. Recurrent genital herpes  A60.00 054.10   6. Family history of breast cancer  Z80.3 V16.3   7. Personal  history of colon cancer  Z85.038 V10.05       Plan    Cont with osphena for atrophy - script to pharmacy      Cont with macrobid 50mg qd for UTI suppression   Reviewed prevention measures      Cont with daily Acyclovir 400mg qd      Discussed genetic counseling/testing based on family and personal hx   Order given - she plans to have it done where her sister had genetic testing done      Discussed monthly SBEs and importance of annual breast imaging   Fibrocystic changes noted on exam today   But she feels strongly that she has noticed a change on SBEs and feels a lump in the right breast   Dx right breast mammogram ordered - Angeline will call to schedule at [facility] at checkout today     Sign ALEXANDRU at checkout today for DEXA report   Once reviewed, will let her know appropriate txment options and f/u     AV 1 yr             Follow Up  Return in about 1 year (around 12/29/2023) for Annual physical.  Patient was given instructions and counseling regarding her condition or for health maintenance advice. Please see specific information pulled into the AVS if appropriate.     Crystal Hannah, APRN  December 29, 2022  16:29 EST

## 2023-02-22 ENCOUNTER — TELEPHONE (OUTPATIENT)
Dept: OBSTETRICS AND GYNECOLOGY | Facility: CLINIC | Age: 68
End: 2023-02-22
Payer: OTHER GOVERNMENT

## 2023-02-22 NOTE — TELEPHONE ENCOUNTER
Amina,  Please let her know that her genetic testing returned all negative, so there are no gene mutations to increase her risk of cancer which is great.     Thanks!

## 2023-12-04 NOTE — PROGRESS NOTES
Subjective:     Encounter Date:2023    Primary Care Physician: Hunter Horn MD      Patient ID: Park Ray is a 68 y.o. female.    Chief Complaint:Consult (Hypertension)    PROBLEM LIST:  Chest pain  Hypertension  Arthritis  Overactive bladder  IBS  Hypothyroidism  Fatty liver  Colon cancer  GERD  Surgeries:  Laparoscopic uterine surgery  Total hysterectomy  Rectal surgery  MMK procedures  Enterocele repair  Colon resection   section       Allergies   Allergen Reactions    Lisinopril Anaphylaxis    Tetracyclines & Related Hives and Nausea Only         Current Outpatient Medications:     acetaminophen (TYLENOL) 325 MG tablet, Take 2 tablets by mouth Every 4 (Four) Hours As Needed for Mild Pain ., Disp: , Rfl:     acyclovir (ZOVIRAX) 400 MG tablet, Take 1 tablet by mouth Daily. If outbreak occurs while on suppressive therapy, call office for further instructions., Disp: 30 tablet, Rfl: 11    Cholecalciferol (VITAMIN D-3) 5000 units tablet, Take 1 tablet by mouth Daily., Disp: , Rfl:     fluticasone (FLONASE) 50 MCG/ACT nasal spray, 2 sprays into the nostril(s) as directed by provider Daily., Disp: , Rfl:     folic acid (FOLVITE) 1 MG tablet, Take 1 tablet by mouth Daily., Disp: , Rfl:     furosemide (LASIX) 40 MG tablet, Take 1 tablet by mouth As Needed., Disp: , Rfl:     levothyroxine (SYNTHROID, LEVOTHROID) 25 MCG tablet, Take 1 tablet by mouth Daily., Disp: , Rfl:     meloxicam (MOBIC) 15 MG tablet, TAKE ONE TABLET BY MOUTH DAILY, Disp: 30 tablet, Rfl: 2    Milk Thistle 140 MG capsule, Take 1 capsule by mouth Daily., Disp: , Rfl:     Multiple Vitamins-Minerals (MULTIVITAMIN ADULT PO), Take 1 tablet by mouth Daily., Disp: , Rfl:     nitrofurantoin (MACRODANTIN) 50 MG capsule, Take 1 capsule by mouth Daily., Disp: 30 capsule, Rfl: 11    omeprazole (priLOSEC) 40 MG capsule, Take 20 mg by mouth Daily., Disp: , Rfl:     Ospemifene 60 MG tablet, Take 1 tablet by mouth Daily., Disp: 30  tablet, Rfl: 11    pravastatin (PRAVACHOL) 40 MG tablet, Take 1 tablet by mouth Daily., Disp: , Rfl:     traMADol (ULTRAM) 50 MG tablet, Take 1 tablet by mouth Every 6 (Six) Hours As Needed for pain., Disp: 28 tablet, Rfl: 0    diazePAM (VALIUM) 5 MG tablet, Take 1 tablet by mouth 2 (Two) Times a Day As Needed for Anxiety., Disp: , Rfl:         History of Present Illness    Patient is a 68-year-old  female who we are seeing today for further evaluation of chest pain and hypertension.  Patient has no previous history of hypertension until recently.  Patient notes that there is been a significant amount of stress in her family related to her sister-in-law's illness.  Notes that they will be withdrawing care later this afternoon.  In the midst of her illness patient notes that she was in significant pain from her knees and went to the dentist office for routine cleaning.  At that time her systolic blood pressure was checked and it ranged anywhere from 180 to greater than 200.  She was eventually released to go home.  Patient notes that her blood pressure did eventually come down.  Notes that she took her 's Eliquis.  The next day she went to her primary care physician who prescribed lisinopril for her elevated blood pressure.  She had angioedema related to this and stopped the medication.  Patient continues to have some intermittent left-sided neck discomfort.  She is concerned for possible carotid disease.  She has also had multiple orthopedic issues with shoulders and knees.  Was prescribed tramadol and given a recent cortisone injection.  She does endorse that she is more short of breath with activity and has intermittent night sweats.  Notes that she wants to make sure from a cardiac standpoint she is okay.    The following portions of the patient's history were reviewed and updated as appropriate: allergies, current medications, past family history, past medical history, past social history, past  "surgical history and problem list.    Family History   Problem Relation Age of Onset    Cancer Father     Heart disease Father     Hypertension Father     Osteoarthritis Father     Breast cancer Mother     Cancer Mother     Brain cancer Brother     Hypertension Brother     Diabetes Brother     Hypertension Sister     Breast cancer Sister     Cancer Other     Heart disease Other     Heart disease Brother     Heart failure Brother     Hypertension Brother     Heart disease Sister     Hypertension Sister        Social History     Tobacco Use    Smoking status: Never    Smokeless tobacco: Never    Tobacco comments:     Never   Vaping Use    Vaping Use: Never used   Substance Use Topics    Alcohol use: Yes     Alcohol/week: 2.0 standard drinks of alcohol     Types: 2 Drinks containing 0.5 oz of alcohol per week     Comment: Socially upon occasion    Drug use: No         Review of Systems   Constitutional: Positive for malaise/fatigue and weight loss. Negative for fever.   HENT:  Negative for nosebleeds.    Eyes:  Negative for redness and visual disturbance.   Cardiovascular:  Positive for chest pain, dyspnea on exertion and leg swelling. Negative for orthopnea, palpitations and paroxysmal nocturnal dyspnea.   Respiratory:  Negative for cough, snoring, sputum production and wheezing.    Hematologic/Lymphatic: Negative for bleeding problem.   Skin:  Negative for flushing, itching and rash.   Musculoskeletal:  Positive for arthritis and joint pain. Negative for falls and muscle cramps.   Gastrointestinal:  Negative for abdominal pain, diarrhea, heartburn, nausea and vomiting.   Genitourinary:  Negative for hematuria.   Neurological:  Negative for excessive daytime sleepiness, dizziness, headaches, tremors and weakness.   Psychiatric/Behavioral:  Negative for substance abuse. The patient is nervous/anxious.           Objective:   /83   Pulse 73   Ht 157.5 cm (62\")   Wt 89.4 kg (197 lb)   LMP  (LMP Unknown) Comment: " S/P AH, LEFT S&O/RIGHT S&O  SpO2 97%   BMI 36.03 kg/m²         Vitals reviewed.   Constitutional:       Appearance: Healthy appearance. Well-developed and not in distress.   Eyes:      Conjunctiva/sclera: Conjunctivae normal.      Pupils: Pupils are equal, round, and reactive to light.   HENT:      Head: Normocephalic and atraumatic.    Mouth/Throat:      Pharynx: Oropharynx is clear.   Neck:      Thyroid: Thyroid normal. No thyromegaly.      Vascular: Normal carotid pulses. No carotid bruit or JVD. JVD normal.      Lymphadenopathy: No cervical adenopathy.   Pulmonary:      Effort: No respiratory distress.      Breath sounds: No wheezing. No rales.   Chest:      Chest wall: Not tender to palpatation.   Cardiovascular:      Normal rate. Regular rhythm.      No gallop.    Pulses:     Carotid: 2+ bilaterally.     Dorsalis pedis: 2+ bilaterally.     Posterior tibial: 2+ bilaterally.  Edema:     Peripheral edema absent.   Abdominal:      General: There is no distension or abdominal bruit.      Palpations: There is no abdominal mass.      Tenderness: There is no abdominal tenderness. There is no rebound.   Musculoskeletal:         General: No tenderness or deformity.      Extremities: No clubbing present.Skin:     General: Skin is warm and dry. There is no cyanosis.      Findings: No rash.   Neurological:      Mental Status: Alert, oriented to person, place, and time and oriented to person, place and time.           ECG 12 Lead    Date/Time: 12/5/2023 2:15 PM  Performed by: Carlos Verdugo MD    Authorized by: Carlos Verdugo MD  Comparison: compared with previous ECG from 2/24/2015  Comparison to previous ECG: Short MI interval and nonspecific T wave change noted.  Rhythm: sinus rhythm  Comments: Short MI nonspecific T wave.                Assessment:   Assessment & Plan      Diagnoses and all orders for this visit:    1. Chest pressure (Primary)  -     ECG 12 Lead      1.  Chest pain, atypical.  Patient quite  concerned about presence of coronary disease  2.  Hypertension labile least some anxiety component, currently uncontrolled.  Angioedema with ACE inhibitor's  Neck pain, patient concerned for carotid artery disease.  4.  Dyslipidemia on pravastatin     Recommendations:  1.  Add nebivolol 5 mg daily for hypertension.  2.  Check echocardiogram and carotid given dyspnea and chest pain  3.  Coronary CTA to evaluate for coronary artery disease.  4.  Further recommendations after the above    Norma SINGH scribed portions of this dictation for Dr. Carlos Verdugo.   I have seen and examined the patient, I have reviewed the note, discussed the case with the advance practice clinician, made necessary changes and I agree with the final note.    Carlos Verdugo MD  12/05/23  14:55 EST              Dictated utilizing Dragon dictation

## 2023-12-05 ENCOUNTER — OFFICE VISIT (OUTPATIENT)
Dept: CARDIOLOGY | Facility: CLINIC | Age: 68
End: 2023-12-05
Payer: MEDICARE

## 2023-12-05 VITALS
WEIGHT: 197 LBS | OXYGEN SATURATION: 97 % | DIASTOLIC BLOOD PRESSURE: 83 MMHG | HEART RATE: 73 BPM | BODY MASS INDEX: 36.25 KG/M2 | HEIGHT: 62 IN | SYSTOLIC BLOOD PRESSURE: 146 MMHG

## 2023-12-05 DIAGNOSIS — R06.00 DYSPNEA, UNSPECIFIED TYPE: ICD-10-CM

## 2023-12-05 DIAGNOSIS — R07.89 CHEST PRESSURE: Primary | ICD-10-CM

## 2023-12-05 DIAGNOSIS — R09.89 OTHER SPECIFIED SYMPTOMS AND SIGNS INVOLVING THE CIRCULATORY AND RESPIRATORY SYSTEMS: ICD-10-CM

## 2023-12-05 PROBLEM — D72.823 NEUTROPHILIC LEUKEMOID REACTION: Status: ACTIVE | Noted: 2022-02-09

## 2023-12-05 PROBLEM — M79.646 THUMB PAIN: Status: ACTIVE | Noted: 2023-02-06

## 2023-12-05 PROBLEM — M89.8X9 BONE PAIN: Status: ACTIVE | Noted: 2022-09-27

## 2023-12-05 PROBLEM — R09.82 ALLERGIC RHINITIS WITH POSTNASAL DRIP: Status: ACTIVE | Noted: 2020-04-23

## 2023-12-05 PROBLEM — Z86.16 PERSONAL HISTORY OF COVID-19: Status: ACTIVE | Noted: 2022-02-03

## 2023-12-05 PROBLEM — M79.601 ARM PAIN, RIGHT: Status: ACTIVE | Noted: 2022-03-07

## 2023-12-05 PROBLEM — L03.119 CELLULITIS OF LOWER EXTREMITY: Status: ACTIVE | Noted: 2022-02-03

## 2023-12-05 PROBLEM — J30.9 ALLERGIC RHINITIS WITH POSTNASAL DRIP: Status: ACTIVE | Noted: 2020-04-23

## 2023-12-05 PROBLEM — L97.209: Status: ACTIVE | Noted: 2022-03-09

## 2023-12-05 PROBLEM — S80.12XA CONTUSION OF LEFT LOWER LEG: Status: ACTIVE | Noted: 2022-02-03

## 2023-12-05 PROBLEM — M25.562 ARTHRALGIA OF BOTH KNEES: Status: ACTIVE | Noted: 2022-10-18

## 2023-12-05 PROBLEM — L97.222 NON-PRESSURE CHRONIC ULCER OF LEFT CALF WITH FAT LAYER EXPOSED: Status: ACTIVE | Noted: 2022-03-09

## 2023-12-05 PROBLEM — M25.561 ARTHRALGIA OF BOTH KNEES: Status: ACTIVE | Noted: 2022-10-18

## 2023-12-05 PROBLEM — L97.909 CHRONIC ULCER OF LOWER EXTREMITY: Status: ACTIVE | Noted: 2022-02-24

## 2023-12-05 PROBLEM — M25.569 ARTHRALGIA OF KNEE: Status: ACTIVE | Noted: 2022-10-18

## 2023-12-05 PROBLEM — K76.0 FATTY LIVER DISEASE, NONALCOHOLIC: Status: ACTIVE | Noted: 2022-02-03

## 2023-12-05 PROBLEM — N39.46 MIXED INCONTINENCE URGE AND STRESS: Status: ACTIVE | Noted: 2021-11-12

## 2023-12-05 PROBLEM — E66.9 OBESITY (BMI 35.0-39.9 WITHOUT COMORBIDITY): Status: ACTIVE | Noted: 2022-06-07

## 2023-12-05 PROBLEM — Z80.3 FAMILY HISTORY OF BREAST CANCER IN MOTHER: Status: ACTIVE | Noted: 2021-11-12

## 2023-12-05 PROCEDURE — 1160F RVW MEDS BY RX/DR IN RCRD: CPT | Performed by: INTERNAL MEDICINE

## 2023-12-05 PROCEDURE — 93000 ELECTROCARDIOGRAM COMPLETE: CPT | Performed by: INTERNAL MEDICINE

## 2023-12-05 PROCEDURE — 1159F MED LIST DOCD IN RCRD: CPT | Performed by: INTERNAL MEDICINE

## 2023-12-05 PROCEDURE — 99204 OFFICE O/P NEW MOD 45 MIN: CPT | Performed by: INTERNAL MEDICINE

## 2023-12-05 RX ORDER — NEBIVOLOL 5 MG/1
5 TABLET ORAL DAILY
Qty: 30 TABLET | Refills: 11 | Status: SHIPPED | OUTPATIENT
Start: 2023-12-05

## 2023-12-08 ENCOUNTER — HOSPITAL ENCOUNTER (OUTPATIENT)
Dept: CARDIOLOGY | Facility: HOSPITAL | Age: 68
Discharge: HOME | End: 2023-12-08
Payer: MEDICARE

## 2023-12-08 DIAGNOSIS — R09.89 OTHER SPECIFIED SYMPTOMS AND SIGNS INVOLVING THE CIRCULATORY AND RESPIRATORY SYSTEMS: ICD-10-CM

## 2023-12-08 DIAGNOSIS — R06.00 DYSPNEA, UNSPECIFIED TYPE: ICD-10-CM

## 2023-12-08 DIAGNOSIS — R07.89 CHEST PRESSURE: ICD-10-CM

## 2023-12-08 LAB
BH CV XLRA MEAS LEFT DIST CCA EDV: 13.7 CM/SEC
BH CV XLRA MEAS LEFT DIST CCA PSV: 66.5 CM/SEC
BH CV XLRA MEAS LEFT DIST ICA EDV: 23 CM/SEC
BH CV XLRA MEAS LEFT DIST ICA PSV: 73.9 CM/SEC
BH CV XLRA MEAS LEFT ICA/CCA RATIO: 0.93
BH CV XLRA MEAS LEFT MID CCA EDV: 15.6 CM/SEC
BH CV XLRA MEAS LEFT MID CCA PSV: 85.8 CM/SEC
BH CV XLRA MEAS LEFT MID ICA EDV: 23.6 CM/SEC
BH CV XLRA MEAS LEFT MID ICA PSV: 79.5 CM/SEC
BH CV XLRA MEAS LEFT PROX CCA EDV: 26.8 CM/SEC
BH CV XLRA MEAS LEFT PROX CCA PSV: 113.4 CM/SEC
BH CV XLRA MEAS LEFT PROX ECA EDV: 13 CM/SEC
BH CV XLRA MEAS LEFT PROX ECA PSV: 101 CM/SEC
BH CV XLRA MEAS LEFT PROX ICA EDV: 14.3 CM/SEC
BH CV XLRA MEAS LEFT PROX ICA PSV: 64.6 CM/SEC
BH CV XLRA MEAS LEFT PROX SCLA PSV: 141.5 CM/SEC
BH CV XLRA MEAS LEFT VERTEBRAL A EDV: 13 CM/SEC
BH CV XLRA MEAS LEFT VERTEBRAL A PSV: 50.9 CM/SEC
BH CV XLRA MEAS RIGHT DIST CCA EDV: 22.7 CM/SEC
BH CV XLRA MEAS RIGHT DIST CCA PSV: 88.6 CM/SEC
BH CV XLRA MEAS RIGHT DIST ICA EDV: 23.6 CM/SEC
BH CV XLRA MEAS RIGHT DIST ICA PSV: 82.9 CM/SEC
BH CV XLRA MEAS RIGHT ICA/CCA RATIO: 0.88
BH CV XLRA MEAS RIGHT MID CCA EDV: 18 CM/SEC
BH CV XLRA MEAS RIGHT MID CCA PSV: 93.3 CM/SEC
BH CV XLRA MEAS RIGHT MID ICA EDV: 17 CM/SEC
BH CV XLRA MEAS RIGHT MID ICA PSV: 81.2 CM/SEC
BH CV XLRA MEAS RIGHT PROX CCA EDV: 22.3 CM/SEC
BH CV XLRA MEAS RIGHT PROX CCA PSV: 154 CM/SEC
BH CV XLRA MEAS RIGHT PROX ECA EDV: 10.4 CM/SEC
BH CV XLRA MEAS RIGHT PROX ECA PSV: 98.2 CM/SEC
BH CV XLRA MEAS RIGHT PROX ICA EDV: 12.1 CM/SEC
BH CV XLRA MEAS RIGHT PROX ICA PSV: 59.8 CM/SEC
BH CV XLRA MEAS RIGHT PROX SCLA PSV: 193.9 CM/SEC
BH CV XLRA MEAS RIGHT VERTEBRAL A EDV: 12.1 CM/SEC
BH CV XLRA MEAS RIGHT VERTEBRAL A PSV: 56.5 CM/SEC

## 2023-12-08 PROCEDURE — 93880 EXTRACRANIAL BILAT STUDY: CPT

## 2023-12-12 ENCOUNTER — HOSPITAL ENCOUNTER (OUTPATIENT)
Dept: CARDIOLOGY | Facility: HOSPITAL | Age: 68
Discharge: HOME | End: 2023-12-12
Admitting: INTERNAL MEDICINE
Payer: MEDICARE

## 2023-12-12 VITALS — HEIGHT: 62 IN | WEIGHT: 197.09 LBS | BODY MASS INDEX: 36.27 KG/M2

## 2023-12-12 DIAGNOSIS — R07.89 CHEST PRESSURE: ICD-10-CM

## 2023-12-12 DIAGNOSIS — R06.00 DYSPNEA, UNSPECIFIED TYPE: ICD-10-CM

## 2023-12-12 LAB
BH CV ECHO MEAS - AO MAX PG: 6.6 MMHG
BH CV ECHO MEAS - AO MEAN PG: 3.9 MMHG
BH CV ECHO MEAS - AO ROOT DIAM: 2.9 CM
BH CV ECHO MEAS - AO V2 MAX: 128.8 CM/SEC
BH CV ECHO MEAS - AO V2 VTI: 34 CM
BH CV ECHO MEAS - AVA(I,D): 1.8 CM2
BH CV ECHO MEAS - EDV(CUBED): 73.9 ML
BH CV ECHO MEAS - EDV(MOD-SP2): 70 ML
BH CV ECHO MEAS - EDV(MOD-SP4): 67 ML
BH CV ECHO MEAS - EF(MOD-BP): 59 %
BH CV ECHO MEAS - EF(MOD-SP2): 58.6 %
BH CV ECHO MEAS - EF(MOD-SP4): 58.2 %
BH CV ECHO MEAS - ESV(CUBED): 18.5 ML
BH CV ECHO MEAS - ESV(MOD-SP2): 29 ML
BH CV ECHO MEAS - ESV(MOD-SP4): 28 ML
BH CV ECHO MEAS - FS: 36.9 %
BH CV ECHO MEAS - IVS/LVPW: 0.98 CM
BH CV ECHO MEAS - IVSD: 0.81 CM
BH CV ECHO MEAS - LA DIMENSION: 3.4 CM
BH CV ECHO MEAS - LAT PEAK E' VEL: 10.2 CM/SEC
BH CV ECHO MEAS - LV DIASTOLIC VOL/BSA (35-75): 35.3 CM2
BH CV ECHO MEAS - LV MASS(C)D: 104.1 GRAMS
BH CV ECHO MEAS - LV MAX PG: 2.6 MMHG
BH CV ECHO MEAS - LV MEAN PG: 1.23 MMHG
BH CV ECHO MEAS - LV SYSTOLIC VOL/BSA (12-30): 14.7 CM2
BH CV ECHO MEAS - LV V1 MAX: 80 CM/SEC
BH CV ECHO MEAS - LV V1 VTI: 19.7 CM
BH CV ECHO MEAS - LVIDD: 4.2 CM
BH CV ECHO MEAS - LVIDS: 2.6 CM
BH CV ECHO MEAS - LVOT AREA: 3.1 CM2
BH CV ECHO MEAS - LVOT DIAM: 1.99 CM
BH CV ECHO MEAS - LVPWD: 0.82 CM
BH CV ECHO MEAS - MED PEAK E' VEL: 8.99 CM/SEC
BH CV ECHO MEAS - MV A MAX VEL: 50.8 CM/SEC
BH CV ECHO MEAS - MV DEC SLOPE: 313.2 CM/SEC2
BH CV ECHO MEAS - MV DEC TIME: 0.26 SEC
BH CV ECHO MEAS - MV E MAX VEL: 99.2 CM/SEC
BH CV ECHO MEAS - MV E/A: 1.95
BH CV ECHO MEAS - MV MAX PG: 4.5 MMHG
BH CV ECHO MEAS - MV MEAN PG: 1.7 MMHG
BH CV ECHO MEAS - MV P1/2T: 105.4 MSEC
BH CV ECHO MEAS - MV V2 VTI: 39.6 CM
BH CV ECHO MEAS - MVA(P1/2T): 2.09 CM2
BH CV ECHO MEAS - MVA(VTI): 1.55 CM2
BH CV ECHO MEAS - PA ACC SLOPE: 332.5 CM/SEC2
BH CV ECHO MEAS - PA ACC TIME: 0.14 SEC
BH CV ECHO MEAS - PI END-D VEL: 73 CM/SEC
BH CV ECHO MEAS - RAP SYSTOLE: 3 MMHG
BH CV ECHO MEAS - RVSP: 21 MMHG
BH CV ECHO MEAS - SI(MOD-SP2): 21.6 ML/M2
BH CV ECHO MEAS - SI(MOD-SP4): 20.5 ML/M2
BH CV ECHO MEAS - SV(LVOT): 61.1 ML
BH CV ECHO MEAS - SV(MOD-SP2): 41 ML
BH CV ECHO MEAS - SV(MOD-SP4): 39 ML
BH CV ECHO MEAS - TAPSE (>1.6): 2.2 CM
BH CV ECHO MEAS - TR MAX PG: 18.1 MMHG
BH CV ECHO MEAS - TR MAX VEL: 212.5 CM/SEC
BH CV ECHO MEASUREMENTS AVERAGE E/E' RATIO: 10.34
BH CV VAS BP RIGHT ARM: NORMAL MMHG
BH CV XLRA - RV BASE: 3.6 CM
BH CV XLRA - RV LENGTH: 7.4 CM
BH CV XLRA - RV MID: 2.7 CM
BH CV XLRA - TDI S': 13.8 CM/SEC
LEFT ATRIUM VOLUME INDEX: 22.6 ML/M2
LV EF 2D ECHO EST: 60 %

## 2023-12-12 PROCEDURE — 93306 TTE W/DOPPLER COMPLETE: CPT

## 2023-12-13 ENCOUNTER — TELEPHONE (OUTPATIENT)
Dept: CARDIOLOGY | Facility: CLINIC | Age: 68
End: 2023-12-13

## 2023-12-13 NOTE — TELEPHONE ENCOUNTER
Caller: Park Ray    Relationship: Self    Best call back number: [phone]    What is the best time to reach you: ANYTIME     Who are you requesting to speak with (clinical staff, provider,  specific staff member): ANYONE     What was the call regarding: PATIENT STILL CONCERNED WITH HIGH BLOOD PRESSURE. REPORTS HER BLOOD PRESSURE HAS BEEN RANGING AROUND 198/87. IS NEEDING TO DISCUSS MEDICATION CHANGES WITH PROVIDER TO LOWER HER BLOOD PRESSURE AND WOULD ALSO LIKE TO GO OVER TEST RESULTS.

## 2023-12-14 ENCOUNTER — TELEPHONE (OUTPATIENT)
Dept: CARDIOLOGY | Facility: CLINIC | Age: 68
End: 2023-12-14
Payer: MEDICARE

## 2023-12-14 RX ORDER — CHLORTHALIDONE 25 MG/1
25 TABLET ORAL DAILY
Qty: 30 TABLET | Refills: 11 | Status: SHIPPED | OUTPATIENT
Start: 2023-12-14

## 2023-12-14 RX ORDER — NEBIVOLOL 10 MG/1
10 TABLET ORAL DAILY
Qty: 30 TABLET | Refills: 11 | Status: SHIPPED | OUTPATIENT
Start: 2023-12-14

## 2023-12-14 NOTE — TELEPHONE ENCOUNTER
Double her Bystolic please, and add chlorthalidone 25.         Please also have her see her primary care physician as hypertension is a primary issue and she is only on 1 medication.  This is certainly driven by her anxiety.     SPoke with patient, advised of results, agreeable to plan.     Also advised to keep a record of BP and heart rate with new medication regimen.

## 2023-12-14 NOTE — TELEPHONE ENCOUNTER
"Patient called to report that \"My blood pressure is still running high every morning when she gets up its like 198/87 or higher.\"      She reports she is sweating all night long, on her forehead, between her breasts, under her breasts, all down her neck, she takes her med at night and it's is 176/ or something like that after sleeping.    Has pain in both knees and pain in shoulder which she acknowledges could contribute to higher readings.  She reports she took 1/2 of an eliquis and states it brought her blood pressure down.    Verified med's:  Bystolic 5 mg, which she says she is taking at bedtime and when she gets up.  Furosemide 40 mg, she takes 5 out of 7 days, she does not take if she has to be away from home.    She also asks if she needs a home EKG to check her BP, advised EKG checks heart rate and rhythm so would not give any information on her blood pressures.     Advised of normal echo and carotid, she asks then why is her blood pressure still so high.  Advised will d/w Dr. Verdugo and call her back with any recommendations.   "

## 2024-03-07 ENCOUNTER — TELEPHONE (OUTPATIENT)
Dept: INFUSION THERAPY | Facility: HOSPITAL | Age: 69
End: 2024-03-07
Payer: MEDICARE

## 2024-03-07 RX ORDER — NAPROXEN SODIUM 220 MG
440 TABLET ORAL AS NEEDED
COMMUNITY

## 2024-03-07 RX ORDER — ESCITALOPRAM OXALATE 5 MG/1
5 TABLET ORAL DAILY
COMMUNITY

## 2024-03-07 NOTE — TELEPHONE ENCOUNTER
Attempted to contact patient as pre-procedure phone call prior to planned CTA for 3/8/24. Left voice message with patient arrival time, location, nothing to eat or drink by mouth for 4 hours prior to procedure, okay to take blood pressure medications morning of procedure with a small sip of water, no caffeine drinks after midnight.

## 2024-03-08 ENCOUNTER — HOSPITAL ENCOUNTER (OUTPATIENT)
Dept: CT IMAGING | Facility: HOSPITAL | Age: 69
Discharge: HOME | End: 2024-03-08
Payer: MEDICARE

## 2024-03-08 VITALS
TEMPERATURE: 97.2 F | HEIGHT: 62 IN | BODY MASS INDEX: 32.2 KG/M2 | RESPIRATION RATE: 18 BRPM | WEIGHT: 175 LBS | DIASTOLIC BLOOD PRESSURE: 69 MMHG | OXYGEN SATURATION: 96 % | HEART RATE: 56 BPM | SYSTOLIC BLOOD PRESSURE: 164 MMHG

## 2024-03-08 DIAGNOSIS — R06.00 DYSPNEA, UNSPECIFIED TYPE: ICD-10-CM

## 2024-03-08 DIAGNOSIS — R07.89 CHEST PRESSURE: ICD-10-CM

## 2024-03-08 PROCEDURE — 25510000001 IOPAMIDOL PER 1 ML: Performed by: INTERNAL MEDICINE

## 2024-03-08 PROCEDURE — 75574 CT ANGIO HRT W/3D IMAGE: CPT

## 2024-03-08 RX ORDER — NITROGLYCERIN 400 UG/1
2 SPRAY ORAL
Status: COMPLETED | OUTPATIENT
Start: 2024-03-08 | End: 2024-03-08

## 2024-03-08 RX ORDER — METOPROLOL TARTRATE 50 MG/1
50 TABLET, FILM COATED ORAL
Status: DISCONTINUED | OUTPATIENT
Start: 2024-03-08 | End: 2024-03-09 | Stop reason: HOSPADM

## 2024-03-08 RX ORDER — SODIUM CHLORIDE 0.9 % (FLUSH) 0.9 %
10 SYRINGE (ML) INJECTION EVERY 12 HOURS SCHEDULED
Status: DISCONTINUED | OUTPATIENT
Start: 2024-03-08 | End: 2024-03-09 | Stop reason: HOSPADM

## 2024-03-08 RX ORDER — SODIUM CHLORIDE 0.9 % (FLUSH) 0.9 %
10 SYRINGE (ML) INJECTION AS NEEDED
Status: DISCONTINUED | OUTPATIENT
Start: 2024-03-08 | End: 2024-03-09 | Stop reason: HOSPADM

## 2024-03-08 RX ORDER — METOPROLOL TARTRATE 1 MG/ML
5 INJECTION, SOLUTION INTRAVENOUS
Status: DISCONTINUED | OUTPATIENT
Start: 2024-03-08 | End: 2024-03-09 | Stop reason: HOSPADM

## 2024-03-08 RX ORDER — METOPROLOL TARTRATE 100 MG/1
100 TABLET ORAL ONCE
Status: DISCONTINUED | OUTPATIENT
Start: 2024-03-08 | End: 2024-03-09 | Stop reason: HOSPADM

## 2024-03-08 RX ORDER — DICLOFENAC SODIUM 75 MG/1
75 TABLET, DELAYED RELEASE ORAL 2 TIMES DAILY
COMMUNITY
Start: 2024-01-10 | End: 2025-01-09

## 2024-03-08 RX ORDER — NITROGLYCERIN 400 UG/1
1 SPRAY ORAL
Status: COMPLETED | OUTPATIENT
Start: 2024-03-08 | End: 2024-03-08

## 2024-03-08 RX ORDER — METOPROLOL TARTRATE 50 MG/1
50 TABLET, FILM COATED ORAL ONCE
Status: DISCONTINUED | OUTPATIENT
Start: 2024-03-08 | End: 2024-03-09 | Stop reason: HOSPADM

## 2024-03-08 RX ADMIN — NITROGLYCERIN 2 SPRAY: 400 SPRAY ORAL at 09:35

## 2024-03-08 RX ADMIN — IOPAMIDOL 65 ML: 755 INJECTION, SOLUTION INTRAVENOUS at 09:55

## 2024-03-13 LAB — CREAT BLDA-MCNC: 1 MG/DL (ref 0.6–1.3)

## 2024-04-26 ENCOUNTER — TELEPHONE (OUTPATIENT)
Dept: OBSTETRICS AND GYNECOLOGY | Facility: CLINIC | Age: 69
End: 2024-04-26

## 2024-04-26 NOTE — TELEPHONE ENCOUNTER
Caller: Park Ray    Relationship: Self    Best call back number:   [phone]     Requested Prescriptions:     OSPHENA       Pharmacy where request should be sent: [facility] [id number] - [facility] [address] [facility] [phone]  - [phone] FX     Last office visit with prescribing clinician: Visit date not found   Last telemedicine visit with prescribing clinician: Visit date not found   Next office visit with prescribing clinician: 5/6/2024     Additional details provided by patient:     FORMER DAUGHTERY PT  SHE IS TOTALLY OUT     Does the patient have less than a 3 day supply:  [x] Yes  [] No    Would you like a call back once the refill request has been completed: [x] Yes [] No    If the office needs to give you a call back, can they leave a voicemail: [x] Yes [] No    Priscila Smith Rep   04/26/24 16:11 EDT

## 2024-05-01 ENCOUNTER — TELEPHONE (OUTPATIENT)
Dept: OBSTETRICS AND GYNECOLOGY | Facility: CLINIC | Age: 69
End: 2024-05-01
Payer: MEDICARE

## 2024-05-01 NOTE — TELEPHONE ENCOUNTER
Caller: Park Ray    Relationship: Self    Best call back number: [phone]    What is the best time to reach you: ANY    Who are you requesting to speak with (clinical staff, provider,  specific staff member): CLINICAL      What was the call regarding: PATIENT STATES THAT OSPHENA THAT WAS CALLED IN ON 4/26/24 IS OUT OF STOCK AT THE PHARMACY AND SHE IS WANTING TO KNOW IF THERE ARE ANY SAMPLES SHE CAN HAVE UNTIL SHE IS ABLE TO FILL THIS MEDICATION.     PATIENT WOULD LIKE A CALL BACK PLEASE.

## 2024-05-01 NOTE — TELEPHONE ENCOUNTER
Confirmed with parties in office at this time whether or not we had Osphena samples at the ready, we do not.  Called and spoke with patient, she states her understanding was that there was an issue with the .  I called the pharmacy and they stated that they can order it but they do not appear to have the correct insurance on file and would just need to sort that out with the patient first.  Called patient back and informed her of above information. Patient will call pharmacy, verified understanding and was appreciative of call.

## 2024-07-31 NOTE — TELEPHONE ENCOUNTER
Caller: Cory Park Lee    Relationship: Self    Best call back number: [phone]    Requested Prescriptions:   Requested Prescriptions     Pending Prescriptions Disp Refills    Ospemifene 60 MG tablet 30 tablet 1     Sig: Take 1 tablet by mouth Daily. This pt needs an appt, please.        Pharmacy where request should be sent: [facility] [id number] [address] [phone] [facility] [phone] FX     Last office visit with prescribing clinician: 12/29/22 W/ SAMANTHA MEEKS    Next office visit with prescribing clinician: 1/2/2025 W/ MINI WAY MD    Does the patient have less than a 3 day supply:  [x] Yes  [] No    Would you like a call back once the refill request has been completed: [] Yes [x] No

## 2024-11-08 ENCOUNTER — TELEPHONE (OUTPATIENT)
Dept: OBSTETRICS AND GYNECOLOGY | Facility: CLINIC | Age: 69
End: 2024-11-08
Payer: MEDICARE

## 2024-11-08 DIAGNOSIS — N63.10 MASS OF RIGHT BREAST, UNSPECIFIED QUADRANT: Primary | ICD-10-CM

## 2024-11-08 DIAGNOSIS — N60.21 LUMPY BREASTS, RIGHT: ICD-10-CM

## 2024-11-08 NOTE — TELEPHONE ENCOUNTER
I just scheduled this patient with Dr Almanza on 01/03/2025 for an Annual.  She called needing an order for a diagnostic mammo.  Patient says she found two lumps in her right breast and has a family history of breast cancer

## 2024-11-11 NOTE — TELEPHONE ENCOUNTER
Diagnostic mammogram ordered.       Sofie Almanza MD  Obstetrics and Gynecology  [facility]

## 2024-12-22 LAB
NCCN CRITERIA FLAG: ABNORMAL
TYRER CUZICK SCORE: 8.2

## 2024-12-23 ENCOUNTER — DOCUMENTATION (OUTPATIENT)
Dept: GENETICS | Facility: HOSPITAL | Age: 69
End: 2024-12-23
Payer: MEDICARE

## 2024-12-23 ENCOUNTER — OFFICE VISIT (OUTPATIENT)
Dept: ORTHOPEDIC SURGERY | Facility: CLINIC | Age: 69
End: 2024-12-23
Payer: MEDICARE

## 2024-12-23 ENCOUNTER — LAB (OUTPATIENT)
Dept: LAB | Facility: HOSPITAL | Age: 69
End: 2024-12-23
Payer: MEDICARE

## 2024-12-23 ENCOUNTER — TELEPHONE (OUTPATIENT)
Dept: ORTHOPEDIC SURGERY | Facility: CLINIC | Age: 69
End: 2024-12-23

## 2024-12-23 VITALS — DIASTOLIC BLOOD PRESSURE: 84 MMHG | SYSTOLIC BLOOD PRESSURE: 132 MMHG

## 2024-12-23 DIAGNOSIS — M17.12 PRIMARY OSTEOARTHRITIS OF LEFT KNEE: Primary | ICD-10-CM

## 2024-12-23 DIAGNOSIS — M17.12 PRIMARY OSTEOARTHRITIS OF LEFT KNEE: ICD-10-CM

## 2024-12-23 LAB
BASOPHILS # BLD AUTO: 0.07 10*3/MM3 (ref 0–0.2)
BASOPHILS NFR BLD AUTO: 0.6 % (ref 0–1.5)
CRP SERPL-MCNC: 0.38 MG/DL (ref 0–0.5)
DEPRECATED RDW RBC AUTO: 41.5 FL (ref 37–54)
EOSINOPHIL # BLD AUTO: 0.18 10*3/MM3 (ref 0–0.4)
EOSINOPHIL NFR BLD AUTO: 1.6 % (ref 0.3–6.2)
ERYTHROCYTE [DISTWIDTH] IN BLOOD BY AUTOMATED COUNT: 13 % (ref 12.3–15.4)
ERYTHROCYTE [SEDIMENTATION RATE] IN BLOOD: 9 MM/HR (ref 0–30)
HCT VFR BLD AUTO: 39 % (ref 34–46.6)
HGB BLD-MCNC: 13.4 G/DL (ref 12–15.9)
IMM GRANULOCYTES # BLD AUTO: 0.05 10*3/MM3 (ref 0–0.05)
IMM GRANULOCYTES NFR BLD AUTO: 0.4 % (ref 0–0.5)
LYMPHOCYTES # BLD AUTO: 2.79 10*3/MM3 (ref 0.7–3.1)
LYMPHOCYTES NFR BLD AUTO: 25.1 % (ref 19.6–45.3)
MCH RBC QN AUTO: 30.2 PG (ref 26.6–33)
MCHC RBC AUTO-ENTMCNC: 34.4 G/DL (ref 31.5–35.7)
MCV RBC AUTO: 88 FL (ref 79–97)
MONOCYTES # BLD AUTO: 1.08 10*3/MM3 (ref 0.1–0.9)
MONOCYTES NFR BLD AUTO: 9.7 % (ref 5–12)
NEUTROPHILS NFR BLD AUTO: 6.96 10*3/MM3 (ref 1.7–7)
NEUTROPHILS NFR BLD AUTO: 62.6 % (ref 42.7–76)
NRBC BLD AUTO-RTO: 0 /100 WBC (ref 0–0.2)
PLATELET # BLD AUTO: 285 10*3/MM3 (ref 140–450)
PMV BLD AUTO: 11 FL (ref 6–12)
RBC # BLD AUTO: 4.43 10*6/MM3 (ref 3.77–5.28)
WBC NRBC COR # BLD AUTO: 11.13 10*3/MM3 (ref 3.4–10.8)

## 2024-12-23 PROCEDURE — 85025 COMPLETE CBC W/AUTO DIFF WBC: CPT

## 2024-12-23 PROCEDURE — 1160F RVW MEDS BY RX/DR IN RCRD: CPT | Performed by: ORTHOPAEDIC SURGERY

## 2024-12-23 PROCEDURE — 86140 C-REACTIVE PROTEIN: CPT

## 2024-12-23 PROCEDURE — 36415 COLL VENOUS BLD VENIPUNCTURE: CPT

## 2024-12-23 PROCEDURE — 1159F MED LIST DOCD IN RCRD: CPT | Performed by: ORTHOPAEDIC SURGERY

## 2024-12-23 PROCEDURE — 99214 OFFICE O/P EST MOD 30 MIN: CPT | Performed by: ORTHOPAEDIC SURGERY

## 2024-12-23 PROCEDURE — 85652 RBC SED RATE AUTOMATED: CPT

## 2024-12-23 NOTE — PROGRESS NOTES
[facility] [place] [facility] Note    Subjective     Chief Complaint   Patient presents with    Left Knee - Initial Evaluation, Pain        HPI    Park Ray is a 69 y.o. female who presents with new problem of: left knee pain.  Onset: atraumatic and gradual in nature. The issue has been ongoing for 2 year(s). Pain is a 10/10 on the pain scale. Pain is described as dull and aching. Associated symptoms include pain. The pain is worse with walking, standing, sitting, climbing stairs, sleeping, and leisure; resting improve the pain. Previous treatments have included: bracing, NSAIDS, physical therapy, and steroid injection (last injection 11/27/2024 at ).  She is interested in left total knee arthroplasty surgery.  We discussed that several years ago as a possible option.  History of a hematoma in the left lower extremity in January 2022, with subsequent infection.  The wound healed over an extended period of time.    I have reviewed the following portions of the patient's history and agree with: History of Present Illness and Review of Systems    Patient Active Problem List   Diagnosis    OAB (overactive bladder)    Mixed incontinence urge and stress    Menopause    Hypothyroidism    Fatty liver    Family history of breast cancer in mother    Superficial dyspareunia    Colon cancer    Recurrent genital herpes    Vaginal atrophy    Cellulitis of left anterior lower leg    Allergic rhinitis with postnasal drip    Arm pain, right    Arthralgia of both knees    Arthralgia of knee    Bone pain    Contusion of left lower leg    Dyslipidemia    History of colon cancer    Neutrophilic leukemoid reaction    Obesity (BMI 35.0-39.9 without comorbidity)    Personal history of COVID-19    Thumb pain    Cellulitis of lower extremity    Chronic ulcer of lower extremity    Chronic ulcer of lower extremity    Non-pressure chronic ulcer of left calf with fat layer exposed    Chronic ulcer of calf    Family history of  breast cancer in mother    Fatty liver disease, nonalcoholic    Hypothyroidism    Mixed incontinence urge and stress     Past Medical History:   Diagnosis Date    Acute torn meniscus of knee     left    Asthma 2023    Tightness in chest    Closed displaced fracture of shaft of left clavicle     Closed nondisplaced fracture of neck of left radius     Colon cancer     Dyspareunia     Fall (on) (from) other stairs and steps, initial encounter 2022    Family history of breast cancer in mother     Fatty liver     Fracture of proximal phalanx of toe     Herpes     Hyperlipidemia 2023    Yearly lab work Dr Nevin Finnegan    Hypertension 2023    Dr Erasto Anna Dentist    Hypothyroidism     IBS (irritable bowel syndrome)     Left wrist pain     Menopause     Mixed incontinence urge and stress     OAB (overactive bladder)     Osteoarthritis     Right wrist pain     Torn rotator cuff       Past Surgical History:   Procedure Laterality Date     SECTION      CHOLECYSTECTOMY      COLECTOMY PARTIAL / TOTAL      COLON RESECTION      COLONOSCOPY      ENTEROCELE REPAIR      DAWSON MORA (JOANNK) PROCEDURE      PELVIC LAPAROSCOPY      RECTAL SURGERY      SALPINGO OOPHORECTOMY Right     TOTAL ABDOMINAL HYSTERECTOMY WITH SALPINGO OOPHORECTOMY Left     UTERINE SUSPENSION LAPAROSCOPIC        Family History   Problem Relation Age of Onset    Cancer Father     Heart disease Father     Hypertension Father     Osteoarthritis Father     Breast cancer Mother     Cancer Mother     Brain cancer Brother     Hypertension Brother     Diabetes Brother     Hypertension Sister     Breast cancer Sister     Cancer Other     Heart disease Other     Heart disease Brother     Heart failure Brother     Hypertension Brother     Heart disease Sister     Hypertension Sister      Social History     Socioeconomic History    Marital status:    Tobacco Use    Smoking status: Never    Smokeless tobacco: Never    Tobacco  comments:     Never   Vaping Use    Vaping status: Never Used   Substance and Sexual Activity    Alcohol use: Yes     Alcohol/week: 2.0 standard drinks of alcohol     Types: 2 Drinks containing 0.5 oz of alcohol per week     Comment: Socially upon occasion    Drug use: No    Sexual activity: Not Currently     Partners: Male     Birth control/protection: Hysterectomy, Surgical     Comment: Hysterectomy 35 years ago      Current Outpatient Medications on File Prior to Visit   Medication Sig Dispense Refill    acyclovir (ZOVIRAX) 400 MG tablet Take 1 tablet by mouth Daily. If outbreak occurs while on suppressive therapy, call office for further instructions. 30 tablet 11    chlorthalidone (HYGROTON) 25 MG tablet Take 1 tablet by mouth Daily. 30 tablet 11    Cholecalciferol (VITAMIN D-3) 5000 units tablet Take 1 tablet by mouth Daily.      diclofenac (VOLTAREN) 75 MG EC tablet Take 1 tablet by mouth 2 (Two) Times a Day.      DICLOFENAC PO Take 75 mg by mouth 2 (Two) Times a Day.      fluticasone (FLONASE) 50 MCG/ACT nasal spray 2 sprays into the nostril(s) as directed by provider Daily.      folic acid (FOLVITE) 1 MG tablet Take 1 tablet by mouth Daily.      furosemide (LASIX) 40 MG tablet Take 1 tablet by mouth As Needed.      levothyroxine (SYNTHROID, LEVOTHROID) 25 MCG tablet Take 1 tablet by mouth Daily.      Multiple Vitamins-Minerals (MULTIVITAMIN ADULT PO) Take 1 tablet by mouth Daily.      nebivolol (Bystolic) 10 MG tablet Take 1 tablet by mouth Daily. 30 tablet 11    omeprazole (priLOSEC) 40 MG capsule Take 20 mg by mouth Daily.      Ospemifene 60 MG tablet Take 1 tablet by mouth Daily. THIS PT NEEDS TO KEEP AN APPT HERE BEFORE ANY ADDITIONAL REFILLS. 30 tablet 0    pravastatin (PRAVACHOL) 40 MG tablet Take 1 tablet by mouth Daily.      [DISCONTINUED] escitalopram (LEXAPRO) 5 MG tablet Take 1 tablet by mouth Daily.      [DISCONTINUED] meloxicam (MOBIC) 15 MG tablet TAKE ONE TABLET BY MOUTH DAILY (Patient taking  differently: Pt not taking) 30 tablet 2    [DISCONTINUED] Milk Thistle 140 MG capsule Take 1 capsule by mouth Daily.      [DISCONTINUED] naproxen sodium (ALEVE) 220 MG tablet Take 2 tablets by mouth As Needed.      [DISCONTINUED] nitrofurantoin (MACRODANTIN) 50 MG capsule Take 1 capsule by mouth Daily. 30 capsule 11    [DISCONTINUED] traMADol (ULTRAM) 50 MG tablet Take 1 tablet by mouth Every 6 (Six) Hours As Needed for pain. (Patient taking differently: Take 1 tablet by mouth Every 6 (Six) Hours As Needed. Pt not taking) 28 tablet 0     No current facility-administered medications on file prior to visit.      Allergies   Allergen Reactions    Lisinopril Anaphylaxis    Tetracyclines & Related Hives and Nausea Only        Review of Systems   Constitutional:  Negative for activity change, appetite change, chills, diaphoresis, fatigue, fever and unexpected weight change.   HENT:  Negative for congestion, dental problem, drooling, ear discharge, ear pain, facial swelling, hearing loss, mouth sores, nosebleeds, postnasal drip, rhinorrhea, sinus pressure, sneezing, sore throat, tinnitus, trouble swallowing and voice change.    Eyes:  Negative for photophobia, pain, discharge, redness, itching and visual disturbance.   Respiratory:  Negative for apnea, cough, choking, chest tightness, shortness of breath, wheezing and stridor.    Cardiovascular:  Negative for chest pain, palpitations and leg swelling.   Gastrointestinal:  Negative for abdominal distention, abdominal pain, anal bleeding, blood in stool, constipation, diarrhea, nausea, rectal pain and vomiting.   Endocrine: Negative for cold intolerance, heat intolerance, polydipsia, polyphagia and polyuria.   Genitourinary:  Negative for decreased urine volume, difficulty urinating, dysuria, enuresis, flank pain, frequency, genital sores, hematuria and urgency.   Musculoskeletal:  Positive for arthralgias. Negative for back pain, gait problem, joint swelling, myalgias, neck  pain and neck stiffness.   Skin:  Negative for color change, pallor, rash and wound.   Allergic/Immunologic: Negative for environmental allergies, food allergies and immunocompromised state.   Neurological:  Negative for dizziness, tremors, seizures, syncope, facial asymmetry, speech difficulty, weakness, light-headedness, numbness and headaches.   Hematological:  Negative for adenopathy. Does not bruise/bleed easily.   Psychiatric/Behavioral:  Negative for agitation, behavioral problems, confusion, decreased concentration, dysphoric mood, hallucinations, self-injury, sleep disturbance and suicidal ideas. The patient is not nervous/anxious and is not hyperactive.         Objective      Physical Exam  /84   LMP  (LMP Unknown) Comment: S/P AH, LEFT S&O/RIGHT S&O    There is no height or weight on file to calculate BMI.  BMI is >= 30 and <35. (Class 1 Obesity). The following options were offered after discussion;: referral to primary care        General:   Mental Status:  Alert   Appearance: Cooperative, in no acute distress   Build and Nutrition: Obese by BMI female   Orientation: Alert and oriented to person, place and time   Posture: Normal   Gait: Limp bilaterally    Integument:   Left knee: No erythema.  Healed wound left distal leg.    Neurologic:   Sensation:    Left foot: Intact to light touch on the dorsal and plantar aspect   Motor:  Left lower extremity: 5/5 quadriceps, hamstrings, ankle dorsiflexors, and ankle plantar flexors  Vascular:   Left lower extremity: 2+ dorsalis pedis pulse, prompt capillary refill    Lower Extremities:   Left Knee:    Tenderness:  Medial/lateral joint line tenderness    Effusion:  1+    Swelling:  None    Crepitus:  Positive    Atrophy:  None    Range of motion:  Extension: 5°       Flexion: 120°  Instability:  No varus laxity, no valgus laxity, negative anterior drawer  Deformities:  Varus      Imaging/Studies      Imaging Results (Last 24 Hours)       Procedure Component  Value Units Date/Time    XR Knee 4+ View Left [[id number]] Resulted: 12/23/24 1336     Updated: 12/23/24 1336    Narrative:      Left Knee Radiographs  Indication: left knee pain  Views: Standing AP's and skiers of both knees, with lateral and sunrise   views of the left knee    Comparison: 1/13/2021    Findings:    Bone-on-bone contact medial compartment, tricompartmental osteophytes,   varus alignment, no acute bony abnormalities.  Worsening compared to the   previous imaging.  Advanced knee arthritis.              Assessment and Plan     Diagnoses and all orders for this visit:    1. Primary osteoarthritis of left knee (Primary)  -     XR Knee 4+ View Left  -     CBC & Differential; Future  -     C-reactive Protein; Future  -     Sedimentation Rate; Future        1. Primary osteoarthritis of left knee          I reviewed my findings with the patient.  Before proceeding with left total knee arthroplasty surgery, I would like to track inflammatory markers given her history of a hematoma distally with subsequent infection.  As long as that is clear, she is very interested in proceeding with left total knee arthroplasty surgery.  I would also like x-rays of her left tibia and fibula on her next visit.  Further imaging may be considered if necessary.  We did have an extensive discussion about total knee arthroplasty surgery, to include the risks, benefits, alternatives, and she understands.  She just had an injection at the [facility] on 11/27/2024, therefore she will need to wait at least 3 months afterwards before proceeding.  She understands.  He has an upcoming thyroid biopsy after [place], as well as breast nodules that are being followed.    She also inquired about pain medication today, and any pain medication preoperatively would be best managed by her primary care physician for monitoring.  She has tried tramadol without relief.    Return in about 2 weeks (around 1/6/2025).      Jose Roger,  MD  12/23/24  17:18 EST      Dictated Utilizing Dragon Dictation

## 2024-12-23 NOTE — PROGRESS NOTES
This patient recently took the CARE risk assessment for a mammogram appointment. Based on the patient's responses, NCCN criteria for genetic testing was met.     Navigator follow-up:   The patient had genetic counseling/testing through [facility] in 2023, results can be seen in media.  [facility]'s [facility] w/ RNA insight was performed at that time.  Additional testing is not indicated at this time.

## 2024-12-23 NOTE — TELEPHONE ENCOUNTER
Hub staff attempted to follow warm transfer process and was unsuccessful     Caller: Park Ray    Relationship to patient: Self    Best call back number: [phone] (home)       Patient is needing: PATIENT NEEDS TO SEE MARY   SHE IS COMING BACK INTO THE OFFICE TO EXCHANGE BRACES.

## 2024-12-24 NOTE — TELEPHONE ENCOUNTER
Patient stated that she would like to talk about a different brace with Saba and will try to call her on Friday when she is in.

## 2024-12-26 RX ORDER — CHLORTHALIDONE 25 MG/1
25 TABLET ORAL DAILY
Qty: 30 TABLET | Refills: 11 | Status: SHIPPED | OUTPATIENT
Start: 2024-12-26

## 2024-12-26 RX ORDER — NEBIVOLOL 10 MG/1
10 TABLET ORAL DAILY
Qty: 30 TABLET | Refills: 11 | Status: SHIPPED | OUTPATIENT
Start: 2024-12-26

## 2024-12-30 ENCOUNTER — HOSPITAL ENCOUNTER (OUTPATIENT)
Facility: HOSPITAL | Age: 69
Discharge: HOME | End: 2024-12-30
Payer: MEDICARE

## 2024-12-30 DIAGNOSIS — N60.21 LUMPY BREASTS, RIGHT: ICD-10-CM

## 2024-12-30 DIAGNOSIS — N63.10 MASS OF RIGHT BREAST, UNSPECIFIED QUADRANT: ICD-10-CM

## 2024-12-30 PROCEDURE — 76642 ULTRASOUND BREAST LIMITED: CPT

## 2024-12-30 PROCEDURE — 77066 DX MAMMO INCL CAD BI: CPT

## 2024-12-30 PROCEDURE — G0279 TOMOSYNTHESIS, MAMMO: HCPCS | Performed by: RADIOLOGY

## 2024-12-30 PROCEDURE — 76642 ULTRASOUND BREAST LIMITED: CPT | Performed by: RADIOLOGY

## 2024-12-30 PROCEDURE — 77066 DX MAMMO INCL CAD BI: CPT | Performed by: RADIOLOGY

## 2024-12-30 PROCEDURE — G0279 TOMOSYNTHESIS, MAMMO: HCPCS

## 2024-12-30 RX ORDER — NEBIVOLOL 10 MG/1
10 TABLET ORAL DAILY
Qty: 30 TABLET | Refills: 11 | OUTPATIENT
Start: 2024-12-30

## 2024-12-30 RX ORDER — CHLORTHALIDONE 25 MG/1
25 TABLET ORAL DAILY
Qty: 30 TABLET | Refills: 11 | OUTPATIENT
Start: 2024-12-30

## 2025-01-03 ENCOUNTER — OFFICE VISIT (OUTPATIENT)
Dept: OBSTETRICS AND GYNECOLOGY | Facility: CLINIC | Age: 70
End: 2025-01-03
Payer: MEDICARE

## 2025-01-03 VITALS
HEIGHT: 62 IN | DIASTOLIC BLOOD PRESSURE: 76 MMHG | BODY MASS INDEX: 33.86 KG/M2 | WEIGHT: 184 LBS | SYSTOLIC BLOOD PRESSURE: 128 MMHG

## 2025-01-03 DIAGNOSIS — N81.5 VAGINAL ENTEROCELE: ICD-10-CM

## 2025-01-03 DIAGNOSIS — N39.46 MIXED INCONTINENCE URGE AND STRESS: Primary | ICD-10-CM

## 2025-01-03 DIAGNOSIS — Z01.419 WELL WOMAN EXAM: ICD-10-CM

## 2025-01-03 PROCEDURE — 99213 OFFICE O/P EST LOW 20 MIN: CPT | Performed by: STUDENT IN AN ORGANIZED HEALTH CARE EDUCATION/TRAINING PROGRAM

## 2025-01-03 RX ORDER — NITROFURANTOIN 25; 75 MG/1; MG/1
100 CAPSULE ORAL ONCE
Qty: 90 CAPSULE | Refills: 3 | Status: SHIPPED | OUTPATIENT
Start: 2025-01-03 | End: 2025-01-03

## 2025-01-03 RX ORDER — POTASSIUM CHLORIDE 1500 MG/1
20 TABLET, EXTENDED RELEASE ORAL DAILY
COMMUNITY
Start: 2024-06-25 | End: 2025-06-25

## 2025-01-03 NOTE — PROGRESS NOTES
"Subjective   Chief Complaint   Patient presents with    Annual Exam     Says that the outside of her vagina is starting to fall down a bit / needs a refill on osfena     Park Ray is a 69 y.o. year old  menopausal female presenting to be seen for her annual exam.  She is s/p hyst and BSO. She has not had any vaginal bleeding in the last 12 months.  Menopausal symptoms are not present. She needs refill on ospemifene. She does have some feeling like she has some excess fat in her pubic area. She also has noticed a vaginal bulge and heaviness that is bothersome to her.     She also has two bad knees and needs knee replacements, has not been walking very well. She has seen ortho and is having surgery at the end of February.   She has history of colon cancer and follows with GI. Recently had colonoscopy this year.     She has history of recurrent UTIs - wears Depends every day due to mixed incontinence. Needs refill on her daily Macrobid.     Review of systems otherwise negative       Objective   /76   Ht 157.5 cm (62.01\")   Wt 83.5 kg (184 lb)   LMP  (LMP Unknown) Comment: S/P AH, LEFT S&O/RIGHT S&O  BMI 33.65 kg/m²     General:  well developed; well nourished  no acute distress  mentation appropriate   Skin:  No suspicious lesions seen   Thyroid: not examined   Breasts:  Not performed.   Abdomen: soft, non-tender; no masses  no umbilical or inguinal hernias are present  no hepato-splenomegaly   Pelvis: Clinical staff was present for exam  Normal external female genitalia with pubic fat pad, no edema. Stage 2 anterior and apical pelvic organ prolapse. Mild vaginal atrophy. Otherwise normal appearing vaginal vault.         Assessment      Plan   Well woman exam    - Pap smear: N/A due to hyst   - Mammogram: [facility]IR[facility] 1 2024   - Colonoscopy: follows with GI regularly due to history of colon cancer    - Patient has PCP   - Recommended yearly influenza and COVID vaccination     Ospemiphene " refilled for menopausal symptoms.     Pelvic organ prolapse    - Discussed treatment options including pelvic floor PT, pessary and surgery. Patient would like to try pessary especially while she is getting her knees taken care of. May want surgery after knee replacements are finished.    - make appt for pessary fitting      No orders of the defined types were placed in this encounter.         This note was electronically signed.      Sofie Almanza MD  Obstetrics and Gynecology  [facility] [place]

## 2025-01-07 ENCOUNTER — TELEPHONE (OUTPATIENT)
Dept: ORTHOPEDIC SURGERY | Facility: CLINIC | Age: 70
End: 2025-01-07
Payer: MEDICARE

## 2025-01-07 RX ORDER — HYDROCODONE BITARTRATE AND ACETAMINOPHEN 5; 325 MG/1; MG/1
1 TABLET ORAL EVERY 12 HOURS PRN
Qty: 20 TABLET | Refills: 0 | Status: SHIPPED | OUTPATIENT
Start: 2025-01-07

## 2025-01-07 NOTE — TELEPHONE ENCOUNTER
Caller: Park Ray    Relationship to patient: Self    Best call back number: [phone]    Patient is needing: PATIENT WOULD LIKE TO KNOW IF SHE CAN BE PRESCRIBED SOMETHING FOR PAIN UNTIL SHE CAN GET IN TO PAIN CLINIC ABOUT 10 TO 14 DAYS WORTH THEY CANCELED HER FIRST ONE YESTERDAY DUE TO WEATHER AND SHE IS TRYING TO SCHEDULE GET BACK IN - PLEASE REACH OUT AND ADVISE

## 2025-01-07 NOTE — TELEPHONE ENCOUNTER
Spoke with patient and let her know we were able to call in a limited supply of NORCO. Let her know to use sparingly until she gets in with pain management.

## 2025-01-07 NOTE — TELEPHONE ENCOUNTER
Spoke with patient and let her know Dr. Roger can not prescribe pain medication necrotic if she is with pain management. Patient states she just spoke with pain management clinic and that this was her first visit that they canceled. She has not under their care yet. She states she has a follow up visit with Dr. Roger this Friday if she doesn't get pain medication she may not be able to walk into this appointment.

## 2025-01-20 ENCOUNTER — TELEPHONE (OUTPATIENT)
Dept: ORTHOPEDIC SURGERY | Facility: CLINIC | Age: 70
End: 2025-01-20

## 2025-01-20 ENCOUNTER — OFFICE VISIT (OUTPATIENT)
Dept: ORTHOPEDIC SURGERY | Facility: CLINIC | Age: 70
End: 2025-01-20
Payer: MEDICARE

## 2025-01-20 VITALS — WEIGHT: 184 LBS | BODY MASS INDEX: 33.86 KG/M2 | HEIGHT: 62 IN

## 2025-01-20 DIAGNOSIS — M17.12 PRIMARY OSTEOARTHRITIS OF LEFT KNEE: Primary | ICD-10-CM

## 2025-01-20 PROCEDURE — 99214 OFFICE O/P EST MOD 30 MIN: CPT | Performed by: ORTHOPAEDIC SURGERY

## 2025-01-20 PROCEDURE — 1159F MED LIST DOCD IN RCRD: CPT | Performed by: ORTHOPAEDIC SURGERY

## 2025-01-20 PROCEDURE — 1160F RVW MEDS BY RX/DR IN RCRD: CPT | Performed by: ORTHOPAEDIC SURGERY

## 2025-01-20 NOTE — PROGRESS NOTES
[facility] [place] [facility] Note    Subjective     Chief Complaint   Patient presents with    Follow-up     1 month follow up - Primary osteoarthritis of left knee         HPI    It has been 1  month(s) since Ms. Ray's last visit. She returns to clinic today for follow-up of left knee arthritis. The issue has been ongoing for 2 year(s). She rates her pain a 10/10 on the pain scale. Previous/current treatments: bracing, cane/walker, NSAIDS, physical therapy, and steroid injection (last injection 11/27/2024 at ). Current symptoms: pain, swelling, popping, grinding, stiffness, and giving way/buckling. The pain is worse with walking, standing, sitting, climbing stairs, sleeping, leisure, lying on affected side, rising from seated position, and any movement of the joint; ice improve the pain. Overall, she is doing worse.  Here today to follow-up on her blood work results.  No explains today.    She is interested in left total knee arthroplasty surgery. We discussed that several years ago as a possible option. History of a hematoma in the left lower extremity in January 2022, with subsequent infection. The wound healed over an extended period of time.  She has exhausted conservative treatment.  Her  and son are able to help out postoperatively.  She takes a baby aspirin per day.  No diabetes.  No history of clots or clotting disorders.    I have reviewed the following portions of the patient's history and agree with: History of Present Illness and Review of Systems    Patient Active Problem List   Diagnosis    OAB (overactive bladder)    Mixed incontinence urge and stress    Menopause    Hypothyroidism    Fatty liver    Family history of breast cancer in mother    Superficial dyspareunia    Colon cancer    Recurrent genital herpes    Vaginal atrophy    Cellulitis of left anterior lower leg    Allergic rhinitis with postnasal drip    Arm pain, right    Arthralgia of both knees    Arthralgia of knee     Bone pain    Contusion of left lower leg    Dyslipidemia    History of colon cancer    Neutrophilic leukemoid reaction    Obesity (BMI 35.0-39.9 without comorbidity)    Personal history of COVID-19    Thumb pain    Cellulitis of lower extremity    Chronic ulcer of lower extremity    Chronic ulcer of lower extremity    Non-pressure chronic ulcer of left calf with fat layer exposed    Chronic ulcer of calf    Family history of breast cancer in mother    Fatty liver disease, nonalcoholic    Hypothyroidism    Mixed incontinence urge and stress     Past Medical History:   Diagnosis Date    Acute torn meniscus of knee     left    Arthritis of neck 2016    Asthma 2023    Tightness in chest    Closed displaced fracture of shaft of left clavicle     Closed nondisplaced fracture of neck of left radius     Colon cancer     Dyspareunia     Fall (on) (from) other stairs and steps, initial encounter 2022    Family history of breast cancer in mother     Fatty liver     Fracture of proximal phalanx of toe     Herpes     Hyperlipidemia 2023    Yearly lab work Dr Nevin Finnegan    Hypertension 2023    Dr Erasto Anna Dentist    Hypothyroidism     IBS (irritable bowel syndrome)     Knee swelling     Left wrist pain     Menopause     Mixed incontinence urge and stress     OAB (overactive bladder)     Osteoarthritis     Right wrist pain     Rotator cuff syndrome     Stress fracture     Rotator cup left    Tear of meniscus of knee     Left knee    Torn rotator cuff       Past Surgical History:   Procedure Laterality Date     SECTION      CHOLECYSTECTOMY      COLECTOMY PARTIAL / TOTAL      COLON RESECTION      COLONOSCOPY      ENTEROCELE REPAIR      DAWSON MORA (MADDISON) PROCEDURE      PELVIC LAPAROSCOPY      RECTAL SURGERY      SALPINGO OOPHORECTOMY Right     TOTAL ABDOMINAL HYSTERECTOMY WITH SALPINGO OOPHORECTOMY Left     TRIGGER POINT INJECTION  2024    Both knees    UTERINE  SUSPENSION LAPAROSCOPIC        Family History   Problem Relation Age of Onset    Breast cancer Mother 40    Cancer Mother         Breast cancer    Cancer Father         Prostate, lung, brain cancer    Heart disease Father     Hypertension Father     Osteoarthritis Father     Breast cancer Sister 70    Hypertension Sister     Heart disease Sister     Hypertension Sister     Cancer Sister         Breast cancer    Brain cancer Brother     Hypertension Brother     Diabetes Brother     Heart disease Brother     Heart failure Brother     Hypertension Brother     Cancer Brother         Brain cancer    Breast cancer Maternal Grandmother     Cancer Maternal Grandmother         Breast cancer    Breast cancer Paternal Grandmother     Cancer Paternal Grandmother         Breast cancer    Cancer Other     Heart disease Other      Social History     Socioeconomic History    Marital status:    Tobacco Use    Smoking status: Never    Smokeless tobacco: Never    Tobacco comments:     Never   Vaping Use    Vaping status: Never Used   Substance and Sexual Activity    Alcohol use: Yes     Alcohol/week: 2.0 standard drinks of alcohol     Types: 2 Drinks containing 0.5 oz of alcohol per week     Comment: Socially upon occasion    Drug use: No    Sexual activity: Not Currently     Partners: Male     Birth control/protection: Hysterectomy     Comment: Hysterectomy 35 years ago      Current Outpatient Medications on File Prior to Visit   Medication Sig Dispense Refill    acyclovir (ZOVIRAX) 400 MG tablet Take 1 tablet by mouth Daily. If outbreak occurs while on suppressive therapy, call office for further instructions. 30 tablet 11    chlorthalidone (HYGROTON) 25 MG tablet TAKE 1 TABLET BY MOUTH DAILY 30 tablet 11    Cholecalciferol (VITAMIN D-3) 5000 units tablet Take 1 tablet by mouth Daily.      fluticasone (FLONASE) 50 MCG/ACT nasal spray Administer 2 sprays into the nostril(s) as directed by provider Daily.      folic acid  (FOLVITE) 1 MG tablet Take 1 tablet by mouth Daily.      furosemide (LASIX) 40 MG tablet Take 1 tablet by mouth As Needed.      levothyroxine (SYNTHROID, LEVOTHROID) 25 MCG tablet Take 1 tablet by mouth Daily.      Multiple Vitamins-Minerals (MULTIVITAMIN ADULT PO) Take 1 tablet by mouth Daily.      nebivolol (BYSTOLIC) 10 MG tablet TAKE 1 TABLET BY MOUTH DAILY 30 tablet 11    omeprazole (priLOSEC) 40 MG capsule Take 20 mg by mouth Daily.      Omeprazole Magnesium (PRILOSEC PO)       Ospemifene 60 MG tablet Take 1 tablet by mouth Daily. THIS PT NEEDS TO KEEP AN APPT HERE BEFORE ANY ADDITIONAL REFILLS. 30 tablet 0    potassium chloride ER (K-TAB) 20 MEQ tablet controlled-release ER tablet Take 1 tablet by mouth Daily.      pravastatin (PRAVACHOL) 40 MG tablet Take 1 tablet by mouth Daily.      [DISCONTINUED] HYDROcodone-acetaminophen (NORCO) 5-325 MG per tablet Take 1 tablet by mouth Every 12 (Twelve) Hours As Needed for Moderate Pain. 20 tablet 0     No current facility-administered medications on file prior to visit.      Allergies   Allergen Reactions    Lisinopril Anaphylaxis    Tetracyclines & Related Hives and Nausea Only        Review of Systems   Constitutional:  Negative for activity change, appetite change, chills, diaphoresis, fatigue, fever and unexpected weight change.   HENT:  Negative for congestion, dental problem, drooling, ear discharge, ear pain, facial swelling, hearing loss, mouth sores, nosebleeds, postnasal drip, rhinorrhea, sinus pressure, sneezing, sore throat, tinnitus, trouble swallowing and voice change.    Eyes:  Negative for photophobia, pain, discharge, redness, itching and visual disturbance.   Respiratory:  Negative for apnea, cough, choking, chest tightness, shortness of breath, wheezing and stridor.    Cardiovascular:  Negative for chest pain, palpitations and leg swelling.   Gastrointestinal:  Negative for abdominal distention, abdominal pain, anal bleeding, blood in stool,  "constipation, diarrhea, nausea, rectal pain and vomiting.   Endocrine: Negative for cold intolerance, heat intolerance, polydipsia, polyphagia and polyuria.   Genitourinary:  Negative for decreased urine volume, difficulty urinating, dysuria, enuresis, flank pain, frequency, genital sores, hematuria and urgency.   Musculoskeletal:  Positive for arthralgias. Negative for back pain, gait problem, joint swelling, myalgias, neck pain and neck stiffness.   Skin:  Negative for color change, pallor, rash and wound.   Allergic/Immunologic: Negative for environmental allergies, food allergies and immunocompromised state.   Neurological:  Negative for dizziness, tremors, seizures, syncope, facial asymmetry, speech difficulty, weakness, light-headedness, numbness and headaches.   Hematological:  Negative for adenopathy. Does not bruise/bleed easily.   Psychiatric/Behavioral:  Negative for agitation, behavioral problems, confusion, decreased concentration, dysphoric mood, hallucinations, self-injury, sleep disturbance and suicidal ideas. The patient is not nervous/anxious and is not hyperactive.         Objective      Physical Exam  Ht 157.5 cm (62.01\")   Wt 83.5 kg (184 lb)   LMP  (LMP Unknown) Comment: S/P AH, LEFT S&O/RIGHT S&O  BMI 33.65 kg/m²     Body mass index is 33.65 kg/m².         General:   Mental Status:  Alert   Appearance: Cooperative, in no acute distress   Build and Nutrition: Obese by BMI female   Orientation: Alert and oriented to person, place and time   Posture: Normal   Gait: Limp bilaterally    Integument:   Left knee: No skin lesions, no rash, no ecchymosis    Lower Extremities:   Left Knee:    Tenderness:  Medial/lateral joint line tenderness    Effusion:  1+    Swelling:  None    Crepitus: Positive    Range of motion:  Extension: 5°       Flexion: 120°  Instability: No varus laxity, no valgus laxity, negative anterior drawer  Deformities:  Varus      Imaging/Studies  Imaging Results (Last 24 Hours)  "      Procedure Component Value Units Date/Time    XR Tibia Fibula 2 View Left [[id number]] Resulted: 01/20/25 1350     Updated: 01/20/25 1350    Narrative:      Left Tibia/Fibula Radiographs  Indication: pain  Views: AP and lateral views of the left tibia and fibula    Comparison: no prior studies available for review    Findings:  No acute bony abnormalities.  Good alignment.  No unusual features.               Lab Results   Component Value Date    SEDRATE 9 12/23/2024    WBC 11.13 (H) 12/23/2024    CRP 0.38 12/23/2024         Assessment and Plan     Diagnoses and all orders for this visit:    1. Primary osteoarthritis of left knee (Primary)  -     XR Tibia Fibula 2 View Left        1. Primary osteoarthritis of left knee          Reviewed my findings with the patient.  She has reached a point where she would like to proceed with left total knee arthroplasty surgery.  She will need to wait at least until after 2/27/2025 before proceeding secondary to her previous injection on 11/27/2024.  She is at high risk of infection given her previous history of lower extremity infection on the left side.  We will likely use antibiotic impregnated cement, as well as a short course of oral antibiotics postoperatively.  Risks, benefits, alternatives been discussed.  Please see my counseling note for details.    Surgical Counseling     I have informed the patient of the diagnosis and the prognosis.  Exhaustive conservative treatment modalities have not resulted in long term pain relief.  The symptoms have progressed to the point of daily pain and inability to perform activities of daily living without significant pain. The patient has reached the point of desiring to proceed with total knee arthroplasty after discussing the risks, benefits and alternatives to the procedure.  The surgical procedure itself was discussed in detail. Risks of the procedure were discussed, which included but are not limited to, bleeding, infection,  damage to blood vessels and nerves, incomplete pain relief, loosening of the prosthesis (early or late), deep infection (early or late), need for further surgery, loss of limb, deep venous thrombosis, pulmonary embolus, death, heart attack, stroke, kidney failure, liver failure, and anesthetic complications.  In addition, the potential for deep infection developing in the future was discussed, which could require further surgery.  The knee would have to be re-opened, debrided, and potentially remove the prosthesis, which may or may not be replaced in the future.  Also, the possibility for loosening of the prosthesis has been mentioned.  If the prosthesis loosened, a revision arthroplasty could be performed, with results that are not as predictable compared to the original procedure.  The typical rehabilitative course has also been discussed, and full recovery may take up to a year to see the maximum benefit.  The importance of patient cooperation in the rehabilitative efforts has also been discussed.  No guarantees were given.  The patient understands the potential risks versus the benefits and desires to proceed with total knee arthroplasty at a mutually convenient time.     Return for surgery.      Jose Roger MD  01/20/25  14:06 EST    Dictated Utilizing Dragon Dictation

## 2025-01-20 NOTE — TELEPHONE ENCOUNTER
Patient called in and stated that she was also seeing Dr. Karla Sky - [facility], and wanted Dr. Roger to know. Patient also has an appt w/ Dr. Roger 1/20/25 @ 1:30

## 2025-01-22 ENCOUNTER — PREP FOR SURGERY (OUTPATIENT)
Dept: OTHER | Facility: HOSPITAL | Age: 70
End: 2025-01-22
Payer: MEDICARE

## 2025-01-22 DIAGNOSIS — M17.12 PRIMARY OSTEOARTHRITIS OF LEFT KNEE: Primary | ICD-10-CM

## 2025-01-22 RX ORDER — TRANEXAMIC ACID 10 MG/ML
1000 INJECTION, SOLUTION INTRAVENOUS ONCE
OUTPATIENT
Start: 2025-01-22 | End: 2025-01-22

## 2025-01-22 RX ORDER — PREGABALIN 150 MG/1
150 CAPSULE ORAL ONCE
OUTPATIENT
Start: 2025-01-22 | End: 2025-01-22

## 2025-01-22 RX ORDER — CHLORHEXIDINE GLUCONATE 40 MG/ML
1 SOLUTION TOPICAL DAILY
Qty: 236 ML | Refills: 0 | Status: SHIPPED | OUTPATIENT
Start: 2025-01-22

## 2025-01-22 RX ORDER — ACETAMINOPHEN 500 MG
1000 TABLET ORAL ONCE
OUTPATIENT
Start: 2025-01-22 | End: 2025-01-22

## 2025-02-24 ENCOUNTER — OFFICE VISIT (OUTPATIENT)
Dept: OBSTETRICS AND GYNECOLOGY | Facility: CLINIC | Age: 70
End: 2025-02-24
Payer: MEDICARE

## 2025-02-24 ENCOUNTER — TELEPHONE (OUTPATIENT)
Dept: ORTHOPEDIC SURGERY | Facility: CLINIC | Age: 70
End: 2025-02-24
Payer: MEDICARE

## 2025-02-24 VITALS
WEIGHT: 185.2 LBS | HEIGHT: 62 IN | DIASTOLIC BLOOD PRESSURE: 82 MMHG | BODY MASS INDEX: 34.08 KG/M2 | SYSTOLIC BLOOD PRESSURE: 132 MMHG

## 2025-02-24 DIAGNOSIS — N81.10 PELVIC ORGAN PROLAPSE QUANTIFICATION STAGE 2 CYSTOCELE: Primary | ICD-10-CM

## 2025-02-24 DIAGNOSIS — N81.5 VAGINAL ENTEROCELE: ICD-10-CM

## 2025-02-24 RX ORDER — HYDROCODONE BITARTRATE AND ACETAMINOPHEN 7.5; 325 MG/1; MG/1
7.5 TABLET ORAL
COMMUNITY

## 2025-02-24 RX ORDER — ALBUTEROL SULFATE 90 UG/1
2 INHALANT RESPIRATORY (INHALATION) 4 TIMES DAILY
COMMUNITY
Start: 2025-01-30

## 2025-02-24 RX ORDER — ONDANSETRON 8 MG/1
8 TABLET, ORALLY DISINTEGRATING ORAL
COMMUNITY
Start: 2025-01-30

## 2025-02-24 RX ORDER — MULTIVIT WITH MINERALS/LUTEIN
TABLET ORAL
COMMUNITY
Start: 2025-01-01

## 2025-02-24 NOTE — PROGRESS NOTES
Pessary insertion    Date of procedure:  2/24/2025    Risks and benefits discussed? yes  All questions answered? yes  Consents given by the patient  Written consent obtained? no  Preprocedure indication: Symptomatic pelvic organ prolapse    Pessary (silicone) placed: Foldable ring w/ support - #2 w/o urethral bar       Pessary (silicone) attempted without good fit: Foldable ring w/ support - #3 w/o urethral bar     Post procedure instructions: Call ASAP if increasing pain or trouble passing urine or bowels    Follow up in 3 months for pessary check. She also would like to meet with Dr. Mesa for surgery consult for pelvic organ prolapse.     Of note, any pessary that was placed at today's visit was supplied by the office and not brought to us by the patient.    This note was electronically signed.    Sofie Almanza MD  Obstetrics and Gynecology  [facility] [place] [facility]

## 2025-02-24 NOTE — TELEPHONE ENCOUNTER
Caller: Park Ray    Relationship: Self    Best call back number:     What is the best time to reach you: ANYTIME    Who are you requesting to speak with (clinical staff, provider,  specific staff member): CLINICAL    Do you know the name of the person who called: AURELIANO    What was the call regarding: PATIENT NEEDS TO TALK TO AURELIANO TO CHANGE APPOINTMENT BACK TO 4/1/25    Is it okay if the provider responds through MyChart: CALL PLEASE

## 2025-03-04 ENCOUNTER — TELEPHONE (OUTPATIENT)
Dept: ORTHOPEDIC SURGERY | Facility: CLINIC | Age: 70
End: 2025-03-04

## 2025-03-04 NOTE — TELEPHONE ENCOUNTER
Caller: Park Ray    Relationship to patient: Self    Best call back number: [phone]    Patient is needing: PT IS CALLING TO SPEAK WITH SOMEONE IN SX SCHEDULING.. PLEASE ADVISE..

## 2025-03-06 ENCOUNTER — TELEPHONE (OUTPATIENT)
Dept: OBSTETRICS AND GYNECOLOGY | Facility: CLINIC | Age: 70
End: 2025-03-06
Payer: MEDICARE

## 2025-03-06 NOTE — TELEPHONE ENCOUNTER
Pt called stating that her pessary has moved and she now has a sore on her vagina and also a pouch on her stomach that is filled with fluid and wants to know what should she do

## 2025-03-06 NOTE — TELEPHONE ENCOUNTER
We can make appointment to see her and possibly remove pessary. I will also look at her belly.     Sofie Almanza MD  Obstetrics and Gynecology  [facility]

## 2025-03-07 ENCOUNTER — OFFICE VISIT (OUTPATIENT)
Dept: OBSTETRICS AND GYNECOLOGY | Facility: CLINIC | Age: 70
End: 2025-03-07
Payer: MEDICARE

## 2025-03-07 VITALS
BODY MASS INDEX: 34.27 KG/M2 | HEIGHT: 62 IN | SYSTOLIC BLOOD PRESSURE: 126 MMHG | DIASTOLIC BLOOD PRESSURE: 84 MMHG | WEIGHT: 186.2 LBS

## 2025-03-07 DIAGNOSIS — T83.9XXA PROBLEM WITH VAGINAL PESSARY, INITIAL ENCOUNTER: Primary | ICD-10-CM

## 2025-03-07 RX ORDER — ACYCLOVIR 400 MG/1
400 TABLET ORAL DAILY
Qty: 30 TABLET | Refills: 11 | Status: SHIPPED | OUTPATIENT
Start: 2025-03-07

## 2025-03-07 RX ORDER — ESTRADIOL 0.1 MG/G
CREAM VAGINAL
Qty: 1 EACH | Refills: 12 | Status: SHIPPED | OUTPATIENT
Start: 2025-03-07

## 2025-03-07 NOTE — PROGRESS NOTES
"Subjective   Chief Complaint   Patient presents with    Follow-up     Thinks that the pessary moved 3 days after placement. Her  said that she had 3 sores on the outside of her vagina. Thinks she has a UTI, and feels a bulge. Needs a refill of acyclovir     Park Ray is a 69 y.o. year old  who comes in for problem with her pessary. She was very happy with it for 4 days, then had an episode where she soiled herself with stool. She tried to clean the pessary but was unable to get this out and felt like it moved. She also has had some increased swelling in her belly. Is taking macrobid for a UTI.        Objective   /84   Ht 157.5 cm (62.01\")   Wt 84.5 kg (186 lb 3.2 oz)   LMP  (LMP Unknown) Comment: S/P AH, LEFT S&O/RIGHT S&O  BMI 34.05 kg/m²     General:  well developed; well nourished  no acute distress  mentation appropriate   Skin:  No suspicious lesions seen   Thyroid: not examined   Breasts:  Not performed.   Abdomen: Soft, nontender. No masses, erythema or fluctuance. Slightly more bloated on right>left, but no tenderness or specific palpable mass    Pelvis: Clinical staff was present for exam  Small erythematous area on clitoris, otherwise normal external female genitalia. Normal appearing vaginal canal. Pessary oriented vertically -- removed, cleaned and replaced         Assessment      Plan   Pessary problem   Pessary removed, cleaned and replaced   Will plan for vaginal estrogen cream. Discussed leaving pessary in place and in office removal/cleaning since patient is unable to do so    Acyclovir refilled given history of HSV       New Medications Ordered This Visit   Medications    estradiol (ESTRACE VAGINAL) 0.1 MG/GM vaginal cream     Sig: Insert 1 gm intravaginally 1-3 times each week     Dispense:  1 each     Refill:  12     Group # [id number]; ID # [id number]        Follow up in 1 month for pessary maintenance     This note was electronically signed.      Sofie Almanza, " MD  Obstetrics and Gynecology  [facility]

## 2025-03-12 ENCOUNTER — OFFICE VISIT (OUTPATIENT)
Dept: ENDOCRINOLOGY | Facility: CLINIC | Age: 70
End: 2025-03-12
Payer: MEDICARE

## 2025-03-12 VITALS
WEIGHT: 189 LBS | SYSTOLIC BLOOD PRESSURE: 130 MMHG | DIASTOLIC BLOOD PRESSURE: 66 MMHG | BODY MASS INDEX: 34.78 KG/M2 | HEART RATE: 74 BPM | OXYGEN SATURATION: 94 % | HEIGHT: 62 IN

## 2025-03-12 DIAGNOSIS — E06.3 HYPOTHYROIDISM DUE TO HASHIMOTO THYROIDITIS: Primary | ICD-10-CM

## 2025-03-12 DIAGNOSIS — E04.2 NONTOXIC MULTINODULAR GOITER: ICD-10-CM

## 2025-03-12 DIAGNOSIS — M17.12 PRIMARY OSTEOARTHRITIS OF LEFT KNEE: Primary | ICD-10-CM

## 2025-03-12 LAB
ANION GAP SERPL CALCULATED.3IONS-SCNC: 14.8 MMOL/L (ref 5–15)
BUN SERPL-MCNC: 23 MG/DL (ref 8–23)
BUN/CREAT SERPL: 26.4 (ref 7–25)
CALCIUM SPEC-SCNC: 9.7 MG/DL (ref 8.6–10.5)
CHLORIDE SERPL-SCNC: 103 MMOL/L (ref 98–107)
CO2 SERPL-SCNC: 28.2 MMOL/L (ref 22–29)
CREAT SERPL-MCNC: 0.87 MG/DL (ref 0.57–1)
EGFRCR SERPLBLD CKD-EPI 2021: 72.2 ML/MIN/1.73
GLUCOSE SERPL-MCNC: 83 MG/DL (ref 65–99)
POTASSIUM SERPL-SCNC: 3.6 MMOL/L (ref 3.5–5.2)
SODIUM SERPL-SCNC: 146 MMOL/L (ref 136–145)
T4 FREE SERPL-MCNC: 1.35 NG/DL (ref 0.92–1.68)
TSH SERPL DL<=0.05 MIU/L-ACNC: 2.53 UIU/ML (ref 0.27–4.2)

## 2025-03-12 PROCEDURE — 84439 ASSAY OF FREE THYROXINE: CPT | Performed by: INTERNAL MEDICINE

## 2025-03-12 PROCEDURE — 84443 ASSAY THYROID STIM HORMONE: CPT | Performed by: INTERNAL MEDICINE

## 2025-03-12 PROCEDURE — 80048 BASIC METABOLIC PNL TOTAL CA: CPT | Performed by: INTERNAL MEDICINE

## 2025-03-12 NOTE — PROGRESS NOTES
Thyroid Problem (Thyroid nodule)    Subjective    Park Ray is a 69 y.o. female. she is being seen for consultation today at the request of  Nevin Finnegan APRN for evaluation of thyroid nodule.   Patient is a 69 y.o. female  presented with newly diagnosed thyroid nodules. Initial u/s 7/2024 showed 1.8 cm complex cyst, 1.2 cm and 0.5 cm and 0.7 cm nodules.   11/2024 repeat u/s showed enlarged nodules and left 1.2 cm nodule was biopsied because of suspicious features.   FNA - AUS, Afirma - benign.      Hypothyroidism - levothyroxine 50 mcg. No change in the dose in the last 6 months.  Patient said that she has fatigue, weight gain, leg swelling and doesn't feel that medication is working.     C/o dysphagia started 6 weeks ago - mostly solids. She has a history of gastric ulcers and has GI specialist.     Review of Systems  Review of Systems   Constitutional:  Positive for diaphoresis, fatigue and unexpected weight gain.   HENT:  Positive for dental problem, hearing loss, sinus pressure and trouble swallowing.    Eyes:  Positive for photophobia and visual disturbance.   Cardiovascular:  Positive for leg swelling.   Endocrine: Positive for cold intolerance and polyuria.   Genitourinary:  Positive for frequency and urinary incontinence.   Musculoskeletal:  Positive for arthralgias, gait problem, joint swelling and neck stiffness.   Skin:         Hair loss   Hematological:  Bruises/bleeds easily.   Psychiatric/Behavioral:  Positive for sleep disturbance.      Current medications:  Current Outpatient Medications   Medication Sig Dispense Refill    acyclovir (ZOVIRAX) 400 MG tablet Take 1 tablet by mouth Daily. If outbreak occurs while on suppressive therapy, call office for further instructions. 30 tablet 11    albuterol sulfate  (90 Base) MCG/ACT inhaler Inhale 2 puffs 4 (Four) Times a Day.      Chlorhexidine Gluconate 4 % solution Apply 1 Application topically to the appropriate area as directed Daily.  "Shower with hibiclens solution as directed for 5 days prior to surgery 236 mL 0    chlorthalidone (HYGROTON) 25 MG tablet TAKE 1 TABLET BY MOUTH DAILY 30 tablet 11    Cholecalciferol (VITAMIN D-3) 5000 units tablet Take 1 tablet by mouth Daily.      estradiol (ESTRACE VAGINAL) 0.1 MG/GM vaginal cream Insert 1 gm intravaginally 1-3 times each week 1 each 12    fluticasone (FLONASE) 50 MCG/ACT nasal spray Administer 2 sprays into the nostril(s) as directed by provider Daily.      folic acid (FOLVITE) 1 MG tablet Take 1 tablet by mouth Daily.      furosemide (LASIX) 40 MG tablet Take 1 tablet by mouth As Needed.      HYDROcodone-acetaminophen (NORCO) 7.5-325 MG per tablet 7.5 mg of hydrocodone.      levothyroxine (SYNTHROID, LEVOTHROID) 25 MCG tablet Take 2 tablets by mouth Daily.      Multiple Vitamins-Minerals (MULTIVITAMIN ADULT PO) Take 1 tablet by mouth Daily.      nebivolol (BYSTOLIC) 10 MG tablet TAKE 1 TABLET BY MOUTH DAILY 30 tablet 11    omeprazole (priLOSEC) 40 MG capsule Take 20 mg by mouth Daily.      Omeprazole Magnesium (PRILOSEC PO)       ondansetron ODT (ZOFRAN-ODT) 8 MG disintegrating tablet Take 1 tablet by mouth.      Ospemifene 60 MG tablet Take 1 tablet by mouth Daily. THIS PT NEEDS TO KEEP AN APPT HERE BEFORE ANY ADDITIONAL REFILLS. 30 tablet 0    potassium chloride ER (K-TAB) 20 MEQ tablet controlled-release ER tablet Take 1 tablet by mouth Daily.      pravastatin (PRAVACHOL) 40 MG tablet Take 1 tablet by mouth Daily.      VITAMIN E 1000 UNIT capsule        No current facility-administered medications for this visit.         Objective      Vitals:    03/12/25 1400   BP: 130/66   BP Location: Left arm   Patient Position: Sitting   Cuff Size: Adult   Pulse: 74   SpO2: 94%   Weight: 85.7 kg (189 lb)   Height: 157.5 cm (62\")   Body mass index is 34.57 kg/m².  Physical Exam  Vitals reviewed.   Constitutional:       Appearance: Normal appearance.   Cardiovascular:      Rate and Rhythm: Normal rate and " regular rhythm.      Pulses: Normal pulses.      Heart sounds: Normal heart sounds.   Pulmonary:      Effort: Pulmonary effort is normal.      Breath sounds: Normal breath sounds.   Musculoskeletal:         General: No swelling.   Neurological:      Mental Status: She is alert and oriented to person, place, and time.   Psychiatric:         Mood and Affect: Mood normal.         Thought Content: Thought content normal.         LABS AND IMAGING  Thyroid ultrasound 03/12/25        Real time high resolution imaging of the thyroid gland was performed in transverse and longitudinal planes.   Previous reports were reviewed and compared to the current appearance to assess stability.       The right lobe measured 3.83 cm L x 1.49 cm AP x 1.96 cm in TV dimension.    The isthmus measured 0.21 cm in thickness.    The left thyroid lobe measured 3.57 cm L x 1.56 cm AP x 1.75 cm in TV dimension.    Thyroid gland is prominent and heterogeneous and contains multiple nodules.    Nodule 1   is located in the left mid lobe and measures 1.4 x 1.05 x 1.31 cm (L X AP X TV).  This nodule is complex solid, heterogeneous, hypoechoic with well-defined margins, and Grade II vascularity on Color Flow Doppler.  It has artifacts:  coma-tail calcifications    Nodule 2  is located in the left lower lobe and measures 0.61 x 0.47 x 0.6 cm (L X AP X TV).  This nodule is solid, homogeneous, hypoechoic with irregular margins, and Grade II vascularity on Color Flow Doppler.  It has calcifications.     Right lobe has a 2 small solid hypoechoic nodules measuring 0.53 x 0.38 x 0.61 cm and 0.49 x 0.37 x 0.5 cm    No pathologic lymph nodes were seen.      Assessment: Multinodular goiter with left dominant nodule that appear stable compared to previous u.s,   Follow-up ultrasound in 12 months   No visits with results within 1 Month(s) from this visit.   Latest known visit with results is:   Lab on 12/23/2024   Component Date Value Ref Range Status    C-Reactive  Protein 12/23/2024 0.38  0.00 - 0.50 mg/dL Final    Sed Rate 12/23/2024 9  0 - 30 mm/hr Final    WBC 12/23/2024 11.13 (H)  3.40 - 10.80 10*3/mm3 Final    RBC 12/23/2024 4.43  3.77 - 5.28 10*6/mm3 Final    Hemoglobin 12/23/2024 13.4  12.0 - 15.9 g/dL Final    Hematocrit 12/23/2024 39.0  34.0 - 46.6 % Final    MCV 12/23/2024 88.0  79.0 - 97.0 fL Final    MCH 12/23/2024 30.2  26.6 - 33.0 pg Final    MCHC 12/23/2024 34.4  31.5 - 35.7 g/dL Final    RDW 12/23/2024 13.0  12.3 - 15.4 % Final    RDW-SD 12/23/2024 41.5  37.0 - 54.0 fl Final    MPV 12/23/2024 11.0  6.0 - 12.0 fL Final    Platelets 12/23/2024 285  140 - 450 10*3/mm3 Final    Neutrophil % 12/23/2024 62.6  42.7 - 76.0 % Final    Lymphocyte % 12/23/2024 25.1  19.6 - 45.3 % Final    Monocyte % 12/23/2024 9.7  5.0 - 12.0 % Final    Eosinophil % 12/23/2024 1.6  0.3 - 6.2 % Final    Basophil % 12/23/2024 0.6  0.0 - 1.5 % Final    Immature Grans % 12/23/2024 0.4  0.0 - 0.5 % Final    Neutrophils, Absolute 12/23/2024 6.96  1.70 - 7.00 10*3/mm3 Final    Lymphocytes, Absolute 12/23/2024 2.79  0.70 - 3.10 10*3/mm3 Final    Monocytes, Absolute 12/23/2024 1.08 (H)  0.10 - 0.90 10*3/mm3 Final    Eosinophils, Absolute 12/23/2024 0.18  0.00 - 0.40 10*3/mm3 Final    Basophils, Absolute 12/23/2024 0.07  0.00 - 0.20 10*3/mm3 Final    Immature Grans, Absolute 12/23/2024 0.05  0.00 - 0.05 10*3/mm3 Final    nRBC 12/23/2024 0.0  0.0 - 0.2 /100 WBC Final       1. Hypothyroidism due to Hashimoto thyroiditis    2. Nontoxic multinodular goiter        Assessment & Plan    (E06.3) Hypothyroidism due to Hashimoto thyroiditis - Plan: US Thyroid, T4, Free, TSH, Basic Metabolic Panel    (E04.2) Nontoxic multinodular goiter       PLAN  -Multinodular goiter with dominant left nodule.  The nodule was biopsied in December 2024 - AUS by cytology and benign by Afirma.  Previous results were explained and reviewed with the patient and my recommendation is to continue monitoring with a repeat  ultrasound in approximately 10 months.     -Hypothyroidism on levothyroxine 50 mcg.  Patient reported multiple symptoms and I have given her a trial of brand name Unithroid 50 mcg.  Repeat thyroid levels and adjust the dose    - diagnosis was discussed with the patient, and her questions were answered.     - old records reviewed and summarized in the HPI portion of the note.       Return in about 10 months (around 1/12/2026) for ultrasound.           Tiffanie Coburn MD

## 2025-03-13 DIAGNOSIS — M16.12 PRIMARY OSTEOARTHRITIS OF LEFT HIP: Primary | ICD-10-CM

## 2025-03-18 ENCOUNTER — PRE-ADMISSION TESTING (OUTPATIENT)
Dept: PREADMISSION TESTING | Facility: HOSPITAL | Age: 70
End: 2025-03-18
Payer: MEDICARE

## 2025-03-18 VITALS — BODY MASS INDEX: 33.27 KG/M2 | WEIGHT: 180.78 LBS | HEIGHT: 62 IN

## 2025-03-18 DIAGNOSIS — M17.12 PRIMARY OSTEOARTHRITIS OF LEFT KNEE: ICD-10-CM

## 2025-03-18 LAB
ANION GAP SERPL CALCULATED.3IONS-SCNC: 14 MMOL/L (ref 5–15)
APTT PPP: 30.5 SECONDS (ref 22–39)
BASOPHILS # BLD AUTO: 0.05 10*3/MM3 (ref 0–0.2)
BASOPHILS NFR BLD AUTO: 0.6 % (ref 0–1.5)
BUN SERPL-MCNC: 17 MG/DL (ref 8–23)
BUN/CREAT SERPL: 26.6 (ref 7–25)
CALCIUM SPEC-SCNC: 9.5 MG/DL (ref 8.6–10.5)
CHLORIDE SERPL-SCNC: 101 MMOL/L (ref 98–107)
CO2 SERPL-SCNC: 28 MMOL/L (ref 22–29)
CREAT SERPL-MCNC: 0.64 MG/DL (ref 0.57–1)
CRP SERPL-MCNC: 0.32 MG/DL (ref 0–0.5)
DEPRECATED RDW RBC AUTO: 43.7 FL (ref 37–54)
EGFRCR SERPLBLD CKD-EPI 2021: 95.8 ML/MIN/1.73
EOSINOPHIL # BLD AUTO: 0.08 10*3/MM3 (ref 0–0.4)
EOSINOPHIL NFR BLD AUTO: 1 % (ref 0.3–6.2)
ERYTHROCYTE [DISTWIDTH] IN BLOOD BY AUTOMATED COUNT: 13.5 % (ref 12.3–15.4)
ERYTHROCYTE [SEDIMENTATION RATE] IN BLOOD: 15 MM/HR (ref 0–30)
GLUCOSE SERPL-MCNC: 108 MG/DL (ref 65–99)
HBA1C MFR BLD: 5.1 % (ref 4.8–5.6)
HCT VFR BLD AUTO: 41 % (ref 34–46.6)
HGB BLD-MCNC: 13.6 G/DL (ref 12–15.9)
IMM GRANULOCYTES # BLD AUTO: 0.02 10*3/MM3 (ref 0–0.05)
IMM GRANULOCYTES NFR BLD AUTO: 0.2 % (ref 0–0.5)
INR PPP: 1 (ref 0.89–1.12)
LYMPHOCYTES # BLD AUTO: 1.77 10*3/MM3 (ref 0.7–3.1)
LYMPHOCYTES NFR BLD AUTO: 21.3 % (ref 19.6–45.3)
MCH RBC QN AUTO: 29.2 PG (ref 26.6–33)
MCHC RBC AUTO-ENTMCNC: 33.2 G/DL (ref 31.5–35.7)
MCV RBC AUTO: 88 FL (ref 79–97)
MONOCYTES # BLD AUTO: 0.81 10*3/MM3 (ref 0.1–0.9)
MONOCYTES NFR BLD AUTO: 9.8 % (ref 5–12)
NEUTROPHILS NFR BLD AUTO: 5.57 10*3/MM3 (ref 1.7–7)
NEUTROPHILS NFR BLD AUTO: 67.1 % (ref 42.7–76)
NRBC BLD AUTO-RTO: 0 /100 WBC (ref 0–0.2)
PLATELET # BLD AUTO: 227 10*3/MM3 (ref 140–450)
PMV BLD AUTO: 9.9 FL (ref 6–12)
POTASSIUM SERPL-SCNC: 3.9 MMOL/L (ref 3.5–5.2)
PROTHROMBIN TIME: 13.3 SECONDS (ref 12.2–14.5)
QT INTERVAL: 430 MS
QTC INTERVAL: 425 MS
RBC # BLD AUTO: 4.66 10*6/MM3 (ref 3.77–5.28)
SODIUM SERPL-SCNC: 143 MMOL/L (ref 136–145)
WBC NRBC COR # BLD AUTO: 8.3 10*3/MM3 (ref 3.4–10.8)

## 2025-03-18 PROCEDURE — 85610 PROTHROMBIN TIME: CPT

## 2025-03-18 PROCEDURE — 86140 C-REACTIVE PROTEIN: CPT

## 2025-03-18 PROCEDURE — 93005 ELECTROCARDIOGRAM TRACING: CPT

## 2025-03-18 PROCEDURE — 85730 THROMBOPLASTIN TIME PARTIAL: CPT

## 2025-03-18 PROCEDURE — 85025 COMPLETE CBC W/AUTO DIFF WBC: CPT

## 2025-03-18 PROCEDURE — 83036 HEMOGLOBIN GLYCOSYLATED A1C: CPT

## 2025-03-18 PROCEDURE — 85652 RBC SED RATE AUTOMATED: CPT

## 2025-03-18 PROCEDURE — 36415 COLL VENOUS BLD VENIPUNCTURE: CPT

## 2025-03-18 PROCEDURE — 80048 BASIC METABOLIC PNL TOTAL CA: CPT

## 2025-03-18 RX ORDER — NITROFURANTOIN 25; 75 MG/1; MG/1
100 CAPSULE ORAL DAILY
COMMUNITY

## 2025-03-18 RX ORDER — LEVOTHYROXINE SODIUM 50 UG/1
50 TABLET ORAL
COMMUNITY

## 2025-03-18 RX ORDER — MULTIVIT-MIN/IRON/FA/VIT K/LUT 8MG-400MCG
1 TABLET ORAL DAILY
COMMUNITY

## 2025-03-18 RX ORDER — ASPIRIN 81 MG/1
81 TABLET ORAL DAILY
COMMUNITY

## 2025-03-18 NOTE — PAT
Patient to apply Chlorhexadine wipes  to surgical area (as instructed) the night before procedure and the AM of procedure. Wipes provided.    Patient instructed to drink 20 ounces of Gatorade or Gatorlyte (if diabetic) and it needs to be completed 1 hour (for Main OR patients) or 2 hours (scheduled  section & [facility] patients) before given arrival time for procedure (NO RED Gatorade and NO Gatorade Zero).    Patient verbalized understanding.    Patient viewed general PAT education video as instructed in their preoperative information received from their surgeon.  Patient stated the general PAT education video was viewed in its entirety and survey completed.  Copies of PAT general education handouts (Incentive Spirometry, Meds to Beds Program, Patient Belongings, Pre-op skin preparation instructions, Blood Glucose testing, Visitor policy, Surgery FAQ, Code H) distributed to patient if not printed. Education related to the PAT pass and skin preparation for surgery (if applicable) completed in PAT as a reinforcement to PAT education video. Patient instructed to return PAT pass provided today as well as completed skin preparation sheet (if applicable) on the day of procedure.     Additionally if patient had not viewed video yet but intended to view it at home or in our waiting area, then referred them to the handout with QR code/link provided during PAT visit.  Encouraged patient/family to read PAT general education handouts thoroughly and notify PAT staff with any questions or concerns. Patient verbalized understanding of all information and priority content.    Discussed with patient options for receiving total joint replacement education and assessed patient's ability and preference. Joint Replacement Guide given to patient during PAT visit since not received a copy within the last year. Encouraged patient/family to read guide thoroughly and notify PAT staff with any questions or concerns. Handout provided  directing patient to links to watch online videos related to joint replacement surgery on the [facility] website. The handout gives detailed instructions for joining an online joint replacement class through Microsoft Teams or phone conference offered on the 1st and 3rd Thursdays of the month. Patient agreed to participate by watching videos online. Patient verbalized understanding of instructions. Encouraged to share information with family and/or . An overview of the joint replacement education was provided during the visit including general perioperative instructions that are routine for all surgical patients (PAT PASS, wipes, directions to pre-op, etc.).    Prescription for Chlorhexidine shower called into patient's pharmacy or [facility] pharmacy by patient's surgeon.  Reinforced with patient to  the prescription from applicable pharmacy if they haven't already.  Verbal and written instructions given regarding proper use of Chlorhexidine body wash to patient and/or famlily during PAT visit. Patient/family also instructed to complete checklist and return it to Pre-op on the day of surgery.  Patient and/or family verbalized understanding.    [employer] (by [employer]) pain pump patient informational handout given to patient.  Instructed patient to watch [employer] Patient Education Video regarding Peripheral Nerve Catheter that will be in place for upcoming surgery unless contraindicated. The video can be accessed using QR code noted on handout.  Patient agreed to watch video.  Stressed to patient to call [employer] Nursing Hotline 24/7 if patient has any questions or concerns after discharge.     Post-Surgery Information Instruction Sheet given to patient during Pre-Admission Testing Visit with verbal instructions to patient to return with PAT PASS on the day of surgery. Additionally, encouraged patient to review the information provided.

## 2025-03-22 PROBLEM — R09.82 ALLERGIC RHINITIS WITH POSTNASAL DRIP: Status: RESOLVED | Noted: 2020-04-23 | Resolved: 2025-03-22

## 2025-03-22 PROBLEM — L03.116 CELLULITIS OF LEFT ANTERIOR LOWER LEG: Status: RESOLVED | Noted: 2022-01-28 | Resolved: 2025-03-22

## 2025-03-22 PROBLEM — L97.209: Status: RESOLVED | Noted: 2022-03-09 | Resolved: 2025-03-22

## 2025-03-22 PROBLEM — M25.569 ARTHRALGIA OF KNEE: Status: RESOLVED | Noted: 2022-10-18 | Resolved: 2025-03-22

## 2025-03-22 PROBLEM — Z01.419 WELL WOMAN EXAM: Status: ACTIVE | Noted: 2025-03-22

## 2025-03-22 PROBLEM — Z86.16 PERSONAL HISTORY OF COVID-19: Status: RESOLVED | Noted: 2022-02-03 | Resolved: 2025-03-22

## 2025-03-22 PROBLEM — L97.909 CHRONIC ULCER OF LOWER EXTREMITY: Status: RESOLVED | Noted: 2022-02-24 | Resolved: 2025-03-22

## 2025-03-22 PROBLEM — J30.9 ALLERGIC RHINITIS WITH POSTNASAL DRIP: Status: RESOLVED | Noted: 2020-04-23 | Resolved: 2025-03-22

## 2025-03-22 PROBLEM — L03.119 CELLULITIS OF LOWER EXTREMITY: Status: RESOLVED | Noted: 2022-02-03 | Resolved: 2025-03-22

## 2025-03-22 PROBLEM — N95.2 VAGINAL ATROPHY: Status: RESOLVED | Noted: 2018-05-29 | Resolved: 2025-03-22

## 2025-03-24 ENCOUNTER — TELEPHONE (OUTPATIENT)
Dept: CARDIOLOGY | Facility: CLINIC | Age: 70
End: 2025-03-24
Payer: MEDICARE

## 2025-03-24 ENCOUNTER — TELEPHONE (OUTPATIENT)
Dept: SURGERY | Facility: OTHER | Age: 70
End: 2025-03-24
Payer: MEDICARE

## 2025-03-24 NOTE — TELEPHONE ENCOUNTER
Patient called to ask if EKG done at [facility] could be reviewed by cardiology.  EKG reviewed by SAMANTHA Morrissey along with past EKG, no changes to hinder upcoming procedure.

## 2025-03-24 NOTE — TELEPHONE ENCOUNTER
The medications for bladder urgency have side effects such as dizziness, drowsiness and problems with BP so I usually would not start this the week that she is planning to have surgery, as usually her surgeon will not want her to take this the week of her surgery.     Sofie Almanza MD  Obstetrics and Gynecology  [facility]

## 2025-03-24 NOTE — TELEPHONE ENCOUNTER
Caller: Park Ray    Relationship: Self    Best call back number: [phone]      Who are you requesting to speak with (clinical staff, provider,   ROSIBEL      What was the call regarding: PATIENT ADVISED THAT SHE IS HAVING  KNEE SURGERY ON 4/1/25, CANCELLED HER NEXT TWO APPTS, WOULD LIKE MEDICATION TO ASSIST WITH BLADDER URGENCY TO PHARMACY ON FILE.

## 2025-03-25 ENCOUNTER — PRE-PROCEDURE SCREENING (OUTPATIENT)
Dept: ORTHOPEDIC SURGERY | Facility: CLINIC | Age: 70
End: 2025-03-25
Payer: MEDICARE

## 2025-03-25 NOTE — TELEPHONE ENCOUNTER
Pt informed and stated understanding.     Pt would like to know if she can take this after surgery as she is concerned about the frequency of urination once she has a new knee. The pessary was out of place again at this time and she has removed it. So she is still having frequent urination. Surgery is on Tuesday. She can come back in for an appointment for a pessary change/ new size if advisable. She is also asking if there are different medications that she could take that would be advised.

## 2025-03-25 NOTE — TELEPHONE ENCOUNTER
We could always make a telemedicine visit after her surgery to see how she is doing so that she does not have to come in, or if she would like to try a new pessary, then she would need to come in for fitting.     Sofie Almanza MD  Obstetrics and Gynecology  [facility] [place] [place] [facility]

## 2025-03-25 NOTE — TELEPHONE ENCOUNTER
TOTAL JOINT REPLACEMENT CARE NAVIGATION INITIAL ASSESSMENT    PATIENT INFORMATION:     Patient: Park Ray       YOB: 1955         Age/Gender: 69 y.o. female     Medical Record Number: [id number]     Surgery:    L TKA        Surgery Date: 4/1/25    Surgeon: Dr. Roger    Physical Therapy Location: [facility], transition to [facility] PT    PT Date & Time:     DVT PPX: ASA 81mg BID    DISCHARGE PLAN: 23 hour stay      LIVING SITUATION:     [x]Private Residence      Who lives in the home?                          []Nursing Home:                                     []Assisted Living  []Rehabilitation Facility:                          []Live Alone  []Homeless    HOUSING STYLE:     []Ranch Style   []Multi-level   []Split level   []Townhouse   []Apartment    Do you have steps to navigate in the home? No  Do you have steps to navigate outside the home? No-ramp      ADL:     [x]Independent   []Independent with difficulty   []Partially Dependent   []Dependent    Do you require bathing/showering?   Do you require assistance with grooming (brushing hair, shaving, etc)?  Do you require assistance dressing?  Do you require assistance with walking?  Do you require assistive devices while walking (cane, walker, wheelchair)?  Do you require assistance with transfers (moving from bed to chair, wheelchair, etc)?  Do you require assistance preparing meals?  Do you require assistance when eating meals?  Do you require assistance to use the toilet?  Do you have any issues with bowel or bladder incontinence?  Do you require assistance with household chores (cleaning, laundry, etc)?  Have you had any recent falls?    CURRENT SERVICES:    []Home Health (PT, OT, Skilled Nursing)  Agency:    County:  []Palliative Care  []Meals on Wheels  []Daily Caregiver  [x]None    CURRENT DME:    [x]Walker   []Cane   []Wheelchair   []Crutches   []Bedside Commode   []Shower Chair  []Hospital Bed   []Nebulizer    []Oxygen  []Other:    MEDICATIONS:    Are you currently on any blood thinners? ASA 81mg-holding for 10 days  Are you currently on any anti-inflammatory medications? No  Are you currently on any medications for rheumatoid arthritis? No  Are you currently taking any herbal supplements? Vitamin E-will hold starting today      Post-op Pharmacy Name: [facility]     Phone Number:     Does anyone assist you filling medication boxes or remind you that medication is due?   Are you currently on a mail order prescription plan?  What pharmacy do you use to fill your short-term prescriptions? [facility]  Do you have prescription insurance coverage?   Can you afford your medications/co-pays?    CURRENT TRANSPORTATION:    Which services do you rely on? []Taxi   []Public Transportation   [x]Private Vehicle     []Ambulance   []Tack (Transportation Assistance [facility])    Do you have a way to get home when you are discharged? Yes, son-Jean    POTENTIAL NEEDS:    []Rehabilitation   []Home Health PT   []SNF  []Meals on Wheels   []Department of    []Palliative Care  []Nursing Home   []Hospice   [x]Durable Medical Equipment-will need a standard walker; has all other DMEs.  []Caregiver Services   []Financial Services   []Pre-op In Home PT Evaluation    CLEARANCES NEEDED FOR SURGERY: Cardiac clearance received: 3/24/25    []Dental   [x]Cardiology   []Pulmonology   []Medical (PCP)   []Rheumatology  []Endocrinology   []Hematology/Oncology   []Neurology   []Neurosurgery   []CT Vascular      INITIAL DISCHARGE PLAN: 23 hour stay. Patient's son, Jean, will be surgery . Patient's son, , and friend will be assisting with post-op care. Patient will need a standard walker upon discharge (is using 's but says it's too wide/big). Patient informed about [facility] program. Patient okay with taking oxycodone and Meloxicam post-op; Instructed to have OTC 500mg Tylenol, 81mg ASA,  and OTC stool softener on hand. Post-op PT scheduled with [facility] WILL[facility]; will then transition to [place] PT after 2 weeks.

## 2025-03-31 ENCOUNTER — ANESTHESIA EVENT (OUTPATIENT)
Dept: PERIOP | Facility: HOSPITAL | Age: 70
End: 2025-03-31
Payer: MEDICARE

## 2025-03-31 ENCOUNTER — TELEPHONE (OUTPATIENT)
Dept: ORTHOPEDIC SURGERY | Facility: CLINIC | Age: 70
End: 2025-03-31
Payer: MEDICARE

## 2025-03-31 RX ORDER — SODIUM CHLORIDE 0.9 % (FLUSH) 0.9 %
10 SYRINGE (ML) INJECTION EVERY 12 HOURS SCHEDULED
Status: CANCELLED | OUTPATIENT
Start: 2025-03-31

## 2025-03-31 RX ORDER — FAMOTIDINE 10 MG/ML
20 INJECTION, SOLUTION INTRAVENOUS ONCE
Status: CANCELLED | OUTPATIENT
Start: 2025-03-31 | End: 2025-03-31

## 2025-03-31 RX ORDER — SODIUM CHLORIDE 0.9 % (FLUSH) 0.9 %
10 SYRINGE (ML) INJECTION AS NEEDED
Status: CANCELLED | OUTPATIENT
Start: 2025-03-31

## 2025-03-31 NOTE — TELEPHONE ENCOUNTER
I called patient and gave arrival time for surgery.  Arrival time for their surgery is 10AM. Surgery location is Main registration first floor of [address] NPO after midnight, may have up to 20 oz of Gatorade any color but RED, up until 1 hour before arrival time for surgery.

## 2025-04-01 ENCOUNTER — ANESTHESIA EVENT CONVERTED (OUTPATIENT)
Dept: ANESTHESIOLOGY | Facility: HOSPITAL | Age: 70
End: 2025-04-01
Payer: MEDICARE

## 2025-04-01 ENCOUNTER — ANESTHESIA (OUTPATIENT)
Dept: PERIOP | Facility: HOSPITAL | Age: 70
End: 2025-04-01
Payer: MEDICARE

## 2025-04-01 ENCOUNTER — HOSPITAL ENCOUNTER (OUTPATIENT)
Facility: HOSPITAL | Age: 70
Discharge: HOME | End: 2025-04-02
Attending: ORTHOPAEDIC SURGERY | Admitting: ORTHOPAEDIC SURGERY
Payer: MEDICARE

## 2025-04-01 ENCOUNTER — APPOINTMENT (OUTPATIENT)
Dept: GENERAL RADIOLOGY | Facility: HOSPITAL | Age: 70
End: 2025-04-01
Payer: MEDICARE

## 2025-04-01 DIAGNOSIS — M17.12 PRIMARY OSTEOARTHRITIS OF LEFT KNEE: ICD-10-CM

## 2025-04-01 DIAGNOSIS — Z96.652 STATUS POST LEFT KNEE REPLACEMENT: Primary | ICD-10-CM

## 2025-04-01 PROBLEM — E66.9 OBESITY (BMI 30-39.9): Status: ACTIVE | Noted: 2025-04-01

## 2025-04-01 PROBLEM — I10 HTN (HYPERTENSION): Status: ACTIVE | Noted: 2025-04-01

## 2025-04-01 PROBLEM — Z96.659 S/P KNEE REPLACEMENT: Status: ACTIVE | Noted: 2025-04-01

## 2025-04-01 LAB
GLUCOSE BLDC GLUCOMTR-MCNC: 91 MG/DL (ref 70–130)
POTASSIUM SERPL-SCNC: 3.1 MMOL/L (ref 3.5–5.2)

## 2025-04-01 PROCEDURE — 25010000002 MEPIVACAINE HCL (PF) 1.5 % SOLUTION: Performed by: ANESTHESIOLOGY

## 2025-04-01 PROCEDURE — 25810000003 LACTATED RINGERS PER 1000 ML: Performed by: ANESTHESIOLOGY

## 2025-04-01 PROCEDURE — 25010000002 DEXAMETHASONE PER 1 MG: Performed by: ANESTHESIOLOGY

## 2025-04-01 PROCEDURE — 27447 TOTAL KNEE ARTHROPLASTY: CPT | Performed by: ORTHOPAEDIC SURGERY

## 2025-04-01 PROCEDURE — 25010000002 LIDOCAINE PF 1% 1 % SOLUTION: Performed by: ANESTHESIOLOGY

## 2025-04-01 PROCEDURE — 25810000003 SODIUM CHLORIDE 0.9 % SOLUTION: Performed by: ORTHOPAEDIC SURGERY

## 2025-04-01 PROCEDURE — 25010000002 DROPERIDOL PER 5 MG: Performed by: ANESTHESIOLOGY

## 2025-04-01 PROCEDURE — 25010000002 PROPOFOL 10 MG/ML EMULSION: Performed by: ANESTHESIOLOGY

## 2025-04-01 PROCEDURE — 73560 X-RAY EXAM OF KNEE 1 OR 2: CPT

## 2025-04-01 PROCEDURE — 25010000002 CEFAZOLIN PER 500 MG: Performed by: ORTHOPAEDIC SURGERY

## 2025-04-01 PROCEDURE — 20985 CPTR-ASST DIR MS PX: CPT | Performed by: ORTHOPAEDIC SURGERY

## 2025-04-01 PROCEDURE — 25010000002 ROPIVACAINE HCL-NACL 0.2-0.9 % SOLUTION: Performed by: NURSE ANESTHETIST, CERTIFIED REGISTERED

## 2025-04-01 PROCEDURE — C1776 JOINT DEVICE (IMPLANTABLE): HCPCS | Performed by: ORTHOPAEDIC SURGERY

## 2025-04-01 PROCEDURE — 82948 REAGENT STRIP/BLOOD GLUCOSE: CPT

## 2025-04-01 PROCEDURE — 27447 TOTAL KNEE ARTHROPLASTY: CPT | Performed by: PHYSICIAN ASSISTANT

## 2025-04-01 PROCEDURE — C1713 ANCHOR/SCREW BN/BN,TIS/BN: HCPCS | Performed by: ORTHOPAEDIC SURGERY

## 2025-04-01 PROCEDURE — 84132 ASSAY OF SERUM POTASSIUM: CPT | Performed by: ORTHOPAEDIC SURGERY

## 2025-04-01 PROCEDURE — 97110 THERAPEUTIC EXERCISES: CPT

## 2025-04-01 PROCEDURE — 25010000002 HYDROMORPHONE PER 4 MG: Performed by: NURSE ANESTHETIST, CERTIFIED REGISTERED

## 2025-04-01 PROCEDURE — 25010000002 ONDANSETRON PER 1 MG: Performed by: ANESTHESIOLOGY

## 2025-04-01 PROCEDURE — 25010000002 BUPIVACAINE (PF) 0.25 % SOLUTION: Performed by: ANESTHESIOLOGY

## 2025-04-01 PROCEDURE — 25010000002 HYDROMORPHONE 1 MG/ML SOLUTION

## 2025-04-01 PROCEDURE — 97161 PT EVAL LOW COMPLEX 20 MIN: CPT

## 2025-04-01 PROCEDURE — 20985 CPTR-ASST DIR MS PX: CPT | Performed by: PHYSICIAN ASSISTANT

## 2025-04-01 PROCEDURE — 25010000002 PHENYLEPHRINE 10 MG/ML SOLUTION 1 ML VIAL: Performed by: ANESTHESIOLOGY

## 2025-04-01 PROCEDURE — 25010000002 HYDROMORPHONE PER 4 MG: Performed by: ORTHOPAEDIC SURGERY

## 2025-04-01 PROCEDURE — 25010000002 ROPIVACAINE PER 1 MG: Performed by: ORTHOPAEDIC SURGERY

## 2025-04-01 DEVICE — PALACOS® R IS A FAST-CURING, RADIOPAQUE, POLY(METHYL METHACRYLATE)-BASED BONE CEMENT.PALACOS ® R CONTAINS THE X-RAY CONTRAST MEDIUM ZIRCONIUM DIOXIDE. TO IMPROVE VISIBILITY IN THE SURGICAL FIELD PALACOS ® R HAS BEEN COLOURED WITH CHLOROPHYLL (E141). THE BONE CEMENT IS PREPARED DIRECTLY BEFORE USE BY MIXING A POLYMER POWDER COMPONENT WITH A LIQUID MONOMER COMPONENT. A DUCTILE DOUGH FORMS WHICH CURES WITHIN A FEW MINUTES.
Type: IMPLANTABLE DEVICE | Site: KNEE | Status: FUNCTIONAL
Brand: PALACOS®

## 2025-04-01 DEVICE — KNOTLESS TISSUE CONTROL DEVICE, UNDYED UNIDIRECTIONAL (ANTIBACTERIAL) SYNTHETIC ABSORBABLE DEVICE
Type: IMPLANTABLE DEVICE | Site: KNEE | Status: FUNCTIONAL
Brand: STRATAFIX

## 2025-04-01 DEVICE — GENESIS II NON-POROUS TIBIAL                                    BASEPLATE SIZE 1 LEFT
Type: IMPLANTABLE DEVICE | Site: KNEE | Status: FUNCTIONAL
Brand: GENESIS II

## 2025-04-01 DEVICE — LEGION NARROW CRUCIATE RETAINING                                    OXINIUM SIZE 4N LEFT
Type: IMPLANTABLE DEVICE | Site: KNEE | Status: FUNCTIONAL
Brand: LEGION

## 2025-04-01 DEVICE — VIOLET ANTIBACTERIAL POLYDIOXANONE, KNOTLESS TISSUE CONTROL DEVICE
Type: IMPLANTABLE DEVICE | Site: KNEE | Status: FUNCTIONAL
Brand: STRATAFIX

## 2025-04-01 DEVICE — GEN II 7.5MM RESUR PAT 29MM
Type: IMPLANTABLE DEVICE | Site: KNEE | Status: FUNCTIONAL
Brand: GENESIS II

## 2025-04-01 DEVICE — LEGION HIGHLY CROSS LINKED                                    POLYETHYLENE DISHED INSERT SIZE 1-2 11MM
Type: IMPLANTABLE DEVICE | Site: KNEE | Status: FUNCTIONAL
Brand: LEGION

## 2025-04-01 DEVICE — IMPLANTABLE DEVICE: Type: IMPLANTABLE DEVICE | Site: KNEE | Status: FUNCTIONAL

## 2025-04-01 RX ORDER — ACETAMINOPHEN 500 MG
1000 TABLET ORAL EVERY 8 HOURS
Status: DISCONTINUED | OUTPATIENT
Start: 2025-04-01 | End: 2025-04-01 | Stop reason: SDUPTHER

## 2025-04-01 RX ORDER — BUPIVACAINE HCL/0.9 % NACL/PF 0.125 %
PLASTIC BAG, INJECTION (ML) EPIDURAL AS NEEDED
Status: DISCONTINUED | OUTPATIENT
Start: 2025-04-01 | End: 2025-04-01 | Stop reason: SURG

## 2025-04-01 RX ORDER — ROPIVACAINE HYDROCHLORIDE 2 MG/ML
INJECTION, SOLUTION EPIDURAL; INFILTRATION; PERINEURAL CONTINUOUS
Status: DISCONTINUED | OUTPATIENT
Start: 2025-04-01 | End: 2025-04-02 | Stop reason: HOSPADM

## 2025-04-01 RX ORDER — MELOXICAM 15 MG/1
15 TABLET ORAL DAILY
Status: DISCONTINUED | OUTPATIENT
Start: 2025-04-02 | End: 2025-04-02 | Stop reason: HOSPADM

## 2025-04-01 RX ORDER — ALBUTEROL SULFATE 0.83 MG/ML
2.5 SOLUTION RESPIRATORY (INHALATION) EVERY 6 HOURS PRN
Status: DISCONTINUED | OUTPATIENT
Start: 2025-04-01 | End: 2025-04-02 | Stop reason: HOSPADM

## 2025-04-01 RX ORDER — HYDROMORPHONE HYDROCHLORIDE 1 MG/ML
0.5 INJECTION, SOLUTION INTRAMUSCULAR; INTRAVENOUS; SUBCUTANEOUS
Status: DISCONTINUED | OUTPATIENT
Start: 2025-04-01 | End: 2025-04-02 | Stop reason: HOSPADM

## 2025-04-01 RX ORDER — NITROFURANTOIN 25; 75 MG/1; MG/1
100 CAPSULE ORAL DAILY
Status: DISCONTINUED | OUTPATIENT
Start: 2025-04-01 | End: 2025-04-02 | Stop reason: HOSPADM

## 2025-04-01 RX ORDER — FENTANYL CITRATE 50 UG/ML
50 INJECTION, SOLUTION INTRAMUSCULAR; INTRAVENOUS
Status: DISCONTINUED | OUTPATIENT
Start: 2025-04-01 | End: 2025-04-01 | Stop reason: HOSPADM

## 2025-04-01 RX ORDER — NEBIVOLOL 10 MG/1
10 TABLET ORAL DAILY
Status: DISCONTINUED | OUTPATIENT
Start: 2025-04-01 | End: 2025-04-02 | Stop reason: HOSPADM

## 2025-04-01 RX ORDER — ACETAMINOPHEN 500 MG
1000 TABLET ORAL ONCE
Status: COMPLETED | OUTPATIENT
Start: 2025-04-01 | End: 2025-04-01

## 2025-04-01 RX ORDER — MAGNESIUM HYDROXIDE 1200 MG/15ML
LIQUID ORAL AS NEEDED
Status: DISCONTINUED | OUTPATIENT
Start: 2025-04-01 | End: 2025-04-01 | Stop reason: HOSPADM

## 2025-04-01 RX ORDER — HYDROMORPHONE HYDROCHLORIDE 1 MG/ML
0.5 INJECTION, SOLUTION INTRAMUSCULAR; INTRAVENOUS; SUBCUTANEOUS
Status: COMPLETED | OUTPATIENT
Start: 2025-04-01 | End: 2025-04-01

## 2025-04-01 RX ORDER — ACETAMINOPHEN 500 MG
1000 TABLET ORAL EVERY 8 HOURS
Status: DISCONTINUED | OUTPATIENT
Start: 2025-04-01 | End: 2025-04-02 | Stop reason: HOSPADM

## 2025-04-01 RX ORDER — ONDANSETRON 2 MG/ML
4 INJECTION INTRAMUSCULAR; INTRAVENOUS ONCE AS NEEDED
Status: DISCONTINUED | OUTPATIENT
Start: 2025-04-01 | End: 2025-04-01 | Stop reason: HOSPADM

## 2025-04-01 RX ORDER — SODIUM CHLORIDE 0.9 % (FLUSH) 0.9 %
10 SYRINGE (ML) INJECTION EVERY 12 HOURS SCHEDULED
Status: DISCONTINUED | OUTPATIENT
Start: 2025-04-01 | End: 2025-04-02 | Stop reason: HOSPADM

## 2025-04-01 RX ORDER — SODIUM CHLORIDE 9 MG/ML
40 INJECTION, SOLUTION INTRAVENOUS AS NEEDED
Status: DISCONTINUED | OUTPATIENT
Start: 2025-04-01 | End: 2025-04-02 | Stop reason: HOSPADM

## 2025-04-01 RX ORDER — BUPIVACAINE HYDROCHLORIDE 2.5 MG/ML
INJECTION, SOLUTION EPIDURAL; INFILTRATION; INTRACAUDAL; PERINEURAL
Status: DISCONTINUED | OUTPATIENT
Start: 2025-04-01 | End: 2025-04-01 | Stop reason: SURG

## 2025-04-01 RX ORDER — ASPIRIN 81 MG/1
81 TABLET ORAL EVERY 12 HOURS SCHEDULED
Status: DISCONTINUED | OUTPATIENT
Start: 2025-04-02 | End: 2025-04-02 | Stop reason: HOSPADM

## 2025-04-01 RX ORDER — SODIUM CHLORIDE, SODIUM LACTATE, POTASSIUM CHLORIDE, CALCIUM CHLORIDE 600; 310; 30; 20 MG/100ML; MG/100ML; MG/100ML; MG/100ML
9 INJECTION, SOLUTION INTRAVENOUS CONTINUOUS
Status: DISCONTINUED | OUTPATIENT
Start: 2025-04-02 | End: 2025-04-02 | Stop reason: HOSPADM

## 2025-04-01 RX ORDER — PYRIDOSTIGMINE BROMIDE 60 MG/1
60 TABLET ORAL 3 TIMES DAILY PRN
Status: DISCONTINUED | OUTPATIENT
Start: 2025-04-01 | End: 2025-04-02 | Stop reason: HOSPADM

## 2025-04-01 RX ORDER — ONDANSETRON 4 MG/1
4 TABLET, ORALLY DISINTEGRATING ORAL EVERY 6 HOURS PRN
Status: DISCONTINUED | OUTPATIENT
Start: 2025-04-01 | End: 2025-04-02 | Stop reason: HOSPADM

## 2025-04-01 RX ORDER — TRANEXAMIC ACID 10 MG/ML
1000 INJECTION, SOLUTION INTRAVENOUS ONCE
Status: DISCONTINUED | OUTPATIENT
Start: 2025-04-01 | End: 2025-04-01 | Stop reason: HOSPADM

## 2025-04-01 RX ORDER — DROPERIDOL 2.5 MG/ML
0.62 INJECTION, SOLUTION INTRAMUSCULAR; INTRAVENOUS ONCE
Status: COMPLETED | OUTPATIENT
Start: 2025-04-01 | End: 2025-04-01

## 2025-04-01 RX ORDER — PRAVASTATIN SODIUM 40 MG
40 TABLET ORAL NIGHTLY
Status: DISCONTINUED | OUTPATIENT
Start: 2025-04-01 | End: 2025-04-02 | Stop reason: HOSPADM

## 2025-04-01 RX ORDER — SODIUM CHLORIDE, SODIUM LACTATE, POTASSIUM CHLORIDE, CALCIUM CHLORIDE 600; 310; 30; 20 MG/100ML; MG/100ML; MG/100ML; MG/100ML
INJECTION, SOLUTION INTRAVENOUS CONTINUOUS PRN
Status: DISCONTINUED | OUTPATIENT
Start: 2025-04-01 | End: 2025-04-01 | Stop reason: SURG

## 2025-04-01 RX ORDER — SODIUM CHLORIDE 9 MG/ML
120 INJECTION, SOLUTION INTRAVENOUS CONTINUOUS
Status: DISCONTINUED | OUTPATIENT
Start: 2025-04-01 | End: 2025-04-02 | Stop reason: HOSPADM

## 2025-04-01 RX ORDER — LABETALOL HYDROCHLORIDE 5 MG/ML
10 INJECTION, SOLUTION INTRAVENOUS EVERY 4 HOURS PRN
Status: DISCONTINUED | OUTPATIENT
Start: 2025-04-01 | End: 2025-04-02 | Stop reason: HOSPADM

## 2025-04-01 RX ORDER — PREGABALIN 150 MG/1
150 CAPSULE ORAL ONCE
Status: COMPLETED | OUTPATIENT
Start: 2025-04-01 | End: 2025-04-01

## 2025-04-01 RX ORDER — SODIUM CHLORIDE 9 MG/ML
INJECTION, SOLUTION INTRAVENOUS
Status: DISPENSED
Start: 2025-04-01 | End: 2025-04-02

## 2025-04-01 RX ORDER — ROPIVACAINE HYDROCHLORIDE 5 MG/ML
INJECTION, SOLUTION EPIDURAL; INFILTRATION; PERINEURAL AS NEEDED
Status: DISCONTINUED | OUTPATIENT
Start: 2025-04-01 | End: 2025-04-01 | Stop reason: HOSPADM

## 2025-04-01 RX ORDER — PROPOFOL 10 MG/ML
VIAL (ML) INTRAVENOUS AS NEEDED
Status: DISCONTINUED | OUTPATIENT
Start: 2025-04-01 | End: 2025-04-01 | Stop reason: SURG

## 2025-04-01 RX ORDER — ONDANSETRON 2 MG/ML
INJECTION INTRAMUSCULAR; INTRAVENOUS AS NEEDED
Status: DISCONTINUED | OUTPATIENT
Start: 2025-04-01 | End: 2025-04-01 | Stop reason: SURG

## 2025-04-01 RX ORDER — LIDOCAINE HYDROCHLORIDE 10 MG/ML
0.5 INJECTION, SOLUTION EPIDURAL; INFILTRATION; INTRACAUDAL; PERINEURAL ONCE AS NEEDED
Status: COMPLETED | OUTPATIENT
Start: 2025-04-01 | End: 2025-04-01

## 2025-04-01 RX ORDER — CEFAZOLIN SODIUM 1 G/3ML
INJECTION, POWDER, FOR SOLUTION INTRAMUSCULAR; INTRAVENOUS
Status: DISPENSED
Start: 2025-04-01 | End: 2025-04-02

## 2025-04-01 RX ORDER — LEVOTHYROXINE SODIUM 50 UG/1
50 TABLET ORAL
Status: DISCONTINUED | OUTPATIENT
Start: 2025-04-02 | End: 2025-04-02 | Stop reason: HOSPADM

## 2025-04-01 RX ORDER — DEXAMETHASONE SODIUM PHOSPHATE 4 MG/ML
INJECTION, SOLUTION INTRA-ARTICULAR; INTRALESIONAL; INTRAMUSCULAR; INTRAVENOUS; SOFT TISSUE AS NEEDED
Status: DISCONTINUED | OUTPATIENT
Start: 2025-04-01 | End: 2025-04-01 | Stop reason: SURG

## 2025-04-01 RX ORDER — NALOXONE HCL 0.4 MG/ML
0.4 VIAL (ML) INJECTION
Status: DISCONTINUED | OUTPATIENT
Start: 2025-04-01 | End: 2025-04-02 | Stop reason: HOSPADM

## 2025-04-01 RX ORDER — SODIUM CHLORIDE 0.9 % (FLUSH) 0.9 %
1-10 SYRINGE (ML) INJECTION AS NEEDED
Status: DISCONTINUED | OUTPATIENT
Start: 2025-04-01 | End: 2025-04-02 | Stop reason: HOSPADM

## 2025-04-01 RX ORDER — TRANEXAMIC ACID 10 MG/ML
1000 INJECTION, SOLUTION INTRAVENOUS ONCE
Status: COMPLETED | OUTPATIENT
Start: 2025-04-01 | End: 2025-04-01

## 2025-04-01 RX ORDER — ONDANSETRON 2 MG/ML
4 INJECTION INTRAMUSCULAR; INTRAVENOUS EVERY 6 HOURS PRN
Status: DISCONTINUED | OUTPATIENT
Start: 2025-04-01 | End: 2025-04-02 | Stop reason: HOSPADM

## 2025-04-01 RX ORDER — NALOXONE HCL 0.4 MG/ML
0.1 VIAL (ML) INJECTION
Status: DISCONTINUED | OUTPATIENT
Start: 2025-04-01 | End: 2025-04-02 | Stop reason: HOSPADM

## 2025-04-01 RX ORDER — OXYCODONE HYDROCHLORIDE 5 MG/1
5 TABLET ORAL EVERY 4 HOURS PRN
Status: DISCONTINUED | OUTPATIENT
Start: 2025-04-01 | End: 2025-04-02 | Stop reason: HOSPADM

## 2025-04-01 RX ORDER — FAMOTIDINE 20 MG/1
20 TABLET, FILM COATED ORAL ONCE
Status: COMPLETED | OUTPATIENT
Start: 2025-04-01 | End: 2025-04-01

## 2025-04-01 RX ORDER — LIDOCAINE HYDROCHLORIDE 10 MG/ML
INJECTION, SOLUTION EPIDURAL; INFILTRATION; INTRACAUDAL; PERINEURAL AS NEEDED
Status: DISCONTINUED | OUTPATIENT
Start: 2025-04-01 | End: 2025-04-01 | Stop reason: SURG

## 2025-04-01 RX ORDER — MIDAZOLAM HYDROCHLORIDE 1 MG/ML
0.5 INJECTION, SOLUTION INTRAMUSCULAR; INTRAVENOUS
Status: DISCONTINUED | OUTPATIENT
Start: 2025-04-01 | End: 2025-04-01 | Stop reason: HOSPADM

## 2025-04-01 RX ADMIN — ACETAMINOPHEN 1000 MG: 500 TABLET, FILM COATED ORAL at 17:35

## 2025-04-01 RX ADMIN — HYDROMORPHONE HYDROCHLORIDE 0.5 MG: 1 INJECTION, SOLUTION INTRAMUSCULAR; INTRAVENOUS; SUBCUTANEOUS at 16:52

## 2025-04-01 RX ADMIN — LIDOCAINE HYDROCHLORIDE 0.5 ML: 10 INJECTION, SOLUTION EPIDURAL; INFILTRATION; INTRACAUDAL; PERINEURAL at 11:13

## 2025-04-01 RX ADMIN — TRANEXAMIC ACID 1000 MG: 10 INJECTION, SOLUTION INTRAVENOUS at 13:10

## 2025-04-01 RX ADMIN — PROPOFOL INJECTABLE EMULSION 50 MG: 10 INJECTION, EMULSION INTRAVENOUS at 13:02

## 2025-04-01 RX ADMIN — Medication 1000 MG: at 15:30

## 2025-04-01 RX ADMIN — PROPOFOL 100 MCG/KG/MIN: 10 INJECTION, EMULSION INTRAVENOUS at 13:02

## 2025-04-01 RX ADMIN — DEXAMETHASONE SODIUM PHOSPHATE 8 MG: 4 INJECTION INTRA-ARTICULAR; INTRALESIONAL; INTRAMUSCULAR; INTRAVENOUS; SOFT TISSUE at 13:45

## 2025-04-01 RX ADMIN — ONDANSETRON 4 MG: 2 INJECTION INTRAMUSCULAR; INTRAVENOUS at 14:18

## 2025-04-01 RX ADMIN — HYDROMORPHONE HYDROCHLORIDE 0.5 MG: 1 INJECTION, SOLUTION INTRAMUSCULAR; INTRAVENOUS; SUBCUTANEOUS at 16:39

## 2025-04-01 RX ADMIN — TRANEXAMIC ACID 1000 MG: 10 INJECTION, SOLUTION INTRAVENOUS at 14:18

## 2025-04-01 RX ADMIN — BUPIVACAINE HYDROCHLORIDE 20 ML: 2.5 INJECTION, SOLUTION EPIDURAL; INFILTRATION; INTRACAUDAL; PERINEURAL at 15:12

## 2025-04-01 RX ADMIN — NEBIVOLOL 10 MG: 10 TABLET ORAL at 22:32

## 2025-04-01 RX ADMIN — SODIUM CHLORIDE 120 ML/HR: 9 INJECTION, SOLUTION INTRAVENOUS at 20:29

## 2025-04-01 RX ADMIN — MEPIVACAINE HYDROCHLORIDE 4 ML: 15 INJECTION, SOLUTION EPIDURAL; INFILTRATION at 13:04

## 2025-04-01 RX ADMIN — HYDROMORPHONE HYDROCHLORIDE 0.5 MG: 1 INJECTION, SOLUTION INTRAMUSCULAR; INTRAVENOUS; SUBCUTANEOUS at 20:37

## 2025-04-01 RX ADMIN — NITROFURANTOIN MONOHYDRATE/MACROCRYSTALLINE 100 MG: 25; 75 CAPSULE ORAL at 22:22

## 2025-04-01 RX ADMIN — DROPERIDOL 0.62 MG: 2.5 INJECTION, SOLUTION INTRAMUSCULAR; INTRAVENOUS at 15:43

## 2025-04-01 RX ADMIN — HYDROMORPHONE HYDROCHLORIDE 0.5 MG: 1 INJECTION, SOLUTION INTRAMUSCULAR; INTRAVENOUS; SUBCUTANEOUS at 15:48

## 2025-04-01 RX ADMIN — PRAVASTATIN SODIUM 40 MG: 40 TABLET ORAL at 21:42

## 2025-04-01 RX ADMIN — SODIUM CHLORIDE 2 G: 900 INJECTION INTRAVENOUS at 23:49

## 2025-04-01 RX ADMIN — CEFAZOLIN 1000 MG: 1 INJECTION, POWDER, FOR SOLUTION INTRAMUSCULAR; INTRAVENOUS at 15:48

## 2025-04-01 RX ADMIN — HYDROMORPHONE HYDROCHLORIDE 0.5 MG: 1 INJECTION, SOLUTION INTRAMUSCULAR; INTRAVENOUS; SUBCUTANEOUS at 15:36

## 2025-04-01 RX ADMIN — Medication 100 MCG: at 13:45

## 2025-04-01 RX ADMIN — Medication 100 MCG: at 13:36

## 2025-04-01 RX ADMIN — SODIUM CHLORIDE, POTASSIUM CHLORIDE, SODIUM LACTATE AND CALCIUM CHLORIDE: 600; 310; 30; 20 INJECTION, SOLUTION INTRAVENOUS at 14:18

## 2025-04-01 RX ADMIN — OXYCODONE 5 MG: 5 TABLET ORAL at 17:35

## 2025-04-01 RX ADMIN — Medication 10 ML: at 20:31

## 2025-04-01 RX ADMIN — PREGABALIN 150 MG: 150 CAPSULE ORAL at 11:09

## 2025-04-01 RX ADMIN — LIDOCAINE HYDROCHLORIDE 50 MG: 10 INJECTION, SOLUTION EPIDURAL; INFILTRATION; INTRACAUDAL; PERINEURAL at 13:02

## 2025-04-01 RX ADMIN — Medication 100 MCG: at 13:27

## 2025-04-01 RX ADMIN — FAMOTIDINE 20 MG: 20 TABLET, FILM COATED ORAL at 11:09

## 2025-04-01 RX ADMIN — PHENYLEPHRINE HYDROCHLORIDE 20 MCG/MIN: 10 INJECTION INTRAVENOUS at 13:45

## 2025-04-01 RX ADMIN — SODIUM CHLORIDE 2000 MG: 900 INJECTION INTRAVENOUS at 13:02

## 2025-04-01 RX ADMIN — SODIUM CHLORIDE, POTASSIUM CHLORIDE, SODIUM LACTATE AND CALCIUM CHLORIDE: 600; 310; 30; 20 INJECTION, SOLUTION INTRAVENOUS at 12:59

## 2025-04-01 RX ADMIN — ACETAMINOPHEN TAB 500 MG 1000 MG: 500 TAB at 11:10

## 2025-04-01 NOTE — H&P
Pre-Op H&P  Park Ray  [id number]  1955      Chief complaint: Left knee pain      Subjective:  Patient is a 69 y.o.female presents for scheduled surgery by Dr. Roger.  She anticipates a TOTAL KNEE ARTHROPLASTY WITH CORI ROBOT LEFT  today.  She reports worsening left knee pain over the last 2 years.  She states she had a procedure on her knee a couple weeks ago and has had severe and debilitating pain since.  She has been mostly wheelchair-bound.  The pain is worse with walking, standing, sitting, climbing stairs, sleeping, leisure, lying on affected side, rising from seated position, and any movement of the joint; ice improve the pain. Conservative treatments failed to provide lasting benefits.      Review of Systems:  Constitutional-- No fever, chills or sweats. No fatigue.  CV-- No chest pain, palpitation or syncope. +HTN, HLD  Resp-- No SOB, cough, hemoptysis  Skin--No rashes or lesions      Allergies:   Allergies   Allergen Reactions    Lisinopril Anaphylaxis    Tetracyclines & Related Hives and Nausea Only         Home Meds:  Medications Prior to Admission   Medication Sig Dispense Refill Last Dose/Taking    acyclovir (ZOVIRAX) 400 MG tablet Take 1 tablet by mouth Daily. If outbreak occurs while on suppressive therapy, call office for further instructions. 30 tablet 11     albuterol sulfate  (90 Base) MCG/ACT inhaler Inhale 2 puffs 4 (Four) Times a Day.       aspirin 81 MG EC tablet Take 1 tablet by mouth Daily.       Chlorhexidine Gluconate 4 % solution Apply 1 Application topically to the appropriate area as directed Daily. Shower with hibiclens solution as directed for 5 days prior to surgery 236 mL 0     chlorthalidone (HYGROTON) 25 MG tablet TAKE 1 TABLET BY MOUTH DAILY 30 tablet 11     Cholecalciferol (VITAMIN D-3) 5000 units tablet Take 1 tablet by mouth Daily.       estradiol (ESTRACE VAGINAL) 0.1 MG/GM vaginal cream Insert 1 gm intravaginally 1-3 times each week 1 each 12      fluticasone (FLONASE) 50 MCG/ACT nasal spray Administer 2 sprays into the nostril(s) as directed by provider Daily.       furosemide (LASIX) 40 MG tablet Take 1 tablet by mouth Daily As Needed.       HYDROcodone-acetaminophen (NORCO) 7.5-325 MG per tablet Take 7.5 mg of hydrocodone by mouth Every 12 (Twelve) Hours As Needed for Moderate Pain.       levothyroxine (SYNTHROID, LEVOTHROID) 50 MCG tablet Take 1 tablet by mouth Every Morning. UNITHROID       multivitamin with minerals (Centrum Silver Ultra Womens) tablet tablet Take 1 tablet by mouth Daily.       nebivolol (BYSTOLIC) 10 MG tablet TAKE 1 TABLET BY MOUTH DAILY 30 tablet 11     nitrofurantoin, macrocrystal-monohydrate, (MACROBID) 100 MG capsule Take 1 capsule by mouth Daily.       Omeprazole 20 MG Tablet Delayed Release Dispersible Take 1 tablet by mouth Daily.       ondansetron ODT (ZOFRAN-ODT) 8 MG disintegrating tablet Take 1 tablet by mouth.       Ospemifene 60 MG tablet Take 1 tablet by mouth Daily. THIS PT NEEDS TO KEEP AN APPT HERE BEFORE ANY ADDITIONAL REFILLS. 30 tablet 0     potassium chloride ER (K-TAB) 20 MEQ tablet controlled-release ER tablet Take 1 tablet by mouth Daily.       pravastatin (PRAVACHOL) 40 MG tablet Take 1 tablet by mouth Every Night.       PYRIDOSTIGMINE BROMIDE PO Take 60 mg by mouth 3 (Three) Times a Day As Needed.       VITAMIN E 1000 UNIT capsule             PMH:   Past Medical History:   Diagnosis Date    Acute torn meniscus of left knee     Arthritis of neck 2016    Asthma Nov 2023    history of    Closed displaced fracture of shaft of left clavicle     Closed nondisplaced fracture of neck of left radius     Colon cancer     tumor removed with 18 inches of colon    Fatty liver     Fracture of proximal phalanx of toe     Herpes     History of stomach ulcers     Hyperlipidemia 08/2023    Yearly lab work Dr Nevin Finnegan    Hypertension 11/2023    Dr Erasto Anna Dentist    Hypothyroidism     IBS (irritable bowel syndrome)      Mixed incontinence urge and stress     Osteoarthritis     Rotator cuff syndrome      PSH:    Past Surgical History:   Procedure Laterality Date    CATARACT EXTRACTION       SECTION      COLECTOMY PARTIAL / TOTAL Left 2005    Dr. Donohue    COLONOSCOPY      ENTEROCELE REPAIR      Dr. Donohue    LAPAROSCOPIC CHOLECYSTECTOMY  2015    Dr. Cori Porras    LAPAROSCOPIC SALPINGOOPHERECTOMY Right     DAWSON MORA (JOANNK) PROCEDURE      Dr. Donohue    TOTAL ABDOMINAL HYSTERECTOMY WITH SALPINGO OOPHORECTOMY Left     Dr. Dodson    TRIGGER POINT INJECTION  2024    Both knees    UTERINE SUSPENSION LAPAROSCOPIC      Dr. Lobato       Immunization History:  Influenza: No  Pneumococcal: UTD  Tetanus: UTD    Social History:   Tobacco:   Social History     Tobacco Use   Smoking Status Never   Smokeless Tobacco Never   Tobacco Comments    Never      Alcohol:     Social History     Substance and Sexual Activity   Alcohol Use Yes    Alcohol/week: 2.0 standard drinks of alcohol    Types: 2 Drinks containing 0.5 oz of alcohol per week    Comment: Socially upon occasion         Physical Exam: VS: /80   HR 73  RR 16  T 97.8  Sat 100%RA      General Appearance:    Alert, cooperative, no distress, appears stated age   Head:    Normocephalic, without obvious abnormality, atraumatic   Lungs:     Clear to auscultation bilaterally, respirations unlabored    Heart:   Regular rate and rhythm, S1 and S2 normal    Abdomen:    Soft without tenderness   Extremities:   Extremities normal, atraumatic, no cyanosis or edema   Skin:   Skin color, texture, turgor normal, no rashes or lesions   Neurologic:   Grossly intact     Results Review:     LABS:  Lab Results   Component Value Date    WBC 8.30 2025    HGB 13.6 2025    HCT 41.0 2025    MCV 88.0 2025     2025    NEUTROABS 5.57 2025    GLUCOSE 108 (H) 2025    BUN 17 2025    CREATININE 0.64 2025     EGFRIFNONA 76 02/01/2022     03/18/2025    K 3.9 03/18/2025     03/18/2025    CO2 28.0 03/18/2025    MG 2.1 06/28/2024    CALCIUM 9.5 03/18/2025    ALBUMIN 3.30 (L) 02/01/2022    AST 18 02/01/2022    ALT 20 02/01/2022    BILITOT 0.2 02/01/2022       RADIOLOGY:  Imaging Results (Last 72 Hours)       ** No results found for the last 72 hours. **            I reviewed the patient's new clinical results.    Cancer Staging (if applicable)  Cancer Patient: __ yes __no __unknown; If yes, clinical stage T:__ N:__M:__, stage group or __N/A      Impression: Osteoarthritis of left knee      Plan: TOTAL KNEE ARTHROPLASTY WITH CORI ROBOT LEFT       SAMANTHA Garcia   4/1/2025   11:01 EDT    Reviewed above.  Plan for left total knee arthroplasty.    Jose Roger MD  04/01/25  12:28 EDT

## 2025-04-01 NOTE — OP NOTE
DATE OF PROCEDURE: 04/01/25    PREOPERATIVE DIAGNOSIS: left knee arthritis      POSTOPERATIVE DIAGNOSIS:  left knee arthritis    PROCEDURES PERFORMED:   left total knee arthroplasty with [employer] components (# 4 narrow cruciate retaining femur, # 1 tibia, 11 mm dished polyethylene, with 29 three peg patella) with CORI robotic assitance    Surgical Approach: Knee Medial Parapatellar    SURGEON: Jose Roger MD    ASSISTANT: Brandon Cabezas PA-C  (Brandon Cabezas PA-C was present and necessary for positioning, draping, retraction, instrumentation and closure.)    SPECIMENS: None    IMPLANTS:   Implant Name Type Inv. Item Serial No.  Lot No. LRB No. Used Action   CMT BONE PALACOS R HI/VISC 1X40 - [id number] Implant CMT BONE PALACOS R HI/VISC 1X40  [facility] [id number] Left 1 Implanted   CMT BONE PALACOS R HI/VISC 1X40 - [id number] Implant CMT BONE PALACOS R HI/VISC 1X40  [facility] [id number] Left 1 Implanted   DEV CONTRL TISS STRATAFIX SPIRAL MNCRYL UD 3/0 PLS 60CM - [id number] Implant DEV CONTRL TISS STRATAFIX SPIRAL MNCRYL UD 3/0 PLS 60CM  [employer]  DIV OF J AND J [id number] Left 1 Implanted   DEV CONTRL TISS STRATAFIX SYMM PDS PLUS JAMAL CT-1 45CM - [id number] Implant DEV CONTRL TISS STRATAFIX SYMM PDS PLUS JAMAL CT-1 45CM  [employer]  DIV OF J AND J [id number] Left 1 Implanted   BASE TIB/KN GEN2 NONPOR TI SZ1 LT - [id number] Implant BASE TIB/KN GEN2 NONPOR TI SZ1 LT  [employer] [id number] Left 1 Implanted   COMP FEM LEGION OXINIUM CR NRW SZ4 LT - [id number] Implant COMP FEM LEGION OXINIUM CR NRW SZ4 LT  [employer] [id number] Left 1 Implanted   PAT RESRF GEN2 7.5X29MM - [id number] Implant PAT RESRF GEN2 7.5X29MM  [employer] [id number] Left 1 Implanted   INSRT ART/KN LEGION CR DEEP DISH XLPE SZ1TO2 11MM - [id number] Implant INSRT ART/KN LEGION CR DEEP DISH XLPE SZ1TO2 11MM  [employer] [id number] Left 1 Implanted         ANESTHESIA:  Spinal    STAFF:  Circulator:  Marcela Solorio, RN  Scrub Person: William Alvarez; Derrell Muhammad  Vendor Representative: Eliezer Souza ([employer])  Nursing Assistant: Yoselin Bonner  Assistant: Aidan Cabezas PA    TOURNIQUET TIME: 17 minutes    ESTIMATED BLOOD LOSS: 50 mL     COMPLICATIONS: None    PREOPERATIVE ANTIBIOTICS: Ancef 2 g    INDICATIONS: The patient is a 69 y.o. female with debilitating left knee pain secondary to osteoarthritis that failed to improve in spite of conservative treatment .  Options have been discussed at length with the patient and the patient has had an extended course of conservative treatment without long-term benefit. The patient has reached the point where the patient desires total knee arthroplasty surgery and understands the risks, benefits, and alternatives. Consent was obtained. Please see my office notes for details with regard to preoperative counseling and operative rationale.     DESCRIPTION OF PROCEDURE: The patient was positively identified in the preoperative holding area and brought to the operating suite and placed in a supine position. After adequate spinal anesthetic had been achieved, the left lower extremity was prepped and draped in the usual sterile fashion.  After application of a tourniquet to the left upper thigh, which was used during the procedure for a total 17 minutes during the cementation process only. Landmarks of the knee were identified and timeout procedure was performed to confirm the operative site, as well as other parameters. Following the sterile prep and drape, a skin incision was made just off the medial aspect of midline for a medial parapatellar approach. Following a sharp skin incision, dissection was carried down to the level of the extensor mechanism and a medial parapatellar arthrotomy was made and the patella was tucked into the lateral gutter.  Anterior horns of the medial and lateral menisci were removed, and ACL transected.  Osteophytes were also removed.   Description of arthritis: Bone-on-bone contact medial compartment, tricompartmental osteophytes, varus alignment, tricompartmental arthritis.      [employer] robotic system was then employed to assist with preparation of the femoral and tibial bone surfaces.  Femoral and tibial arrays were placed, as well as markers in both the femur and tibia.  System was registered in a systematic fashion, and 3D models created of both the femoral and tibial aspects. Range of motion and gap assessments were also performed, and implants were selected and appropriately sized and oriented virtually on both the femur and tibial aspects starting with the flexion gap, and then progressing to the extension gap.  Planning was made for a 4 narrow cruciate retaining femoral component and a 2 tibial component with a 9 mm insert. The patella was prepared with the cutting guide for a 29 implant and a trial patella placed.  Once virtual balancing had been achieved, distal femoral surface was then prepared with the CORI teresa, followed by preparation for the 4-in-1 cutting block.  Proximal tibial cut was then performed in a guided fashion, posterior condyles were freed of osteophytes, and trial implants placed, namely a 4 cruciate retaining femur with a 1 tibia and a 11 mm dished trial insert.  Range of motion and gap assessments were again performed with excellent balancing noted and the patella had excellent tracking.     Therefore the trial components were removed, and preparations made for final placement, and final components were cemented in place with namely a # 1 tibia, 11 mm dished insert, # 4 narrow cruciate retaining femur, and a 29 three peg patella, with appropriate gap balancing and full range of motion with no instability. All the excess cement was removed from the bone implant interface and allowed to harden. Tourniquet was deflated. Hemostasis was obtained with electrocautery. There was no brisk bleeding noted in the popliteal fossa in  particular. Therefore, the knee was copiously irrigated as it was between major steps and attention was then directed towards closure. The medial parapatellar arthrotomy was closed with #1 Vicryl in an interrupted figure-of-eight fashion in 4 strategic locations followed by oversewing this from proximal to distal with a #1 StrataFix symmetric, which nicely sealed the joint, followed by closure of the deep fascial layer with #1 Vicryl in a buried interrupted fashion, followed by closure of the subcutaneous layer with 2-0 Vicryl and the skin with 3-0 Stratafix in a running subcuticular fashion.  Adhesive wound closure dressing was applied followed by a sterile dressing with 4 x 4's, abdominal pad, soft roll and Ace wrap. The patient tolerated the procedure well and was brought to the recovery room in good condition.     PLAN:  1.  The patient will begin early range of motion and weight-bearing per the post total knee arthroplasty protocol.   2.  I anticipate brief hospitalization for initial rehabilitation and pain control followed by continued rehabilitation outpatient physical therapy setting.  Patient likely for discharge tomorrow as long as she is cleared medically and by physical therapy.  Follow-up in 3 weeks as planned.   3.  Postoperative medical management with Dr. Prince.  4.  Aspirin 81 mg p.o. twice daily for 1 month will be utilized for DVT prophylaxis.       Jose Roger MD  04/01/25  14:37 EDT

## 2025-04-01 NOTE — H&P
Patient Name: Park Ray  MRN: [id number]  : 1955  DOS: 2025    Attending: Jose Roger MD    Primary Care Provider: Nevin Finnegan APRN      Chief complaint: Left knee Pain.    Subjective   Patient is a pleasant 69 y.o. female presented for scheduled surgery by Dr. Roger    Per his note: (The patient is a 69 y.o. female with debilitating left knee pain secondary to osteoarthritis that failed to improve in spite of conservative treatment .  Options have been discussed at length with the patient and the patient has had an extended course of conservative treatment without long-term benefit. The patient has reached the point where the patient desires total knee arthroplasty surgery and understands the risks, benefits, and alternatives. Consent was obtained. Please see my office notes for details with regard to preoperative counseling and operative rationale. )    Patient underwent left total knee arthroplasty under spinal anesthesia, tolerated surgery well.  Adductor canal nerve block catheter was placed by acute pain service, and patient was admitted for further management.    Seen postoperatively, doing well with good pain control, no complaints of nausea, vomiting, or shortness of breath.  Patient has no history of DVT or PE.    Reviewed with patient past medical history and home medications    Allergies:  Allergies   Allergen Reactions    Lisinopril Anaphylaxis    Tetracyclines & Related Hives and Nausea Only       Meds:  Medications Prior to Admission   Medication Sig Dispense Refill Last Dose/Taking    acyclovir (ZOVIRAX) 400 MG tablet Take 1 tablet by mouth Daily. If outbreak occurs while on suppressive therapy, call office for further instructions. 30 tablet 11 3/31/2025 at  8:00 PM    Chlorhexidine Gluconate 4 % solution Apply 1 Application topically to the appropriate area as directed Daily. Shower with hibiclens solution as directed for 5 days prior to surgery 236 mL 0 2025 at   7:30 AM    chlorthalidone (HYGROTON) 25 MG tablet TAKE 1 TABLET BY MOUTH DAILY 30 tablet 11 4/1/2025 at  7:30 AM    Cholecalciferol (VITAMIN D-3) 5000 units tablet Take 1 tablet by mouth Daily.   3/31/2025 at  8:00 AM    estradiol (ESTRACE VAGINAL) 0.1 MG/GM vaginal cream Insert 1 gm intravaginally 1-3 times each week 1 each 12 3/29/2025 at  8:00 PM    HYDROcodone-acetaminophen (NORCO) 7.5-325 MG per tablet Take 7.5 mg of hydrocodone by mouth Every 12 (Twelve) Hours As Needed for Moderate Pain.   3/31/2025 at  5:00 PM    levothyroxine (SYNTHROID, LEVOTHROID) 50 MCG tablet Take 1 tablet by mouth Every Morning. UNITHROID   4/1/2025 at  7:30 AM    nebivolol (BYSTOLIC) 10 MG tablet TAKE 1 TABLET BY MOUTH DAILY 30 tablet 11 3/31/2025 at  8:00 PM    nitrofurantoin, macrocrystal-monohydrate, (MACROBID) 100 MG capsule Take 1 capsule by mouth Daily.   3/31/2025 at  8:00 PM    Ospemifene 60 MG tablet Take 1 tablet by mouth Daily. THIS PT NEEDS TO KEEP AN APPT HERE BEFORE ANY ADDITIONAL REFILLS. 30 tablet 0 3/31/2025 at  8:00 PM    potassium chloride ER (K-TAB) 20 MEQ tablet controlled-release ER tablet Take 1 tablet by mouth Daily.   3/31/2025 at  8:00 PM    pravastatin (PRAVACHOL) 40 MG tablet Take 1 tablet by mouth Every Night.   3/30/2025 at  8:00 PM    albuterol sulfate  (90 Base) MCG/ACT inhaler Inhale 2 puffs 4 (Four) Times a Day.   3/18/2025    aspirin 81 MG EC tablet Take 1 tablet by mouth Daily.   3/17/2025 at  8:00 AM    fluticasone (FLONASE) 50 MCG/ACT nasal spray Administer 2 sprays into the nostril(s) as directed by provider Daily.   3/29/2025 at  8:00 PM    furosemide (LASIX) 40 MG tablet Take 1 tablet by mouth Daily As Needed.   3/29/2025 at  8:00 AM    multivitamin with minerals (Centrum Silver Ultra Womens) tablet tablet Take 1 tablet by mouth Daily.       Omeprazole 20 MG Tablet Delayed Release Dispersible Take 1 tablet by mouth Daily.   3/30/2025 at  8:00 PM    ondansetron ODT (ZOFRAN-ODT) 8 MG  disintegrating tablet Take 1 tablet by mouth.   More than a month    PYRIDOSTIGMINE BROMIDE PO Take 60 mg by mouth 3 (Three) Times a Day As Needed.   3/29/2025 at  8:00 PM    VITAMIN E 1000 UNIT capsule    3/18/2025          Past Medical History:   Diagnosis Date    Acute torn meniscus of left knee     Arthritis of neck 2016    Asthma 2023    history of    Closed displaced fracture of shaft of left clavicle     Closed nondisplaced fracture of neck of left radius     Colon cancer     tumor removed with 18 inches of colon    Fatty liver     Fracture of proximal phalanx of toe     Herpes     History of stomach ulcers     Hyperlipidemia 2023    Yearly lab work Dr Nevin Finnegan    Hypertension 2023    Dr Erasto Anna Dentist    Hypothyroidism     IBS (irritable bowel syndrome)     Mixed incontinence urge and stress     Osteoarthritis     Rotator cuff syndrome      Past Surgical History:   Procedure Laterality Date    CATARACT EXTRACTION       SECTION      COLECTOMY PARTIAL / TOTAL Left 2005    Dr. Donohue    COLONOSCOPY      ENTEROCELE REPAIR      Dr. Donohue    LAPAROSCOPIC CHOLECYSTECTOMY  2015    Dr. Cori Porras    LAPAROSCOPIC SALPINGOOPHERECTOMY Right     DAWSON MORA (MMK) PROCEDURE      Dr. Donohue    TOTAL ABDOMINAL HYSTERECTOMY WITH SALPINGO OOPHORECTOMY Left     Dr. Dodson    TRIGGER POINT INJECTION  2024    Both knees    UTERINE SUSPENSION LAPAROSCOPIC      Dr. Lobato     Family History   Problem Relation Age of Onset    Breast cancer Mother 40    Cancer Mother         Breast cancer    Cancer Father         Prostate, lung, brain cancer    Heart disease Father     Hypertension Father     Osteoarthritis Father     Breast cancer Sister 70    Hypertension Sister     Heart disease Sister     Hypertension Sister     Cancer Sister         Breast cancer    Brain cancer Brother     Hypertension Brother     Diabetes Brother     Heart disease Brother     Heart failure Brother  "    Hypertension Brother     Cancer Brother         Brain cancer    Breast cancer Maternal Grandmother     Cancer Maternal Grandmother         Breast cancer    Breast cancer Paternal Grandmother     Cancer Paternal Grandmother         Breast cancer    Cancer Other     Heart disease Other      Social History     Tobacco Use    Smoking status: Never    Smokeless tobacco: Never    Tobacco comments:     Never   Vaping Use    Vaping status: Never Used   Substance Use Topics    Alcohol use: Yes     Alcohol/week: 2.0 standard drinks of alcohol     Types: 2 Drinks containing 0.5 oz of alcohol per week     Comment: Socially upon occasion    Drug use: No       Review of Systems  Pertinent items are noted in HPI    Vital Signs  /97 (BP Location: Left arm, Patient Position: Lying)   Pulse 71   Temp 98 °F (36.7 °C) (Oral)   Resp 18   Ht 157.5 cm (62\")   Wt 81.6 kg (180 lb)   LMP  (LMP Unknown) Comment: S/P AH, LEFT S&O/RIGHT S&O  SpO2 97%   BMI 32.92 kg/m²     Physical Exam:    General Appearance:    Alert, cooperative, in no acute distress   Head:    Normocephalic, without obvious abnormality, atraumatic   Eyes:            Lids and lashes normal, conjunctivae and sclerae normal, no   icterus, no pallor, corneas clear    Ears:    Ears appear intact with no abnormalities noted   Throat:   No oral lesions, no thrush, oral mucosa moist   Neck:   No adenopathy, supple, trachea midline, no thyromegaly         Lungs:     Clear to auscultation,respirations regular, even and                   unlabored    Heart:    Regular rhythm and normal rate, normal S1 and S2, no       murmur, no gallop   Abdomen:     Normal bowel sounds, no masses, no organomegaly, soft        nontender, nondistended, no guarding, no rebound                 tenderness   Genitalia:    Deferred   Extremities: Left LE, CDI dressing on knee, PNB cath present.    Pulses:   Pulses palpable and equal bilaterally   Skin:   No bleeding, bruising or rash " "  Neurologic:   Cranial nerves 2 - 12 grossly intact, intact flexion and dorsiflexion bilateral feet      I reviewed the patient's new clinical results.             Invalid input(s): \"NEUTOPHILPCT\"  Results from last 7 days   Lab Units 04/01/25  1109   POTASSIUM mmol/L 3.1*     Lab Results   Component Value Date    HGBA1C 5.10 03/18/2025      Latest Reference Range & Units 03/18/25 10:10   Sodium 136 - 145 mmol/L 143   Potassium 3.5 - 5.2 mmol/L 3.9   Chloride 98 - 107 mmol/L 101   CO2 22.0 - 29.0 mmol/L 28.0   Anion Gap 5.0 - 15.0 mmol/L 14.0   BUN 8 - 23 mg/dL 17   Creatinine 0.57 - 1.00 mg/dL 0.64   BUN/Creatinine Ratio 7.0 - 25.0  26.6 (H)   eGFR >60.0 mL/min/1.73 95.8   Glucose 65 - 99 mg/dL 108 (H)   Calcium 8.6 - 10.5 mg/dL 9.5   (H): Data is abnormally high     Latest Reference Range & Units 03/18/25 10:10   WBC 3.40 - 10.80 10*3/mm3 8.30   RBC 3.77 - 5.28 10*6/mm3 4.66   Hemoglobin 12.0 - 15.9 g/dL 13.6   Hematocrit 34.0 - 46.6 % 41.0   Platelets 140 - 450 10*3/mm3 227   RDW 12.3 - 15.4 % 13.5   MCV 79.0 - 97.0 fL 88.0   MCH 26.6 - 33.0 pg 29.2   MCHC 31.5 - 35.7 g/dL 33.2   MPV 6.0 - 12.0 fL 9.9   RDW-SD 37.0 - 54.0 fl 43.7     Assessment and Plan:       S/P knee replacement    Hypothyroidism    Dyslipidemia    History of colon cancer    Primary osteoarthritis of left knee    Degenerative arthritis of left knee    HTN (hypertension)    Obesity (BMI 30-39.9)      Plan  1. PT/OT,  Weight bearing as tolerated left LE  2. Pain control-prns, ACB cath with ropivacaine infusion.  Multimodal approach  3. IS-encourage  4. DVT proph- Mechanicals and aspirin  5. Bowel regimen  6. Resume home medications as appropriate  7. Monitor post-op labs  8. DC planning.    - Hypertension:  Resume home medications as appropriate, formulary substitution when indicated.  Holding parameters.  PRN medications for elevated blood pressure.    -Dyslipidemia:  Resume home regimen statin ( formulary substitution when " appropriate).    -Hypothyroidism: Resume replacement    Santiago disclaimer:  Part of this encounter note is an electronic transcription/translation of spoken language to printed text. The electronic translation of spoken language may permit erroneous, or at times, nonsensical words or phrases to be inadvertently transcribed; Although I have reviewed the note for such errors, some may still exist.    Myrtle Prince MD  04/01/25  17:59 EDT

## 2025-04-01 NOTE — ANESTHESIA PROCEDURE NOTES
Peripheral Block      Patient reassessed immediately prior to procedure    Patient location during procedure: post-op  Start time: 4/1/2025 3:12 PM  Reason for block: at surgeon's request and post-op pain management  Performed by  ODALIS/CAA: Juan David Welsh, MARK: Sofie Culver SRNA  Preanesthetic Checklist  Completed: patient identified, IV checked, site marked, risks and benefits discussed, surgical consent, monitors and equipment checked, pre-op evaluation and timeout performed  Prep:  Pt Position: supine  Sterile barriers:cap, gloves, mask, sterile barriers and washed/disinfected hands  Prep: ChloraPrep  Patient monitoring: blood pressure monitoring, continuous pulse oximetry and EKG  Procedure  Performed under: spinal  Guidance:ultrasound guided    ULTRASOUND INTERPRETATION.  Using ultrasound guidance a 20 G gauge needle was placed in close proximity to the nerve, at which point, under ultrasound guidance anesthetic was injected in the area of the nerve and spread of the anesthesia was seen on ultrasound in close proximity thereto.  There were no abnormalities seen on ultrasound; a digital image was taken; and the patient tolerated the procedure with no complications. Images:still images obtained, printed/placed on chart    Laterality:left  Block Type:adductor canal block  Injection Technique:catheter  Needle Type:Tuohy and echogenic  Needle Gauge:18 G  Resistance on Injection: none  Catheter Size:20 G (20g)  Cath Depth at skin: 10 cm    Medications Used: bupivacaine PF (MARCAINE) 0.25 % injection - Injection   20 mL - 4/1/2025 3:12:00 PM      Post Assessment  Injection Assessment: negative aspiration for heme, incremental injection and no paresthesia on injection  Patient Tolerance:comfortable throughout block  Complications:no  Additional Notes  CATHETER   A high-frequency linear transducer, with sterile cover, was placed on the anterior mid-thigh (between the anterior superior iliac spine and patella).  "The transducer was then moved medially to identify the Sartorius muscle (Janette), Vastus Medialis muscle (VMM), Superficial Femoral Artery (SFA) and Vein. The transducer was then moved cephalad or caudad to position the SFA in the middle of the Janette. The insertion site was prepped and draped in sterile fashion. Skin and cutaneous tissue was infiltrated with 2-5 ml of 1% Lidocaine. Using ultrasound-guidance, an 18-gauge Contiplex Ultra 360 Touhy needle was advanced in plane from lateral to medial. Preservative-free normal saline was utilized for hydro-dissection of tissue, advancement of Touhy, and to confirm needle placement below the fascial plane of the Janette where the Nerve to the VMM is located. Local anesthetic (LA) 5 ml deposited here. The Touhy needle continues its path lateral to the SFA at the level of the Saphenous Nerve. The remainder of the LA was deposited at the 10-11 o'clock position of the SFA. This injection created a space between the Janette and the SFA. Aspiration every 5 ml to prevent intravascular injection. Injection was completed with negative aspiration of blood and negative intravascular injection. Injection pressures were normal with minimal resistance. A 20-gauge Contiplex Echo catheter was placed through the needle and advance out the tip of the Touhy 3-5 cm anterior to the SFA. The Touhy needle was then removed, and final catheter position verified at the 12 o'clock position to the SFA. The catheter was secured in the usual fashion with skin glue, benzoin, steri-strips, CHG tegaderm and label noting \"Nerve Block Catheter\". Jerk tape applied at yellow connector and catheter connection.   Performed by: Sofie Culver SRNA            "

## 2025-04-01 NOTE — ANESTHESIA PREPROCEDURE EVALUATION
Anesthesia Evaluation     Patient summary reviewed and Nursing notes reviewed                Airway   Mallampati: II  TM distance: >3 FB  Neck ROM: full  No difficulty expected  Dental - normal exam     Pulmonary - normal exam   (+) asthma,  Cardiovascular - normal exam    (+) hypertension, hyperlipidemia    ROS comment:   Echo 12/23: normal EF, no significant valve disease     Neuro/Psych- negative ROS  GI/Hepatic/Renal/Endo    (+) obesity, GERD, liver disease fatty liver disease, thyroid problem hypothyroidism    Musculoskeletal (-) negative ROS    Abdominal  - normal exam    Bowel sounds: normal.   Substance History - negative use     OB/GYN negative ob/gyn ROS         Other                      Anesthesia Plan    ASA 3     spinal     (Peripheral  nerve block + cath for post op pain relief)  intravenous induction     Anesthetic plan, risks, benefits, and alternatives have been provided, discussed and informed consent has been obtained with: patient.    Plan discussed with CRNA.    CODE STATUS:

## 2025-04-01 NOTE — THERAPY EVALUATION
Patient Name: Park Ray  : 1955    MRN: [id number]                              Today's Date: 2025       Admit Date: 2025    Visit Dx:     ICD-10-CM ICD-9-CM   1. Primary osteoarthritis of left knee  M17.12 715.16     Patient Active Problem List   Diagnosis    Hypothyroidism    Recurrent genital herpes    Arm pain, right    Arthralgia of both knees    Bone pain    Contusion of left lower leg    Dyslipidemia    History of colon cancer    Neutrophilic leukemoid reaction    Obesity (BMI 35.0-39.9 without comorbidity)    Thumb pain    Chronic ulcer of lower extremity    Non-pressure chronic ulcer of left calf with fat layer exposed    Family history of breast cancer in mother    Fatty liver disease, nonalcoholic    Mixed incontinence urge and stress    Primary osteoarthritis of left knee    Degenerative arthritis of left knee    Nontoxic multinodular goiter    Annual GYN exam S/P TAHBSO    S/P knee replacement    HTN (hypertension)    Obesity (BMI 30-39.9)     Past Medical History:   Diagnosis Date    Acute torn meniscus of left knee     Arthritis of neck 2016    Asthma 2023    history of    Closed displaced fracture of shaft of left clavicle     Closed nondisplaced fracture of neck of left radius     Colon cancer     tumor removed with 18 inches of colon    Fatty liver     Fracture of proximal phalanx of toe     Herpes     History of stomach ulcers     Hyperlipidemia 2023    Yearly lab work Dr Nevin Finnegan    Hypertension 2023    Dr Erasto Anna Dentist    Hypothyroidism     IBS (irritable bowel syndrome)     Mixed incontinence urge and stress     Osteoarthritis     Rotator cuff syndrome      Past Surgical History:   Procedure Laterality Date    CATARACT EXTRACTION       SECTION      COLECTOMY PARTIAL / TOTAL Left 2005    Dr. Donohue    COLONOSCOPY      ENTEROCELE REPAIR      Dr. Donohue    LAPAROSCOPIC CHOLECYSTECTOMY  2015    Dr. Cori Porras    LAPAROSCOPIC  SALPINGOOPHERECTOMY Right 1994    [facility] ([facility]) PROCEDURE      Dr. Donohue    TOTAL ABDOMINAL HYSTERECTOMY WITH SALPINGO OOPHORECTOMY Left     Dr. Dodson    TRIGGER POINT INJECTION  Nov 2024    Both knees    UTERINE SUSPENSION LAPAROSCOPIC      Dr. Lobato      General Information       Row Name 04/01/25 1820          Physical Therapy Time and Intention    Document Type evaluation (P)   -AP     Mode of Treatment individual therapy;physical therapy (P)   -AP       Row Name 04/01/25 1820          General Information    Patient Profile Reviewed yes (P)   -AP     Prior Level of Function min assist:;all household mobility;gait;transfer;ADL's;using stairs;dependent:;community mobility;shopping (P)   Previously requiring assist from son PRN over the last month as knee symptoms increased. Was reliant on w/c transportation for community mobility and rollator within home. Son grocery shops, assists LBD, and home management PRN  -AP     Existing Precautions/Restrictions fall;left;weight bearing;other (see comments) (P)   WBAT s/p L TKA; adductor canal nerve catheter  -AP     Barriers to Rehab medically complex;previous functional deficit (P)   -AP       Row Name 04/01/25 1820          Living Environment    Current Living Arrangements home (P)   -AP     People in Home spouse;other (see comments) (P)   Son states he stops by after work at least 5 days per week to assist PRN  -AP       Row Name 04/01/25 1820          Home Main Entrance    Number of Stairs, Main Entrance one (P)   -AP     Stair Railings, Main Entrance none (P)   -AP       Row Name 04/01/25 1820          Stairs Within Home, Primary    Number of Stairs, Within Home, Primary one (P)   -AP     Stair Railings, Within Home, Primary railing on right side (ascending) (P)   -AP       Row Name 04/01/25 1820          Cognition    Orientation Status (Cognition) oriented x 3 (P)   -AP       Row Name 04/01/25 1820          Safety Issues/Impairments Affecting  Functional Mobility    Safety Issues Affecting Function (Mobility) awareness of need for assistance;insight into deficits/self-awareness;judgment;positioning of assistive device;problem-solving;safety precaution awareness;safety precautions follow-through/compliance;sequencing abilities (P)   -AP     Impairments Affecting Function (Mobility) balance;coordination;endurance/activity tolerance;motor control;pain;range of motion (ROM);sensation/sensory awareness;strength (P)   -AP               User Key  (r) = Recorded By, (t) = Taken By, (c) = Cosigned By      Initials Name Provider Type    AP Honey Bond, PT Student PT Student                   Mobility       Row Name 04/01/25 1824          Bed Mobility    Comment, (Bed Mobility) Pt received sitting UIC pre-treatment, left sitting UIC post-treatment (P)   -AP       Row Name 04/01/25 1824          Sit-Stand Transfer    Sit-Stand [place] (Transfers) verbal cues;contact guard;2 person assist (P)   -AP     Assistive Device (Sit-Stand Transfers) walker, front-wheeled (P)   -AP     Comment, (Sit-Stand Transfer) Completed from chair x1, commode x1. VCs for proper hand placement and appropriate posterior approach to seated surface to prevent abandoning FWW. Demo for LLE extension during stand>sit for comfort (P)   -AP       Row Name 04/01/25 1824          Gait/Stairs (Locomotion)    [place] Level (Gait) verbal cues;contact guard;2 person assist (P)   -AP     Assistive Device (Gait) walker, front-wheeled (P)   -AP     Patient was able to Ambulate yes (P)   -AP     Distance in Feet (Gait) 120 (P)   ~120' x1, ~20' x1  -AP     Deviations/Abnormal Patterns (Gait) antalgic;bilateral deviations;base of support, narrow;franco decreased;gait speed decreased;stride length decreased (P)   -AP     Bilateral Gait Deviations forward flexed posture;heel strike decreased (P)   -AP     Left Sided Gait Deviations weight shift ability decreased (P)   -AP     Maintains  Weight-bearing Status (Gait) able to maintain (P)   -AP     [place] Level (Stairs) not tested (P)   -AP     Comment, (Gait/Stairs) Ambulated ~120' in hallway, progressing towards good step-through gait pattern. VCs for emphasis on heel strike and L knee extension during stance, upright trunk posture, and decreased reliance on BUE during ambulation. Further distance limited by pt-reported 10/10 L knee pain. No overt LOB or knee buckling noted. Formal stairs assessment necessary. (P)   -AP       Row Name 04/01/25 1824          Mobility    Extremity Weight-bearing Status left lower extremity (P)   -AP     Left Lower Extremity (Weight-bearing Status) weight-bearing as tolerated (WBAT) (P)   -AP               User Key  (r) = Recorded By, (t) = Taken By, (c) = Cosigned By      Initials Name Provider Type    AP Honey Bond PT Student PT Student                   Obj/Interventions       Row Name 04/01/25 1827          Range of Motion Comprehensive    General Range of Motion lower extremity range of motion deficits identified (P)   -AP     Comment, General Range of Motion L knee ROM: 11-76 (P)   -AP       Row Name 04/01/25 1827          Strength Comprehensive (MMT)    General Manual Muscle Testing (MMT) Assessment lower extremity strength deficits identified (P)   -AP     Comment, General Manual Muscle Testing (MMT) Assessment Pt able to complete B active ankle DF and L SLR with Min A d/t elevated pain (P)   -AP       Row Name 04/01/25 1827          Motor Skills    Therapeutic Exercise knee;ankle (P)   -AP       Row Name 04/01/25 1827          Knee (Therapeutic Exercise)    Knee (Therapeutic Exercise) isometric exercises;strengthening exercise (P)   -AP     Knee Isometrics (Therapeutic Exercise) quad sets;left;supine;10 repetitions;3 second hold (P)   -AP     Knee Strengthening (Therapeutic Exercise) SLR (straight leg raise);SAQ (short arc quad);heel slides;left;supine;LAQ (long arc quad);sitting;10 repetitions  (P)   Min A required for SLR and LAQ d/t elevated pain  -AP       Row Name 04/01/25 1827          Ankle (Therapeutic Exercise)    Ankle (Therapeutic Exercise) AROM (active range of motion) (P)   -AP     Ankle AROM (Therapeutic Exercise) bilateral;dorsiflexion;plantarflexion;supine;10 repetitions (P)   -AP       Row Name 04/01/25 1827          Balance    Balance Assessment sitting static balance;standing static balance;sitting dynamic balance;standing dynamic balance (P)   -AP     Static Sitting Balance standby assist;1-person assist (P)   -AP     Dynamic Sitting Balance standby assist;1-person assist (P)   -AP     Position, Sitting Balance unsupported;sitting in chair;other (see comments) (P)   commode  -AP     Static Standing Balance contact guard;1-person assist (P)   -AP     Dynamic Standing Balance contact guard;2-person assist (P)   -AP     Position/Device Used, Standing Balance walker, front-wheeled (P)   -AP     Balance Interventions sitting;standing;sit to stand;supported;dynamic;minimal challenge;occupation based/functional task (P)   -AP     Comment, Balance No overt LOB or knee buckling noted with ambulation (P)   -AP       Row Name 04/01/25 1827          Sensory Assessment (Somatosensory)    Sensory Assessment (Somatosensory) LE sensation intact;other (see comments) (P)   Pt reports neuropathy-related changes in BLE sensation at baseline with diminished light touch sensation globally over lower legs  -AP               User Key  (r) = Recorded By, (t) = Taken By, (c) = Cosigned By      Initials Name Provider Type    AP Honey Bond, PT Student PT Student                   Goals/Plan       Row Name 04/01/25 1833          Bed Mobility Goal 1 (PT)    Activity/Assistive Device (Bed Mobility Goal 1, PT) sit to supine/supine to sit (P)   -AP     [place] Level/Cues Needed (Bed Mobility Goal 1, PT) modified independence (P)   -AP     Time Frame (Bed Mobility Goal 1, PT) short term goal (STG);1 day (P)    -AP       Row Name 04/01/25 1833          Transfer Goal 1 (PT)    Activity/Assistive Device (Transfer Goal 1, PT) sit-to-stand/stand-to-sit (P)   -AP     [place] Level/Cues Needed (Transfer Goal 1, PT) modified independence (P)   with FWW  -AP     Time Frame (Transfer Goal 1, PT) short term goal (STG);1 day (P)   -AP       Row Name 04/01/25 1833          Gait Training Goal 1 (PT)    Activity/Assistive Device (Gait Training Goal 1, PT) gait (walking locomotion) (P)   -AP     [place] Level (Gait Training Goal 1, PT) modified independence (P)   with FWW  -AP     Distance (Gait Training Goal 1, PT) 150' (P)   -AP     Time Frame (Gait Training Goal 1, PT) short term goal (STG);1 day (P)   -AP       Row Name 04/01/25 1833          ROM Goal 1 (PT)    ROM Goal 1 (PT) L knee ROM: 0-90 (P)   -AP     Time Frame (ROM Goal 1, PT) long-term goal (LTG);3 days (P)   -AP       Row Name 04/01/25 1833          Stairs Goal 1 (PT)    Activity/Assistive Device (Stairs Goal 1, PT) ascending stairs;descending stairs;step-to-step (P)   -AP     [place] Level/Cues Needed (Stairs Goal 1, PT) modified independence (P)   with FWW  -AP     Number of Stairs (Stairs Goal 1, PT) 1 (P)   -AP     Time Frame (Stairs Goal 1, PT) long term goal (LTG);3 days (P)   -AP       Row Name 04/01/25 1833          Therapy Assessment/Plan (PT)    Planned Therapy Interventions (PT) balance training;bed mobility training;gait training;home exercise program;ROM (range of motion);patient/family education;stair training;strengthening;stretching;transfer training (P)   -AP               User Key  (r) = Recorded By, (t) = Taken By, (c) = Cosigned By      Initials Name Provider Type    Honey Vasquez, PT Student PT Student                   Clinical Impression       Row Name 04/01/25 1829          Pain    Pretreatment Pain Rating 10/10 (P)   -AP     Posttreatment Pain Rating 10/10 (P)   -AP     Pain Location knee (P)   -AP     Pain Side/Orientation  left;anterior;posterior (P)   -AP     Pain Management Interventions cold applied;nursing notified;exercise or physical activity utilized;positioning techniques utilized (P)   -AP     Response to Pain Interventions activity participation with increased pain (P)   -AP       Row Name 04/01/25 1829          Plan of Care Review    Plan of Care Reviewed With patient;child (P)   -AP     Progress no change (P)   -AP     Outcome Evaluation Pt presents POD#0 following L TKA. Ambulated ~120' with FWW and CGA x2, progressing towards good step-through gait pattern. No overt LOB or knee buckling noted. Further distance/mobility limited by pt-reported 10/10 knee pain throughout session. Pt would benefit from overnight stay in order to increase independence with functional mobility and HEP prior to d/c. When medically appropriate, recommend d/c home with 24/7 assist and home health PT services. Formal stairs assessment still needed. Pt will need FWW. (P)   -AP       Row Name 04/01/25 1828          Therapy Assessment/Plan (PT)    Patient/Family Therapy Goals Statement (PT) D/c home with  and support of son (P)   -AP     Rehab Potential (PT) good (P)   -AP     Criteria for Skilled Interventions Met (PT) yes;meets criteria;skilled treatment is necessary (P)   -AP     Therapy Frequency (PT) 2 times/day (P)   -AP     Predicted Duration of Therapy Intervention (PT) 1 day (P)   -AP       Row Name 04/01/25 1827          Positioning and Restraints    Pre-Treatment Position sitting in chair/recliner (P)   -AP     Post Treatment Position chair (P)   -AP     In Chair notified nsg;reclined;call light within reach;encouraged to call for assist;exit alarm on;with family/caregiver;compression device;legs elevated;LLE elevated (P)   -AP               User Key  (r) = Recorded By, (t) = Taken By, (c) = Cosigned By      Initials Name Provider Type    Honey Vasquez, PT Student PT Student                   Outcome Measures       Row Name  04/01/25 1834          How much help from another person do you currently need...    Turning from your back to your side while in flat bed without using bedrails? 4 (P)   -AP     Moving from lying on back to sitting on the side of a flat bed without bedrails? 3 (P)   -AP     Moving to and from a bed to a chair (including a wheelchair)? 3 (P)   -AP     Standing up from a chair using your arms (e.g., wheelchair, bedside chair)? 3 (P)   -AP     Climbing 3-5 steps with a railing? 2 (P)   -AP     To walk in hospital room? 3 (P)   -AP     AM-PAC 6 Clicks Score (PT) 18 (P)   -AP     Highest Level of Mobility Goal 6 --> Walk 10 steps or more (P)   -AP       Row Name 04/01/25 1834          PADD    Diagnosis 1 (P)   -AP     Gender 1 (P)   -AP     Age Group 1 (P)   -AP     Gait Distance 0 (P)   -AP     Assist Level 1 (P)   -AP     Home Support 3 (P)   Has support PRN from son.  is unable to assist physically  -AP     PADD Score 7 (P)   -AP     Patient Preference home with home health (P)   -AP     Prediction by PADD Score extended rehabilitation (P)   -AP       Row Name 04/01/25 1834          Functional Assessment    Outcome Measure Options PADD;AM-PAC 6 Clicks Basic Mobility (PT) (P)   -AP               User Key  (r) = Recorded By, (t) = Taken By, (c) = Cosigned By      Initials Name Provider Type    AP Honey Bond, PT Student PT Student                                 Physical Therapy Education       Title: PT OT SLP Therapies (Done)       Topic: Physical Therapy (Done)       Point: Mobility training (Done)       Learning Progress Summary            Patient Acceptance, E,TB,H, VU,DU by AP at 4/1/2025 1835   Family Acceptance, E,TB,H, VU,DU by AP at 4/1/2025 1835                      Point: Home exercise program (Done)       Learning Progress Summary            Patient Acceptance, E,TB,H, VU,DU by AP at 4/1/2025 1835   Family Acceptance, E,TB,H, VU,DU by AP at 4/1/2025 1835                      Point: Body  mechanics (Done)       Learning Progress Summary            Patient Acceptance, E,TB,H, VU,DU by AP at 4/1/2025 1835   Family Acceptance, E,TB,H, VU,DU by AP at 4/1/2025 1835                      Point: Precautions (Done)       Learning Progress Summary            Patient Acceptance, E,TB,H, VU,DU by AP at 4/1/2025 1835   Family Acceptance, E,TB,H, VU,DU by AP at 4/1/2025 1835                                      User Key       Initials Effective Dates Name Provider Type Discipline     01/09/25 -  Honey Bond, PT Student PT Student PT                  PT Recommendation and Plan  Planned Therapy Interventions (PT): (P) balance training, bed mobility training, gait training, home exercise program, ROM (range of motion), patient/family education, stair training, strengthening, stretching, transfer training  Progress: (P) no change  Outcome Evaluation: (P) Pt presents POD#0 following L TKA. Ambulated ~120' with FWW and CGA x2, progressing towards good step-through gait pattern. No overt LOB or knee buckling noted. Further distance/mobility limited by pt-reported 10/10 knee pain throughout session. Pt would benefit from overnight stay in order to increase independence with functional mobility and HEP prior to d/c. When medically appropriate, recommend d/c home with 24/7 assist and home health PT services. Formal stairs assessment still needed. Pt will need FWW.     Time Calculation:   PT Evaluation Complexity  History, PT Evaluation Complexity: (P) 3 or more personal factors and/or comorbidities  Examination of Body Systems (PT Eval Complexity): (P) total of 3 or more elements  Clinical Presentation (PT Evaluation Complexity): (P) stable  Clinical Decision Making (PT Evaluation Complexity): (P) low complexity  Overall Complexity (PT Evaluation Complexity): (P) low complexity     PT Charges       Row Name 04/01/25 1836             Time Calculation    Start Time 1740 (P)   -AP      PT Received On 04/01/25 (P)   -AP       PT Goal Re-Cert Due Date 04/11/25 (P)   -AP         Timed Charges    34332 - PT Therapeutic Exercise Minutes 10 (P)   -AP         Untimed Charges    PT Eval/Re-eval Minutes 50 (P)   -AP         Total Minutes    Timed Charges Total Minutes 10 (P)   -AP      Untimed Charges Total Minutes 50 (P)   -AP       Total Minutes 60 (P)   -AP                User Key  (r) = Recorded By, (t) = Taken By, (c) = Cosigned By      Initials Name Provider Type    AP Honey Bond, PT Student PT Student                  Therapy Charges for Today       Code Description Service Date Service Provider Modifiers Qty    [id number]  PT THER PROC EA 15 MIN 4/1/2025 Honey Bond, PT Student GP 1    [id number] HC PT EVAL LOW COMPLEXITY 4 4/1/2025 Honey Bond, PT Student GP 1            PT G-Codes  Outcome Measure Options: (P) PADD, AM-PAC 6 Clicks Basic Mobility (PT)  AM-PAC 6 Clicks Score (PT): (P) 18  PT Discharge Summary  Anticipated Discharge Disposition (PT): (P) home with 24/7 care, home with home health    Honey Bond PT Student  4/1/2025

## 2025-04-01 NOTE — ANESTHESIA PROCEDURE NOTES
Spinal Block      Patient reassessed immediately prior to procedure    Patient location during procedure: OR  Indication:at surgeon's request  Performed By  ODALIS/CAA: Juan David Welsh CRNASRNA: Sofie Culver SRNA  Preanesthetic Checklist  Completed: patient identified, IV checked, site marked, risks and benefits discussed, surgical consent, monitors and equipment checked, pre-op evaluation and timeout performed  Spinal Block Prep:  Patient Position:sitting  Sterile Tech:cap, gloves, sterile barriers and mask  Prep:Chloraprep  Patient Monitoring:blood pressure monitoring, continuous pulse oximetry and EKG    Spinal Block Procedure  Approach:midline  Guidance:landmark technique and palpation technique  Location:L4-L5  Needle Type:Quincke  Needle Gauge:22 G  Placement of Spinal needle event:cerebrospinal fluid aspirated  Paresthesia: no  Fluid Appearance:clear  Medications: Mepivacaine HCl (PF) (CARBOCAINE) 1.5 % injection - Injection   4 mL - 4/1/2025 1:04:00 PM   Post Assessment  Patient Tolerance:patient tolerated the procedure well with no apparent complications  Complications no  Additional Notes  Procedure:  Pt assisted to sitting position, with legs in position of comfort over side of bed.  Pt. instructed in optimal spine presentation, the spine was prepped/ Draped and the skin at insertion site was anesthetized with 1% Lidocaine 2 ml.  The spinal needle was then advanced until CSF flow was obtained and LA was injected:

## 2025-04-01 NOTE — PLAN OF CARE
Goal Outcome Evaluation:  Plan of Care Reviewed With: (P) patient, child        Progress: (P) no change  Outcome Evaluation: (P) Pt presents POD#0 following L TKA. Ambulated ~120' with FWW and CGA x2, progressing towards good step-through gait pattern. No overt LOB or knee buckling noted. Further distance/mobility limited by pt-reported 10/10 knee pain throughout session. Pt would benefit from overnight stay in order to increase independence with functional mobility and HEP prior to d/c. When medically appropriate, recommend d/c home with 24/7 assist and home health PT services. Formal stairs assessment still needed. Pt will need FWW.    Anticipated Discharge Disposition (PT): (P) home with 24/7 care, home with home health

## 2025-04-02 VITALS
HEART RATE: 73 BPM | SYSTOLIC BLOOD PRESSURE: 141 MMHG | RESPIRATION RATE: 18 BRPM | OXYGEN SATURATION: 95 % | WEIGHT: 180 LBS | DIASTOLIC BLOOD PRESSURE: 66 MMHG | HEIGHT: 62 IN | BODY MASS INDEX: 33.13 KG/M2 | TEMPERATURE: 97.9 F

## 2025-04-02 PROBLEM — D72.829 LEUKOCYTOSIS: Status: ACTIVE | Noted: 2025-04-02

## 2025-04-02 PROBLEM — G89.18 ACUTE POSTOPERATIVE PAIN: Status: ACTIVE | Noted: 2025-04-02

## 2025-04-02 PROBLEM — Z96.652 STATUS POST LEFT KNEE REPLACEMENT: Status: ACTIVE | Noted: 2025-04-01

## 2025-04-02 LAB
ANION GAP SERPL CALCULATED.3IONS-SCNC: 15 MMOL/L (ref 5–15)
BUN SERPL-MCNC: 14 MG/DL (ref 8–23)
BUN/CREAT SERPL: 20.6 (ref 7–25)
CALCIUM SPEC-SCNC: 8.7 MG/DL (ref 8.6–10.5)
CHLORIDE SERPL-SCNC: 103 MMOL/L (ref 98–107)
CO2 SERPL-SCNC: 25 MMOL/L (ref 22–29)
CREAT SERPL-MCNC: 0.68 MG/DL (ref 0.57–1)
DEPRECATED RDW RBC AUTO: 43.6 FL (ref 37–54)
EGFRCR SERPLBLD CKD-EPI 2021: 94.4 ML/MIN/1.73
ERYTHROCYTE [DISTWIDTH] IN BLOOD BY AUTOMATED COUNT: 13.4 % (ref 12.3–15.4)
GLUCOSE SERPL-MCNC: 157 MG/DL (ref 65–99)
HCT VFR BLD AUTO: 36.4 % (ref 34–46.6)
HGB BLD-MCNC: 11.9 G/DL (ref 12–15.9)
MAGNESIUM SERPL-MCNC: 1.8 MG/DL (ref 1.6–2.4)
MCH RBC QN AUTO: 28.9 PG (ref 26.6–33)
MCHC RBC AUTO-ENTMCNC: 32.7 G/DL (ref 31.5–35.7)
MCV RBC AUTO: 88.3 FL (ref 79–97)
PLATELET # BLD AUTO: 223 10*3/MM3 (ref 140–450)
PMV BLD AUTO: 10.4 FL (ref 6–12)
POTASSIUM SERPL-SCNC: 3.7 MMOL/L (ref 3.5–5.2)
RBC # BLD AUTO: 4.12 10*6/MM3 (ref 3.77–5.28)
SODIUM SERPL-SCNC: 143 MMOL/L (ref 136–145)
WBC NRBC COR # BLD AUTO: 16.37 10*3/MM3 (ref 3.4–10.8)

## 2025-04-02 PROCEDURE — 85027 COMPLETE CBC AUTOMATED: CPT | Performed by: ORTHOPAEDIC SURGERY

## 2025-04-02 PROCEDURE — 97110 THERAPEUTIC EXERCISES: CPT

## 2025-04-02 PROCEDURE — 97116 GAIT TRAINING THERAPY: CPT

## 2025-04-02 PROCEDURE — 25810000003 LACTATED RINGERS PER 1000 ML: Performed by: ORTHOPAEDIC SURGERY

## 2025-04-02 PROCEDURE — 97165 OT EVAL LOW COMPLEX 30 MIN: CPT

## 2025-04-02 PROCEDURE — 97535 SELF CARE MNGMENT TRAINING: CPT

## 2025-04-02 PROCEDURE — 83735 ASSAY OF MAGNESIUM: CPT | Performed by: INTERNAL MEDICINE

## 2025-04-02 PROCEDURE — 99024 POSTOP FOLLOW-UP VISIT: CPT | Performed by: ORTHOPAEDIC SURGERY

## 2025-04-02 PROCEDURE — 25010000002 HYDROMORPHONE PER 4 MG: Performed by: ORTHOPAEDIC SURGERY

## 2025-04-02 PROCEDURE — 25010000002 CEFAZOLIN PER 500 MG: Performed by: ORTHOPAEDIC SURGERY

## 2025-04-02 PROCEDURE — 80048 BASIC METABOLIC PNL TOTAL CA: CPT | Performed by: INTERNAL MEDICINE

## 2025-04-02 RX ORDER — CEFADROXIL 500 MG/1
500 CAPSULE ORAL 2 TIMES DAILY
Qty: 14 CAPSULE | Refills: 0 | Status: SHIPPED | OUTPATIENT
Start: 2025-04-02 | End: 2025-04-09

## 2025-04-02 RX ORDER — OXYCODONE HYDROCHLORIDE 5 MG/1
5 TABLET ORAL EVERY 4 HOURS PRN
Start: 2025-04-02 | End: 2025-04-02

## 2025-04-02 RX ORDER — ASPIRIN 81 MG/1
81 TABLET ORAL 2 TIMES DAILY
Qty: 60 TABLET | Refills: 0 | Status: SHIPPED | OUTPATIENT
Start: 2025-04-03

## 2025-04-02 RX ORDER — ASPIRIN 81 MG/1
81 TABLET ORAL DAILY
Start: 2025-04-02

## 2025-04-02 RX ORDER — MELOXICAM 15 MG/1
15 TABLET ORAL DAILY
Qty: 10 TABLET | Refills: 0 | Status: SHIPPED | OUTPATIENT
Start: 2025-04-02

## 2025-04-02 RX ORDER — OXYCODONE HYDROCHLORIDE 5 MG/1
5 TABLET ORAL EVERY 4 HOURS PRN
Qty: 35 TABLET | Refills: 0 | Status: SHIPPED | OUTPATIENT
Start: 2025-04-02 | End: 2025-04-03

## 2025-04-02 RX ORDER — ROPIVACAINE HYDROCHLORIDE 2 MG/ML
1 INJECTION, SOLUTION EPIDURAL; INFILTRATION; PERINEURAL CONTINUOUS
Start: 2025-04-02

## 2025-04-02 RX ORDER — ACETAMINOPHEN 500 MG
1000 TABLET ORAL EVERY 8 HOURS
Qty: 42 TABLET | Refills: 0 | Status: SHIPPED | OUTPATIENT
Start: 2025-04-02

## 2025-04-02 RX ORDER — DOCUSATE SODIUM 100 MG/1
100 CAPSULE, LIQUID FILLED ORAL 2 TIMES DAILY
Qty: 60 CAPSULE | Refills: 0 | Status: SHIPPED | OUTPATIENT
Start: 2025-04-02

## 2025-04-02 RX ADMIN — OXYCODONE 5 MG: 5 TABLET ORAL at 11:18

## 2025-04-02 RX ADMIN — ASPIRIN 81 MG: 81 TABLET, COATED ORAL at 08:09

## 2025-04-02 RX ADMIN — OXYCODONE 5 MG: 5 TABLET ORAL at 02:00

## 2025-04-02 RX ADMIN — Medication 10 ML: at 08:20

## 2025-04-02 RX ADMIN — SODIUM CHLORIDE, SODIUM LACTATE, POTASSIUM CHLORIDE, CALCIUM CHLORIDE 9 ML/HR: 20; 30; 600; 310 INJECTION, SOLUTION INTRAVENOUS at 06:12

## 2025-04-02 RX ADMIN — LEVOTHYROXINE SODIUM 50 MCG: 0.05 TABLET ORAL at 06:13

## 2025-04-02 RX ADMIN — CEPHALEXIN 500 MG: 250 CAPSULE ORAL at 11:18

## 2025-04-02 RX ADMIN — MELOXICAM 15 MG: 15 TABLET ORAL at 08:09

## 2025-04-02 RX ADMIN — HYDROMORPHONE HYDROCHLORIDE 0.5 MG: 1 INJECTION, SOLUTION INTRAMUSCULAR; INTRAVENOUS; SUBCUTANEOUS at 03:45

## 2025-04-02 RX ADMIN — HYDROMORPHONE HYDROCHLORIDE 0.5 MG: 1 INJECTION, SOLUTION INTRAMUSCULAR; INTRAVENOUS; SUBCUTANEOUS at 07:00

## 2025-04-02 RX ADMIN — OXYCODONE 5 MG: 5 TABLET ORAL at 06:21

## 2025-04-02 RX ADMIN — ACETAMINOPHEN 1000 MG: 500 TABLET, FILM COATED ORAL at 08:09

## 2025-04-02 RX ADMIN — SODIUM CHLORIDE 2 G: 900 INJECTION INTRAVENOUS at 08:10

## 2025-04-02 RX ADMIN — ACETAMINOPHEN 1000 MG: 500 TABLET, FILM COATED ORAL at 02:00

## 2025-04-02 NOTE — PROGRESS NOTES
"      [facility] [place]    Subjective      LOS: 0 days   Patient Care Team:  Nevin Finnegan APRN as PCP - General (Family Medicine)  Crystal Hannah APRN as Nurse Practitioner (Nurse Practitioner)  Carlos Verdugo MD as Consulting Physician (Cardiology)  Jose Roger MD as Consulting Physician (Orthopedic Surgery)  Karla Connor (Pain Medicine)  Tiffanie Coburn MD as Consulting Physician (Endocrinology)    CC: Left knee pain    Interval History:   Pain controlled currently.  No new complaints.  Resting in bed.    Objective      Vital Signs  Temp (24hrs), Av.8 °F (36.6 °C), Min:97 °F (36.1 °C), Max:98.7 °F (37.1 °C)      /66 (BP Location: Right arm, Patient Position: Lying)   Pulse 73   Temp 97.9 °F (36.6 °C) (Oral)   Resp 18   Ht 157.5 cm (62\")   Wt 81.6 kg (180 lb)   LMP  (LMP Unknown) Comment: S/P AH, LEFT S&O/RIGHT S&O  SpO2 95%   BMI 32.92 kg/m²     Examination:   Examination of the left knee: The wound is clean, dry, and intact.  Ankle dorsiflexion, ankle plantar flexion, and EHL are intact.  Sensation intact in the foot to light touch.  2+ dorsalis pedis pulse.  Straight leg raise is weak but intact.        Labs:  Results from last 7 days   Lab Units 25  0351   WBC 10*3/mm3 16.37*   HEMOGLOBIN g/dL 11.9*   HEMATOCRIT % 36.4   MCV fL 88.3   PLATELETS 10*3/mm3 223       Radiology:  Imaging Results (Last 24 Hours)       Procedure Component Value Units Date/Time    XR Knee 1 or 2 View Left [[id number]] Collected: 25 1538     Updated: 25 1552    Narrative:      XR KNEE 1 OR 2 VW LEFT    Date of Exam: 2025 3:17 PM EDT    Indication: KNEE ARTHROPLASTY  post-op  Post-op Knee Arthoplasty    Comparison: 2024    Findings:  Expected postoperative changes of total knee arthroplasty. Alignment appears intact. No evidence of immediate hardware complication or periprosthetic fracture. Expected soft tissue gas.      Impression:      " Impression:  Expected postoperative changes of total knee arthroplasty without evidence of immediate hardware complication.        Electronically Signed: Sesar David MD    4/1/2025 3:49 PM EDT    Workstation ID: [id number]            PT:  Physical Therapy - Plan of Care Review - Outcome Summary:  Outcome Evaluation: Pt presents POD#0 following L TKA. Ambulated ~120' with FWW and CGA x2, progressing towards good step-through gait pattern. No overt LOB or knee buckling noted. Further distance/mobility limited by pt-reported 10/10 knee pain throughout session. Pt would benefit from overnight stay in order to increase independence with functional mobility and HEP prior to d/c. When medically appropriate, recommend d/c home with 24/7 assist and home health PT services. Formal stairs assessment still needed. Pt will need FWW. (04/01/25 7252)]       Results Review:     I reviewed the patient's new clinical results.    Assessment and Plan     Assessment:   1 Day Post-Op status post left total knee arthroplasty      S/P knee replacement    Hypothyroidism    Dyslipidemia    History of colon cancer    Primary osteoarthritis of left knee    Degenerative arthritis of left knee    HTN (hypertension)    Obesity (BMI 30-39.9)      Plan for disposition: Plan for discharge most likely today, as long as she is cleared medically and by physical therapy.  Follow-up in 3 weeks as planned.      Future Appointments   Date Time Provider Department Center   4/9/2025  1:30 PM Sofie Almanza MD [facility]   4/23/2025  9:20 AM Abbie Mas PA-C [facility]   1/13/2026  1:30 PM Tiffanie Coburn MD [facility]           Jose Roger MD  04/02/25  08:07 EDT

## 2025-04-02 NOTE — THERAPY TREATMENT NOTE
Patient Name: Park Ray  : 1955    MRN: [id number]                              Today's Date: 2025       Admit Date: 2025    Visit Dx:     ICD-10-CM ICD-9-CM   1. Status post left knee replacement  Z96.652 V43.65   2. Primary osteoarthritis of left knee  M17.12 715.16     Patient Active Problem List   Diagnosis    Hypothyroidism    Recurrent genital herpes    Arm pain, right    Arthralgia of both knees    Bone pain    Contusion of left lower leg    Dyslipidemia    History of colon cancer    Neutrophilic leukemoid reaction    Obesity (BMI 35.0-39.9 without comorbidity)    Thumb pain    Chronic ulcer of lower extremity    Non-pressure chronic ulcer of left calf with fat layer exposed    Family history of breast cancer in mother    Fatty liver disease, nonalcoholic    Mixed incontinence urge and stress    Primary osteoarthritis of left knee    Degenerative arthritis of left knee    Nontoxic multinodular goiter    Annual GYN exam S/P TAHBSO    S/P knee replacement    HTN (hypertension)    Obesity (BMI 30-39.9)     Past Medical History:   Diagnosis Date    Acute torn meniscus of left knee     Arthritis of neck 2016    Asthma 2023    history of    Closed displaced fracture of shaft of left clavicle     Closed nondisplaced fracture of neck of left radius     Colon cancer     tumor removed with 18 inches of colon    Fatty liver     Fracture of proximal phalanx of toe     Herpes     History of stomach ulcers     Hyperlipidemia 2023    Yearly lab work Dr Nevin Finnegan    Hypertension 2023    Dr Erasto Anna Dentist    Hypothyroidism     IBS (irritable bowel syndrome)     Mixed incontinence urge and stress     Osteoarthritis     Rotator cuff syndrome      Past Surgical History:   Procedure Laterality Date    CATARACT EXTRACTION       SECTION      COLECTOMY PARTIAL / TOTAL Left 2005    Dr. Donohue    COLONOSCOPY      ENTEROCELE REPAIR      Dr. Donohue    LAPAROSCOPIC CHOLECYSTECTOMY   03/2015    Dr. Cori Porras    LAPAROSCOPIC SALPINGOOPHERECTOMY Right 1994    [facility] ([facility]) PROCEDURE      Dr. Donohue    TOTAL ABDOMINAL HYSTERECTOMY WITH SALPINGO OOPHORECTOMY Left     Dr. Dodson    TOTAL KNEE ARTHROPLASTY Left 4/1/2025    Procedure: TOTAL KNEE ARTHROPLASTY WITH CORI ROBOT LEFT;  Surgeon: Jose Roger MD;  Location: [facility];  Service: Robotics - Ortho;  Laterality: Left;    TRIGGER POINT INJECTION  Nov 2024    Both knees    UTERINE SUSPENSION LAPAROSCOPIC      Dr. Lobato      General Information       Row Name 04/02/25 1001          Physical Therapy Time and Intention    Document Type therapy note (daily note) (P)   -AP     Mode of Treatment individual therapy;physical therapy (P)   -AP       Row Name 04/02/25 1001          General Information    Patient Profile Reviewed yes (P)   -AP     Existing Precautions/Restrictions fall;left;weight bearing;other (see comments) (P)   WBAT s/p L TKA; adductor canal nerve catheter  -AP     Barriers to Rehab medically complex;previous functional deficit (P)   -AP       Row Name 04/02/25 1001          Cognition    Orientation Status (Cognition) oriented x 3 (P)   -AP       Row Name 04/02/25 1001          Safety Issues/Impairments Affecting Functional Mobility    Safety Issues Affecting Function (Mobility) at risk behavior observed;awareness of need for assistance;insight into deficits/self-awareness;judgment;positioning of assistive device;problem-solving;safety precaution awareness;safety precautions follow-through/compliance;sequencing abilities (P)   -AP     Impairments Affecting Function (Mobility) balance;coordination;endurance/activity tolerance;motor control;pain;range of motion (ROM);sensation/sensory awareness;strength (P)   -AP               User Key  (r) = Recorded By, (t) = Taken By, (c) = Cosigned By      Initials Name Provider Type    AP Honey Bond, PT Student PT Student                   Mobility       Row Name  04/02/25 1002          Bed Mobility    Bed Mobility supine-sit (P)   -AP     Supine-Sit [place] (Bed Mobility) verbal cues;contact guard;1 person assist (P)   -AP     Assistive Device (Bed Mobility) bed rails;head of bed elevated (P)   -AP     Comment, (Bed Mobility) Pt required VCs for sequencing and management of nerve catheter to prevent dislodgment. Completed with increased time/effort d/t elevated pain (P)   -AP       Row Name 04/02/25 1002          Transfers    Comment, (Transfers) Pt educated on importance of keeping FWW close when approaching chairs to avoid abandonment of walker prior to sitting/decrease fall risk. (P)   -AP       Row Name 04/02/25 1002          Sit-Stand Transfer    Sit-Stand [place] (Transfers) verbal cues;contact guard;2 person assist (P)   -AP     Assistive Device (Sit-Stand Transfers) walker, front-wheeled (P)   -AP     Comment, (Sit-Stand Transfer) Completed from chair x1, commode x1. VCs for proper hand placement. Demonstrates good carryover of education on LLE extension during sitting (P)   -AP       Row Name 04/02/25 1002          Gait/Stairs (Locomotion)    [place] Level (Gait) verbal cues;contact guard;2 person assist (P)   -AP     Assistive Device (Gait) walker, front-wheeled (P)   -AP     Patient was able to Ambulate yes (P)   -AP     Distance in Feet (Gait) 175 (P)   -AP     Deviations/Abnormal Patterns (Gait) antalgic;bilateral deviations;base of support, narrow;franco decreased;gait speed decreased;stride length decreased (P)   -AP     Bilateral Gait Deviations forward flexed posture;heel strike decreased (P)   -AP     Left Sided Gait Deviations weight shift ability decreased (P)   -AP     Maintains Weight-bearing Status (Gait) able to maintain (P)   -AP     [place] Level (Stairs) verbal cues;contact guard;2 person assist (P)   -AP     Assistive Device (Stairs) walker, front-wheeled (P)   -AP     Handrail Location (Stairs) none (P)   -AP     Number of  Steps (Stairs) 1 (P)   -AP     Ascending Technique (Stairs) step-to-step (P)   -AP     Descending Technique (Stairs) step-to-step (P)   -AP     Stairs, Safety Issues balance decreased during turns;sequencing ability decreased;weight-shifting ability decreased (P)   -AP     Stairs, Impairments strength decreased;ROM decreased;impaired balance;coordination impaired;pain (P)   -AP     Comment, (Gait/Stairs) Ambulated ~175' in hallway, demonstrating short step-to gait pattern initially with ability to progress towards good step-through pattern. VCs for decreased reliance on BUE, increased franco and RLE step length, and upright trunk posture/forward eye gaze. Navigated 1 step ascending posteriorly with FWW and VCs for LE/AD sequencing. No overt LOB or knee buckling noted throughout. Further mobility/ambulation limited by fatigue and increasing pt-reported knee pain. (P)   -AP       Row Name 04/02/25 1002          Mobility    Extremity Weight-bearing Status left lower extremity (P)   -AP     Left Lower Extremity (Weight-bearing Status) weight-bearing as tolerated (WBAT) (P)   -AP               User Key  (r) = Recorded By, (t) = Taken By, (c) = Cosigned By      Initials Name Provider Type    AP Honey Bond, PT Student PT Student                   Obj/Interventions       Row Name 04/02/25 1009          Range of Motion Comprehensive    Comment, General Range of Motion L knee ROM: 8-70 (P)   -AP       Row Name 04/02/25 1009          Motor Skills    Therapeutic Exercise knee;ankle (P)   -AP       Row Name 04/02/25 1009          Knee (Therapeutic Exercise)    Knee (Therapeutic Exercise) isometric exercises;strengthening exercise (P)   -AP     Knee Isometrics (Therapeutic Exercise) left;quad sets;supine;10 repetitions;3 second hold (P)   -AP     Knee Strengthening (Therapeutic Exercise) left;heel slides;SLR (straight leg raise);SAQ (short arc quad);supine;LAQ (long arc quad);sitting;10 repetitions (P)   Min A required for  SLR d/t weakness and pt-reported elevated pain  -AP       Row Name 04/02/25 1009          Ankle (Therapeutic Exercise)    Ankle (Therapeutic Exercise) AROM (active range of motion) (P)   -AP     Ankle AROM (Therapeutic Exercise) bilateral;dorsiflexion;plantarflexion;supine;10 repetitions (P)   -AP       Row Name 04/02/25 1009          Balance    Balance Assessment sitting static balance;sitting dynamic balance;standing static balance;standing dynamic balance (P)   -AP     Static Sitting Balance standby assist;1-person assist (P)   -AP     Dynamic Sitting Balance standby assist;1-person assist (P)   -AP     Position, Sitting Balance unsupported;sitting edge of bed;other (see comments) (P)   commode  -AP     Static Standing Balance contact guard;1-person assist (P)   -AP     Dynamic Standing Balance contact guard;2-person assist (P)   -AP     Position/Device Used, Standing Balance supported;walker, front-wheeled (P)   -AP     Balance Interventions sitting;standing;sit to stand;supported;dynamic;moderate challenge;occupation based/functional task (P)   -AP     Comment, Balance No overt LOB or knee buckling noted with ambulation/stair navigation. (P)   -AP               User Key  (r) = Recorded By, (t) = Taken By, (c) = Cosigned By      Initials Name Provider Type    AP Honey Bond, PT Student PT Student                   Goals/Plan    No documentation.                  Clinical Impression       Row Name 04/02/25 1011          Pain    Pretreatment Pain Rating 8/10 (P)   -AP     Posttreatment Pain Rating 8/10 (P)   -AP     Pain Location knee (P)   -AP     Pain Side/Orientation left;anterior;posterior;lateral (P)   -AP     Pain Management Interventions exercise or physical activity utilized;nursing notified (P)   -AP     Response to Pain Interventions activity participation with increased pain (P)   -AP       Row Name 04/02/25 1011          Plan of Care Review    Plan of Care Reviewed With patient;child (P)   -AP      Progress improving (P)   -AP     Outcome Evaluation Ambulated ~175' with FWW and CGA x2, progressing towards step-through gait pattern. Navigated 1 step ascending posteriorly with FWW, CGA x2, and VCs for sequencing. No overt LOB or knee buckling noted. Further participation in session limited by increased fatigue and pt-reported pain with mobility. Pt cleared for d/c home from PT standpoint. When medically appropriate, recommend d/c home with 24/7 assist and home health PT services. (P)   -AP       Row Name 04/02/25 1011          Positioning and Restraints    Pre-Treatment Position in bed (P)   -AP     Post Treatment Position chair (P)   -AP     In Chair notified nsg;sitting;with family/caregiver;with OT (P)   -AP               User Key  (r) = Recorded By, (t) = Taken By, (c) = Cosigned By      Initials Name Provider Type    AP Honey Bond, PT Student PT Student                   Outcome Measures       Row Name 04/02/25 1014          How much help from another person do you currently need...    Turning from your back to your side while in flat bed without using bedrails? 4 (P)   -AP     Moving from lying on back to sitting on the side of a flat bed without bedrails? 3 (P)   -AP     Moving to and from a bed to a chair (including a wheelchair)? 3 (P)   -AP     Standing up from a chair using your arms (e.g., wheelchair, bedside chair)? 3 (P)   -AP     Climbing 3-5 steps with a railing? 2 (P)   -AP     To walk in hospital room? 3 (P)   -AP     AM-PAC 6 Clicks Score (PT) 18 (P)   -AP     Highest Level of Mobility Goal 6 --> Walk 10 steps or more (P)   -AP       Row Name 04/02/25 1014          Functional Assessment    Outcome Measure Options AM-PAC 6 Clicks Basic Mobility (PT) (P)   -AP               User Key  (r) = Recorded By, (t) = Taken By, (c) = Cosigned By      Initials Name Provider Type    Honey Vasquez, PT Student PT Student                                 Physical Therapy Education       Title: PT OT  SLP Therapies (Done)       Topic: Physical Therapy (Done)       Point: Mobility training (Done)       Learning Progress Summary            Patient Acceptance, E,TB,H, VU,DU,NR by AP at 4/2/2025 1014    Acceptance, E,TB,H, VU,DU by AP at 4/1/2025 1835   Family Acceptance, E,TB,H, VU,DU,NR by AP at 4/2/2025 1014    Acceptance, E,TB,H, VU,DU by AP at 4/1/2025 1835                      Point: Home exercise program (Done)       Learning Progress Summary            Patient Acceptance, E,TB,H, VU,DU,NR by AP at 4/2/2025 1014    Acceptance, E,TB,H, VU,DU by AP at 4/1/2025 1835   Family Acceptance, E,TB,H, VU,DU,NR by AP at 4/2/2025 1014    Acceptance, E,TB,H, VU,DU by AP at 4/1/2025 1835                      Point: Body mechanics (Done)       Learning Progress Summary            Patient Acceptance, E,TB,H, VU,DU,NR by AP at 4/2/2025 1014    Acceptance, E,TB,H, VU,DU by AP at 4/1/2025 1835   Family Acceptance, E,TB,H, VU,DU,NR by AP at 4/2/2025 1014    Acceptance, E,TB,H, VU,DU by AP at 4/1/2025 1835                      Point: Precautions (Done)       Learning Progress Summary            Patient Acceptance, E,TB,H, VU,DU,NR by AP at 4/2/2025 1014    Acceptance, E,TB,H, VU,DU by AP at 4/1/2025 1835   Family Acceptance, E,TB,H, VU,DU,NR by AP at 4/2/2025 1014    Acceptance, E,TB,H, VU,DU by AP at 4/1/2025 1835                                      User Key       Initials Effective Dates Name Provider Type Discipline     01/09/25 -  Honey Bond, PT Student PT Student PT                  PT Recommendation and Plan  Planned Therapy Interventions (PT): balance training, bed mobility training, gait training, home exercise program, ROM (range of motion), patient/family education, stair training, strengthening, stretching, transfer training  Progress: (P) improving  Outcome Evaluation: (P) Ambulated ~175' with FWW and CGA x2, progressing towards step-through gait pattern. Navigated 1 step ascending posteriorly with FWW, CGA  x2, and VCs for sequencing. No overt LOB or knee buckling noted. Further participation in session limited by increased fatigue and pt-reported pain with mobility. Pt cleared for d/c home from PT standpoint. When medically appropriate, recommend d/c home with 24/7 assist and home health PT services.     Time Calculation:   PT Evaluation Complexity  History, PT Evaluation Complexity: 3 or more personal factors and/or comorbidities  Examination of Body Systems (PT Eval Complexity): total of 3 or more elements  Clinical Presentation (PT Evaluation Complexity): stable  Clinical Decision Making (PT Evaluation Complexity): low complexity  Overall Complexity (PT Evaluation Complexity): low complexity     PT Charges       Row Name 04/02/25 1015             Time Calculation    Start Time 0830 (P)   -AP      PT Received On 04/02/25 (P)   -AP      PT Goal Re-Cert Due Date 04/11/25 (P)   -AP         Timed Charges    89748 - PT Therapeutic Exercise Minutes 12 (P)   -AP      38632 - Gait Training Minutes  10 (P)   -AP      99435 - PT Therapeutic Activity Minutes 8 (P)   -AP         Total Minutes    Timed Charges Total Minutes 30 (P)   -AP       Total Minutes 30 (P)   -AP                User Key  (r) = Recorded By, (t) = Taken By, (c) = Cosigned By      Initials Name Provider Type    AP Honey Bond, PT Student PT Student                  Therapy Charges for Today       Code Description Service Date Service Provider Modifiers Qty    33950660579 HC PT THER PROC EA 15 MIN 4/1/2025 Honey Bond, PT Student GP 1    63355555917 HC PT EVAL LOW COMPLEXITY 4 4/1/2025 Honey Bond, PT Student GP 1    [id number] HC PT THER PROC EA 15 MIN 4/2/2025 Honey Bond, PT Student GP 1    07132751098 HC GAIT TRAINING EA 15 MIN 4/2/2025 Honey Bond, PT Student GP 1            PT G-Codes  Outcome Measure Options: (P) AM-PAC 6 Clicks Basic Mobility (PT)  AM-PAC 6 Clicks Score (PT): (P) 18  PT Discharge Summary  Anticipated Discharge  Disposition (PT): (P) home with 24/7 care, home with home health    Honey Bond, PT Student  4/2/2025

## 2025-04-02 NOTE — DISCHARGE SUMMARY
Patient Name: Park Ray  MRN: [id number]  : 1955  DOS: 2025    Attending: Jose Roger MD    Primary Care Provider: Nevin Finnegan APRN    Date of Admission:.2025  9:04 AM    Date of Discharge:  2025    Discharge Diagnosis:   Status post left knee replacement    Hypothyroidism    Dyslipidemia    History of colon cancer    Primary osteoarthritis of left knee    Degenerative arthritis of left knee    HTN (hypertension)    Obesity (BMI 30-39.9)      Hospital Course    At admit:    Patient is a pleasant 69 y.o. female presented for scheduled surgery by Dr. Roger     Per his note: (The patient is a 69 y.o. female with debilitating left knee pain secondary to osteoarthritis that failed to improve in spite of conservative treatment .  Options have been discussed at length with the patient and the patient has had an extended course of conservative treatment without long-term benefit. The patient has reached the point where the patient desires total knee arthroplasty surgery and understands the risks, benefits, and alternatives. Consent was obtained. Please see my office notes for details with regard to preoperative counseling and operative rationale. )     Patient underwent left total knee arthroplasty under spinal anesthesia, tolerated surgery well.  Adductor canal nerve block catheter was placed by acute pain service, and patient was admitted for further management.     Seen postoperatively, doing well with good pain control, no complaints of nausea, vomiting, or shortness of breath.  Patient has no history of DVT or PE.     Reviewed with patient past medical history and home medications     After admit:    Patient was provided pain medications as needed for pain control, along with adductor canal nerve block infusion of Ropivacaine.    Adjustments were made to pain medications to optimize postop pain management.   Risks and benefits of opiate medications discussed with patient.  Rudy  report in chart was reviewed prior to discharge.    She was seen by PT and OT and has progressed well over her stay.    She used an IS for atelectasis prophylaxis and aspirin along with mechanicals for DVT prophylaxis.  Home medications were resumed as appropriate, and labs were monitored and remained fairly stable.     With the progress she has made, she is ready for DC home today.      She will have an adductor canal nerve block (instructed on it during this admit).    Discussed with patient regarding plan and she shows understanding and agreement.        Procedures Performed    DATE OF PROCEDURE: 04/01/25     PREOPERATIVE DIAGNOSIS: left knee arthritis       POSTOPERATIVE DIAGNOSIS:  left knee arthritis     PROCEDURES PERFORMED:   left total knee arthroplasty with [employer] components (# 4 narrow cruciate retaining femur, # 1 tibia, 11 mm dished polyethylene, with 29 three peg patella) with CORI robotic assitance     Surgical Approach: Knee Medial Parapatellar     SURGEON: Jose Roger MD    Pertinent Test Results:    I reviewed the patient's new clinical results.   Results from last 7 days   Lab Units 04/02/25  0351   WBC 10*3/mm3 16.37*   HEMOGLOBIN g/dL 11.9*   HEMATOCRIT % 36.4   PLATELETS 10*3/mm3 223     Results from last 7 days   Lab Units 04/02/25  0351 04/01/25  1109   SODIUM mmol/L 143  --    POTASSIUM mmol/L 3.7 3.1*   CHLORIDE mmol/L 103  --    CO2 mmol/L 25.0  --    BUN mg/dL 14  --    CREATININE mg/dL 0.68  --    CALCIUM mg/dL 8.7  --    GLUCOSE mg/dL 157*  --      I reviewed the patient's new imaging including images and reports.      Physical therapy: Ambulated ~175' with FWW and CGA x2, progressing towards step-through gait pattern. Navigated 1 step ascending posteriorly with FWW, CGA x2, and VCs for sequencing. No overt LOB or knee buckling noted. Further participation in session limited by increased fatigue and pt-reported pain with mobility. Pt cleared for d/c home from PT  "standpoint. When medically appropriate, recommend d/c home with 24/7 assist and home health PT services.       Discharge Assessment:    Vital Signs  Visit Vitals  /66 (BP Location: Right arm, Patient Position: Lying)   Pulse 73   Temp 97.9 °F (36.6 °C) (Oral)   Resp 18   Ht 157.5 cm (62\")   Wt 81.6 kg (180 lb)   LMP  (LMP Unknown) Comment: S/P AH, LEFT S&O/RIGHT S&O   SpO2 95%   BMI 32.92 kg/m²     Temp (24hrs), Av.9 °F (36.6 °C), Min:97 °F (36.1 °C), Max:98.7 °F (37.1 °C)      General Appearance:    Alert, cooperative, in no acute distress   Lungs:     Clear to auscultation,respirations regular, even and unlabored    Heart:    Regular rhythm and normal rate, normal S1 and S2   Abdomen:     Normal bowel sounds, no masses, no organomegaly, soft non-tender, non-distended, no guarding, no rebound tenderness   Extremities:   CDI dressing surgical knee, left. ACB cath present, arrow pump. Moves all extremities well, no edema, no cyanosis, no redness   Pulses:   Pulses palpable and equal bilaterally   Skin:   No bleeding, bruising or rash   Neurologic:   Cranial nerves 2 - 12 grossly intact, sensation intact, Flexion and dorsiflexion intact bilateral feet.       Discharge Disposition: Home    Discharge Medications:     Discharge Medications        New Medications        Instructions Start Date   acetaminophen 500 MG tablet  Commonly known as: TYLENOL   1,000 mg, Oral, Every 8 Hours      cefadroxil 500 MG capsule  Commonly known as: DURICEF   500 mg, Oral, 2 Times Daily      docusate sodium 100 MG capsule  Commonly known as: Colace   100 mg, Oral, 2 Times Daily      meloxicam 15 MG tablet  Commonly known as: Mobic   15 mg, Oral, Daily      oxyCODONE 5 MG immediate release tablet  Commonly known as: ROXICODONE   5 mg, Oral, Every 4 Hours PRN      ropivacaine 0.2 % infusion (INFUSYSTEM)  Commonly known as: NAROPIN   1 mL/hr (2 mg/hr), Peripheral Nerve, Continuous             Changes to Medications        " Instructions Start Date   aspirin 81 MG EC tablet  What changed: additional instructions   81 mg, Oral, Daily, Resume in 1 month      aspirin 81 MG EC tablet  Commonly known as: ASPIR  What changed: You were already taking a medication with the same name, and this prescription was added. Make sure you understand how and when to take each.   81 mg, Oral, 2 Times Daily   Start Date: April 3, 2025            Continue These Medications        Instructions Start Date   acyclovir 400 MG tablet  Commonly known as: ZOVIRAX   400 mg, Oral, Daily, If outbreak occurs while on suppressive therapy, call office for further instructions.      albuterol sulfate  (90 Base) MCG/ACT inhaler  Commonly known as: PROVENTIL HFA;VENTOLIN HFA;PROAIR HFA   2 puffs, 4 Times Daily      Centrum Silver Ultra Womens tablet tablet  Generic drug: multivitamin with minerals   1 tablet, Daily      chlorthalidone 25 MG tablet  Commonly known as: HYGROTON   25 mg, Oral, Daily      estradiol 0.1 MG/GM vaginal cream  Commonly known as: ESTRACE VAGINAL   Insert 1 gm intravaginally 1-3 times each week      fluticasone 50 MCG/ACT nasal spray  Commonly known as: FLONASE   2 sprays, Daily      furosemide 40 MG tablet  Commonly known as: LASIX   40 mg, Daily PRN      levothyroxine 50 MCG tablet  Commonly known as: SYNTHROID, LEVOTHROID   50 mcg, Every Early Morning      nebivolol 10 MG tablet  Commonly known as: BYSTOLIC   10 mg, Oral, Daily      nitrofurantoin (macrocrystal-monohydrate) 100 MG capsule  Commonly known as: MACROBID   100 mg, Daily      Omeprazole 20 MG Tablet Delayed Release Dispersible   20 mg, Daily      ondansetron ODT 8 MG disintegrating tablet  Commonly known as: ZOFRAN-ODT   8 mg      Ospemifene 60 MG tablet   60 mg, Oral, Daily, THIS PT NEEDS TO KEEP AN APPT HERE BEFORE ANY ADDITIONAL REFILLS.      potassium chloride ER 20 MEQ tablet controlled-release ER tablet  Commonly known as: K-TAB   20 mEq, Daily      pravastatin 40 MG  tablet  Commonly known as: PRAVACHOL   40 mg, Nightly      PYRIDOSTIGMINE BROMIDE PO   60 mg, 3 Times Daily PRN      Vitamin D-3 125 MCG (5000 UT) tablet   1 tablet, Daily      vitamin E 1000 UNIT capsule              Stop These Medications      Chlorhexidine Gluconate 4 % solution     HYDROcodone-acetaminophen 7.5-325 MG per tablet  Commonly known as: NORCO              Discharge Diet: Regular diet      Activity at Discharge: WBAT LLE      Follow-up Appointments:  Dr. Roger per his orders      SAMANTHA Garcia  04/02/25  11:49 EDT

## 2025-04-02 NOTE — PLAN OF CARE
Goal Outcome Evaluation:  Plan of Care Reviewed With: patient, son        Progress: no change (Initial Eval)  Outcome Evaluation: Patient presenting below her functional baseline w/ mobility, transfers and balance limiting independence w/ ADLs. Pt requiring assist for LB dressing but demonstrating good return on education. Pt educated on safety w/ ADLs and paion management. Recommend home w/ 24/7 assist and HH OT when medically appropriate for d/c.    Anticipated Discharge Disposition (OT): home with 24/7 care, home with home health

## 2025-04-02 NOTE — PLAN OF CARE
Goal Outcome Evaluation:  Plan of Care Reviewed With: (P) patient, child        Progress: (P) improving  Outcome Evaluation: (P) Ambulated ~175' with FWW and CGA x2, progressing towards step-through gait pattern. Navigated 1 step ascending posteriorly with FWW, CGA x2, and VCs for sequencing. No overt LOB or knee buckling noted. Further participation in session limited by increased fatigue and pt-reported pain with mobility. Pt cleared for d/c home from PT standpoint. When medically appropriate, recommend d/c home with 24/7 assist and home health PT services.    Anticipated Discharge Disposition (PT): (P) home with 24/7 care, home with home health

## 2025-04-02 NOTE — PLAN OF CARE
Patient is up with minimal assist, gait belt, and walker and ambulated the the bathroom multiple times throughout the night. She was unable to walk in the rosa due to pain consistently rated at 10. Ice packs, infublock, tylenol, roxicodone, and dilauded used for pain. VSS on room air. Per patient, LBM 3/31. Voids spontaneously.

## 2025-04-02 NOTE — PROGRESS NOTES
Psychiatric    Acute pain service Inpatient Progress Note    Patient Name: Park Ray  :  1955  MRN:  [id number]        Acute Pain  Service Inpatient Progress Note:    Analgesia:Good  Pain Score:7/10  LOC: alert and awake  Resp Status: room air  Cardiac: VS stable  Side Effects:None  Catheter Site:clean, dressing intact and dry  Cath type: peripheral nerve cath(InfuSystem)  Infusion rate: Fem/ Add: Basal: 1ml/hr, PIB: 8ml q 8h, PCA: 8ml q 30 min (1mL,8ml, 8ml InfuSystem Pump)  Catheter Plan:Catheter to remain Insitu and Continue catheter infusion rate unchanged  Comments:    Pt states she is having anterior pain. Catheter bolused with 10cc of 0.2% rop

## 2025-04-02 NOTE — THERAPY EVALUATION
Patient Name: Park Ray  : 1955    MRN: [id number]                              Today's Date: 2025       Admit Date: 2025    Visit Dx:     ICD-10-CM ICD-9-CM   1. Status post left knee replacement  Z96.652 V43.65   2. Primary osteoarthritis of left knee  M17.12 715.16     Patient Active Problem List   Diagnosis    Hypothyroidism    Recurrent genital herpes    Arm pain, right    Arthralgia of both knees    Bone pain    Contusion of left lower leg    Dyslipidemia    History of colon cancer    Neutrophilic leukemoid reaction    Obesity (BMI 35.0-39.9 without comorbidity)    Thumb pain    Chronic ulcer of lower extremity    Non-pressure chronic ulcer of left calf with fat layer exposed    Family history of breast cancer in mother    Fatty liver disease, nonalcoholic    Mixed incontinence urge and stress    Primary osteoarthritis of left knee    Degenerative arthritis of left knee    Nontoxic multinodular goiter    Annual GYN exam S/P TAHBSO    S/P knee replacement    HTN (hypertension)    Obesity (BMI 30-39.9)     Past Medical History:   Diagnosis Date    Acute torn meniscus of left knee     Arthritis of neck 2016    Asthma 2023    history of    Closed displaced fracture of shaft of left clavicle     Closed nondisplaced fracture of neck of left radius     Colon cancer     tumor removed with 18 inches of colon    Fatty liver     Fracture of proximal phalanx of toe     Herpes     History of stomach ulcers     Hyperlipidemia 2023    Yearly lab work Dr Nevin Finnegan    Hypertension 2023    Dr Erasto Anna Dentist    Hypothyroidism     IBS (irritable bowel syndrome)     Mixed incontinence urge and stress     Osteoarthritis     Rotator cuff syndrome      Past Surgical History:   Procedure Laterality Date    CATARACT EXTRACTION       SECTION      COLECTOMY PARTIAL / TOTAL Left 2005    Dr. Donohue    COLONOSCOPY      ENTEROCELE REPAIR      Dr. Donohue    LAPAROSCOPIC CHOLECYSTECTOMY   03/2015    Dr. Cori Porras    LAPAROSCOPIC SALPINGOOPHERECTOMY Right 1994    [facility] ([facility]) PROCEDURE      Dr. Donohue    TOTAL ABDOMINAL HYSTERECTOMY WITH SALPINGO OOPHORECTOMY Left     Dr. Dodson    TOTAL KNEE ARTHROPLASTY Left 4/1/2025    Procedure: TOTAL KNEE ARTHROPLASTY WITH CORI ROBOT LEFT;  Surgeon: Jose Roger MD;  Location: [facility];  Service: Robotics - Ortho;  Laterality: Left;    TRIGGER POINT INJECTION  Nov 2024    Both knees    UTERINE SUSPENSION LAPAROSCOPIC      Dr. Lobato      General Information       Row Name 04/02/25 0957          OT Time and Intention    Document Type evaluation  -MR     Mode of Treatment individual therapy;occupational therapy  -MR     Patient Effort good  -MR       Row Name 04/02/25 0957          General Information    Patient Profile Reviewed yes  -MR     Prior Level of Function min assist:;all household mobility;gait;transfer;ADL's;using stairs;dependent:;community mobility;shopping  Previously requiring assist from son PRN over the last month as knee symptoms increased. Was reliant on w/c transportation for community mobility and rollator within home. Son grocery shops, assists LBD, and home management PRN  -MR     Existing Precautions/Restrictions fall;left;weight bearing;other (see comments)  WBAT s/p L TKA; adductor canal nerve catheter  -MR     Barriers to Rehab medically complex;previous functional deficit  -MR       Row Name 04/02/25 0957          Living Environment    Current Living Arrangements home  -MR     People in Home spouse;other (see comments)  Son states he stops by after work at least 5 days per week to assist PRN  -MR       Row Name 04/02/25 0957          Home Main Entrance    Number of Stairs, Main Entrance one  -MR       Row Name 04/02/25 0957          Stairs Within Home, Primary    Number of Stairs, Within Home, Primary one  -MR     Stair Railings, Within Home, Primary railing on right side (ascending)  -MR       Row Name  04/02/25 0957          Cognition    Orientation Status (Cognition) oriented x 3  -       Row Name 04/02/25 0957          Safety Issues/Impairments Affecting Functional Mobility    Safety Issues Affecting Function (Mobility) awareness of need for assistance;insight into deficits/self-awareness;judgment;positioning of assistive device;safety precaution awareness;safety precautions follow-through/compliance  -MR     Impairments Affecting Function (Mobility) balance;coordination;endurance/activity tolerance;motor control;pain;range of motion (ROM);sensation/sensory awareness;strength  -MR               User Key  (r) = Recorded By, (t) = Taken By, (c) = Cosigned By      Initials Name Provider Type     DennisIvelisse, OT Occupational Therapist                     Mobility/ADL's       Row Name 04/02/25 1106          Bed Mobility    Comment, (Bed Mobility) Pt received in restroom w/ PT.  -[facility] Name 04/02/25 1106          Sit-Stand Transfer    Sit-Stand [place] (Transfers) contact guard;verbal cues;nonverbal cues (demo/gesture)  -MR     Assistive Device (Sit-Stand Transfers) walker, front-wheeled  -[facility] Name 04/02/25 1106          Functional Mobility    Functional Mobility- Ind. Level contact guard assist;verbal cues required;nonverbal cues required (demo/gesture)  -MR     Functional Mobility- Device walker, front-wheeled  -MR     Functional Mobility-Distance (Feet) --  HH distances  -       Row Name 04/02/25 1106          Activities of Daily Living    BADL Assessment/Intervention lower body dressing;bathing;upper body dressing;grooming;toileting  -       Row Name 04/02/25 1106          Mobility    Extremity Weight-bearing Status left lower extremity  -     Left Lower Extremity (Weight-bearing Status) weight-bearing as tolerated (WBAT)  -[facility] Name 04/02/25 1106          Lower Body Dressing Assessment/Training    [place] Level (Lower Body Dressing) don;pants/bottoms;contact guard  assist;verbal cues  -MR     Position (Lower Body Dressing) unsupported sitting;unsupported standing  -MR     Comment, (Lower Body Dressing) Pt educated on sequencing and safety w/ LB dressing while having nerve cath to prevent accidental dislodgement. Pt demonstrated good sequencing for pain control this date.  -MR       Row Name 04/02/25 1106          Bathing Assessment/Intervention    [place] Level (Bathing) bathing skills  -MR     Comment, (Bathing) Patient educated on waiting to shower once nerve cath has been discontinued and once cleared by surgeon.  -MR       Row Name 04/02/25 1106          Upper Body Dressing Assessment/Training    [place] Level (Upper Body Dressing) don;pull-over garment;set up  -MR     Position (Upper Body Dressing) unsupported sitting  -MR       Row Name 04/02/25 1106          Grooming Assessment/Training    [place] Level (Grooming) wash face, hands;set up  -MR     Position (Grooming) unsupported sitting  -       Row Name 04/02/25 1106          Toileting Assessment/Training    [place] Level (Toileting) adjust/manage clothing;perform perineal hygiene;contact guard assist  -MR     Assistive Devices (Toileting) commode  -MR     Position (Toileting) unsupported sitting;unsupported standing  -               User Key  (r) = Recorded By, (t) = Taken By, (c) = Cosigned By      Initials Name Provider Type    Ivelisse Keller, OT Occupational Therapist                   Obj/Interventions       Row Name 04/02/25 1109          Sensory Assessment (Somatosensory)    Sensory Assessment (Somatosensory) UE sensation intact  -MR       Row Name 04/02/25 1109          Vision Assessment/Intervention    Visual Impairment/Limitations WFL  -MR       Row Name 04/02/25 1109          Range of Motion Comprehensive    General Range of Motion bilateral upper extremity ROM WFL  -MR       Row Name 04/02/25 1109          Strength Comprehensive (MMT)    Comment, General Manual Muscle Testing (MMT)  Assessment BUE grossly 4-/5 in all functional planes.  -MR       Row Name 04/02/25 1109          Balance    Balance Assessment sitting static balance;sitting dynamic balance;standing static balance;standing dynamic balance  -MR     Static Sitting Balance standby assist  -MR     Dynamic Sitting Balance standby assist  -MR     Position, Sitting Balance unsupported;sitting in chair;other (see comments)  commode  -MR     Static Standing Balance contact guard  -MR     Dynamic Standing Balance contact guard  -MR     Position/Device Used, Standing Balance supported;walker, front-wheeled  -MR     Balance Interventions sitting;standing;sit to stand;supported;dynamic;static;occupation based/functional task  -MR     Comment, Balance No overt LOB noted w/ HH distances of mobility, transfers and ADL based tasks.  -MR               User Key  (r) = Recorded By, (t) = Taken By, (c) = Cosigned By      Initials Name Provider Type    MR Collins Ivelisse, OT Occupational Therapist                   Goals/Plan       Row Name 04/02/25 1113          Bed Mobility Goal 1 (OT)    Activity/Assistive Device (Bed Mobility Goal 1, OT) sit to supine;supine to sit  -MR     [place] Level/Cues Needed (Bed Mobility Goal 1, OT) supervision required  -MR     Time Frame (Bed Mobility Goal 1, OT) short term goal (STG);3 days  -MR     Progress/Outcomes (Bed Mobility Goal 1, OT) new goal  -MR       Row Name 04/02/25 1113          Transfer Goal 1 (OT)    Activity/Assistive Device (Transfer Goal 1, OT) sit-to-stand/stand-to-sit;toilet  -MR     [place] Level/Cues Needed (Transfer Goal 1, OT) supervision required  -MR     Time Frame (Transfer Goal 1, OT) long term goal (LTG);5 days  -MR     Progress/Outcome (Transfer Goal 1, OT) new goal  -MR       Row Name 04/02/25 1113          Dressing Goal 1 (OT)    Activity/Device (Dressing Goal 1, OT) lower body dressing  -MR     [place]/Cues Needed (Dressing Goal 1, OT) supervision required  -MR     Time  Frame (Dressing Goal 1, OT) long term goal (LTG);5 days  -MR     Progress/Outcome (Dressing Goal 1, OT) new goal  -MR       Row Name 04/02/25 1113          Therapy Assessment/Plan (OT)    Planned Therapy Interventions (OT) activity tolerance training;adaptive equipment training;BADL retraining;functional balance retraining;strengthening exercise;occupation/activity based interventions;transfer/mobility retraining;patient/caregiver education/training;IADL retraining;neuromuscular control/coordination retraining;passive ROM/stretching  -MR               User Key  (r) = Recorded By, (t) = Taken By, (c) = Cosigned By      Initials Name Provider Type    MR Ivelisse Collins, OT Occupational Therapist                   Clinical Impression       Row Name 04/02/25 1110          Pain Assessment    Pretreatment Pain Rating 8/10  -MR     Posttreatment Pain Rating 8/10  -MR     Pain Location knee  -MR     Pain Side/Orientation left  -MR       Row Name 04/02/25 1110          Plan of Care Review    Plan of Care Reviewed With patient;son  -MR     Progress no change  Initial Eval  -MR     Outcome Evaluation Patient presenting below her functional baseline w/ mobility, transfers and balance limiting independence w/ ADLs. Pt requiring assist for LB dressing but demonstrating good return on education. Pt educated on safety w/ ADLs and paion management. Recommend home w/ 24/7 assist and  OT when medically appropriate for d/c.  -MR       Row Name 04/02/25 1110          Therapy Assessment/Plan (OT)    Patient/Family Therapy Goal Statement (OT) Return to PLOF  -MR     Rehab Potential (OT) good  -MR     Criteria for Skilled Therapeutic Interventions Met (OT) yes;meets criteria;skilled treatment is necessary  -MR     Therapy Frequency (OT) daily  -MR     Predicted Duration of Therapy Intervention (OT) 5 days  -MR       Row Name 04/02/25 1110          Therapy Plan Review/Discharge Plan (OT)    Anticipated Discharge Disposition (OT) home with  24/7 care;home with home health  -MR       Row Name 04/02/25 1110          Vital Signs    Pre Systolic BP Rehab 141  -MR     Pre Treatment Diastolic BP 66  -MR     O2 Delivery Pre Treatment room air  -MR     O2 Delivery Intra Treatment room air  -MR     O2 Delivery Post Treatment room air  -MR     Pre Patient Position Sitting  -MR     Intra Patient Position Standing  -MR     Post Patient Position Sitting  -MR       Row Name 04/02/25 1110          Positioning and Restraints    Pre-Treatment Position bathroom  -MR     Post Treatment Position chair  -MR     In Chair notified nsg;reclined;sitting;call light within reach;encouraged to call for assist;exit alarm on;waffle cushion;legs elevated;LLE elevated  ice applied to anterior and posterior knee  -MR               User Key  (r) = Recorded By, (t) = Taken By, (c) = Cosigned By      Initials Name Provider Type    MR DennisIvelisse, OT Occupational Therapist                   Outcome Measures       Row Name 04/02/25 1114          How much help from another is currently needed...    Putting on and taking off regular lower body clothing? 3  -MR     Bathing (including washing, rinsing, and drying) 3  -MR     Toileting (which includes using toilet bed pan or urinal) 3  -MR     Putting on and taking off regular upper body clothing 4  -MR     Taking care of personal grooming (such as brushing teeth) 4  -MR     Eating meals 4  -MR     AM-PAC 6 Clicks Score (OT) 21  -MR       Row Name 04/02/25 1014          How much help from another person do you currently need...    Turning from your back to your side while in flat bed without using bedrails? 4 (P)   -AP     Moving from lying on back to sitting on the side of a flat bed without bedrails? 3 (P)   -AP     Moving to and from a bed to a chair (including a wheelchair)? 3 (P)   -AP     Standing up from a chair using your arms (e.g., wheelchair, bedside chair)? 3 (P)   -AP     Climbing 3-5 steps with a railing? 2 (P)   -AP     To  walk in hospital room? 3 (P)   -AP     AM-PAC 6 Clicks Score (PT) 18 (P)   -AP     Highest Level of Mobility Goal 6 --> Walk 10 steps or more (P)   -AP       Row Name 04/02/25 1114 04/02/25 1014       Functional Assessment    Outcome Measure Options AM-PAC 6 Clicks Daily Activity (OT)  - AM-PAC 6 Clicks Basic Mobility (PT) (P)   -AP              User Key  (r) = Recorded By, (t) = Taken By, (c) = Cosigned By      Initials Name Provider Type    Ivelisse Keller, OT Occupational Therapist    Honey Vasquez, PT Student PT Student                    Occupational Therapy Education       Title: PT OT SLP Therapies (Done)       Topic: Occupational Therapy (Done)       Point: ADL training (Done)       Learning Progress Summary            Patient Acceptance, E, VU by MR at 4/2/2025 1114                      Point: Precautions (Done)       Learning Progress Summary            Patient Acceptance, E, VU by MR at 4/2/2025 1114                      Point: Body mechanics (Done)       Learning Progress Summary            Patient Acceptance, E, VU by MR at 4/2/2025 1114                                      User Key       Initials Effective Dates Name Provider Type Discipline    MR 09/22/22 -  Ivelisse Collins, OT Occupational Therapist OT                  OT Recommendation and Plan  Planned Therapy Interventions (OT): activity tolerance training, adaptive equipment training, BADL retraining, functional balance retraining, strengthening exercise, occupation/activity based interventions, transfer/mobility retraining, patient/caregiver education/training, IADL retraining, neuromuscular control/coordination retraining, passive ROM/stretching  Therapy Frequency (OT): daily  Plan of Care Review  Plan of Care Reviewed With: patient, son  Progress: no change (Initial Eval)  Outcome Evaluation: Patient presenting below her functional baseline w/ mobility, transfers and balance limiting independence w/ ADLs. Pt requiring assist for LB  dressing but demonstrating good return on education. Pt educated on safety w/ ADLs and paion management. Recommend home w/ 24/7 assist and HH OT when medically appropriate for d/c.     Time Calculation:   Evaluation Complexity (OT)  Review Occupational Profile/Medical/Therapy History Complexity: brief/low complexity  Assessment, Occupational Performance/Identification of Deficit Complexity: 1-3 performance deficits  Clinical Decision Making Complexity (OT): problem focused assessment/low complexity  Overall Complexity of Evaluation (OT): low complexity     Time Calculation- OT       Row Name 04/02/25 1115 04/02/25 1015          Time Calculation- OT    OT Start Time 0854  -MR --     OT Received On 04/02/25  -MR --     OT Goal Re-Cert Due Date 04/12/25  -MR --        Timed Charges    01638 - Gait Training Minutes  -- 10 (P)   -AP     65163 - OT Self Care/Mgmt Minutes 8  -MR --        Untimed Charges    OT Eval/Re-eval Minutes 46  -MR --        Total Minutes    Timed Charges Total Minutes 8  -MR 10 (P)   -AP     Untimed Charges Total Minutes 46  -MR --      Total Minutes 54  -MR 10 (P)   -AP               User Key  (r) = Recorded By, (t) = Taken By, (c) = Cosigned By      Initials Name Provider Type    MR Ivelisse Collins, OT Occupational Therapist    AP Honey Bond, PT Student PT Student                  Therapy Charges for Today       Code Description Service Date Service Provider Modifiers Qty    32835141896 HC OT SELF CARE/MGMT/TRAIN EA 15 MIN 4/2/2025 Ivelisse Collins OT GO 1    [id number] HC OT EVAL LOW COMPLEXITY 4 4/2/2025 Ivelisse Collins OT GO 1                 Ivelisse Collins OT  4/2/2025

## 2025-04-02 NOTE — CASE MANAGEMENT/SOCIAL WORK
Continued Stay Note  [facility]     Patient Name: Park Ray  MRN: [id number]  Today's Date: 4/2/2025    Admit Date: 4/1/2025    Plan: home w/[facility]   Discharge Plan       Row Name 04/02/25 1205       Plan    Plan home w/[facility]    Patient/Family in Agreement with Plan yes    Plan Comments Met with patient at the bedside to initiate discharge planning. Patient lives with her  in a house in [place]. At baseline, patient is independent with ADLs and doesn't use any DME. Patient has Medicare and [facility] insurance with prescription coverage and prefers to fill scripts at [facility]. Patient denies any home oxygen use. Patient's plan is home with home health. Patient says she is current with [facility] for PT and will transition to [facility] PT. Patient's son will transport. Rolling walker ordered and referral given to Farrukh with [facility]. [facility] will deliver DME to the bedside prior to discharge today. No further discharge needs identified. CM will continue to follow.    15:17 EDT  Spoke with representative from [employer], who says patient used insurance to cover the cost of a rollator in 2022. CM explained that patient would be responsible for the cost of the rolling walker. Patient says she will borrow one from someone and does not wish to wait for one to be delivered.     Final Discharge Disposition Code 06 - home with home health care                   Discharge Codes    No documentation.                 Expected Discharge Date and Time       Expected Discharge Date Expected Discharge Time    Apr 2, 2025               Stan Novoa RN

## 2025-04-03 ENCOUNTER — TELEPHONE (OUTPATIENT)
Dept: ORTHOPEDIC SURGERY | Facility: CLINIC | Age: 70
End: 2025-04-03
Payer: MEDICARE

## 2025-04-03 RX ORDER — OXYCODONE HYDROCHLORIDE 10 MG/1
10 TABLET ORAL EVERY 4 HOURS PRN
Qty: 40 TABLET | Refills: 0 | Status: SHIPPED | OUTPATIENT
Start: 2025-04-03

## 2025-04-03 NOTE — TELEPHONE ENCOUNTER
Called pt to let her know Dr. Roger sent in Rx for oxycodone 10mg to [facility] in [place]. She verbalized understanding.    Dorene MELVIN CMA ([facility]), ROT

## 2025-04-04 ENCOUNTER — TELEPHONE (OUTPATIENT)
Dept: ORTHOPEDIC SURGERY | Facility: CLINIC | Age: 70
End: 2025-04-04
Payer: MEDICARE

## 2025-04-04 NOTE — TELEPHONE ENCOUNTER
Patient had knee replacement surgery this week. She was told by her pt that her cryotherapy pain relief system should be covered by insurance and that she should have received one after surgery, despite us telling her its not.     Please advise.

## 2025-04-04 NOTE — TELEPHONE ENCOUNTER
Spoke to pt regarding her previous message; She reports that her PT from [facility] stated that she knows that insurance (Medicare) will cover ice machines because she has had many patients leave the hospital with them.     I explained to the patient it is not standard for out patients to receive ice machines because in our experience they are not a covered cost by insurance. I explained that our brace representative does have Iceman machines but that they are an out-of-pocket $165 cost.     I told her I would reach out to her PT to have her provide me with specifics (specific products and specific insurances that she knows is covered) and get back to her. She understood.    Dorene MELVIN CMA ([facility]), ROT

## 2025-04-04 NOTE — TELEPHONE ENCOUNTER
LVM with [facility] to speak with pt's therapist (Radha) to discuss ice machine.    Dorene MELVIN CMA ([facility]), ROT

## 2025-04-07 NOTE — TELEPHONE ENCOUNTER
Spoke to Steph with [facility] who reports speaking to pt regarding the ice machine and explaining to the pt not all of her TKA pts receive one but the ones that do receive them upon discharge from the hospital; She reports explaining to the pt that she was not sure how the billing/insurance part worked and the patient informed her that she would reach out to our office.    I told Steph that I contact the pt.    Dorene MELVIN CMA ([facility]), ROT

## 2025-04-07 NOTE — TELEPHONE ENCOUNTER
Spoke to pt to let her know about the conversation I had with her therapist.    I again explained that it is our experience that insurances do not cover this item and that the patients who do desire them, pay out of pocket. Explained that it is not standard for any our patients to receive them from the hospital.    I also explained to the pt that if she desires one, she can contact her insurance and find out if they will cover this cost and if so, I told her to find what type of device and where she can find out. I told her we would be more than happy to send over a script. She understood.    Otherwise, she says she is doing okay with pain, swelling, and PT. She reports she will probably need a refill of the oxycodone 10mg prior to her first post-op appt; I told her this was fine and to contact us when she was running low. She also inquired about re-scheduling her 1st post-op appt due to a scheduling conflict. She was agreeable to coming in to our [place] office on Friday 4/25 with Abbie at 9:10am. Directions provided. No further action needed at this time.    Dorene MELVIN CMA ([facility]), ROT

## 2025-04-08 DIAGNOSIS — M17.12 PRIMARY OSTEOARTHRITIS OF LEFT KNEE: Primary | ICD-10-CM

## 2025-04-09 ENCOUNTER — TELEMEDICINE (OUTPATIENT)
Dept: OBSTETRICS AND GYNECOLOGY | Facility: CLINIC | Age: 70
End: 2025-04-09
Payer: MEDICARE

## 2025-04-09 DIAGNOSIS — N39.41 URGE URINARY INCONTINENCE: Primary | ICD-10-CM

## 2025-04-09 RX ORDER — OXYBUTYNIN CHLORIDE 10 MG/1
10 TABLET, EXTENDED RELEASE ORAL DAILY
Qty: 30 TABLET | Refills: 12 | Status: SHIPPED | OUTPATIENT
Start: 2025-04-09 | End: 2026-04-09

## 2025-04-09 RX ORDER — OXYCODONE HYDROCHLORIDE 10 MG/1
10 TABLET ORAL EVERY 6 HOURS PRN
Qty: 30 TABLET | Refills: 0 | Status: SHIPPED | OUTPATIENT
Start: 2025-04-09 | End: 2025-04-10 | Stop reason: SDUPTHER

## 2025-04-09 NOTE — PROGRESS NOTES
No chief complaint on file.  Chief complaint: urinary incontinence     You have chosen to receive care through a telehealth visit.  Do you consent to use a video/audio connection for your medical care today? YES    Park Ray is a 69 y.o. year old .  No LMP recorded (lmp unknown). Patient has had a hysterectomy.  Attempted to contact via video, but unable to connect. She presents via a phone to be seen because of urinary incontinence. Patient had tried pessary due to prolapse, but is having much more urge urinary incontinence. She has removed pessary as this was worsening her symptoms. She recently had knee surgery and is doing well from a recovery standpoint, but is having a lot of urgency and is not easily able to make it to the bathroom on time. No stress urinary incontinence symptoms.     History of HTN but reports that BP has been much better since knee surgery. She is interested in starting medication for urgency.     The following portions of the patient's history were reviewed and updated as appropriate:current medications and allergies    Social History    Tobacco Use      Smoking status: Never      Smokeless tobacco: Never      Tobacco comments: Never         Objective   LMP  (LMP Unknown) Comment: S/P AH, LEFT S&O/RIGHT S&O    Lab Review   No data reviewed    Imaging   No data reviewed        Assessment   Urge urinary incontinence      Plan   Will start XL oxybutynin. Discussed side effects of dry eyes, dry mouth, dizziness. She is to call if having any of these symptoms.   The importance of keeping all planned follow-up and taking all medications as prescribed was emphasized.  Follow up for annual exam      New Medications Ordered This Visit   Medications    oxybutynin XL (Ditropan XL) 10 MG 24 hr tablet     Sig: Take 1 tablet by mouth Daily.     Dispense:  30 tablet     Refill:  12          This note was electronically signed.    Sofie Almanza MD  Obstetrics and Gynecology  [facility]       Total time spent today with Park  was 10 minutes (level 2).  Off this time, 90% was spent coordinating care, answering her questions and counseling regarding urinary incontinence and alternative therapies.    Part of this note may be an electronic transcription/translation of spoken language to printed text using the Dragon Dictation System.

## 2025-04-09 NOTE — TELEPHONE ENCOUNTER
Dr. Roger-patient is requesting an oxycodone refill; Pt is 8 days s/p TKA. Not sure if the plan was to continue her on oxycodone 10mg or cut back to 5mg. [facility] Please advise. Thanks!    Dorene MELVIN CMA ([facility]), ROT

## 2025-04-09 NOTE — TELEPHONE ENCOUNTER
Patient calling in with questions regarding her ACE wrap; She reports PT mentioned that she should have her ACE wrap on still.    I explained that typically the ACE wrap and other dressing supplies can typically be removed around post-op day 2 (once nerve block is removed) but that if she was having any swelling concerns, she could apply the ACE wrap and use for compression. She did note having a significant amount of swelling still so I told her she could use the ACE wrap or look into a thigh-high compression stocking. In addition to this, I encouraged her to continue icing and elevating higher than the heart. She verbalized understanding.    She did also note that she would be running out of her pain medication on Friday and is requesting a refill for this. I told her I would send the refill request back to Dr. Roger and get back to her once I had received a response. She understood.    Dorene MELVIN CMA ([facility]), ROT

## 2025-04-10 RX ORDER — OXYCODONE HYDROCHLORIDE 10 MG/1
10 TABLET ORAL EVERY 6 HOURS PRN
Qty: 30 TABLET | Refills: 0 | Status: SHIPPED | OUTPATIENT
Start: 2025-04-10

## 2025-04-10 NOTE — TELEPHONE ENCOUNTER
Patient called back and stated she received a notification from [facility] that her prescription from Dr Roger is ready for . Patient stated she no longer needs a call from a supervisor.

## 2025-04-10 NOTE — TELEPHONE ENCOUNTER
This script printed instead of going to pharmacy. Can you send for Dr Roger since he is out of office today?    Thanks.     Dianelys

## 2025-04-10 NOTE — TELEPHONE ENCOUNTER
Patient called again and stated her medicine (Oxycodone) was not called in to the pharmacy and is requesting for it to be called in to the pharmacy. I advised patient we are waiting on Dr Roger to send it in.

## 2025-04-10 NOTE — TELEPHONE ENCOUNTER
Ms. Ray was calling in to inquire the status of the medication refill. I did advise the patient there was a technical error and we are waiting for Dr. Roger to resend the script.    Could someone please reach out to the patient when it gets sent to the pharmacy, please?

## 2025-04-16 NOTE — TELEPHONE ENCOUNTER
PATIENT IS CALLING IN REQUESTING A REFILL ON HER PAIN MEDICATION. THE OXYCODONE 5 MG. SHE HAD ENOUGH UNTIL FRIDAY. SHE HAD A TOTAL LEFT KNEE ON 04/01    CALL BACK # [phone]    PHARMACY: [facility] [place]

## 2025-04-17 ENCOUNTER — TELEPHONE (OUTPATIENT)
Dept: ENDOCRINOLOGY | Facility: CLINIC | Age: 70
End: 2025-04-17
Payer: MEDICARE

## 2025-04-17 RX ORDER — LEVOTHYROXINE SODIUM 50 UG/1
50 TABLET ORAL
Qty: 90 TABLET | Refills: 3 | Status: SHIPPED | OUTPATIENT
Start: 2025-04-17

## 2025-04-17 RX ORDER — OXYCODONE HYDROCHLORIDE 5 MG/1
5 TABLET ORAL EVERY 6 HOURS PRN
Qty: 30 TABLET | Refills: 0 | Status: SHIPPED | OUTPATIENT
Start: 2025-04-17

## 2025-04-17 NOTE — TELEPHONE ENCOUNTER
PT CALLED STATING SHE IS TOLERATING UNITHROID. SHE REQUESTED AN RX TO BE SENT IN TO [facility] IN [place], [place].     PT ALSO REQUESTED SAMPLES.

## 2025-04-18 ENCOUNTER — TELEPHONE (OUTPATIENT)
Dept: ORTHOPEDIC SURGERY | Facility: CLINIC | Age: 70
End: 2025-04-18
Payer: MEDICARE

## 2025-04-18 RX ORDER — OXYCODONE HYDROCHLORIDE 10 MG/1
10 TABLET ORAL EVERY 6 HOURS PRN
Qty: 25 TABLET | Refills: 0 | Status: SHIPPED | OUTPATIENT
Start: 2025-04-18

## 2025-04-18 NOTE — TELEPHONE ENCOUNTER
Dr. Roger-Patient reports it was discussed that she would be able to have 10mg oxycodone for the first month after her surgery.    The refill provided yesterday was only 5mg and she has tried taking just the 1 tab of 5mg but reports it does not seem to help control her pain enough (especially after working with therapy). If she doubles up on the current 5mg, she will run out within 4 days.     Can a new Rx for 10mg oxycodone be provided? Please advise. Pharmacy on file ([facility] [place]) confirmed. Thanks!    Dorene MELVIN CMA ([facility]), ROT

## 2025-04-18 NOTE — TELEPHONE ENCOUNTER
Called pt back to let her know new Rx had been sent in and that Dr. Roger explained this would probably be the last one. Pt verbalized understanding.    Pt also asked about a sleeve that her PT had mentioned to her. PT told her she should have been given a sleeve upon discharge from the hospital; I explained that sometimes that is true but not every pt comes home with a knee sleeve. Her PT has recommended she get one and she wanted to make sure that was okay.    I told her it was fine for her to try a knee sleeve but to make sure it wasn't too tight considering the fluctuation and degree of post-op swelling she may still have. She understood and states she will have her PT evaluate her to ensure the sleeve she gets is appropriate.    Dorene MELVIN CMA ([facility]), ROT

## 2025-04-23 RX ORDER — LEVOTHYROXINE SODIUM 50 UG/1
50 TABLET ORAL
Qty: 90 TABLET | Refills: 2 | Status: SHIPPED | OUTPATIENT
Start: 2025-04-23

## 2025-04-23 NOTE — TELEPHONE ENCOUNTER
PATIENT IS STATING PHARMACY DID NOT RECEIVE HER PRESCRIPTION FOR UNITHYROID. SHE NEEDS US TO RESEND THIS PRESCRIPTION TO [facility] IN [facility]. SHE ALSO IS HERE WITH  AND WANTS TO SEE IF WE HAVE SAMPLES TO GIVE HER TODAY.

## 2025-04-23 NOTE — TELEPHONE ENCOUNTER
Last office visit with prescribing clinician: 3/12/2025       Next office visit with prescribing clinician: 1/13/2026

## 2025-04-24 NOTE — TELEPHONE ENCOUNTER
Pt called stated she was unable to fill prescription for Unithroid 50 mcg until 06/10/25. Pt price is $285.00 unsure if PA is needed or patient price. Per pt Levothyroxine was filled 20 days ago 90 day supply. Pt wants to know if we have any samples of Unithroid 50 mcg. Please see previous messagers

## 2025-04-25 ENCOUNTER — TELEPHONE (OUTPATIENT)
Dept: ENDOCRINOLOGY | Facility: CLINIC | Age: 70
End: 2025-04-25
Payer: MEDICARE

## 2025-04-25 ENCOUNTER — OFFICE VISIT (OUTPATIENT)
Age: 70
End: 2025-04-25
Payer: MEDICARE

## 2025-04-25 VITALS — TEMPERATURE: 98.2 F

## 2025-04-25 DIAGNOSIS — Z09 SURGERY FOLLOW-UP: Primary | ICD-10-CM

## 2025-04-25 DIAGNOSIS — Z96.652 S/P TKR (TOTAL KNEE REPLACEMENT), LEFT: ICD-10-CM

## 2025-04-25 PROCEDURE — 99024 POSTOP FOLLOW-UP VISIT: CPT | Performed by: PHYSICIAN ASSISTANT

## 2025-04-25 PROCEDURE — 1159F MED LIST DOCD IN RCRD: CPT | Performed by: PHYSICIAN ASSISTANT

## 2025-04-25 PROCEDURE — 1160F RVW MEDS BY RX/DR IN RCRD: CPT | Performed by: PHYSICIAN ASSISTANT

## 2025-04-25 RX ORDER — OXYCODONE HYDROCHLORIDE 5 MG/1
5 TABLET ORAL EVERY 8 HOURS PRN
Qty: 30 TABLET | Refills: 0 | Status: SHIPPED | OUTPATIENT
Start: 2025-04-25

## 2025-04-25 RX ORDER — MELOXICAM 7.5 MG/1
7.5 TABLET ORAL DAILY
Qty: 30 TABLET | Refills: 0 | Status: SHIPPED | OUTPATIENT
Start: 2025-04-25

## 2025-04-25 NOTE — PROGRESS NOTES
[facility]      Subjective     CC: Post-op (3.5 weeks status post TOTAL KNEE ARTHROPLASTY WITH CORI ROBOT LEFT- DOS 4/1/25)      KIERAN Ray is a 69 y.o. female.  Patient presents today 3 weeks status post left total knee arthroplasty with Dr. Roger.  She reports pain is slowly returning.  She continues with oxycodone every 6 hours.  She is also taking Tylenol.  She is doing home health physical therapy.  Using a cane for ambulation.    Overall, patient's symptoms are improving    ROS:    Constiutional:Pt denies fever, chills, nausea, or vomiting.  MSK:as above        Objective      Past Medical History  Past Medical History:   Diagnosis Date    Acute torn meniscus of left knee     Arthritis of neck 2016    Asthma Nov 2023    history of    Closed displaced fracture of shaft of left clavicle     Closed nondisplaced fracture of neck of left radius     Colon cancer     tumor removed with 18 inches of colon    Fatty liver     Fracture of proximal phalanx of toe     Herpes     History of stomach ulcers     Hyperlipidemia 08/2023    Yearly lab work Dr Nevin Finnegan    Hypertension 11/2023    Dr Erasto Anna Dentist    Hyperthyroidism     Hypothyroidism     IBS (irritable bowel syndrome)     Knee swelling 2021    Mixed incontinence urge and stress     Osteoarthritis     Osteoporosis     Rotator cuff syndrome 2023    Stress fracture 2023    Rotator cup left    Tear of meniscus of knee 2022    Left knee         Physical Exam  Temp 98.2 °F (36.8 °C)   LMP  (LMP Unknown) Comment: S/P AH, LEFT S&O/RIGHT S&O    There is no height or weight on file to calculate BMI.    Patient is well nourished and well developed.        Ortho Exam  Left knee exam: Anterior knee incision is healing well.  Range of motion 5-90.  Ligament stable.  Neurovascular intact distally.    Imaging/Labs/EMG Reviewed:  XR Knee 3+ View With [place] Left  Order date: 4/25/2025  Authorizing: Jose Roger  MD  Ordered by Jose Roger MD on 4/25/2025.     Narrative & Impression    Left Knee Radiographs  Indication: status-post left total knee arthroplasty  Views: AP, lateral, and sunrise views of the left knee     Comparison: no change compared to prior study, 4/1/2025     Findings:   The components are well aligned, with no signs of loosening or failure.          Assessment    Assessment:  1. Surgery follow-up    2. S/P TKR (total knee replacement), left        Plan:  Recommend over the counter anti-inflammatories for pain and/or swelling  Doing well status post left total knee arthroplasty with Dr. Roger on 4/1/2025.  I reassured the patient that her incision is healing well and pain will improve.  This point, she needs to begin weaning herself off of the oxycodone.  She will continue Tylenol and meloxicam.  She wanted to continue with home health physical therapy and I explained the importance of doing outpatient physical therapy if she is amenable to this.  She will return to see Dr. Roger in 6 weeks, sooner if needed.    Patient history, diagnosis and treatment plan discussed with Dr. Roger.        Abbie Mas PA-C  04/28/25  08:19 AMANDAT      Dragon disclaimer:  Much of this encounter note is an electronic transcription/translation of spoken language to printed text. The electronic translation of spoken language may permit erroneous, or at times, nonsensical words or phrases to be inadvertently transcribed; Although I have reviewed the note for such errors, some may still exist.

## 2025-04-25 NOTE — TELEPHONE ENCOUNTER
Spoke with patient.  Suggested that the patient contact her insurance company.  We have two bottles of 25 mcg saved for the patient.  The patient is aware to take two 25mcg to equal her 50 mcg.  Patient will inform us regarding insurance contact.

## 2025-05-14 ENCOUNTER — TELEPHONE (OUTPATIENT)
Dept: ORTHOPEDIC SURGERY | Facility: CLINIC | Age: 70
End: 2025-05-14
Payer: MEDICARE

## 2025-05-14 NOTE — TELEPHONE ENCOUNTER
*TKA - 04/01/25*  PATIENT IS CALLING IN ABOUT HER INCISION SITE. SHE SAID LAST TIME SHE WAS IN HERE, HE WAS CONCERNED WITH A COUPLE OF SPOTS. THEY HAVE SINCE SCABBED OVER. ONE SCAB CAME OFF AND SHE EXPERIENCED SOME DRAINAGE. HER KNEE IS ALSO STILL REALLY TENDER, AND RED. IT IS NOT HOT TO THE TOUCH AND SHE DENIES ANY FEVERS. SHE TOOK A PICTURE AND IS SENDING IT THROUGH A PATIENT MESSAGE. SHE WOULD LIKE DR. REYNA TO LOOK AT IT TO DETERMINE IF SHE NEEDS TO GET IT FURTHER EXAMINED ETC.     IF DR. REYNA OR CLINICAL STAFF COULD PLEASE ADVISE.     CALL BACK # [phone]

## 2025-05-16 ENCOUNTER — OFFICE VISIT (OUTPATIENT)
Age: 70
End: 2025-05-16
Payer: MEDICARE

## 2025-05-16 DIAGNOSIS — Z96.652 S/P TKR (TOTAL KNEE REPLACEMENT), LEFT: Primary | ICD-10-CM

## 2025-05-16 DIAGNOSIS — Z47.89 ORTHOPEDIC AFTERCARE: ICD-10-CM

## 2025-05-16 DIAGNOSIS — M17.11 PRIMARY OSTEOARTHRITIS OF RIGHT KNEE: ICD-10-CM

## 2025-05-16 RX ORDER — MELOXICAM 15 MG/1
15 TABLET ORAL DAILY
COMMUNITY
Start: 2025-05-06 | End: 2025-08-04

## 2025-05-16 RX ORDER — HYDROCODONE BITARTRATE AND ACETAMINOPHEN 7.5; 325 MG/1; MG/1
1 TABLET ORAL 2 TIMES DAILY
COMMUNITY

## 2025-05-16 RX ORDER — ROPIVACAINE HYDROCHLORIDE 5 MG/ML
4 INJECTION, SOLUTION EPIDURAL; INFILTRATION; PERINEURAL
Status: COMPLETED | OUTPATIENT
Start: 2025-05-16 | End: 2025-05-16

## 2025-05-16 RX ORDER — TRIAMCINOLONE ACETONIDE 40 MG/ML
40 INJECTION, SUSPENSION INTRA-ARTICULAR; INTRAMUSCULAR
Status: COMPLETED | OUTPATIENT
Start: 2025-05-16 | End: 2025-05-16

## 2025-05-16 RX ADMIN — TRIAMCINOLONE ACETONIDE 40 MG: 40 INJECTION, SUSPENSION INTRA-ARTICULAR; INTRAMUSCULAR at 08:05

## 2025-05-16 RX ADMIN — ROPIVACAINE HYDROCHLORIDE 4 ML: 5 INJECTION, SOLUTION EPIDURAL; INFILTRATION; PERINEURAL at 08:05

## 2025-05-16 NOTE — PROGRESS NOTES
Procedure   - Large Joint Arthrocentesis: R knee on 5/16/2025 8:05 AM  Indications: pain  Details: 21 G needle, anterolateral approach  Medications: 4 mL ropivacaine 0.5 %; 40 mg triamcinolone acetonide 40 MG/ML  Outcome: tolerated well, no immediate complications  Procedure, treatment alternatives, risks and benefits explained, specific risks discussed. Consent was given by the patient. Immediately prior to procedure a time out was called to verify the correct patient, procedure, equipment, support staff and site/side marked as required. Patient was prepped and draped in the usual sterile fashion.

## 2025-05-16 NOTE — PROGRESS NOTES
[facility] [place] [facility] Note    Subjective     Chief Complaint   Patient presents with    Follow-up     3 week recheck - status post TOTAL KNEE ARTHROPLASTY WITH CORI ROBOT LEFT- DOS 4/1/25        HPI    It has been 3  week(s) since Ms. Ray's last visit. She returns to clinic today for postoperative follow-up of left knee arthroplasty. The issue has been ongoing for 6 week(s).  Previous/current treatments: cane/walker and physical therapy. Current symptoms: pain and stiffness. The pain is worse with walking, sitting, climbing stairs, sleeping, and rising from seated position; ice, assistive device (cane/walker), and pain medication and/or NSAID improve the pain. Overall, she is doing the same.  80% improvement compared to her preoperative symptoms.  She wanted to have her incisions evaluated today.  No fevers.  She would like to have an injection for her right knee, as this has been painful with her rehabilitation.  She has known history of arthritis.      I have reviewed the following portions of the patient's history and agree with: History of Present Illness and Review of Systems    Patient Active Problem List   Diagnosis    Hypothyroidism    Recurrent genital herpes    Arm pain, right    Arthralgia of both knees    Bone pain    Contusion of left lower leg    Dyslipidemia    History of colon cancer    Neutrophilic leukemoid reaction    Obesity (BMI 35.0-39.9 without comorbidity)    Thumb pain    Chronic ulcer of lower extremity    Non-pressure chronic ulcer of left calf with fat layer exposed    Family history of breast cancer in mother    Fatty liver disease, nonalcoholic    Mixed incontinence urge and stress    Primary osteoarthritis of left knee    Degenerative arthritis of left knee    Nontoxic multinodular goiter    Annual GYN exam S/P TAHBSO    Status post left knee replacement    HTN (hypertension)    Obesity (BMI 30-39.9)    Leukocytosis, likely reactive    Acute postoperative pain      Past Medical History:   Diagnosis Date    Acute torn meniscus of left knee     Arthritis of neck 2016    Asthma 2023    history of    Closed displaced fracture of shaft of left clavicle     Closed nondisplaced fracture of neck of left radius     Colon cancer     tumor removed with 18 inches of colon    Fatty liver     Fracture of proximal phalanx of toe     Herpes     History of stomach ulcers     Hyperlipidemia 2023    Yearly lab work Dr Nevin Finnegan    Hypertension 2023    Dr Erasto Anna Dentist    Hyperthyroidism     Hypothyroidism     IBS (irritable bowel syndrome)     Knee swelling     Mixed incontinence urge and stress     Osteoarthritis     Osteoporosis     Rotator cuff syndrome     Stress fracture     Rotator cup left    Tear of meniscus of knee     Left knee      Past Surgical History:   Procedure Laterality Date    CATARACT EXTRACTION       SECTION      COLECTOMY PARTIAL / TOTAL Left 2005    Dr. Donohue    COLONOSCOPY      ENTEROCELE REPAIR      Dr. Donohue    LAPAROSCOPIC CHOLECYSTECTOMY  2015    Dr. Cori Porras    LAPAROSCOPIC SALPINGOOPHERECTOMY Right     DAWSON MORA ([place]) PROCEDURE      Dr. Donohue    THYROID SURGERY      Needle biopsy     TOTAL ABDOMINAL HYSTERECTOMY WITH SALPINGO OOPHORECTOMY Left     Dr. Dodson    TOTAL KNEE ARTHROPLASTY Left 2025    Procedure: TOTAL KNEE ARTHROPLASTY WITH CORI ROBOT LEFT;  Surgeon: Jose Roger MD;  Location: [facility];  Service: Robotics - Ortho;  Laterality: Left;    TRIGGER POINT INJECTION  2024    Both knees    UTERINE SUSPENSION LAPAROSCOPIC      Dr. Lobato      Family History   Problem Relation Age of Onset    Breast cancer Mother 40    Cancer Mother         Breast cancer    Cancer Father         Prostate, lung, brain cancer    Heart disease Father     Hypertension Father     Osteoarthritis Father     Breast cancer Sister 70    Hypertension Sister     Heart disease Sister      Hypertension Sister     Cancer Sister         Breast cancer    Obesity Sister     Brain cancer Brother     Hypertension Brother     Diabetes Brother     Heart disease Brother     Heart failure Brother     Hypertension Brother     Cancer Brother         Brain cancer    Diabetes Brother     Obesity Brother     Breast cancer Maternal Grandmother     Cancer Maternal Grandmother         Breast cancer    Breast cancer Paternal Grandmother     Cancer Paternal Grandmother         Brain cancer    Cancer Other     Heart disease Other      Social History     Socioeconomic History    Marital status:    Tobacco Use    Smoking status: Never    Smokeless tobacco: Never    Tobacco comments:     Never   Vaping Use    Vaping status: Never Used   Substance and Sexual Activity    Alcohol use: Not Currently     Alcohol/week: 2.0 standard drinks of alcohol     Comment: Socially upon occasion    Drug use: No    Sexual activity: Not Currently     Partners: Male     Birth control/protection: Abstinence, Hysterectomy     Comment: Hysterectomy 35 years ago      Current Outpatient Medications on File Prior to Visit   Medication Sig Dispense Refill    acetaminophen (TYLENOL) 500 MG tablet Take 2 tablets by mouth Every 8 (Eight) Hours. 42 tablet 0    acyclovir (ZOVIRAX) 400 MG tablet Take 1 tablet by mouth Daily. If outbreak occurs while on suppressive therapy, call office for further instructions. 30 tablet 11    albuterol sulfate  (90 Base) MCG/ACT inhaler Inhale 2 puffs 4 (Four) Times a Day.      aspirin (ASPIR) 81 MG EC tablet Take 1 tablet by mouth 2 (Two) Times a Day. 60 tablet 0    chlorthalidone (HYGROTON) 25 MG tablet TAKE 1 TABLET BY MOUTH DAILY 30 tablet 11    Cholecalciferol (VITAMIN D-3) 5000 units tablet Take 1 tablet by mouth Daily.      docusate sodium (Colace) 100 MG capsule Take 1 capsule by mouth 2 (Two) Times a Day. 60 capsule 0    estradiol (ESTRACE VAGINAL) 0.1 MG/GM vaginal cream Insert 1 gm intravaginally  1-3 times each week 1 each 12    fluticasone (FLONASE) 50 MCG/ACT nasal spray Administer 2 sprays into the nostril(s) as directed by provider Daily.      furosemide (LASIX) 40 MG tablet Take 1 tablet by mouth Daily As Needed.      HYDROcodone-acetaminophen (NORCO) 7.5-325 MG per tablet Take 1 tablet by mouth 2 (Two) Times a Day.      meloxicam (MOBIC) 15 MG tablet Take 1 tablet by mouth Daily.      multivitamin with minerals (Centrum Silver Ultra Womens) tablet tablet Take 1 tablet by mouth Daily.      nebivolol (BYSTOLIC) 10 MG tablet TAKE 1 TABLET BY MOUTH DAILY 30 tablet 11    nitrofurantoin, macrocrystal-monohydrate, (MACROBID) 100 MG capsule Take 1 capsule by mouth Daily.      Ospemifene 60 MG tablet Take 1 tablet by mouth Daily. THIS PT NEEDS TO KEEP AN APPT HERE BEFORE ANY ADDITIONAL REFILLS. 30 tablet 0    oxybutynin XL (Ditropan XL) 10 MG 24 hr tablet Take 1 tablet by mouth Daily. 30 tablet 12    potassium chloride ER (K-TAB) 20 MEQ tablet controlled-release ER tablet Take 1 tablet by mouth Daily.      pravastatin (PRAVACHOL) 40 MG tablet Take 1 tablet by mouth Every Night.      PYRIDOSTIGMINE BROMIDE PO Take 60 mg by mouth 3 (Three) Times a Day As Needed.      Unithroid 50 MCG tablet Take 1 tablet by mouth Every Morning. 90 tablet 2    VITAMIN E 1000 UNIT capsule       [DISCONTINUED] meloxicam (Mobic) 7.5 MG tablet Take 1 tablet by mouth Daily. 30 tablet 0    [DISCONTINUED] Omeprazole 20 MG Tablet Delayed Release Dispersible Take 1 tablet by mouth Daily.      [DISCONTINUED] oxyCODONE (ROXICODONE) 5 MG immediate release tablet Take 1 tablet by mouth Every 8 (Eight) Hours As Needed for Moderate Pain. 30 tablet 0     No current facility-administered medications on file prior to visit.      Allergies   Allergen Reactions    Lisinopril Anaphylaxis    Tetracyclines & Related Hives and Nausea Only        Review of Systems   Constitutional:  Negative for activity change, appetite change, chills, diaphoresis,  fatigue, fever and unexpected weight change.   HENT:  Negative for congestion, dental problem, drooling, ear discharge, ear pain, facial swelling, hearing loss, mouth sores, nosebleeds, postnasal drip, rhinorrhea, sinus pressure, sneezing, sore throat, tinnitus, trouble swallowing and voice change.    Eyes:  Negative for photophobia, pain, discharge, redness, itching and visual disturbance.   Respiratory:  Negative for apnea, cough, choking, chest tightness, shortness of breath, wheezing and stridor.    Cardiovascular:  Negative for chest pain, palpitations and leg swelling.   Gastrointestinal:  Negative for abdominal distention, abdominal pain, anal bleeding, blood in stool, constipation, diarrhea, nausea, rectal pain and vomiting.   Endocrine: Negative for cold intolerance, heat intolerance, polydipsia, polyphagia and polyuria.   Genitourinary:  Negative for decreased urine volume, difficulty urinating, dysuria, enuresis, flank pain, frequency, genital sores, hematuria and urgency.   Musculoskeletal:  Positive for arthralgias. Negative for back pain, gait problem, joint swelling, myalgias, neck pain and neck stiffness.   Skin:  Negative for color change, pallor, rash and wound.   Allergic/Immunologic: Negative for environmental allergies, food allergies and immunocompromised state.   Neurological:  Negative for dizziness, tremors, seizures, syncope, facial asymmetry, speech difficulty, weakness, light-headedness, numbness and headaches.   Hematological:  Negative for adenopathy. Does not bruise/bleed easily.   Psychiatric/Behavioral:  Negative for agitation, behavioral problems, confusion, decreased concentration, dysphoric mood, hallucinations, self-injury, sleep disturbance and suicidal ideas. The patient is not nervous/anxious and is not hyperactive.         Objective      Physical Exam  LMP  (LMP Unknown) Comment: S/P AH, LEFT S&O/RIGHT S&O    There is no height or weight on file to calculate BMI.          General:   Mental Status:  Alert   Appearance: Cooperative, in no acute distress   Build and Nutrition: Well-nourished well-developed female   Orientation: Alert and oriented to person, place and time   Posture: Normal   Gait: With a walker    Integument:   Left knee: The primary wound is well-healed with no signs of infection, with healing pin site incision sites, with resolving suture reaction type appearance, with no surrounding erythema and no discharge    Lower Extremities:   Left Knee:    Effusion:  Trace    Swelling: None    Crepitus:  None    Range of motion:  Extension: 0°       Flexion: 120°  Instability:  No varus laxity, no valgus laxity, negative anterior drawer  Deformities:  None      Imaging/Studies  Imaging Results (Last 24 Hours)       ** No results found for the last 24 hours. **          No new imaging today.    Assessment and Plan     Diagnoses and all orders for this visit:    1. S/P TKR (total knee replacement), left (Primary)    2. Orthopedic aftercare    3. Primary osteoarthritis of right knee  -     - Large Joint Arthrocentesis: R knee        1. S/P TKR (total knee replacement), left    2. Orthopedic aftercare    3. Primary osteoarthritis of right knee          I reviewed my findings with the patient.  Her left total knee arthroplasty appears to be functioning well.  No signs of infection.  Suture reactions are improving at the pin sites.  I will see her back as planned in June, but I will be happy to see her back sooner for any problems.  She would like to have an injection of her right knee today, and this was provided    Procedure Note:  The potential benefits of performing a therapeutic right knee joint injection, as well as potential risks (including, but not limited to infection, swelling, pain, bleeding, bruising, nerve/blood vessel damage, skin color changes, transient elevation in blood glucose levels, and fat atrophy) were discussed with the patient.  After informed consent,  timeout procedure was performed, and the skin on the right knee was prepped with chlorhexidine soap and alcohol, after which ethyl chloride was applied to the skin at the injection site. Via the anterolateral approach, 1ml of Kenalog 40mg/ml mixed with 4ml 0.5% ropivacaine plain was injected into the knee joint.  The patient tolerated the procedure well, experiencing 75% improvement a few minutes following the injection. There were no complications.  Band-Aid was applied to the injection site. Post-procedural instructions were given to the patient and/or their caregiver.      Return for at regularly scheduled appointment.      Jose Roger MD  05/16/25  08:15 EDT    Dictated Utilizing Dragon Dictation

## 2025-05-27 DIAGNOSIS — Z96.652 S/P TKR (TOTAL KNEE REPLACEMENT), LEFT: Primary | ICD-10-CM

## 2025-05-27 DIAGNOSIS — Z09 SURGERY FOLLOW-UP: ICD-10-CM

## 2025-06-03 ENCOUNTER — TELEPHONE (OUTPATIENT)
Dept: ORTHOPEDIC SURGERY | Facility: CLINIC | Age: 70
End: 2025-06-03
Payer: MEDICARE

## 2025-06-03 DIAGNOSIS — M17.11 PRIMARY OSTEOARTHRITIS OF RIGHT KNEE: Primary | ICD-10-CM

## 2025-06-03 NOTE — TELEPHONE ENCOUNTER
PATIENT IS CALLING IN REQUESTING A PT ORDER FOR HER RIGHT KNEE BE SENT TO [facility].  SHE HAD A LEFT KNEE REPLACEMENT ON 04/01/25 AND HAS BEEN DOING THERAPY ON THAT KNEE. SHE IS PLANNING TO GET HER RIGHT KNEE DONE AT SOME POINT. HOWEVER, HER THERAPIST IS REQUESTING SHE START THERAPY ON HER RIGHT KNEE AS WELL,IN THE MEAN TIME BECAUSE IT IS SO BAD.     IF CLINICAL STAFF OR DR. REYNA COULD PLEASE ADVISE.     CALL BACK # [phone]      Fax # [phone] - [facility]

## 2025-06-19 RX ORDER — CEPHALEXIN 500 MG/1
CAPSULE ORAL
Qty: 4 CAPSULE | Refills: 1 | Status: SHIPPED | OUTPATIENT
Start: 2025-06-19

## 2025-06-19 RX ORDER — CEPHALEXIN 500 MG/1
CAPSULE ORAL
Qty: 4 CAPSULE | Refills: 0 | Status: CANCELLED | OUTPATIENT
Start: 2025-06-19

## 2025-06-19 NOTE — TELEPHONE ENCOUNTER
PATIENT IS SCHEDULED TO HAVE A DENTAL CLEANING NEXT WEEK AND IS REQUESTING ANTIBIOTICS. SHE HAD A KNEE REPLACEMENT ON 04/01.     CALL BACK # [phone]    PHARMACY: [facility]

## 2025-06-20 ENCOUNTER — TELEPHONE (OUTPATIENT)
Dept: ORTHOPEDIC SURGERY | Facility: CLINIC | Age: 70
End: 2025-06-20
Payer: MEDICARE

## 2025-06-20 ENCOUNTER — HOSPITAL ENCOUNTER (OUTPATIENT)
Dept: CARDIOLOGY | Facility: HOSPITAL | Age: 70
Discharge: HOME | End: 2025-06-20
Payer: MEDICARE

## 2025-06-20 ENCOUNTER — OFFICE VISIT (OUTPATIENT)
Age: 70
End: 2025-06-20
Payer: MEDICARE

## 2025-06-20 VITALS — HEIGHT: 62 IN | BODY MASS INDEX: 33.13 KG/M2 | WEIGHT: 180 LBS

## 2025-06-20 DIAGNOSIS — M79.89 LEFT LEG SWELLING: ICD-10-CM

## 2025-06-20 DIAGNOSIS — Z96.652 S/P TKR (TOTAL KNEE REPLACEMENT), LEFT: Primary | ICD-10-CM

## 2025-06-20 LAB
BH CV LOWER VASCULAR LEFT COMMON FEMORAL AUGMENT: NORMAL
BH CV LOWER VASCULAR LEFT COMMON FEMORAL COMPRESS: NORMAL
BH CV LOWER VASCULAR LEFT COMMON FEMORAL PHASIC: NORMAL
BH CV LOWER VASCULAR LEFT COMMON FEMORAL SPONT: NORMAL
BH CV LOWER VASCULAR LEFT DISTAL FEMORAL AUGMENT: NORMAL
BH CV LOWER VASCULAR LEFT DISTAL FEMORAL COMPRESS: NORMAL
BH CV LOWER VASCULAR LEFT DISTAL FEMORAL PHASIC: NORMAL
BH CV LOWER VASCULAR LEFT DISTAL FEMORAL SPONT: NORMAL
BH CV LOWER VASCULAR LEFT GASTRONEMIUS COMPRESS: NORMAL
BH CV LOWER VASCULAR LEFT GREATER SAPH AK COMPRESS: NORMAL
BH CV LOWER VASCULAR LEFT GREATER SAPH BK COMPRESS: NORMAL
BH CV LOWER VASCULAR LEFT LESSER SAPH COMPRESS: NORMAL
BH CV LOWER VASCULAR LEFT MID FEMORAL AUGMENT: NORMAL
BH CV LOWER VASCULAR LEFT MID FEMORAL COMPRESS: NORMAL
BH CV LOWER VASCULAR LEFT MID FEMORAL PHASIC: NORMAL
BH CV LOWER VASCULAR LEFT MID FEMORAL SPONT: NORMAL
BH CV LOWER VASCULAR LEFT PERONEAL AUGMENT: NORMAL
BH CV LOWER VASCULAR LEFT PERONEAL COMPRESS: NORMAL
BH CV LOWER VASCULAR LEFT POPLITEAL AUGMENT: NORMAL
BH CV LOWER VASCULAR LEFT POPLITEAL COMPRESS: NORMAL
BH CV LOWER VASCULAR LEFT POPLITEAL PHASIC: NORMAL
BH CV LOWER VASCULAR LEFT POPLITEAL SPONT: NORMAL
BH CV LOWER VASCULAR LEFT POSTERIOR TIBIAL AUGMENT: NORMAL
BH CV LOWER VASCULAR LEFT POSTERIOR TIBIAL COMPRESS: NORMAL
BH CV LOWER VASCULAR LEFT PROFUNDA FEMORAL AUGMENT: NORMAL
BH CV LOWER VASCULAR LEFT PROFUNDA FEMORAL PHASIC: NORMAL
BH CV LOWER VASCULAR LEFT PROFUNDA FEMORAL SPONT: NORMAL
BH CV LOWER VASCULAR LEFT PROXIMAL FEMORAL AUGMENT: NORMAL
BH CV LOWER VASCULAR LEFT PROXIMAL FEMORAL COMPRESS: NORMAL
BH CV LOWER VASCULAR LEFT PROXIMAL FEMORAL PHASIC: NORMAL
BH CV LOWER VASCULAR LEFT PROXIMAL FEMORAL SPONT: NORMAL
BH CV LOWER VASCULAR LEFT SAPHENOFEMORAL JUNCTION AUGMENT: NORMAL
BH CV LOWER VASCULAR LEFT SAPHENOFEMORAL JUNCTION COMPRESS: NORMAL
BH CV LOWER VASCULAR LEFT SAPHENOFEMORAL JUNCTION PHASIC: NORMAL
BH CV LOWER VASCULAR LEFT SAPHENOFEMORAL JUNCTION SPONT: NORMAL
BH CV LOWER VASCULAR RIGHT COMMON FEMORAL AUGMENT: NORMAL
BH CV LOWER VASCULAR RIGHT COMMON FEMORAL COMPRESS: NORMAL
BH CV LOWER VASCULAR RIGHT COMMON FEMORAL PHASIC: NORMAL
BH CV LOWER VASCULAR RIGHT COMMON FEMORAL SPONT: NORMAL

## 2025-06-20 PROCEDURE — 93971 EXTREMITY STUDY: CPT

## 2025-06-20 NOTE — PROGRESS NOTES
[facility] [place]    Subjective     Chief Complaint   Patient presents with    Post-op     2 month, TOTAL KNEE ARTHROPLASTY WITH CORI ROBOT LEFT- DOS 4/1/25             HPI    It has been 1  month(s) since Ms. Ray's last visit. She returns to clinic today for postoperative follow-up of left knee arthroplasty. The issue has been ongoing for 2 month(s). She rates her pain a 8/10 on the pain scale. Previous/current treatments: physical therapy and walker. Current symptoms: pain, swelling, and popping. The pain is worse with walking, standing, sitting, sleeping, and lying on affected side; resting, assistive device (cane/walker), pain medication and/or NSAID, and elevating the extremity improve the pain. Overall, she is doing worse.  80% improvement compared to her preoperative symptoms.  Ambulating with the aid of a walker, but she can walk without it.  Complaining of swelling in the leg.      I have reviewed the following portions of the patient's history and agree with: History of Present Illness and Review of Systems    Patient Active Problem List   Diagnosis    Hypothyroidism    Recurrent genital herpes    Arm pain, right    Arthralgia of both knees    Bone pain    Contusion of left lower leg    Dyslipidemia    History of colon cancer    Neutrophilic leukemoid reaction    Obesity (BMI 35.0-39.9 without comorbidity)    Thumb pain    Chronic ulcer of lower extremity    Non-pressure chronic ulcer of left calf with fat layer exposed    Family history of breast cancer in mother    Fatty liver disease, nonalcoholic    Mixed incontinence urge and stress    Primary osteoarthritis of left knee    Degenerative arthritis of left knee    Nontoxic multinodular goiter    Annual GYN exam S/P TAHBSO    Status post left knee replacement    HTN (hypertension)    Obesity (BMI 30-39.9)    Leukocytosis, likely reactive    Acute postoperative pain     Past Medical History:   Diagnosis Date    Acute torn  meniscus of left knee     Arthritis of neck 2016    Asthma 2023    history of    Closed displaced fracture of shaft of left clavicle     Closed nondisplaced fracture of neck of left radius     Colon cancer     tumor removed with 18 inches of colon    Fatty liver     Fracture of proximal phalanx of toe     Herpes     History of stomach ulcers     Hyperlipidemia 2023    Yearly lab work Dr Nevin Finnegan    Hypertension 2023    Dr Erasto Anna Dentist    Hyperthyroidism     Hypothyroidism     IBS (irritable bowel syndrome)     Knee swelling     Mixed incontinence urge and stress     Osteoarthritis     Osteoporosis     Rotator cuff syndrome     Stress fracture     Rotator cup left    Tear of meniscus of knee     Left knee      Past Surgical History:   Procedure Laterality Date    CATARACT EXTRACTION       SECTION      COLECTOMY PARTIAL / TOTAL Left 2005    Dr. Donohue    COLONOSCOPY      ENTEROCELE REPAIR      Dr. Donohue    JOINT REPLACEMENT  2025    Dr Roger    LAPAROSCOPIC CHOLECYSTECTOMY  2015    Dr. Cori Porras    LAPAROSCOPIC SALPINGOOPHERECTOMY Right     DAWSON MORA ([facility]) PROCEDURE      Dr. Donohue    THYROID SURGERY      Needle biopsy     TOTAL ABDOMINAL HYSTERECTOMY WITH SALPINGO OOPHORECTOMY Left     Dr. Dodson    TOTAL KNEE ARTHROPLASTY Left 2025    Procedure: TOTAL KNEE ARTHROPLASTY WITH CORI ROBOT LEFT;  Surgeon: Jose Roger MD;  Location: [facility];  Service: Robotics - Ortho;  Laterality: Left;    TRIGGER POINT INJECTION  2024    Both knees    UTERINE SUSPENSION LAPAROSCOPIC      Dr. Lobato      Family History   Problem Relation Age of Onset    Breast cancer Mother 40    Cancer Mother         Breast cancer    Cancer Father         Prostate, lung, brain cancer    Heart disease Father     Hypertension Father     Osteoarthritis Father     Breast cancer Sister 70    Hypertension Sister     Heart disease Sister     Hypertension Sister      Cancer Sister         Breast cancer    Obesity Sister     Brain cancer Brother     Hypertension Brother     Diabetes Brother     Heart disease Brother     Heart failure Brother     Hypertension Brother     Cancer Brother         Brain cancer    Diabetes Brother     Obesity Brother     Breast cancer Maternal Grandmother     Cancer Maternal Grandmother         Breast cancer    Breast cancer Paternal Grandmother     Cancer Paternal Grandmother         Brain cancer    Cancer Other     Heart disease Other      Social History     Socioeconomic History    Marital status:    Tobacco Use    Smoking status: Never    Smokeless tobacco: Never    Tobacco comments:     Never   Vaping Use    Vaping status: Never Used   Substance and Sexual Activity    Alcohol use: Not Currently     Alcohol/week: 2.0 standard drinks of alcohol     Comment: Socially upon occasion    Drug use: No    Sexual activity: Not Currently     Partners: Male     Birth control/protection: Abstinence, Hysterectomy     Comment: Hysterectomy 35 years ago      Current Outpatient Medications on File Prior to Visit   Medication Sig Dispense Refill    acetaminophen (TYLENOL) 500 MG tablet Take 2 tablets by mouth Every 8 (Eight) Hours. 42 tablet 0    acyclovir (ZOVIRAX) 400 MG tablet Take 1 tablet by mouth Daily. If outbreak occurs while on suppressive therapy, call office for further instructions. 30 tablet 11    albuterol sulfate  (90 Base) MCG/ACT inhaler Inhale 2 puffs 4 (Four) Times a Day.      aspirin (ASPIR) 81 MG EC tablet Take 1 tablet by mouth 2 (Two) Times a Day. 60 tablet 0    cephalexin (KEFLEX) 500 MG capsule Take 4 capsules 1 hour prior to the dental procedure. 4 capsule 1    chlorthalidone (HYGROTON) 25 MG tablet TAKE 1 TABLET BY MOUTH DAILY 30 tablet 11    Cholecalciferol (VITAMIN D-3) 5000 units tablet Take 1 tablet by mouth Daily.      docusate sodium (Colace) 100 MG capsule Take 1 capsule by mouth 2 (Two) Times a Day. 60 capsule 0     estradiol (ESTRACE VAGINAL) 0.1 MG/GM vaginal cream Insert 1 gm intravaginally 1-3 times each week 1 each 12    fluticasone (FLONASE) 50 MCG/ACT nasal spray Administer 2 sprays into the nostril(s) as directed by provider Daily.      furosemide (LASIX) 40 MG tablet Take 1 tablet by mouth Daily As Needed.      HYDROcodone-acetaminophen (NORCO) 7.5-325 MG per tablet Take 1 tablet by mouth 2 (Two) Times a Day.      meloxicam (MOBIC) 15 MG tablet Take 1 tablet by mouth Daily.      multivitamin with minerals (Centrum Silver Ultra Womens) tablet tablet Take 1 tablet by mouth Daily.      nebivolol (BYSTOLIC) 10 MG tablet TAKE 1 TABLET BY MOUTH DAILY 30 tablet 11    nitrofurantoin, macrocrystal-monohydrate, (MACROBID) 100 MG capsule Take 1 capsule by mouth Daily.      Ospemifene 60 MG tablet Take 1 tablet by mouth Daily. THIS PT NEEDS TO KEEP AN APPT HERE BEFORE ANY ADDITIONAL REFILLS. 30 tablet 0    oxybutynin XL (Ditropan XL) 10 MG 24 hr tablet Take 1 tablet by mouth Daily. 30 tablet 12    potassium chloride ER (K-TAB) 20 MEQ tablet controlled-release ER tablet Take 1 tablet by mouth Daily.      pravastatin (PRAVACHOL) 40 MG tablet Take 1 tablet by mouth Every Night.      PYRIDOSTIGMINE BROMIDE PO Take 60 mg by mouth 3 (Three) Times a Day As Needed.      Unithroid 50 MCG tablet Take 1 tablet by mouth Every Morning. 90 tablet 2    VITAMIN E 1000 UNIT capsule        No current facility-administered medications on file prior to visit.      Allergies   Allergen Reactions    Lisinopril Anaphylaxis    Tetracyclines & Related Hives and Nausea Only        Review of Systems     Objective      Physical Exam  LMP  (LMP Unknown) Comment: S/P AH, LEFT S&O/RIGHT S&O    There is no height or weight on file to calculate BMI.         General:   Mental Status:  Alert   Appearance: Cooperative, in no acute distress   Build and Nutrition: Well-nourished well-developed female   Orientation: Alert and oriented to person, place and  time   Posture: Normal   Gait: Nonantalgic, using a walker    Integument:   Left knee: Wound is well-healed with no signs of infection    Lower Extremities:   Left Knee:    Effusion:  None    Swelling: Edema left lower extremity    Crepitus:  None    Range of motion:  Extension: 0°       Flexion: 120°  Instability:  No varus laxity, no valgus laxity, negative anterior drawer  Deformities:  None      Imaging/Studies  Imaging Results (Last 24 Hours)       Procedure Component Value Units Date/Time    XR Knee 3+ View With [place] Left [[id number]] Resulted: 06/20/25 1113     Updated: 06/20/25 1113    Narrative:      Left Knee Radiographs  Indication: status-post left total knee arthroplasty  Views: AP, lateral, and sunrise views of the left knee    Comparison: no change compared to prior study, 4/25/2025    Findings:   The components are well aligned, with no signs of loosening or failure.                 Assessment and Plan     Diagnoses and all orders for this visit:    1. S/P TKR (total knee replacement), left (Primary)  -     XR Knee 3+ View With Sunrise Left    2. Left leg swelling  -     Duplex Venous Lower Extremity - Left CAR; Future        1. S/P TKR (total knee replacement), left    2. Left leg swelling          I reviewed my findings with the patient.  Her left total knee arthroplasty appears to be stable, and I anticipate further improvements over time.  She is having some swelling and calf pain, and we will obtain a duplex scan specifically looking for any evidence of a DVT.  I will see her back in a month for checkup, but I will be happy to see her back sooner for any worsening or problems.    Return in about 4 weeks (around 7/18/2025).      Jose Roger MD  06/20/25  11:14 EDT    Dictated Utilizing [employer]

## 2025-07-21 ENCOUNTER — OFFICE VISIT (OUTPATIENT)
Dept: ORTHOPEDIC SURGERY | Facility: CLINIC | Age: 70
End: 2025-07-21
Payer: MEDICARE

## 2025-07-21 VITALS
DIASTOLIC BLOOD PRESSURE: 62 MMHG | HEIGHT: 62 IN | BODY MASS INDEX: 34.48 KG/M2 | SYSTOLIC BLOOD PRESSURE: 136 MMHG | WEIGHT: 187.4 LBS

## 2025-07-21 DIAGNOSIS — M17.11 PRIMARY OSTEOARTHRITIS OF RIGHT KNEE: ICD-10-CM

## 2025-07-21 DIAGNOSIS — Z96.652 S/P TKR (TOTAL KNEE REPLACEMENT), LEFT: Primary | ICD-10-CM

## 2025-07-21 PROCEDURE — 3078F DIAST BP <80 MM HG: CPT | Performed by: ORTHOPAEDIC SURGERY

## 2025-07-21 PROCEDURE — 99213 OFFICE O/P EST LOW 20 MIN: CPT | Performed by: ORTHOPAEDIC SURGERY

## 2025-07-21 PROCEDURE — 1159F MED LIST DOCD IN RCRD: CPT | Performed by: ORTHOPAEDIC SURGERY

## 2025-07-21 PROCEDURE — 1160F RVW MEDS BY RX/DR IN RCRD: CPT | Performed by: ORTHOPAEDIC SURGERY

## 2025-07-21 PROCEDURE — 3075F SYST BP GE 130 - 139MM HG: CPT | Performed by: ORTHOPAEDIC SURGERY

## 2025-07-21 RX ORDER — ACYCLOVIR 400 MG/1
400 TABLET ORAL
COMMUNITY

## 2025-07-21 NOTE — PROGRESS NOTES
[facility] [place]    Subjective     Chief Complaint   Patient presents with   • Follow-up     1 month follow up -- 3.5 month s/p TOTAL KNEE ARTHROPLASTY WITH CORI ROBOT LEFT- DOS 4/1/25        HPI    It has been 1  month(s) since Ms. Ray's last visit. She returns to clinic today for postoperative follow-up of left knee arthroplasty. The issue has been ongoing for 3.5 month(s). She rates her pain a 7/10 on the pain scale. Previous/current treatments: cane/walker. Current symptoms: pain. The pain is worse with walking and climbing stairs; ice, assistive device (cane/walker), and elevating the extremity improve the pain. Overall, she is doing better.  80% improvement compared to her preoperative symptoms.      I have reviewed the following portions of the patient's history and agree with: History of Present Illness and Review of Systems    Patient Active Problem List   Diagnosis   • Hypothyroidism   • Recurrent genital herpes   • Arm pain, right   • Arthralgia of both knees   • Bone pain   • Contusion of left lower leg   • Dyslipidemia   • History of colon cancer   • Neutrophilic leukemoid reaction   • Obesity (BMI 35.0-39.9 without comorbidity)   • Thumb pain   • Chronic ulcer of lower extremity   • Non-pressure chronic ulcer of left calf with fat layer exposed   • Family history of breast cancer in mother   • Fatty liver disease, nonalcoholic   • Mixed incontinence urge and stress   • Primary osteoarthritis of left knee   • Degenerative arthritis of left knee   • Nontoxic multinodular goiter   • Annual GYN exam S/P TAHBSO   • Status post left knee replacement   • HTN (hypertension)   • Obesity (BMI 30-39.9)   • Leukocytosis, likely reactive   • Acute postoperative pain     Past Medical History:   Diagnosis Date   • Acute torn meniscus of left knee    • Arthritis of neck 2016   • Asthma Nov 2023    history of   • Closed displaced fracture of shaft of left clavicle    • Closed nondisplaced  fracture of neck of left radius    • Colon cancer     tumor removed with 18 inches of colon   • Fatty liver    • Fracture of proximal phalanx of toe    • Herpes    • History of stomach ulcers    • Hyperlipidemia 2023    Yearly lab work Dr Nevin Finnegan   • Hypertension 2023    Dr Erasto Anna Dentist   • Hyperthyroidism    • Hypothyroidism    • IBS (irritable bowel syndrome)    • Knee swelling    • Mixed incontinence urge and stress    • Osteoarthritis    • Osteoporosis    • Rotator cuff syndrome    • Stress fracture     Rotator cup left   • Tear of meniscus of knee     Left knee      Past Surgical History:   Procedure Laterality Date   • CATARACT EXTRACTION     •  SECTION     • COLECTOMY PARTIAL / TOTAL Left 2005    Dr. Donohue   • COLONOSCOPY     • ENTEROCELE REPAIR      Dr. Donohue   • JOINT REPLACEMENT  2025    Dr Roger   • LAPAROSCOPIC CHOLECYSTECTOMY  2015    Dr. Cori Porras   • LAPAROSCOPIC SALPINGOOPHERECTOMY Right    • DAWSON MORA ([facility]) PROCEDURE      Dr. Donohue   • THYROID SURGERY      Needle biopsy    • TOTAL ABDOMINAL HYSTERECTOMY WITH SALPINGO OOPHORECTOMY Left     Dr. Dodson   • TOTAL KNEE ARTHROPLASTY Left 2025    Procedure: TOTAL KNEE ARTHROPLASTY WITH CORI ROBOT LEFT;  Surgeon: Jose Roger MD;  Location: [facility] [place];  Service: Robotics - Ortho;  Laterality: Left;   • TRIGGER POINT INJECTION  2024    Both knees   • UTERINE SUSPENSION LAPAROSCOPIC      Dr. Lobato      Family History   Problem Relation Age of Onset   • Breast cancer Mother 40   • Cancer Mother         Breast cancer   • Cancer Father         Prostate, lung, brain cancer   • Heart disease Father    • Hypertension Father    • Osteoarthritis Father    • Breast cancer Sister 70   • Hypertension Sister    • Heart disease Sister    • Hypertension Sister    • Cancer Sister         Breast cancer   • Obesity Sister    • Brain cancer Brother    • Hypertension Brother    •  Diabetes Brother    • Heart disease Brother    • Heart failure Brother    • Hypertension Brother    • Cancer Brother         Brain cancer   • Diabetes Brother    • Obesity Brother    • Breast cancer Maternal Grandmother    • Cancer Maternal Grandmother         Breast cancer   • Breast cancer Paternal Grandmother    • Cancer Paternal Grandmother         Brain cancer   • Cancer Other    • Heart disease Other      Social History     Socioeconomic History   • Marital status:    Tobacco Use   • Smoking status: Never   • Smokeless tobacco: Never   • Tobacco comments:     Never   Vaping Use   • Vaping status: Never Used   Substance and Sexual Activity   • Alcohol use: Not Currently     Alcohol/week: 2.0 standard drinks of alcohol     Comment: Socially upon occasion   • Drug use: No   • Sexual activity: Not Currently     Partners: Male     Birth control/protection: Abstinence, Hysterectomy     Comment: Hysterectomy 35 years ago      Current Outpatient Medications on File Prior to Visit   Medication Sig Dispense Refill   • acyclovir (ZOVIRAX) 400 MG tablet Take 1 tablet by mouth Every 4 (Four) Hours While Awake. Take no more than 5 doses a day.     • aspirin (ASPIR) 81 MG EC tablet Take 1 tablet by mouth 2 (Two) Times a Day. 60 tablet 0   • chlorthalidone (HYGROTON) 25 MG tablet TAKE 1 TABLET BY MOUTH DAILY 30 tablet 11   • Cholecalciferol (VITAMIN D-3) 5000 units tablet Take 1 tablet by mouth Daily.     • docusate sodium (Colace) 100 MG capsule Take 1 capsule by mouth 2 (Two) Times a Day. 60 capsule 0   • estradiol (ESTRACE VAGINAL) 0.1 MG/GM vaginal cream Insert 1 gm intravaginally 1-3 times each week 1 each 12   • fluticasone (FLONASE) 50 MCG/ACT nasal spray Administer 2 sprays into the nostril(s) as directed by provider Daily.     • furosemide (LASIX) 40 MG tablet Take 1 tablet by mouth Daily As Needed.     • meloxicam (MOBIC) 15 MG tablet Take 1 tablet by mouth Daily.     • multivitamin with minerals (Centrum  Silver Ultra Womens) tablet tablet Take 1 tablet by mouth Daily.     • nebivolol (BYSTOLIC) 10 MG tablet TAKE 1 TABLET BY MOUTH DAILY 30 tablet 11   • nitrofurantoin, macrocrystal-monohydrate, (MACROBID) 100 MG capsule Take 1 capsule by mouth Daily.     • Ospemifene 60 MG tablet Take 1 tablet by mouth Daily. THIS PT NEEDS TO KEEP AN APPT HERE BEFORE ANY ADDITIONAL REFILLS. 30 tablet 0   • pravastatin (PRAVACHOL) 40 MG tablet Take 1 tablet by mouth Every Night.     • PYRIDOSTIGMINE BROMIDE PO Take 60 mg by mouth 3 (Three) Times a Day As Needed.     • Unithroid 50 MCG tablet Take 1 tablet by mouth Every Morning. 90 tablet 2   • VITAMIN E 1000 UNIT capsule      • [DISCONTINUED] acyclovir (ZOVIRAX) 400 MG tablet Take 1 tablet by mouth Daily. If outbreak occurs while on suppressive therapy, call office for further instructions. 30 tablet 11   • [DISCONTINUED] cephalexin (KEFLEX) 500 MG capsule Take 4 capsules 1 hour prior to the dental procedure. 4 capsule 1   • [DISCONTINUED] HYDROcodone-acetaminophen (NORCO) 7.5-325 MG per tablet Take 1 tablet by mouth 2 (Two) Times a Day.       No current facility-administered medications on file prior to visit.      Allergies   Allergen Reactions   • Lisinopril Anaphylaxis   • Tetracyclines & Related Hives and Nausea Only        Review of Systems   Constitutional:  Negative for activity change, appetite change, chills, diaphoresis, fatigue, fever and unexpected weight change.   HENT:  Negative for congestion, dental problem, drooling, ear discharge, ear pain, facial swelling, hearing loss, mouth sores, nosebleeds, postnasal drip, rhinorrhea, sinus pressure, sneezing, sore throat, tinnitus, trouble swallowing and voice change.    Eyes:  Negative for photophobia, pain, discharge, redness, itching and visual disturbance.   Respiratory:  Negative for apnea, cough, choking, chest tightness, shortness of breath, wheezing and stridor.    Cardiovascular:  Negative for chest pain,  "palpitations and leg swelling.   Gastrointestinal:  Negative for abdominal distention, abdominal pain, anal bleeding, blood in stool, constipation, diarrhea, nausea, rectal pain and vomiting.   Endocrine: Negative for cold intolerance, heat intolerance, polydipsia, polyphagia and polyuria.   Genitourinary:  Negative for decreased urine volume, difficulty urinating, dysuria, enuresis, flank pain, frequency, genital sores, hematuria and urgency.   Musculoskeletal:  Positive for arthralgias. Negative for back pain, gait problem, joint swelling, myalgias, neck pain and neck stiffness.   Skin:  Negative for color change, pallor, rash and wound.   Allergic/Immunologic: Negative for environmental allergies, food allergies and immunocompromised state.   Neurological:  Negative for dizziness, tremors, seizures, syncope, facial asymmetry, speech difficulty, weakness, light-headedness, numbness and headaches.   Hematological:  Negative for adenopathy. Does not bruise/bleed easily.   Psychiatric/Behavioral:  Negative for agitation, behavioral problems, confusion, decreased concentration, dysphoric mood, hallucinations, self-injury, sleep disturbance and suicidal ideas. The patient is not nervous/anxious and is not hyperactive.         Objective      Physical Exam  /62   Ht 157.5 cm (62\")   Wt 85 kg (187 lb 6.4 oz)   LMP  (LMP Unknown) Comment: S/P AH, LEFT S&O/RIGHT S&O  BMI 34.28 kg/m²     Body mass index is 34.28 kg/m².         General:   Mental Status:  Alert   Appearance: Cooperative, in no acute distress   Build and Nutrition: Overweight by BMI female   Orientation: Alert and oriented to person, place and time   Posture: Normal   Gait: Nonantalgic    Integument:   Left knee: Wound is well-healed with no signs of infection    Lower Extremities:   Left Knee:    Effusion:  None    Crepitus:  None    Range of motion:  Extension: 0°       Flexion: 125°  Instability:  No varus laxity, no valgus laxity, negative " anterior drawer  Deformities:  None      Imaging/Studies  Imaging Results (Last 24 Hours)       ** No results found for the last 24 hours. **          No new imaging today.    Assessment and Plan     Diagnoses and all orders for this visit:    1. S/P TKR (total knee replacement), left (Primary)    2. Primary osteoarthritis of right knee  -     Ambulatory Referral to Physical Therapy for Evaluation & Treatment        1. S/P TKR (total knee replacement), left    2. Primary osteoarthritis of right knee          Reviewed my findings with the patient.  Her left total knee arthroplasty appears to be functioning well, and I anticipate further improvements over time.  I will see her back in 3 months for what will be a 6-month check with x-rays.  I will see her back sooner for any problems.  She may be interested in scheduling her right knee after that.  She is going to continue with physical therapy for her right knee before surgery, and a referral was provided today.    Return in about 3 months (around 10/21/2025) for recheck with x-rays.      Jose Roger MD  07/21/25  12:58 EDT    Dictated Utilizing Dragon Dictation

## 2025-08-04 ENCOUNTER — TELEPHONE (OUTPATIENT)
Dept: ORTHOPEDIC SURGERY | Facility: CLINIC | Age: 70
End: 2025-08-04
Payer: MEDICARE

## 2025-08-18 ENCOUNTER — OFFICE VISIT (OUTPATIENT)
Dept: ORTHOPEDIC SURGERY | Facility: CLINIC | Age: 70
End: 2025-08-18
Payer: MEDICARE

## 2025-08-18 VITALS
SYSTOLIC BLOOD PRESSURE: 120 MMHG | HEIGHT: 62 IN | DIASTOLIC BLOOD PRESSURE: 70 MMHG | BODY MASS INDEX: 34.48 KG/M2 | WEIGHT: 187.39 LBS

## 2025-08-18 DIAGNOSIS — M17.11 PRIMARY OSTEOARTHRITIS OF RIGHT KNEE: Primary | ICD-10-CM

## 2025-08-18 PROCEDURE — 20610 DRAIN/INJ JOINT/BURSA W/O US: CPT | Performed by: ORTHOPAEDIC SURGERY

## 2025-08-18 PROCEDURE — 1160F RVW MEDS BY RX/DR IN RCRD: CPT | Performed by: ORTHOPAEDIC SURGERY

## 2025-08-18 PROCEDURE — 1159F MED LIST DOCD IN RCRD: CPT | Performed by: ORTHOPAEDIC SURGERY

## 2025-08-18 PROCEDURE — 3074F SYST BP LT 130 MM HG: CPT | Performed by: ORTHOPAEDIC SURGERY

## 2025-08-18 PROCEDURE — 99214 OFFICE O/P EST MOD 30 MIN: CPT | Performed by: ORTHOPAEDIC SURGERY

## 2025-08-18 PROCEDURE — 3078F DIAST BP <80 MM HG: CPT | Performed by: ORTHOPAEDIC SURGERY

## 2025-08-18 RX ORDER — ROPIVACAINE HYDROCHLORIDE 5 MG/ML
4 INJECTION, SOLUTION EPIDURAL; INFILTRATION; PERINEURAL
Status: COMPLETED | OUTPATIENT
Start: 2025-08-18 | End: 2025-08-18

## 2025-08-18 RX ORDER — POTASSIUM CHLORIDE 1500 MG/1
TABLET, EXTENDED RELEASE ORAL
COMMUNITY
Start: 2025-07-24

## 2025-08-18 RX ORDER — CHLORHEXIDINE GLUCONATE 40 MG/ML
1 SOLUTION TOPICAL DAILY
Qty: 236 ML | Refills: 0 | Status: SHIPPED | OUTPATIENT
Start: 2025-08-18

## 2025-08-18 RX ORDER — OMEPRAZOLE 20 MG/1
20 CAPSULE, DELAYED RELEASE ORAL DAILY
COMMUNITY
Start: 2025-08-04

## 2025-08-18 RX ORDER — DEXAMETHASONE SODIUM PHOSPHATE 4 MG/ML
4 INJECTION, SOLUTION INTRA-ARTICULAR; INTRALESIONAL; INTRAMUSCULAR; INTRAVENOUS; SOFT TISSUE
Status: COMPLETED | OUTPATIENT
Start: 2025-08-18 | End: 2025-08-18

## 2025-08-18 RX ADMIN — DEXAMETHASONE SODIUM PHOSPHATE 4 MG: 4 INJECTION, SOLUTION INTRA-ARTICULAR; INTRALESIONAL; INTRAMUSCULAR; INTRAVENOUS; SOFT TISSUE at 08:56

## 2025-08-18 RX ADMIN — ROPIVACAINE HYDROCHLORIDE 4 ML: 5 INJECTION, SOLUTION EPIDURAL; INFILTRATION; PERINEURAL at 08:56

## (undated) DEVICE — Device

## (undated) DEVICE — SUT MONOCRYL PLS ANTIB UND 3/0  PS1 27IN

## (undated) DEVICE — CEMENT MIXING SYSTEM WITH MIS FEMORAL BREAKAWAY NOZZLE: Brand: REVOLUTION

## (undated) DEVICE — STRYKER PERFORMANCE SERIES SAGITTAL BLADE: Brand: STRYKER PERFORMANCE SERIES

## (undated) DEVICE — SYS SKIN EXOFIN WND CLS 4X22CM

## (undated) DEVICE — BLANKT WARM UPPR/BDY ARM/OUT 57X196CM

## (undated) DEVICE — SHEET,DRAPE,40X58,STERILE: Brand: MEDLINE

## (undated) DEVICE — SPINAL TRAY/P: Brand: MEDLINE INDUSTRIES, INC.

## (undated) DEVICE — PATIENT RETURN ELECTRODE, SINGLE-USE, CONTACT QUALITY MONITORING, ADULT, WITH 9FT CORD, FOR PATIENTS WEIGING OVER 33LBS. (15KG): Brand: MEGADYNE

## (undated) DEVICE — PK KN TOTL 10

## (undated) DEVICE — KT PUMP INFUBLOCK MDL 2100 PMKITSOLIS

## (undated) DEVICE — UNDERCAST PADDING: Brand: DEROYAL

## (undated) DEVICE — ANTIBACTERIAL UNDYED BRAIDED (POLYGLACTIN 910), SYNTHETIC ABSORBABLE SUTURE: Brand: COATED VICRYL

## (undated) DEVICE — NEEDLE,HYPODERM,SAFETY,18GX1.5: Brand: MEDLINE

## (undated) DEVICE — SYR LUERLOK 20CC BX/50

## (undated) DEVICE — GLV SURG PREMIERPRO MIC LTX PF SZ8.5 BRN

## (undated) DEVICE — DRSNG PAD ABD 8X10IN STRL

## (undated) DEVICE — PENCL SMOKE/EVAC MEGADYNE TELESCP 10FT

## (undated) DEVICE — MICRO HVTSA, 0.5G AND HVTSA SOURCEMARK PRODUCT CODE M1206 AND M1206-01: Brand: EXOFIN MICRO HVTSA, 0.5G

## (undated) DEVICE — GLV SURG SENSICARE PI MIC PF SZ9 LF STRL

## (undated) DEVICE — TRAP FLD MINIVAC MEGADYNE 100ML

## (undated) DEVICE — BNDG ELAS W/CLIP 6IN 10YD LF STRL

## (undated) DEVICE — CVR FTSWITCH UNIV